# Patient Record
Sex: MALE | Race: WHITE | Employment: OTHER
[De-identification: names, ages, dates, MRNs, and addresses within clinical notes are randomized per-mention and may not be internally consistent; named-entity substitution may affect disease eponyms.]

---

## 2017-01-03 ENCOUNTER — TELEPHONE (OUTPATIENT)
Dept: UROLOGY | Facility: CLINIC | Age: 77
End: 2017-01-03

## 2017-01-11 ENCOUNTER — PROCEDURE VISIT (OUTPATIENT)
Dept: UROLOGY | Facility: CLINIC | Age: 77
End: 2017-01-11

## 2017-01-11 VITALS
SYSTOLIC BLOOD PRESSURE: 142 MMHG | DIASTOLIC BLOOD PRESSURE: 80 MMHG | HEIGHT: 71 IN | WEIGHT: 219 LBS | BODY MASS INDEX: 30.66 KG/M2

## 2017-01-11 DIAGNOSIS — N32.0 BNC (BLADDER NECK CONTRACTURE): ICD-10-CM

## 2017-01-11 DIAGNOSIS — R31.0 GROSS HEMATURIA: Primary | ICD-10-CM

## 2017-01-11 PROCEDURE — 52000 CYSTOURETHROSCOPY: CPT | Performed by: UROLOGY

## 2017-01-11 PROCEDURE — 99214 OFFICE O/P EST MOD 30 MIN: CPT | Performed by: UROLOGY

## 2017-01-11 ASSESSMENT — ENCOUNTER SYMPTOMS
NAUSEA: 0
ABDOMINAL PAIN: 0
SHORTNESS OF BREATH: 0
WHEEZING: 0
COUGH: 0
EYE PAIN: 0
COLOR CHANGE: 0
VOMITING: 0
BACK PAIN: 0
EYE REDNESS: 0

## 2017-01-16 ENCOUNTER — TELEPHONE (OUTPATIENT)
Dept: UROLOGY | Facility: CLINIC | Age: 77
End: 2017-01-16

## 2017-01-16 RX ORDER — NITROFURANTOIN 25; 75 MG/1; MG/1
100 CAPSULE ORAL 2 TIMES DAILY
Qty: 20 CAPSULE | Refills: 0 | Status: SHIPPED | OUTPATIENT
Start: 2017-01-24 | End: 2017-02-03

## 2017-02-07 ENCOUNTER — TELEPHONE (OUTPATIENT)
Dept: UROLOGY | Facility: CLINIC | Age: 77
End: 2017-02-07

## 2017-02-07 ENCOUNTER — NURSE ONLY (OUTPATIENT)
Dept: UROLOGY | Facility: CLINIC | Age: 77
End: 2017-02-07

## 2017-02-07 VITALS
WEIGHT: 216 LBS | DIASTOLIC BLOOD PRESSURE: 80 MMHG | TEMPERATURE: 98 F | HEIGHT: 71 IN | SYSTOLIC BLOOD PRESSURE: 120 MMHG | BODY MASS INDEX: 30.24 KG/M2

## 2017-02-07 DIAGNOSIS — Z98.890 POST-OPERATIVE STATE: Primary | ICD-10-CM

## 2017-02-07 DIAGNOSIS — R33.9 URINARY RETENTION WITH INCOMPLETE BLADDER EMPTYING: ICD-10-CM

## 2017-02-07 PROCEDURE — 51798 US URINE CAPACITY MEASURE: CPT | Performed by: PHYSICIAN ASSISTANT

## 2017-02-22 ENCOUNTER — OFFICE VISIT (OUTPATIENT)
Dept: UROLOGY | Facility: CLINIC | Age: 77
End: 2017-02-22

## 2017-02-22 VITALS
HEIGHT: 70 IN | SYSTOLIC BLOOD PRESSURE: 128 MMHG | WEIGHT: 217 LBS | DIASTOLIC BLOOD PRESSURE: 74 MMHG | BODY MASS INDEX: 31.07 KG/M2

## 2017-02-22 DIAGNOSIS — N40.1 BENIGN NON-NODULAR PROSTATIC HYPERPLASIA WITH LOWER URINARY TRACT SYMPTOMS: ICD-10-CM

## 2017-02-22 DIAGNOSIS — Z98.890 POST-OPERATIVE STATE: ICD-10-CM

## 2017-02-22 DIAGNOSIS — R33.9 URINARY RETENTION WITH INCOMPLETE BLADDER EMPTYING: ICD-10-CM

## 2017-02-22 DIAGNOSIS — N32.0 BNC (BLADDER NECK CONTRACTURE): Primary | ICD-10-CM

## 2017-02-22 PROCEDURE — 51798 US URINE CAPACITY MEASURE: CPT | Performed by: PHYSICIAN ASSISTANT

## 2017-02-22 PROCEDURE — 99024 POSTOP FOLLOW-UP VISIT: CPT | Performed by: PHYSICIAN ASSISTANT

## 2017-02-27 ENCOUNTER — TELEPHONE (OUTPATIENT)
Dept: UROLOGY | Facility: CLINIC | Age: 77
End: 2017-02-27

## 2017-02-27 RX ORDER — NITROFURANTOIN 25; 75 MG/1; MG/1
100 CAPSULE ORAL 2 TIMES DAILY
Qty: 20 CAPSULE | Refills: 0 | Status: SHIPPED | OUTPATIENT
Start: 2017-02-27 | End: 2017-03-01 | Stop reason: ALTCHOICE

## 2017-03-01 PROBLEM — N18.30 ACUTE RENAL FAILURE SUPERIMPOSED ON STAGE 3 CHRONIC KIDNEY DISEASE (HCC): Status: ACTIVE | Noted: 2017-03-01

## 2017-03-01 PROBLEM — N17.9 ACUTE RENAL FAILURE SUPERIMPOSED ON STAGE 3 CHRONIC KIDNEY DISEASE (HCC): Status: ACTIVE | Noted: 2017-03-01

## 2017-03-02 PROBLEM — A41.9 SEPSIS (HCC): Status: ACTIVE | Noted: 2017-03-02

## 2017-03-06 ENCOUNTER — TELEPHONE (OUTPATIENT)
Dept: UROLOGY | Facility: CLINIC | Age: 77
End: 2017-03-06

## 2017-03-13 ENCOUNTER — HOSPITAL ENCOUNTER (OUTPATIENT)
Dept: INFUSION THERAPY | Age: 77
Discharge: HOME | End: 2017-03-13

## 2017-03-17 ENCOUNTER — OFFICE VISIT (OUTPATIENT)
Dept: UROLOGY | Age: 77
End: 2017-03-17
Payer: MEDICARE

## 2017-03-17 ENCOUNTER — HOSPITAL ENCOUNTER (OUTPATIENT)
Age: 77
Discharge: HOME | End: 2017-03-17
Payer: MEDICARE

## 2017-03-17 VITALS
DIASTOLIC BLOOD PRESSURE: 72 MMHG | WEIGHT: 218 LBS | TEMPERATURE: 97.4 F | BODY MASS INDEX: 31.21 KG/M2 | HEIGHT: 70 IN | SYSTOLIC BLOOD PRESSURE: 124 MMHG

## 2017-03-17 DIAGNOSIS — N40.1 BPH WITH OBSTRUCTION/LOWER URINARY TRACT SYMPTOMS: ICD-10-CM

## 2017-03-17 DIAGNOSIS — N32.0 BNC (BLADDER NECK CONTRACTURE): Primary | ICD-10-CM

## 2017-03-17 DIAGNOSIS — N13.8 BPH WITH OBSTRUCTION/LOWER URINARY TRACT SYMPTOMS: ICD-10-CM

## 2017-03-17 LAB
ABSOLUTE EOS #: 0.2 K/UL (ref 0–0.4)
ABSOLUTE LYMPH #: 3 K/UL (ref 1–4.8)
ABSOLUTE MONO #: 1 K/UL (ref 0–1)
ANION GAP SERPL CALCULATED.3IONS-SCNC: 13 MMOL/L (ref 9–17)
BASOPHILS # BLD: 1 % (ref 0–2)
BASOPHILS ABSOLUTE: 0 K/UL (ref 0–0.2)
BUN BLDV-MCNC: 24 MG/DL (ref 8–23)
BUN/CREAT BLD: 19 (ref 9–20)
CALCIUM SERPL-MCNC: 9.4 MG/DL (ref 8.6–10.4)
CHLORIDE BLD-SCNC: 97 MMOL/L (ref 98–107)
CO2: 24 MMOL/L (ref 20–31)
CREAT SERPL-MCNC: 1.27 MG/DL (ref 0.7–1.2)
DIFFERENTIAL TYPE: NORMAL
EOSINOPHILS RELATIVE PERCENT: 2 % (ref 0–8)
GFR AFRICAN AMERICAN: >60 ML/MIN
GFR NON-AFRICAN AMERICAN: 55 ML/MIN
GFR SERPL CREATININE-BSD FRML MDRD: ABNORMAL ML/MIN/{1.73_M2}
GFR SERPL CREATININE-BSD FRML MDRD: ABNORMAL ML/MIN/{1.73_M2}
GLUCOSE BLD-MCNC: 92 MG/DL (ref 70–99)
HCT VFR BLD CALC: 42.6 % (ref 41–53)
HEMOGLOBIN: 14.2 G/DL (ref 13.5–17)
LYMPHOCYTES # BLD: 35 % (ref 24–44)
MCH RBC QN AUTO: 30.8 PG (ref 26–34)
MCHC RBC AUTO-ENTMCNC: 33.3 G/DL (ref 31–37)
MCV RBC AUTO: 92.6 FL (ref 80–100)
MONOCYTES # BLD: 12 % (ref 0–12)
PDW BLD-RTO: 14.4 % (ref 12.1–15.2)
PLATELET # BLD: 358 K/UL (ref 140–450)
PLATELET ESTIMATE: NORMAL
PMV BLD AUTO: 8.3 FL (ref 6–12)
POTASSIUM SERPL-SCNC: 5.2 MMOL/L (ref 3.7–5.3)
RBC # BLD: 4.6 M/UL (ref 4.5–5.9)
RBC # BLD: NORMAL 10*6/UL
SEG NEUTROPHILS: 50 % (ref 36–66)
SEGMENTED NEUTROPHILS ABSOLUTE COUNT: 4.4 K/UL (ref 1.8–7.7)
SODIUM BLD-SCNC: 134 MMOL/L (ref 135–144)
WBC # BLD: 8.7 K/UL (ref 3.5–11)
WBC # BLD: NORMAL 10*3/UL

## 2017-03-17 PROCEDURE — 1123F ACP DISCUSS/DSCN MKR DOCD: CPT | Performed by: UROLOGY

## 2017-03-17 PROCEDURE — 51798 US URINE CAPACITY MEASURE: CPT | Performed by: UROLOGY

## 2017-03-17 PROCEDURE — G8417 CALC BMI ABV UP PARAM F/U: HCPCS | Performed by: UROLOGY

## 2017-03-17 PROCEDURE — 1111F DSCHRG MED/CURRENT MED MERGE: CPT | Performed by: UROLOGY

## 2017-03-17 PROCEDURE — 1036F TOBACCO NON-USER: CPT | Performed by: UROLOGY

## 2017-03-17 PROCEDURE — 4040F PNEUMOC VAC/ADMIN/RCVD: CPT | Performed by: UROLOGY

## 2017-03-17 PROCEDURE — 80048 BASIC METABOLIC PNL TOTAL CA: CPT

## 2017-03-17 PROCEDURE — 85025 COMPLETE CBC W/AUTO DIFF WBC: CPT

## 2017-03-17 PROCEDURE — G8484 FLU IMMUNIZE NO ADMIN: HCPCS | Performed by: UROLOGY

## 2017-03-17 PROCEDURE — 36415 COLL VENOUS BLD VENIPUNCTURE: CPT

## 2017-03-17 PROCEDURE — G8427 DOCREV CUR MEDS BY ELIG CLIN: HCPCS | Performed by: UROLOGY

## 2017-03-17 PROCEDURE — 3017F COLORECTAL CA SCREEN DOC REV: CPT | Performed by: UROLOGY

## 2017-03-17 PROCEDURE — 99024 POSTOP FOLLOW-UP VISIT: CPT | Performed by: UROLOGY

## 2017-03-17 ASSESSMENT — ENCOUNTER SYMPTOMS
EYE PAIN: 0
COUGH: 0
ABDOMINAL PAIN: 0
BACK PAIN: 0
WHEEZING: 0
VOMITING: 0
SHORTNESS OF BREATH: 0
EYE REDNESS: 0
NAUSEA: 0
COLOR CHANGE: 0

## 2017-05-15 ENCOUNTER — HOSPITAL ENCOUNTER (OUTPATIENT)
Age: 77
Discharge: HOME | End: 2017-05-15
Payer: MEDICARE

## 2017-05-15 DIAGNOSIS — R33.9 URINARY RETENTION WITH INCOMPLETE BLADDER EMPTYING: ICD-10-CM

## 2017-05-15 DIAGNOSIS — N40.1 BENIGN NON-NODULAR PROSTATIC HYPERPLASIA WITH LOWER URINARY TRACT SYMPTOMS: ICD-10-CM

## 2017-05-15 LAB
ANION GAP SERPL CALCULATED.3IONS-SCNC: 14 MMOL/L (ref 9–17)
BUN BLDV-MCNC: 19 MG/DL (ref 8–23)
BUN/CREAT BLD: 17 (ref 9–20)
CALCIUM SERPL-MCNC: 9.8 MG/DL (ref 8.6–10.4)
CHLORIDE BLD-SCNC: 99 MMOL/L (ref 98–107)
CO2: 25 MMOL/L (ref 20–31)
CREAT SERPL-MCNC: 1.13 MG/DL (ref 0.7–1.2)
GFR AFRICAN AMERICAN: >60 ML/MIN
GFR NON-AFRICAN AMERICAN: >60 ML/MIN
GFR SERPL CREATININE-BSD FRML MDRD: NORMAL ML/MIN/{1.73_M2}
GFR SERPL CREATININE-BSD FRML MDRD: NORMAL ML/MIN/{1.73_M2}
GLUCOSE BLD-MCNC: 87 MG/DL (ref 70–99)
POTASSIUM SERPL-SCNC: 5.2 MMOL/L (ref 3.7–5.3)
SODIUM BLD-SCNC: 138 MMOL/L (ref 135–144)

## 2017-05-15 PROCEDURE — 80048 BASIC METABOLIC PNL TOTAL CA: CPT

## 2017-05-15 PROCEDURE — 36415 COLL VENOUS BLD VENIPUNCTURE: CPT

## 2017-05-17 ENCOUNTER — HOSPITAL ENCOUNTER (OUTPATIENT)
Age: 77
Setting detail: SPECIMEN
Discharge: HOME | End: 2017-05-17
Payer: MEDICARE

## 2017-05-17 ENCOUNTER — OFFICE VISIT (OUTPATIENT)
Dept: UROLOGY | Age: 77
End: 2017-05-17
Payer: MEDICARE

## 2017-05-17 VITALS
BODY MASS INDEX: 31.07 KG/M2 | WEIGHT: 217 LBS | DIASTOLIC BLOOD PRESSURE: 70 MMHG | HEIGHT: 70 IN | SYSTOLIC BLOOD PRESSURE: 106 MMHG

## 2017-05-17 DIAGNOSIS — N13.8 BPH WITH OBSTRUCTION/LOWER URINARY TRACT SYMPTOMS: ICD-10-CM

## 2017-05-17 DIAGNOSIS — N40.1 BPH WITH OBSTRUCTION/LOWER URINARY TRACT SYMPTOMS: ICD-10-CM

## 2017-05-17 DIAGNOSIS — N32.0 BNC (BLADDER NECK CONTRACTURE): Primary | ICD-10-CM

## 2017-05-17 DIAGNOSIS — R33.9 URINARY RETENTION WITH INCOMPLETE BLADDER EMPTYING: ICD-10-CM

## 2017-05-17 DIAGNOSIS — N28.9 RENAL INSUFFICIENCY: ICD-10-CM

## 2017-05-17 DIAGNOSIS — N32.0 BNC (BLADDER NECK CONTRACTURE): ICD-10-CM

## 2017-05-17 LAB
-: ABNORMAL
AMORPHOUS: ABNORMAL
BACTERIA: ABNORMAL
BILIRUBIN URINE: NEGATIVE
CASTS UA: ABNORMAL /LPF
COLOR: YELLOW
COMMENT UA: ABNORMAL
CRYSTALS, UA: ABNORMAL /HPF
EPITHELIAL CELLS UA: ABNORMAL /HPF (ref 0–5)
GLUCOSE URINE: NEGATIVE
KETONES, URINE: NEGATIVE
LEUKOCYTE ESTERASE, URINE: ABNORMAL
MUCUS: ABNORMAL
NITRITE, URINE: POSITIVE
OTHER OBSERVATIONS UA: ABNORMAL
PH UA: 6 (ref 5–9)
PROTEIN UA: NEGATIVE
RBC UA: ABNORMAL /HPF (ref 0–2)
RENAL EPITHELIAL, UA: ABNORMAL /HPF
SPECIFIC GRAVITY UA: 1.02 (ref 1.01–1.02)
TRICHOMONAS: ABNORMAL
TURBIDITY: CLEAR
URINE HGB: ABNORMAL
UROBILINOGEN, URINE: NORMAL
WBC UA: ABNORMAL /HPF (ref 0–5)
YEAST: ABNORMAL

## 2017-05-17 PROCEDURE — G8417 CALC BMI ABV UP PARAM F/U: HCPCS | Performed by: PHYSICIAN ASSISTANT

## 2017-05-17 PROCEDURE — 99214 OFFICE O/P EST MOD 30 MIN: CPT | Performed by: PHYSICIAN ASSISTANT

## 2017-05-17 PROCEDURE — 4040F PNEUMOC VAC/ADMIN/RCVD: CPT | Performed by: PHYSICIAN ASSISTANT

## 2017-05-17 PROCEDURE — 3017F COLORECTAL CA SCREEN DOC REV: CPT | Performed by: PHYSICIAN ASSISTANT

## 2017-05-17 PROCEDURE — 87186 SC STD MICRODIL/AGAR DIL: CPT

## 2017-05-17 PROCEDURE — 51741 ELECTRO-UROFLOWMETRY FIRST: CPT | Performed by: PHYSICIAN ASSISTANT

## 2017-05-17 PROCEDURE — 87077 CULTURE AEROBIC IDENTIFY: CPT

## 2017-05-17 PROCEDURE — G8427 DOCREV CUR MEDS BY ELIG CLIN: HCPCS | Performed by: PHYSICIAN ASSISTANT

## 2017-05-17 PROCEDURE — 1036F TOBACCO NON-USER: CPT | Performed by: PHYSICIAN ASSISTANT

## 2017-05-17 PROCEDURE — 87086 URINE CULTURE/COLONY COUNT: CPT

## 2017-05-17 PROCEDURE — 81001 URINALYSIS AUTO W/SCOPE: CPT

## 2017-05-17 PROCEDURE — 1123F ACP DISCUSS/DSCN MKR DOCD: CPT | Performed by: PHYSICIAN ASSISTANT

## 2017-05-17 ASSESSMENT — ENCOUNTER SYMPTOMS
DIARRHEA: 0
SHORTNESS OF BREATH: 0
NAUSEA: 0
CONSTIPATION: 0
ABDOMINAL PAIN: 0
VOMITING: 0
ABDOMINAL DISTENTION: 0
COLOR CHANGE: 0

## 2017-05-20 LAB
CULTURE: ABNORMAL
CULTURE: ABNORMAL
Lab: ABNORMAL
Lab: ABNORMAL
ORGANISM: ABNORMAL
SPECIMEN DESCRIPTION: ABNORMAL
SPECIMEN DESCRIPTION: ABNORMAL
STATUS: ABNORMAL

## 2017-10-30 ENCOUNTER — TELEPHONE (OUTPATIENT)
Dept: UROLOGY | Age: 77
End: 2017-10-30

## 2017-10-30 NOTE — TELEPHONE ENCOUNTER
Patient had wife drop off disk of stone and results. He was having abdominal pain and vomiting. He was put on Levofloxacin 500mg daily X 7 days, which he finished Thursday or Friday. He has no pain now. He didn't see where he passed it, but he self caths once a day. He has an appointment 11/20/17. Do you want to see him sooner?

## 2017-10-31 ENCOUNTER — HOSPITAL ENCOUNTER (OUTPATIENT)
Age: 77
Setting detail: SPECIMEN
Discharge: HOME | DRG: 698 | End: 2017-10-31
Payer: MEDICARE

## 2017-10-31 ENCOUNTER — OFFICE VISIT (OUTPATIENT)
Dept: UROLOGY | Age: 77
End: 2017-10-31
Payer: MEDICARE

## 2017-10-31 ENCOUNTER — ANESTHESIA EVENT (OUTPATIENT)
Dept: OPERATING ROOM | Age: 77
DRG: 698 | End: 2017-10-31
Payer: MEDICARE

## 2017-10-31 VITALS
SYSTOLIC BLOOD PRESSURE: 120 MMHG | WEIGHT: 214 LBS | TEMPERATURE: 97.9 F | HEIGHT: 70 IN | DIASTOLIC BLOOD PRESSURE: 80 MMHG | BODY MASS INDEX: 30.64 KG/M2

## 2017-10-31 DIAGNOSIS — N13.2 URETERAL STONE WITH HYDRONEPHROSIS: Primary | ICD-10-CM

## 2017-10-31 DIAGNOSIS — N20.0 RENAL STONE: ICD-10-CM

## 2017-10-31 DIAGNOSIS — N13.2 URETERAL STONE WITH HYDRONEPHROSIS: ICD-10-CM

## 2017-10-31 LAB
-: ABNORMAL
AMORPHOUS: ABNORMAL
BACTERIA: ABNORMAL
BILIRUBIN URINE: NEGATIVE
CASTS UA: ABNORMAL /LPF
COLOR: YELLOW
COMMENT UA: ABNORMAL
CRYSTALS, UA: ABNORMAL /HPF
EPITHELIAL CELLS UA: ABNORMAL /HPF (ref 0–5)
GLUCOSE URINE: NEGATIVE
KETONES, URINE: NEGATIVE
LEUKOCYTE ESTERASE, URINE: ABNORMAL
MUCUS: ABNORMAL
NITRITE, URINE: POSITIVE
OTHER OBSERVATIONS UA: ABNORMAL
PH UA: 6 (ref 5–9)
PROTEIN UA: NEGATIVE
RBC UA: ABNORMAL /HPF (ref 0–2)
RENAL EPITHELIAL, UA: ABNORMAL /HPF
SPECIFIC GRAVITY UA: 1.01 (ref 1.01–1.02)
TRICHOMONAS: ABNORMAL
TURBIDITY: CLEAR
URINE HGB: ABNORMAL
UROBILINOGEN, URINE: NORMAL
WBC UA: ABNORMAL /HPF (ref 0–5)
YEAST: ABNORMAL

## 2017-10-31 PROCEDURE — 87186 SC STD MICRODIL/AGAR DIL: CPT

## 2017-10-31 PROCEDURE — 3017F COLORECTAL CA SCREEN DOC REV: CPT | Performed by: NURSE PRACTITIONER

## 2017-10-31 PROCEDURE — 81001 URINALYSIS AUTO W/SCOPE: CPT

## 2017-10-31 PROCEDURE — 1036F TOBACCO NON-USER: CPT | Performed by: NURSE PRACTITIONER

## 2017-10-31 PROCEDURE — 87077 CULTURE AEROBIC IDENTIFY: CPT

## 2017-10-31 PROCEDURE — G8417 CALC BMI ABV UP PARAM F/U: HCPCS | Performed by: NURSE PRACTITIONER

## 2017-10-31 PROCEDURE — 4040F PNEUMOC VAC/ADMIN/RCVD: CPT | Performed by: NURSE PRACTITIONER

## 2017-10-31 PROCEDURE — 99214 OFFICE O/P EST MOD 30 MIN: CPT | Performed by: NURSE PRACTITIONER

## 2017-10-31 PROCEDURE — 87086 URINE CULTURE/COLONY COUNT: CPT

## 2017-10-31 PROCEDURE — 1123F ACP DISCUSS/DSCN MKR DOCD: CPT | Performed by: NURSE PRACTITIONER

## 2017-10-31 PROCEDURE — G8484 FLU IMMUNIZE NO ADMIN: HCPCS | Performed by: NURSE PRACTITIONER

## 2017-10-31 PROCEDURE — G8427 DOCREV CUR MEDS BY ELIG CLIN: HCPCS | Performed by: NURSE PRACTITIONER

## 2017-10-31 RX ORDER — TAMSULOSIN HYDROCHLORIDE 0.4 MG/1
0.4 CAPSULE ORAL EVERY EVENING
Qty: 30 CAPSULE | Refills: 0 | Status: SHIPPED | OUTPATIENT
Start: 2017-10-31 | End: 2018-01-02 | Stop reason: ALTCHOICE

## 2017-10-31 ASSESSMENT — ENCOUNTER SYMPTOMS
COLOR CHANGE: 0
CONSTIPATION: 0
WHEEZING: 0
SHORTNESS OF BREATH: 0
EYE PAIN: 0
ABDOMINAL PAIN: 0
NAUSEA: 0
VOMITING: 0
COUGH: 0
BACK PAIN: 0
EYE REDNESS: 0

## 2017-10-31 NOTE — PROGRESS NOTES
Subjective:      Patient ID: Codey Reilly is a 78 y.o. male. HPI  Patient is a 78 y.o. male in no acute distress and alert and oriented to person, place and time. History  s/p Greenlight PVP Jan 2015. PVR remained elevated post-op but was improving: Feb 839 mL, March 600 mL, April 505 mL, July 452 mL, Oct 252 mL. Flomax dc'd in July 2015. At annual f/u Oct 2016 PVR back up, >850 mL. Pt does not feel full or uncomfortable. He did self cath previously. He does report intermittent urine stream about half the time, mild straining. Offered pt option of resuming Flomax or resuming self cath qhs. Pt prefered to start self cath. Pt reported gross hematuria and some difficulty with cathing. Cysto showed small caliber BNC. S/p Greenlight PVP and TUIBNC with Greenlight Laser on 1/31/17. Pt has been cathing nightly since (volumes range from 3-28 oz). Today  Here today to follow-up due to recent abdominal pain and vomiting. His primary care provider did obtain a CT scan. This was independently reviewed and does show a 6 mm proximal right ureteral stone with hydronephrosis. There is a 10 mm nonobstructing stone in the left kidney without hydroureter nephrosis. This stone is visible on a KUB. He was started on Levaquin ×7 days. BUN 26, creatinine 1.85, GFR 36. Renal function has declined since 5/2017 when renal function was: BUN 19, creatinine 1.13, GFR >60. WBC 8.7. In office urinalysis showed small leukocytes and negative nitrites. He denies any abdominal pain, vomiting, or fevers since completing the course of Levaquin. He denies any dysuria or gross hematuria.    Past Medical History:   Diagnosis Date    Acute renal failure superimposed on stage 3 chronic kidney disease ([address] 3/1/2017    Arthritis     Diabetes mellitus ([address]     Hyperlipidemia     Hypertension      Past Surgical History:   Procedure Laterality Date    CATARACT REMOVAL WITH IMPLANT Right 8/26/2013    HERNIA REPAIR umbilical 3712    JOINT REPLACEMENT      right knee march 22, 2012    JOINT REPLACEMENT Right junu 2014 partial knee    JOINT REPLACEMENT Right 2014    complete removal    KNEE SURGERY Right     x 2    TURP  01/2017    Greenlight PVP and TUIBNC    TURP  01/2015    Greenlight PVP     History reviewed. No pertinent family history. Current Outpatient Prescriptions on File Prior to Visit   Medication Sig Dispense Refill    docusate sodium (COLACE) 100 MG capsule Take 1 capsule by mouth daily as needed for Constipation 20 capsule 0    metFORMIN (GLUCOPHAGE) 850 MG tablet Take 850 mg by mouth 2 times daily (with meals)       Fish Oil-Cholecalciferol (FISH OIL + D3 PO) Take  by mouth daily.  Pediatric Multivitamins-Fl (MULTI VIT/FL PO) Take  by mouth. Daily      Polyethylene Glycol 3350 (MIRALAX PO) Take  by mouth. Prn      lisinopril (PRINIVIL;ZESTRIL) 10 MG tablet Take 10 mg by mouth daily.  aspirin 81 MG tablet Take 81 mg by mouth daily.  loratadine (CLARITIN) 10 MG tablet   Take 10 mg by mouth daily prn       No current facility-administered medications on file prior to visit. Outpatient Encounter Prescriptions as of 10/31/2017   Medication Sig Dispense Refill    docusate sodium (COLACE) 100 MG capsule Take 1 capsule by mouth daily as needed for Constipation 20 capsule 0    metFORMIN (GLUCOPHAGE) 850 MG tablet Take 850 mg by mouth 2 times daily (with meals)       Fish Oil-Cholecalciferol (FISH OIL + D3 PO) Take  by mouth daily.  Pediatric Multivitamins-Fl (MULTI VIT/FL PO) Take  by mouth. Daily      Polyethylene Glycol 3350 (MIRALAX PO) Take  by mouth. Prn      lisinopril (PRINIVIL;ZESTRIL) 10 MG tablet Take 10 mg by mouth daily.  aspirin 81 MG tablet Take 81 mg by mouth daily.  loratadine (CLARITIN) 10 MG tablet   Take 10 mg by mouth daily prn       No facility-administered encounter medications on file as of 10/31/2017.       Review of patient's allergies indicates no known allergies. History   Smoking Status    Former Smoker   Smokeless Tobacco    Never Used     Comment: quit 50 years ago       History   Alcohol Use No       Vitals:    10/31/17 0926   BP: 120/80   Temp: 97.9 °F (36.6 °C)       Constitutional: Patient in no acute distress; Neuro: alert and oriented to person place and time. Psych: Mood and affect normal.  Skin: Normal  Lungs: Respiratory effort normal  Cardiovascular:  Normal peripheral pulses  Abdomen: Soft, non-tender, non-distended with no CVA, flank pain, hepatosplenomegaly or hernia. Kidneys normal.  Bladder non-tender and not distended. Lymphatics: no palpable lymphadenopathy  Penis normal  Urethral meatus normal  Scrotal exam normal  Testicles normal bilaterally      No results found for: PSA  Lab Results   Component Value Date    BUN 19 05/15/2017     Lab Results   Component Value Date    CREATININE 1.13 05/15/2017       No results found for this visit on 10/31/17. Review of Systems   Constitutional: Negative for appetite change, chills and fever. Eyes: Negative for pain, redness and visual disturbance. Respiratory: Negative for cough, shortness of breath and wheezing. Cardiovascular: Negative for chest pain and leg swelling. Gastrointestinal: Negative for abdominal pain, constipation, nausea and vomiting. Genitourinary: Positive for difficulty urinating (CISC). Negative for decreased urine volume, discharge, dysuria, enuresis, flank pain, frequency, hematuria, penile pain, scrotal swelling, testicular pain and urgency. Musculoskeletal: Negative for back pain, joint swelling and myalgias. Skin: Negative for color change, rash and wound. Neurological: Negative for dizziness, tremors and numbness. Hematological: Negative for adenopathy. Does not bruise/bleed easily. Objective:   Physical Exam    Assessment:      1.  Ureteral stone with hydronephrosis  Urinalysis with Microscopic    Urine culture clean catch tamsulosin (FLOMAX) 0.4 MG capsule   2. Renal stone  Urinalysis with Microscopic    Urine culture clean catch    tamsulosin (FLOMAX) 0.4 MG capsule           Plan:      Discussed risks and benefits of HLL and stent placement (including bleeding, infection, injury to  tract, and need for multiple procedures), details of procedure, and what to expect post-op. Pt amenable to schedule. We will plan in left ESWL in the near future as well.

## 2017-10-31 NOTE — PATIENT INSTRUCTIONS
Thank you for enrolling in [address] Please follow the instructions below to securely access your online medical record. [employer] allows you to send messages to your doctor, view your test results, renew your prescriptions, schedule appointments, and more. How Do I Sign Up? 1. In your Internet browser, go to [email or web address]  2. Click on the Sign Up Now link in the Sign In box. You will see the New Member Sign Up page. 3. Enter your [employer] Access Code exactly as it appears below. You will not need to use this code after youve completed the sign-up process. If you do not sign up before the expiration date, you must request a new code. [employer] Access Code: Activation code not generated  Current [employer] Status: Patient Declined    4. Enter your Social Security Number ([id number]) and Date of Birth (mm/dd/yyyy) as indicated and click Submit. You will be taken to the next sign-up page. 5. Create a [employer] ID. This will be your [employer] login ID and cannot be changed, so think of one that is secure and easy to remember. 6. Create a [employer] password. You can change your password at any time. 7. Enter your Password Reset Question and Answer. This can be used at a later time if you forget your password. 8. Enter your e-mail address. You will receive e-mail notification when new information is available in [address] 9. Click Sign Up. You can now view your medical record. Additional Information  If you have questions, please contact your physician practice where you receive care. Remember, [employer] is NOT to be used for urgent needs. For medical emergencies, dial 911.

## 2017-11-01 ENCOUNTER — ANESTHESIA (OUTPATIENT)
Dept: OPERATING ROOM | Age: 77
DRG: 698 | End: 2017-11-01
Payer: MEDICARE

## 2017-11-01 ENCOUNTER — HOSPITAL ENCOUNTER (OUTPATIENT)
Age: 77
Setting detail: OUTPATIENT SURGERY
Discharge: HOME | DRG: 698 | End: 2017-11-01
Attending: UROLOGY | Admitting: UROLOGY
Payer: MEDICARE

## 2017-11-01 ENCOUNTER — APPOINTMENT (OUTPATIENT)
Dept: GENERAL RADIOLOGY | Age: 77
DRG: 698 | End: 2017-11-01
Attending: UROLOGY
Payer: MEDICARE

## 2017-11-01 VITALS
OXYGEN SATURATION: 98 % | SYSTOLIC BLOOD PRESSURE: 109 MMHG | HEIGHT: 70 IN | DIASTOLIC BLOOD PRESSURE: 66 MMHG | BODY MASS INDEX: 30.64 KG/M2 | TEMPERATURE: 96.8 F | RESPIRATION RATE: 18 BRPM | HEART RATE: 75 BPM | WEIGHT: 214 LBS

## 2017-11-01 VITALS — TEMPERATURE: 97.9 F | OXYGEN SATURATION: 97 % | SYSTOLIC BLOOD PRESSURE: 100 MMHG | DIASTOLIC BLOOD PRESSURE: 59 MMHG

## 2017-11-01 DIAGNOSIS — N20.0 RENAL CALCULUS: ICD-10-CM

## 2017-11-01 PROBLEM — N20.1 URETERAL CALCULUS: Status: ACTIVE | Noted: 2017-11-01

## 2017-11-01 PROCEDURE — 7100000001 HC PACU RECOVERY - ADDTL 15 MIN: Performed by: UROLOGY

## 2017-11-01 PROCEDURE — 6360000002 HC RX W HCPCS: Performed by: NURSE ANESTHETIST, CERTIFIED REGISTERED

## 2017-11-01 PROCEDURE — 74000 XR ABDOMEN LIMITED (KUB): CPT

## 2017-11-01 PROCEDURE — 2500000003 HC RX 250 WO HCPCS: Performed by: NURSE ANESTHETIST, CERTIFIED REGISTERED

## 2017-11-01 PROCEDURE — C1769 GUIDE WIRE: HCPCS | Performed by: UROLOGY

## 2017-11-01 PROCEDURE — 7100000010 HC PHASE II RECOVERY - FIRST 15 MIN: Performed by: UROLOGY

## 2017-11-01 PROCEDURE — 3700000001 HC ADD 15 MINUTES (ANESTHESIA): Performed by: UROLOGY

## 2017-11-01 PROCEDURE — 7100000000 HC PACU RECOVERY - FIRST 15 MIN: Performed by: UROLOGY

## 2017-11-01 PROCEDURE — 7100000011 HC PHASE II RECOVERY - ADDTL 15 MIN: Performed by: UROLOGY

## 2017-11-01 PROCEDURE — 6370000000 HC RX 637 (ALT 250 FOR IP): Performed by: UROLOGY

## 2017-11-01 PROCEDURE — C2617 STENT, NON-COR, TEM W/O DEL: HCPCS | Performed by: UROLOGY

## 2017-11-01 PROCEDURE — 3700000000 HC ANESTHESIA ATTENDED CARE: Performed by: UROLOGY

## 2017-11-01 PROCEDURE — 6360000002 HC RX W HCPCS: Performed by: UROLOGY

## 2017-11-01 PROCEDURE — 3600000014 HC SURGERY LEVEL 4 ADDTL 15MIN: Performed by: UROLOGY

## 2017-11-01 PROCEDURE — 3600000004 HC SURGERY LEVEL 4 BASE: Performed by: UROLOGY

## 2017-11-01 PROCEDURE — 2580000003 HC RX 258: Performed by: UROLOGY

## 2017-11-01 DEVICE — URETERAL STENT
Type: IMPLANTABLE DEVICE | Site: URETER | Status: FUNCTIONAL
Brand: PERCUFLEX™ PLUS

## 2017-11-01 RX ORDER — METOCLOPRAMIDE HYDROCHLORIDE 5 MG/ML
10 INJECTION INTRAMUSCULAR; INTRAVENOUS
Status: DISCONTINUED | OUTPATIENT
Start: 2017-11-01 | End: 2017-11-01 | Stop reason: HOSPADM

## 2017-11-01 RX ORDER — PROPOFOL 10 MG/ML
INJECTION, EMULSION INTRAVENOUS PRN
Status: DISCONTINUED | OUTPATIENT
Start: 2017-11-01 | End: 2017-11-01 | Stop reason: SDUPTHER

## 2017-11-01 RX ORDER — KETOROLAC TROMETHAMINE 30 MG/ML
INJECTION, SOLUTION INTRAMUSCULAR; INTRAVENOUS PRN
Status: DISCONTINUED | OUTPATIENT
Start: 2017-11-01 | End: 2017-11-01 | Stop reason: SDUPTHER

## 2017-11-01 RX ORDER — FENTANYL CITRATE 50 UG/ML
INJECTION, SOLUTION INTRAMUSCULAR; INTRAVENOUS PRN
Status: DISCONTINUED | OUTPATIENT
Start: 2017-11-01 | End: 2017-11-01 | Stop reason: SDUPTHER

## 2017-11-01 RX ORDER — CIPROFLOXACIN 2 MG/ML
400 INJECTION, SOLUTION INTRAVENOUS
Status: COMPLETED | OUTPATIENT
Start: 2017-11-01 | End: 2017-11-01

## 2017-11-01 RX ORDER — SODIUM CHLORIDE, SODIUM LACTATE, POTASSIUM CHLORIDE, CALCIUM CHLORIDE 600; 310; 30; 20 MG/100ML; MG/100ML; MG/100ML; MG/100ML
INJECTION, SOLUTION INTRAVENOUS CONTINUOUS
Status: DISCONTINUED | OUTPATIENT
Start: 2017-11-01 | End: 2017-11-01 | Stop reason: HOSPADM

## 2017-11-01 RX ORDER — DEXAMETHASONE SODIUM PHOSPHATE 4 MG/ML
INJECTION, SOLUTION INTRA-ARTICULAR; INTRALESIONAL; INTRAMUSCULAR; INTRAVENOUS; SOFT TISSUE PRN
Status: DISCONTINUED | OUTPATIENT
Start: 2017-11-01 | End: 2017-11-01 | Stop reason: SDUPTHER

## 2017-11-01 RX ORDER — OXYCODONE HYDROCHLORIDE AND ACETAMINOPHEN 5; 325 MG/1; MG/1
2 TABLET ORAL
Status: DISCONTINUED | OUTPATIENT
Start: 2017-11-01 | End: 2017-11-01 | Stop reason: HOSPADM

## 2017-11-01 RX ORDER — ONDANSETRON 2 MG/ML
INJECTION INTRAMUSCULAR; INTRAVENOUS PRN
Status: DISCONTINUED | OUTPATIENT
Start: 2017-11-01 | End: 2017-11-01 | Stop reason: SDUPTHER

## 2017-11-01 RX ORDER — LIDOCAINE HYDROCHLORIDE 20 MG/ML
INJECTION, SOLUTION INFILTRATION; PERINEURAL PRN
Status: DISCONTINUED | OUTPATIENT
Start: 2017-11-01 | End: 2017-11-01 | Stop reason: SDUPTHER

## 2017-11-01 RX ORDER — PROMETHAZINE HYDROCHLORIDE 25 MG/ML
6.25 INJECTION, SOLUTION INTRAMUSCULAR; INTRAVENOUS
Status: DISCONTINUED | OUTPATIENT
Start: 2017-11-01 | End: 2017-11-01 | Stop reason: HOSPADM

## 2017-11-01 RX ADMIN — FENTANYL CITRATE 50 MCG: 50 INJECTION INTRAMUSCULAR; INTRAVENOUS at 12:30

## 2017-11-01 RX ADMIN — ONDANSETRON 4 MG: 2 INJECTION INTRAMUSCULAR; INTRAVENOUS at 12:30

## 2017-11-01 RX ADMIN — KETOROLAC TROMETHAMINE 30 MG: 30 INJECTION, SOLUTION INTRAMUSCULAR; INTRAVENOUS at 12:30

## 2017-11-01 RX ADMIN — PHENYLEPHRINE HYDROCHLORIDE 200 MCG: 10 INJECTION INTRAVENOUS at 12:55

## 2017-11-01 RX ADMIN — SODIUM CHLORIDE, POTASSIUM CHLORIDE, SODIUM LACTATE AND CALCIUM CHLORIDE: 600; 310; 30; 20 INJECTION, SOLUTION INTRAVENOUS at 11:29

## 2017-11-01 RX ADMIN — PHENYLEPHRINE HYDROCHLORIDE 200 MCG: 10 INJECTION INTRAVENOUS at 12:35

## 2017-11-01 RX ADMIN — CIPROFLOXACIN 400 MG: 2 INJECTION, SOLUTION INTRAVENOUS at 12:11

## 2017-11-01 RX ADMIN — LIDOCAINE HYDROCHLORIDE 100 MG: 20 INJECTION, SOLUTION INFILTRATION; PERINEURAL at 12:20

## 2017-11-01 RX ADMIN — PROPOFOL 160 MG: 10 INJECTION, EMULSION INTRAVENOUS at 12:20

## 2017-11-01 RX ADMIN — PHENYLEPHRINE HYDROCHLORIDE 200 MCG: 10 INJECTION INTRAVENOUS at 12:50

## 2017-11-01 RX ADMIN — DEXAMETHASONE SODIUM PHOSPHATE 8 MG: 4 INJECTION, SOLUTION INTRAMUSCULAR; INTRAVENOUS at 12:30

## 2017-11-01 RX ADMIN — PHENYLEPHRINE HYDROCHLORIDE 200 MCG: 10 INJECTION INTRAVENOUS at 12:30

## 2017-11-01 ASSESSMENT — PAIN SCALES - GENERAL
PAINLEVEL_OUTOF10: 0

## 2017-11-01 ASSESSMENT — PAIN - FUNCTIONAL ASSESSMENT: PAIN_FUNCTIONAL_ASSESSMENT: 0-10

## 2017-11-01 NOTE — H&P
evening 10/31/17   CHILANGO Delgado   docusate sodium (COLACE) 100 MG capsule Take 1 capsule by mouth daily as needed for Constipation 1/31/17   Meagan Interiano MD   metFORMIN (GLUCOPHAGE) 850 MG tablet Take 850 mg by mouth 2 times daily (with meals)  9/12/16   Historical Provider, MD   Fish Oil-Cholecalciferol (FISH OIL + D3 PO) Take  by mouth daily. Historical Provider, MD   Pediatric Multivitamins-Fl (MULTI VIT/FL PO) Take  by mouth. Daily    Historical Provider, MD   Polyethylene Glycol 3350 (MIRALAX PO) Take  by mouth. Prn    Historical Provider, MD   lisinopril (PRINIVIL;ZESTRIL) 10 MG tablet Take 10 mg by mouth daily. Historical Provider, MD   aspirin 81 MG tablet Take 81 mg by mouth daily. Historical Provider, MD   loratadine (CLARITIN) 10 MG tablet   Take 10 mg by mouth daily prn    Historical Provider, MD       Allergies:  Review of patient's allergies indicates no known allergies. Social History:    Social History     Social History    Marital status:      Spouse name: N/A    Number of children: N/A    Years of education: N/A     Occupational History    Not on file. Social History Main Topics    Smoking status: Former Smoker    Smokeless tobacco: Never Used      Comment: quit 50 years ago    Alcohol use No    Drug use: No    Sexual activity: Not on file     Other Topics Concern    Not on file     Social History Narrative    No narrative on file       Family History:  History reviewed. No pertinent family history. REVIEW OF SYSTEMS:  All systems reviewed and negative except for that already noted in the HPI. Physical Exam:      Patient Vitals for the past 24 hrs:   Height Weight   10/31/17 1420 5' 10\" (1.778 m) 214 lb (97.1 kg)     Constitutional: Patient in no acute distress; Neuro: alert and oriented to person place and time.     Psych: Mood and affect normal.  Skin: Normal  Lungs: Respiratory effort normal  Cardiovascular:  Normal peripheral

## 2017-11-01 NOTE — ANESTHESIA PRE PROCEDURE
Department of Anesthesiology  Preprocedure Note       Name:  Denise Rivers   Age:  78 y.o.  :  1940                                          MRN:  [id number]         Date:  2017      Surgeon: Jennie Peralta):  Gaetano Sprague MD    Procedure: Procedure(s):  CYSTOSCOPY URETEROSCOPY LASER-WITH HLL  CYSTOSCOPY STENT INSERTION    Medications prior to admission:   Prior to Admission medications    Medication Sig Start Date End Date Taking? Authorizing Provider   tamsulosin (FLOMAX) 0.4 MG capsule Take 1 capsule by mouth every evening 10/31/17  Yes CHILANGO Ortega   metFORMIN (GLUCOPHAGE) 850 MG tablet Take 850 mg by mouth 2 times daily (with meals)  16  Yes Historical Provider, MD   Fish Oil-Cholecalciferol (FISH OIL + D3 PO) Take  by mouth daily. Yes Historical Provider, MD   Pediatric Multivitamins-Fl (MULTI VIT/FL PO) Take  by mouth. Daily   Yes Historical Provider, MD   lisinopril (PRINIVIL;ZESTRIL) 10 MG tablet Take 10 mg by mouth daily. Yes Historical Provider, MD   aspirin 81 MG tablet Take 81 mg by mouth daily. Yes Historical Provider, MD   loratadine (CLARITIN) 10 MG tablet   Take 10 mg by mouth daily prn   Yes Historical Provider, MD   Polyethylene Glycol 3350 (MIRALAX PO) Take  by mouth.  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 and other lower urinary tract symptoms (LUTS)(600.21) N40.1    UTI (urinary tract infection) N39.0    Gross hematuria R31.0    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    Acute renal failure superimposed on stage 3 chronic kidney disease (HCC) N17.9, N18.3    Sepsis ([facility] A41.9    Diabetes mellitus ([facility] E11.9    Hypertension I10    Ureteral calculus N20.1       Past Medical History:        Diagnosis Date    Acute renal failure superimposed on stage 3 chronic kidney disease ([facility] 3/1/2017    Arthritis     Diabetes mellitus ([facility]     Hyperlipidemia     Hypertension        Past Surgical History:        Procedure Laterality Date    CATARACT REMOVAL WITH IMPLANT Right 8/26/2013    HERNIA REPAIR      umbilical 3175    JOINT REPLACEMENT      right knee march 22, 2012    JOINT REPLACEMENT Right junu 2014 partial knee    JOINT REPLACEMENT Right 2014    complete removal    KNEE SURGERY Right     x 2    TURP  01/2017    Greenlight PVP and TUIBNC    TURP  01/2015    Greenlight PVP       Social History:    Social History   Substance Use Topics    Smoking status: Former Smoker     Packs/day: 0.50     Years: 3.00    Smokeless tobacco: Never Used      Comment: quit 50 years ago    Alcohol use No                                Counseling given: Not Answered      Vital Signs (Current):   Vitals:    10/31/17 1420 11/01/17 1118 11/01/17 1124   BP:   110/86   Pulse:   92   Resp:   16   Temp:   36.9 °C (98.4 °F)   TempSrc:   Temporal   SpO2:   97%   Weight: 214 lb (97.1 kg) 214 lb (97.1 kg)    Height: 5' 10\" (1.778 m) 5' 10\" (1.778 m)                                               BP Readings from Last 3 Encounters:   11/01/17 110/86   10/31/17 120/80   05/17/17 106/70       NPO Status: Time of last liquid consumption: 2100                        Time of last solid consumption: 2100                        Date of last liquid consumption: 10/31/17                        Date of last solid food consumption: 10/31/17    BMI:   Wt Readings from Last 3 Encounters:   11/01/17 214 lb (97.1 kg)   10/31/17 214 lb (97.1 kg) 05/17/17 217 lb (98.4 kg)     Body mass index is 30.71 kg/m². CBC:   Lab Results   Component Value Date    WBC 8.7 03/17/2017    RBC 4.60 03/17/2017    HGB 14.2 03/17/2017    HCT 42.6 03/17/2017    MCV 92.6 03/17/2017    RDW 14.4 03/17/2017     03/17/2017       CMP:   Lab Results   Component Value Date     05/15/2017    K 5.2 05/15/2017    CL 99 05/15/2017    CO2 25 05/15/2017    BUN 19 05/15/2017    CREATININE 1.13 05/15/2017    GFRAA >60 05/15/2017    LABGLOM >60 05/15/2017    GLUCOSE 87 05/15/2017    PROT 6.5 03/02/2017    CALCIUM 9.8 05/15/2017    BILITOT 0.65 03/02/2017    ALKPHOS 47 03/02/2017    AST 16 03/02/2017    ALT 25 03/02/2017       POC Tests: No results for input(s): POCGLU, POCNA, POCK, POCCL, POCBUN, POCHEMO, POCHCT in the last 72 hours. Coags:   Lab Results   Component Value Date    PROTIME 10.6 03/03/2017    INR 1.0 03/03/2017    APTT 31.5 03/03/2017       HCG (If Applicable): No results found for: PREGTESTUR, PREGSERUM, HCG, HCGQUANT     ABGs: No results found for: PHART, PO2ART, BTD9BTK, HQE7LJL, BEART, F8CYHMXN     Type & Screen (If Applicable):  No results found for: LABABO, [address]    Anesthesia Evaluation  Patient summary reviewed and Nursing notes reviewed no history of anesthetic complications:   Airway: Mallampati: III  TM distance: >3 FB   Neck ROM: full  Mouth opening: > = 3 FB Dental: normal exam         Pulmonary:negative ROS breath sounds clear to auscultation                             Cardiovascular:negative ROS    (+) hypertension: no interval change,         Rhythm: regular  Rate: normal                    Neuro/Psych:               GI/Hepatic/Renal:   (+) renal disease: kidney stones, CRI and no interval change,           Endo/Other:                     Abdominal:           Vascular:                                      Anesthesia Plan      general     ASA 2       Induction: intravenous.     MIPS: Postoperative opioids intended and Prophylactic antiemetics administered. Anesthetic plan and risks discussed with patient and spouse.                       Chidi Potts CRNA   11/1/2017

## 2017-11-01 NOTE — OP NOTE
we then used flexible ureteroscope to  pass this through the ureter up into the kidney. I did not identify  any ureteral calculi. We could have easily pushed the stone up into  the kidney. We did identify two stones in the renal pelvis,  approximately 6 mm each. We were able to then use the 200 micron  laser fiber and ablated these stones adequately. We did tiffanie them  around several calices. Once we were down to sub 2 mm fragments, we  then removed the scope, leaving the guidewire in place and replaced  the scope over the guidewire and placed 6-Paraguayan 28-cm double-J  ureteral stent over the Glidewire up into the kidney. Glidewire was  removed. Proximal curl was confirmed by fluoroscopy. Distal curl was  confirmed by visualization. At this point in time, the bladder was  drained. We used 2% Lidocaine Uro-jet for local anesthetic. We then  taped the stent string to the penis with Steris and benzoin. PLAN:  The patient will be discharged home per PACU criteria and we  can pull his stent by string in two days. We can then schedule him  for a left ESWL.         Jaskaran Barnes    D: 11/01/2017 13:04:21       T: 11/01/2017 14:59:39     [email or web address]  Job#: [id number]     Doc#: [id number]

## 2017-11-01 NOTE — ANESTHESIA POSTPROCEDURE EVALUATION
Department of Anesthesiology  Postprocedure Note    Patient: Silver Balderas  MRN: [id number]  YOB: 1940  Date of evaluation: 11/1/2017  Time:  4:03 PM     Procedure Summary     Date:  11/01/17 Room / Location:  [facility] OR    Anesthesia Start:  1215 Anesthesia Stop:  9739    Procedures:       CYSTOSCOPY URETEROSCOPY LASER-WITH HLL (Right )      CYSTOSCOPY STENT INSERTION (Right ) Diagnosis:  (RIGHT URETERAL CALCULUS)    Surgeon:  Roseann Cadena MD Responsible Provider:  Julius Cruz CRNA    Anesthesia Type:  general ASA Status:  2          Anesthesia Type: general    Annelise Phase I: Annelise Score: 10    Annelise Phase II: Annelise Score: 10    Last vitals: Reviewed and per EMR flowsheets.        Anesthesia Post Evaluation    Patient location during evaluation: PACU  Patient participation: complete - patient participated  Level of consciousness: awake and alert  Pain score: 0  Airway patency: patent  Nausea & Vomiting: no nausea and no vomiting  Complications: no  Cardiovascular status: blood pressure returned to baseline  Respiratory status: acceptable and room air  Hydration status: stable

## 2017-11-03 ENCOUNTER — HOSPITAL ENCOUNTER (INPATIENT)
Age: 77
LOS: 6 days | Discharge: HOME | DRG: 698 | End: 2017-11-09
Attending: EMERGENCY MEDICINE | Admitting: INTERNAL MEDICINE
Payer: MEDICARE

## 2017-11-03 ENCOUNTER — APPOINTMENT (OUTPATIENT)
Dept: GENERAL RADIOLOGY | Age: 77
DRG: 698 | End: 2017-11-03
Payer: MEDICARE

## 2017-11-03 ENCOUNTER — APPOINTMENT (OUTPATIENT)
Dept: CT IMAGING | Age: 77
DRG: 698 | End: 2017-11-03
Payer: MEDICARE

## 2017-11-03 ENCOUNTER — ANESTHESIA (OUTPATIENT)
Dept: OPERATING ROOM | Age: 77
DRG: 698 | End: 2017-11-03
Payer: MEDICARE

## 2017-11-03 ENCOUNTER — OFFICE VISIT (OUTPATIENT)
Dept: UROLOGY | Age: 77
End: 2017-11-03
Payer: MEDICARE

## 2017-11-03 ENCOUNTER — ANESTHESIA EVENT (OUTPATIENT)
Dept: OPERATING ROOM | Age: 77
DRG: 698 | End: 2017-11-03
Payer: MEDICARE

## 2017-11-03 VITALS
WEIGHT: 212 LBS | HEART RATE: 110 BPM | BODY MASS INDEX: 30.35 KG/M2 | HEIGHT: 70 IN | DIASTOLIC BLOOD PRESSURE: 63 MMHG | TEMPERATURE: 99.2 F | SYSTOLIC BLOOD PRESSURE: 88 MMHG

## 2017-11-03 VITALS — SYSTOLIC BLOOD PRESSURE: 156 MMHG | OXYGEN SATURATION: 95 % | DIASTOLIC BLOOD PRESSURE: 78 MMHG

## 2017-11-03 DIAGNOSIS — A41.9 SEPSIS, DUE TO UNSPECIFIED ORGANISM: Primary | ICD-10-CM

## 2017-11-03 DIAGNOSIS — N13.2 URETERAL STONE WITH HYDRONEPHROSIS: Primary | ICD-10-CM

## 2017-11-03 DIAGNOSIS — I95.89 OTHER SPECIFIED HYPOTENSION: ICD-10-CM

## 2017-11-03 PROBLEM — N13.30 HYDRONEPHROSIS OF RIGHT KIDNEY: Status: ACTIVE | Noted: 2017-11-03

## 2017-11-03 PROBLEM — I95.9 ARTERIAL HYPOTENSION: Status: ACTIVE | Noted: 2017-11-03

## 2017-11-03 PROBLEM — N17.9 ACUTE RENAL FAILURE SUPERIMPOSED ON STAGE 3 CHRONIC KIDNEY DISEASE (HCC): Status: ACTIVE | Noted: 2017-11-03

## 2017-11-03 PROBLEM — N30.00 ACUTE CYSTITIS: Status: ACTIVE | Noted: 2017-11-03

## 2017-11-03 PROBLEM — I95.9 SEPSIS ASSOCIATED HYPOTENSION (HCC): Status: ACTIVE | Noted: 2017-11-03

## 2017-11-03 PROBLEM — R65.20 SEVERE SEPSIS (HCC): Status: ACTIVE | Noted: 2017-11-03

## 2017-11-03 PROBLEM — E87.20 LACTIC ACID ACIDOSIS: Status: ACTIVE | Noted: 2017-11-03

## 2017-11-03 PROBLEM — N18.30 ACUTE RENAL FAILURE SUPERIMPOSED ON STAGE 3 CHRONIC KIDNEY DISEASE (HCC): Status: ACTIVE | Noted: 2017-11-03

## 2017-11-03 PROBLEM — R33.9 URINARY RETENTION: Status: ACTIVE | Noted: 2017-11-03

## 2017-11-03 PROBLEM — E87.1 HYPONATREMIA: Status: ACTIVE | Noted: 2017-11-03

## 2017-11-03 PROBLEM — B37.9 YEAST INFECTION: Status: ACTIVE | Noted: 2017-11-03

## 2017-11-03 LAB
-: ABNORMAL
ABSOLUTE BANDS #: 4.91 K/UL (ref 0–1)
ABSOLUTE EOS #: 0 K/UL (ref 0–0.4)
ABSOLUTE IMMATURE GRANULOCYTE: ABNORMAL K/UL (ref 0–0.3)
ABSOLUTE LYMPH #: 0.55 K/UL (ref 1–4.8)
ABSOLUTE MONO #: 3 K/UL (ref 0–1)
ALBUMIN SERPL-MCNC: 3.3 G/DL (ref 3.5–5.2)
ALBUMIN/GLOBULIN RATIO: 1 (ref 1–2.5)
ALP BLD-CCNC: 45 U/L (ref 40–129)
ALT SERPL-CCNC: 16 U/L (ref 5–41)
AMORPHOUS: ABNORMAL
ANION GAP SERPL CALCULATED.3IONS-SCNC: 18 MMOL/L (ref 9–17)
AST SERPL-CCNC: 18 U/L
BACTERIA: ABNORMAL
BANDS: 18 %
BASOPHILS # BLD: 0 %
BASOPHILS ABSOLUTE: 0 K/UL (ref 0–0.2)
BILIRUB SERPL-MCNC: 0.95 MG/DL (ref 0.3–1.2)
BILIRUBIN DIRECT: 0.33 MG/DL
BILIRUBIN URINE: ABNORMAL
BILIRUBIN, INDIRECT: 0.62 MG/DL (ref 0–1)
BUN BLDV-MCNC: 29 MG/DL (ref 8–23)
BUN/CREAT BLD: 14 (ref 9–20)
CALCIUM SERPL-MCNC: 8.5 MG/DL (ref 8.6–10.4)
CASTS UA: ABNORMAL /LPF
CHLORIDE BLD-SCNC: 93 MMOL/L (ref 98–107)
CO2: 20 MMOL/L (ref 20–31)
COLOR: YELLOW
COMMENT UA: ABNORMAL
CREAT SERPL-MCNC: 2.06 MG/DL (ref 0.7–1.2)
CRYSTALS, UA: ABNORMAL /HPF
CULTURE: ABNORMAL
DIFFERENTIAL TYPE: ABNORMAL
EKG ATRIAL RATE: 115 BPM
EKG P AXIS: 30 DEGREES
EKG P-R INTERVAL: 188 MS
EKG Q-T INTERVAL: 316 MS
EKG QRS DURATION: 126 MS
EKG QTC CALCULATION (BAZETT): 437 MS
EKG R AXIS: -59 DEGREES
EKG T AXIS: 73 DEGREES
EKG VENTRICULAR RATE: 115 BPM
EOSINOPHILS RELATIVE PERCENT: 0 %
EPITHELIAL CELLS UA: ABNORMAL /HPF (ref 0–5)
GFR AFRICAN AMERICAN: 38 ML/MIN
GFR NON-AFRICAN AMERICAN: 32 ML/MIN
GFR SERPL CREATININE-BSD FRML MDRD: ABNORMAL ML/MIN/{1.73_M2}
GFR SERPL CREATININE-BSD FRML MDRD: ABNORMAL ML/MIN/{1.73_M2}
GLOBULIN: ABNORMAL G/DL (ref 1.5–3.8)
GLUCOSE BLD-MCNC: 150 MG/DL (ref 74–100)
GLUCOSE BLD-MCNC: 165 MG/DL (ref 74–100)
GLUCOSE BLD-MCNC: 205 MG/DL (ref 70–99)
GLUCOSE BLD-MCNC: 223 MG/DL (ref 74–100)
GLUCOSE URINE: ABNORMAL
HCT VFR BLD CALC: 38.1 % (ref 41–53)
HEMOGLOBIN: 12.7 G/DL (ref 13.5–17)
IMMATURE GRANULOCYTES: ABNORMAL %
KETONES, URINE: ABNORMAL
LACTIC ACID, WHOLE BLOOD: ABNORMAL MMOL/L (ref 0.7–2.1)
LACTIC ACID, WHOLE BLOOD: ABNORMAL MMOL/L (ref 0.7–2.1)
LACTIC ACID, WHOLE BLOOD: NORMAL MMOL/L (ref 0.7–2.1)
LACTIC ACID, WHOLE BLOOD: NORMAL MMOL/L (ref 0.7–2.1)
LACTIC ACID: 1.4 MMOL/L (ref 0.5–2.2)
LACTIC ACID: 1.8 MMOL/L (ref 0.5–2.2)
LACTIC ACID: 2.9 MMOL/L (ref 0.5–2.2)
LACTIC ACID: 4.7 MMOL/L (ref 0.5–2.2)
LEUKOCYTE ESTERASE, URINE: ABNORMAL
LYMPHOCYTES # BLD: 2 %
Lab: ABNORMAL
Lab: ABNORMAL
MAGNESIUM: 1.5 MG/DL (ref 1.6–2.6)
MCH RBC QN AUTO: 31.3 PG (ref 26–34)
MCHC RBC AUTO-ENTMCNC: 33.3 G/DL (ref 31–37)
MCV RBC AUTO: 94.1 FL (ref 80–100)
MONOCYTES # BLD: 11 %
MORPHOLOGY: NORMAL
MUCUS: ABNORMAL
NITRITE, URINE: ABNORMAL
ORGANISM: ABNORMAL
OTHER OBSERVATIONS UA: ABNORMAL
PDW BLD-RTO: 13.7 % (ref 12.1–15.2)
PH UA: ABNORMAL (ref 5–9)
PHOSPHORUS: 2.9 MG/DL (ref 2.5–4.5)
PLATELET # BLD: 209 K/UL (ref 140–450)
PLATELET ESTIMATE: ABNORMAL
PMV BLD AUTO: 8.1 FL (ref 6–12)
POTASSIUM SERPL-SCNC: 4.6 MMOL/L (ref 3.7–5.3)
PROTEIN UA: ABNORMAL
RBC # BLD: 4.04 M/UL (ref 4.5–5.9)
RBC # BLD: ABNORMAL 10*6/UL
RBC UA: ABNORMAL /HPF (ref 0–2)
RENAL EPITHELIAL, UA: ABNORMAL /HPF
SEG NEUTROPHILS: 69 %
SEGMENTED NEUTROPHILS ABSOLUTE COUNT: 18.84 K/UL (ref 1.8–7.7)
SODIUM BLD-SCNC: 131 MMOL/L (ref 135–144)
SPECIFIC GRAVITY UA: 1.02 (ref 1.01–1.02)
SPECIMEN DESCRIPTION: ABNORMAL
SPECIMEN DESCRIPTION: ABNORMAL
STATUS: ABNORMAL
TOTAL PROTEIN: 6.7 G/DL (ref 6.4–8.3)
TRICHOMONAS: ABNORMAL
TURBIDITY: ABNORMAL
URINE HGB: ABNORMAL
UROBILINOGEN, URINE: ABNORMAL
WBC # BLD: 27.3 K/UL (ref 3.5–11)
WBC # BLD: ABNORMAL 10*3/UL
WBC UA: ABNORMAL /HPF (ref 0–5)
YEAST: ABNORMAL

## 2017-11-03 PROCEDURE — 87077 CULTURE AEROBIC IDENTIFY: CPT

## 2017-11-03 PROCEDURE — 6370000000 HC RX 637 (ALT 250 FOR IP): Performed by: UROLOGY

## 2017-11-03 PROCEDURE — 83605 ASSAY OF LACTIC ACID: CPT

## 2017-11-03 PROCEDURE — 2500000003 HC RX 250 WO HCPCS: Performed by: NURSE ANESTHETIST, CERTIFIED REGISTERED

## 2017-11-03 PROCEDURE — 6370000000 HC RX 637 (ALT 250 FOR IP): Performed by: PHYSICIAN ASSISTANT

## 2017-11-03 PROCEDURE — 87186 SC STD MICRODIL/AGAR DIL: CPT

## 2017-11-03 PROCEDURE — 6360000002 HC RX W HCPCS: Performed by: NURSE ANESTHETIST, CERTIFIED REGISTERED

## 2017-11-03 PROCEDURE — 83735 ASSAY OF MAGNESIUM: CPT

## 2017-11-03 PROCEDURE — 3600000004 HC SURGERY LEVEL 4 BASE: Performed by: UROLOGY

## 2017-11-03 PROCEDURE — 2580000003 HC RX 258: Performed by: EMERGENCY MEDICINE

## 2017-11-03 PROCEDURE — 84100 ASSAY OF PHOSPHORUS: CPT

## 2017-11-03 PROCEDURE — 82947 ASSAY GLUCOSE BLOOD QUANT: CPT

## 2017-11-03 PROCEDURE — 7100000010 HC PHASE II RECOVERY - FIRST 15 MIN: Performed by: UROLOGY

## 2017-11-03 PROCEDURE — 99284 EMERGENCY DEPT VISIT MOD MDM: CPT

## 2017-11-03 PROCEDURE — 96374 THER/PROPH/DIAG INJ IV PUSH: CPT

## 2017-11-03 PROCEDURE — 87040 BLOOD CULTURE FOR BACTERIA: CPT

## 2017-11-03 PROCEDURE — 6370000000 HC RX 637 (ALT 250 FOR IP): Performed by: EMERGENCY MEDICINE

## 2017-11-03 PROCEDURE — 3600000014 HC SURGERY LEVEL 4 ADDTL 15MIN: Performed by: UROLOGY

## 2017-11-03 PROCEDURE — 80048 BASIC METABOLIC PNL TOTAL CA: CPT

## 2017-11-03 PROCEDURE — 36415 COLL VENOUS BLD VENIPUNCTURE: CPT

## 2017-11-03 PROCEDURE — 74176 CT ABD & PELVIS W/O CONTRAST: CPT

## 2017-11-03 PROCEDURE — 6360000002 HC RX W HCPCS: Performed by: EMERGENCY MEDICINE

## 2017-11-03 PROCEDURE — 71010 XR CHEST LIMITED ONE VIEW: CPT

## 2017-11-03 PROCEDURE — 85025 COMPLETE CBC W/AUTO DIFF WBC: CPT

## 2017-11-03 PROCEDURE — 87205 SMEAR GRAM STAIN: CPT

## 2017-11-03 PROCEDURE — C9113 INJ PANTOPRAZOLE SODIUM, VIA: HCPCS | Performed by: PHYSICIAN ASSISTANT

## 2017-11-03 PROCEDURE — 2000000000 HC ICU R&B

## 2017-11-03 PROCEDURE — 6360000002 HC RX W HCPCS: Performed by: PHYSICIAN ASSISTANT

## 2017-11-03 PROCEDURE — 51798 US URINE CAPACITY MEASURE: CPT

## 2017-11-03 PROCEDURE — 81001 URINALYSIS AUTO W/SCOPE: CPT

## 2017-11-03 PROCEDURE — 3700000000 HC ANESTHESIA ATTENDED CARE: Performed by: UROLOGY

## 2017-11-03 PROCEDURE — 80076 HEPATIC FUNCTION PANEL: CPT

## 2017-11-03 PROCEDURE — 2580000003 HC RX 258: Performed by: UROLOGY

## 2017-11-03 PROCEDURE — 87149 DNA/RNA DIRECT PROBE: CPT

## 2017-11-03 PROCEDURE — 93005 ELECTROCARDIOGRAM TRACING: CPT

## 2017-11-03 PROCEDURE — 6360000002 HC RX W HCPCS: Performed by: UROLOGY

## 2017-11-03 PROCEDURE — A6251 ABSORPT DRG <=16 SQ IN W/O B: HCPCS | Performed by: UROLOGY

## 2017-11-03 PROCEDURE — 2580000003 HC RX 258: Performed by: PHYSICIAN ASSISTANT

## 2017-11-03 PROCEDURE — 74000 XR ABDOMEN LIMITED (KUB): CPT

## 2017-11-03 PROCEDURE — 51702 INSERT TEMP BLADDER CATH: CPT

## 2017-11-03 PROCEDURE — C2617 STENT, NON-COR, TEM W/O DEL: HCPCS | Performed by: UROLOGY

## 2017-11-03 PROCEDURE — 99211 OFF/OP EST MAY X REQ PHY/QHP: CPT | Performed by: NURSE PRACTITIONER

## 2017-11-03 PROCEDURE — 3700000001 HC ADD 15 MINUTES (ANESTHESIA): Performed by: UROLOGY

## 2017-11-03 DEVICE — URETERAL STENT
Type: IMPLANTABLE DEVICE | Site: URETER | Status: FUNCTIONAL
Brand: PERCUFLEX™ PLUS

## 2017-11-03 RX ORDER — FLUCONAZOLE 100 MG/1
200 TABLET ORAL DAILY
Status: DISCONTINUED | OUTPATIENT
Start: 2017-11-04 | End: 2017-11-09 | Stop reason: HOSPADM

## 2017-11-03 RX ORDER — 0.9 % SODIUM CHLORIDE 0.9 %
10 VIAL (ML) INJECTION DAILY
Status: DISCONTINUED | OUTPATIENT
Start: 2017-11-03 | End: 2017-11-09 | Stop reason: HOSPADM

## 2017-11-03 RX ORDER — SODIUM CHLORIDE 0.9 % (FLUSH) 0.9 %
10 SYRINGE (ML) INJECTION PRN
Status: DISCONTINUED | OUTPATIENT
Start: 2017-11-03 | End: 2017-11-03

## 2017-11-03 RX ORDER — MIDAZOLAM HYDROCHLORIDE 1 MG/ML
INJECTION INTRAMUSCULAR; INTRAVENOUS PRN
Status: DISCONTINUED | OUTPATIENT
Start: 2017-11-03 | End: 2017-11-03 | Stop reason: SDUPTHER

## 2017-11-03 RX ORDER — KETAMINE HYDROCHLORIDE 100 MG/ML
INJECTION, SOLUTION INTRAMUSCULAR; INTRAVENOUS PRN
Status: DISCONTINUED | OUTPATIENT
Start: 2017-11-03 | End: 2017-11-03 | Stop reason: SDUPTHER

## 2017-11-03 RX ORDER — ACETAMINOPHEN 325 MG/1
650 TABLET ORAL EVERY 4 HOURS PRN
Status: DISCONTINUED | OUTPATIENT
Start: 2017-11-03 | End: 2017-11-09 | Stop reason: HOSPADM

## 2017-11-03 RX ORDER — ACETAMINOPHEN 650 MG/1
650 SUPPOSITORY RECTAL EVERY 4 HOURS PRN
Status: DISCONTINUED | OUTPATIENT
Start: 2017-11-03 | End: 2017-11-03

## 2017-11-03 RX ORDER — MAGNESIUM SULFATE 1 G/100ML
1 INJECTION INTRAVENOUS PRN
Status: DISCONTINUED | OUTPATIENT
Start: 2017-11-03 | End: 2017-11-03

## 2017-11-03 RX ORDER — TAMSULOSIN HYDROCHLORIDE 0.4 MG/1
0.4 CAPSULE ORAL EVERY EVENING
Status: DISCONTINUED | OUTPATIENT
Start: 2017-11-03 | End: 2017-11-03

## 2017-11-03 RX ORDER — 0.9 % SODIUM CHLORIDE 0.9 %
30 INTRAVENOUS SOLUTION INTRAVENOUS ONCE
Status: COMPLETED | OUTPATIENT
Start: 2017-11-03 | End: 2017-11-03

## 2017-11-03 RX ORDER — POTASSIUM CHLORIDE 7.45 MG/ML
10 INJECTION INTRAVENOUS PRN
Status: DISCONTINUED | OUTPATIENT
Start: 2017-11-03 | End: 2017-11-09 | Stop reason: HOSPADM

## 2017-11-03 RX ORDER — SODIUM CHLORIDE 0.9 % (FLUSH) 0.9 %
10 SYRINGE (ML) INJECTION EVERY 12 HOURS SCHEDULED
Status: DISCONTINUED | OUTPATIENT
Start: 2017-11-03 | End: 2017-11-03

## 2017-11-03 RX ORDER — POTASSIUM CHLORIDE 7.45 MG/ML
10 INJECTION INTRAVENOUS PRN
Status: DISCONTINUED | OUTPATIENT
Start: 2017-11-03 | End: 2017-11-03

## 2017-11-03 RX ORDER — FLUCONAZOLE 100 MG/1
200 TABLET ORAL DAILY
Status: DISCONTINUED | OUTPATIENT
Start: 2017-11-03 | End: 2017-11-03

## 2017-11-03 RX ORDER — DEXTROSE MONOHYDRATE 50 MG/ML
100 INJECTION, SOLUTION INTRAVENOUS PRN
Status: DISCONTINUED | OUTPATIENT
Start: 2017-11-03 | End: 2017-11-09 | Stop reason: HOSPADM

## 2017-11-03 RX ORDER — DEXTROSE MONOHYDRATE 25 G/50ML
12.5 INJECTION, SOLUTION INTRAVENOUS PRN
Status: DISCONTINUED | OUTPATIENT
Start: 2017-11-03 | End: 2017-11-09 | Stop reason: HOSPADM

## 2017-11-03 RX ORDER — SODIUM CHLORIDE 0.9 % (FLUSH) 0.9 %
10 SYRINGE (ML) INJECTION PRN
Status: DISCONTINUED | OUTPATIENT
Start: 2017-11-03 | End: 2017-11-09 | Stop reason: HOSPADM

## 2017-11-03 RX ORDER — TAMSULOSIN HYDROCHLORIDE 0.4 MG/1
0.4 CAPSULE ORAL EVERY EVENING
Status: DISCONTINUED | OUTPATIENT
Start: 2017-11-03 | End: 2017-11-09 | Stop reason: HOSPADM

## 2017-11-03 RX ORDER — ACETAMINOPHEN 325 MG/1
650 TABLET ORAL EVERY 4 HOURS PRN
Status: DISCONTINUED | OUTPATIENT
Start: 2017-11-03 | End: 2017-11-03

## 2017-11-03 RX ORDER — SODIUM CHLORIDE 9 MG/ML
INJECTION, SOLUTION INTRAVENOUS CONTINUOUS
Status: DISCONTINUED | OUTPATIENT
Start: 2017-11-03 | End: 2017-11-03

## 2017-11-03 RX ORDER — POLYETHYLENE GLYCOL 3350 17 G/17G
17 POWDER, FOR SOLUTION ORAL DAILY PRN
Status: DISCONTINUED | OUTPATIENT
Start: 2017-11-03 | End: 2017-11-03

## 2017-11-03 RX ORDER — SODIUM CHLORIDE 0.9 % (FLUSH) 0.9 %
10 SYRINGE (ML) INJECTION EVERY 12 HOURS SCHEDULED
Status: CANCELLED | OUTPATIENT
Start: 2017-11-03

## 2017-11-03 RX ORDER — ASPIRIN 81 MG/1
81 TABLET ORAL DAILY
Status: DISCONTINUED | OUTPATIENT
Start: 2017-11-03 | End: 2017-11-03

## 2017-11-03 RX ORDER — SODIUM CHLORIDE 0.9 % (FLUSH) 0.9 %
10 SYRINGE (ML) INJECTION PRN
Status: CANCELLED | OUTPATIENT
Start: 2017-11-03

## 2017-11-03 RX ORDER — SODIUM CHLORIDE 0.9 % (FLUSH) 0.9 %
10 SYRINGE (ML) INJECTION EVERY 12 HOURS SCHEDULED
Status: DISCONTINUED | OUTPATIENT
Start: 2017-11-03 | End: 2017-11-09 | Stop reason: HOSPADM

## 2017-11-03 RX ORDER — POLYETHYLENE GLYCOL 3350 17 G/17G
17 POWDER, FOR SOLUTION ORAL DAILY PRN
Status: DISCONTINUED | OUTPATIENT
Start: 2017-11-03 | End: 2017-11-09 | Stop reason: HOSPADM

## 2017-11-03 RX ORDER — POLYETHYLENE GLYCOL 3350 17 G/17G
17 POWDER, FOR SOLUTION ORAL DAILY
Status: DISCONTINUED | OUTPATIENT
Start: 2017-11-03 | End: 2017-11-03

## 2017-11-03 RX ORDER — ASPIRIN 81 MG/1
81 TABLET ORAL DAILY
Status: DISCONTINUED | OUTPATIENT
Start: 2017-11-04 | End: 2017-11-09 | Stop reason: HOSPADM

## 2017-11-03 RX ORDER — ACETAMINOPHEN 500 MG
1000 TABLET ORAL ONCE
Status: COMPLETED | OUTPATIENT
Start: 2017-11-03 | End: 2017-11-03

## 2017-11-03 RX ORDER — DEXTROSE MONOHYDRATE 50 MG/ML
100 INJECTION, SOLUTION INTRAVENOUS PRN
Status: DISCONTINUED | OUTPATIENT
Start: 2017-11-03 | End: 2017-11-03

## 2017-11-03 RX ORDER — 0.9 % SODIUM CHLORIDE 0.9 %
2000 INTRAVENOUS SOLUTION INTRAVENOUS ONCE
Status: COMPLETED | OUTPATIENT
Start: 2017-11-03 | End: 2017-11-03

## 2017-11-03 RX ORDER — POLYETHYLENE GLYCOL 3350 17 G/17G
17 POWDER, FOR SOLUTION ORAL DAILY
Status: DISCONTINUED | OUTPATIENT
Start: 2017-11-04 | End: 2017-11-09 | Stop reason: HOSPADM

## 2017-11-03 RX ORDER — MAGNESIUM SULFATE 1 G/100ML
1 INJECTION INTRAVENOUS PRN
Status: DISCONTINUED | OUTPATIENT
Start: 2017-11-03 | End: 2017-11-09 | Stop reason: HOSPADM

## 2017-11-03 RX ORDER — PANTOPRAZOLE SODIUM 40 MG/10ML
40 INJECTION, POWDER, LYOPHILIZED, FOR SOLUTION INTRAVENOUS DAILY
Status: DISCONTINUED | OUTPATIENT
Start: 2017-11-03 | End: 2017-11-09 | Stop reason: HOSPADM

## 2017-11-03 RX ORDER — DEXTROSE MONOHYDRATE 25 G/50ML
12.5 INJECTION, SOLUTION INTRAVENOUS PRN
Status: DISCONTINUED | OUTPATIENT
Start: 2017-11-03 | End: 2017-11-03

## 2017-11-03 RX ORDER — SODIUM CHLORIDE 9 MG/ML
INJECTION, SOLUTION INTRAVENOUS CONTINUOUS
Status: DISCONTINUED | OUTPATIENT
Start: 2017-11-03 | End: 2017-11-04

## 2017-11-03 RX ORDER — NICOTINE POLACRILEX 4 MG
15 LOZENGE BUCCAL PRN
Status: DISCONTINUED | OUTPATIENT
Start: 2017-11-03 | End: 2017-11-09 | Stop reason: HOSPADM

## 2017-11-03 RX ORDER — NICOTINE POLACRILEX 4 MG
15 LOZENGE BUCCAL PRN
Status: DISCONTINUED | OUTPATIENT
Start: 2017-11-03 | End: 2017-11-03

## 2017-11-03 RX ADMIN — ACETAMINOPHEN 650 MG: 325 TABLET, FILM COATED ORAL at 19:53

## 2017-11-03 RX ADMIN — TAMSULOSIN HYDROCHLORIDE 0.4 MG: 0.4 CAPSULE ORAL at 18:31

## 2017-11-03 RX ADMIN — INSULIN LISPRO 1 UNITS: 100 INJECTION, SOLUTION INTRAVENOUS; SUBCUTANEOUS at 20:55

## 2017-11-03 RX ADMIN — MAGNESIUM SULFATE HEPTAHYDRATE 1 G: 1 INJECTION, SOLUTION INTRAVENOUS at 18:32

## 2017-11-03 RX ADMIN — CEFTRIAXONE 1 G: 1 INJECTION, POWDER, FOR SOLUTION INTRAMUSCULAR; INTRAVENOUS at 11:03

## 2017-11-03 RX ADMIN — SODIUM CHLORIDE 999 ML: 9 INJECTION, SOLUTION INTRAVENOUS at 11:04

## 2017-11-03 RX ADMIN — SODIUM CHLORIDE 999 ML: 9 INJECTION, SOLUTION INTRAVENOUS at 11:29

## 2017-11-03 RX ADMIN — PANTOPRAZOLE SODIUM 40 MG: 40 INJECTION, POWDER, FOR SOLUTION INTRAVENOUS at 14:44

## 2017-11-03 RX ADMIN — SODIUM CHLORIDE 2898 ML: 9 INJECTION, SOLUTION INTRAVENOUS at 12:36

## 2017-11-03 RX ADMIN — ACETAMINOPHEN 1000 MG: 500 TABLET ORAL at 10:02

## 2017-11-03 RX ADMIN — MIDAZOLAM HYDROCHLORIDE 1 MG: 1 INJECTION, SOLUTION INTRAMUSCULAR; INTRAVENOUS at 16:39

## 2017-11-03 RX ADMIN — ACETAMINOPHEN 650 MG: 650 SUPPOSITORY RECTAL at 15:00

## 2017-11-03 RX ADMIN — MAGNESIUM SULFATE HEPTAHYDRATE 1 G: 1 INJECTION, SOLUTION INTRAVENOUS at 14:59

## 2017-11-03 RX ADMIN — KETAMINE HYDROCHLORIDE 30 MG: 100 INJECTION INTRAMUSCULAR; INTRAVENOUS at 16:41

## 2017-11-03 RX ADMIN — SODIUM CHLORIDE 999 ML: 9 INJECTION, SOLUTION INTRAVENOUS at 10:03

## 2017-11-03 RX ADMIN — SODIUM CHLORIDE: 9 INJECTION, SOLUTION INTRAVENOUS at 23:29

## 2017-11-03 RX ADMIN — CEFEPIME HYDROCHLORIDE 2 G: 2 INJECTION, POWDER, FOR SOLUTION INTRAVENOUS at 14:44

## 2017-11-03 RX ADMIN — MIDAZOLAM HYDROCHLORIDE 1 MG: 1 INJECTION, SOLUTION INTRAMUSCULAR; INTRAVENOUS at 16:34

## 2017-11-03 RX ADMIN — KETAMINE HYDROCHLORIDE 20 MG: 100 INJECTION INTRAMUSCULAR; INTRAVENOUS at 16:45

## 2017-11-03 RX ADMIN — Medication 10 ML: at 15:02

## 2017-11-03 RX ADMIN — SODIUM CHLORIDE 2000 ML: 9 INJECTION, SOLUTION INTRAVENOUS at 16:00

## 2017-11-03 ASSESSMENT — PAIN DESCRIPTION - PAIN TYPE: TYPE: ACUTE PAIN

## 2017-11-03 ASSESSMENT — ENCOUNTER SYMPTOMS
EYE DISCHARGE: 0
NAUSEA: 0
DIARRHEA: 0
ABDOMINAL DISTENTION: 0
SHORTNESS OF BREATH: 0
COUGH: 0
WHEEZING: 0
VOMITING: 0
CONSTIPATION: 0
BACK PAIN: 1
COLOR CHANGE: 0
ABDOMINAL PAIN: 0

## 2017-11-03 ASSESSMENT — PAIN DESCRIPTION - LOCATION: LOCATION: FLANK

## 2017-11-03 ASSESSMENT — PAIN SCALES - GENERAL
PAINLEVEL_OUTOF10: 0
PAINLEVEL_OUTOF10: 2
PAINLEVEL_OUTOF10: 5

## 2017-11-03 ASSESSMENT — PAIN DESCRIPTION - ORIENTATION: ORIENTATION: RIGHT

## 2017-11-03 ASSESSMENT — PAIN DESCRIPTION - DESCRIPTORS: DESCRIPTORS: ACHING

## 2017-11-03 NOTE — ED PROVIDER NOTES
Cardiovascular: Normal rate, regular rhythm, normal heart sounds and intact distal pulses. Pulmonary/Chest: Effort normal and breath sounds normal. He has no wheezes. Abdominal: Soft. Bowel sounds are normal. He exhibits no distension. There is no tenderness. There is no rebound and no guarding. The patient has decreasing right flank pain. Musculoskeletal: Normal range of motion. He exhibits no edema or tenderness. Neurological: He is alert and oriented to person, place, and time. No cranial nerve deficit. He exhibits normal muscle tone. Coordination normal.   Skin: Skin is warm and dry. He is not diaphoretic. Psychiatric: He has a normal mood and affect. His behavior is normal. Judgment and thought content normal.     Nursing Notes were reviewed. Past Medical History:   Diagnosis Date    Acute renal failure superimposed on stage 3 chronic kidney disease ([facility] 3/1/2017    Arthritis     Diabetes mellitus ([facility]     Hyperlipidemia     Hypertension     Kidney stone        History reviewed. No pertinent family history.     Past Surgical History:   Procedure Laterality Date    CATARACT REMOVAL WITH IMPLANT Right 8/26/2013    CYSTOSCOPY INSERTION / REMOVAL STENT / STONE Right 11/1/2017    CYSTOSCOPY STENT INSERTION performed by Génesis Oliva MD at [address]      right knee march 22, 2012    JOINT REPLACEMENT Right junu 2014 partial knee    JOINT REPLACEMENT Right 2014    complete removal    KNEE SURGERY Right     x 2    LITHOTRIPSY Right 11/01/2017    with stent placement - Dr. Ruddy Carlisle Right 11/1/2017    CYSTOSCOPY URETEROSCOPY LASER-WITH HLL performed by Génesis Oliva MD at [facility]  01/2017    Greenlight PVP and [facility]    TUR  01/2015    Greenlight PVP       Social History     Social History    Marital status:      Spouse name: N/A    Number of children: N/A  Years of education: N/A     Social History Main Topics    Smoking status: Former Smoker     Packs/day: 0.50     Years: 3.00    Smokeless tobacco: Never Used      Comment: quit 50 years ago    Alcohol use No    Drug use: No    Sexual activity: Not Asked     Other Topics Concern    None     Social History Narrative    None       Procedures    MDM patient appears to be septic. He will be admitted to the ICU for further workup. He is tachycardic, hypotensive, febrile and has a positive lactic acid of 2.9. And elevated white count. Dr. Rick Barlow will admit patient as hospitalist    ED Course        Labs  Lactic acid is 2.9. WBCs 27.3 and hemoglobin 12.2.  69 neutrophils, 2 lymphs, 11 monos, and 18 bands. Glucose is 205. BUN 29 and creatinine 2.06. Calcium 8.5. Sodium 131, potassium 4.6, chloride 93 and CO2 20. Urinalysis only shows 2-5 white blood cells but greater than 100 red blood cells. Radiology chest x-ray was normal.      EKG Interpretation.        Dougie Fernando MD  11/03/17 1120

## 2017-11-03 NOTE — PROGRESS NOTES
[facility] Department of Pharmacy   Pharmacy Renal Adjustment Note    Denise Rivers is a 78 y.o. male. Pharmacist assessment of renally cleared medications. Recent Labs      11/03/17   0953   CREATININE  2.06*     Estimated Creatinine Clearance: 36 mL/min (based on SCr of 2.06 mg/dL).     Height:   Ht Readings from Last 1 Encounters:   11/03/17 5' 10\" (1.778 m)     Weight:  Wt Readings from Last 1 Encounters:   11/03/17 214 lb 7 oz (97.3 kg)       The following medication has been adjusted based upon renal function:             Cefepime adjusted to once daily for Crcl 30-60        Elodia Alegre,11/3/2017,1:01 PM

## 2017-11-03 NOTE — H&P
Pooja     IA CYSTO/URETERO/PYELOSCOPY W/LITHOTRIPSY Right 11/1/2017    CYSTOSCOPY URETEROSCOPY LASER-WITH HLL performed by Lara Oliver MD at [facility] TUR  01/2017    Green[facility] PVP and TUIBNC    TURP  01/2015    [facility] PVP       Family History:   History reviewed. No pertinent family history. Social History:   TOBACCO:   reports that he has quit smoking. He has a 1.50 pack-year smoking history. He has never used smokeless tobacco.  ETOH:   reports that he does not drink alcohol. OCCUPATION: farmer    Allergies:  Review of patient's allergies indicates no known allergies. Medications Prior to Admission:    Prior to Admission medications    Medication Sig Start Date End Date Taking? Authorizing Provider   tamsulosin (FLOMAX) 0.4 MG capsule Take 1 capsule by mouth every evening 10/31/17  Yes CHILANGO Ortega   metFORMIN (GLUCOPHAGE) 850 MG tablet Take 850 mg by mouth 2 times daily (with meals)  9/12/16  Yes Historical Provider, MD   Fish Oil-Cholecalciferol (FISH OIL + D3 PO) Take 1 capsule by mouth daily    Yes Historical Provider, MD   Pediatric Multivitamins-Fl (MULTI VIT/FL PO) Take 1 tablet by mouth daily Daily    Yes Historical Provider, MD   lisinopril (PRINIVIL;ZESTRIL) 10 MG tablet Take 10 mg by mouth daily. Yes Historical Provider, MD   aspirin 81 MG tablet Take 81 mg by mouth daily. Yes Historical Provider, MD   Polyethylene Glycol 3350 (MIRALAX PO) Take 1 Dose by mouth daily as needed Prn     Historical Provider, MD   loratadine (CLARITIN) 10 MG tablet   Take 10 mg by mouth daily prn    Historical Provider, MD       Review of Systems:  Constitutional:positive  for fevers, and negative for chills.   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       Plan:     · This patient requires inpatient admission because of hypotension, severe sepsis likely due to UTI - bacterial and yeast, hyponatremia, acute on chronic kidney injury  · Factors affecting the medical complexity of this patient include h/o sepsis, BPH, CKD  · Estimated length of stay is 3-4 days  · Cefepime  · Diflucan  · Trend LFTs  · IV fluid boluses per sepsis protocol  · Trend lactic acid  · Tele  · F/u pan culture  · IVF  · Urology consult  · Hold metformin  · Hold lisinopril  · SSI  · Bladder scan  · Will started IV pressors if needed  · High risk medication monitoring: diflucan    CORE MEASURES  DVT prophylaxis: Lovenox  Decubitus ulcer present on admission: No  CODE STATUS: FULL CODE  Nutrition Status: fair   Physical therapy: No   Old Charts reviewed: Yes  EKG Reviewed: Yes  Advance Directive Addressed:  Yes    Konstantin Todd PA-C  11/3/2017, 1:26 PM    40 minutes of CC time spent on/with this patient indepentantly

## 2017-11-03 NOTE — ANESTHESIA PRE PROCEDURE
chloride flush 0.9 % injection 10 mL  10 mL Intravenous PRN Danielle Nolasco PA-C        [MAR Hold] acetaminophen (TYLENOL) tablet 650 mg  650 mg Oral Q4H PRN Danielle Nolasco PA-C        [facility] Hold] pantoprazole (PROTONIX) injection 40 mg  40 mg Intravenous Daily Danielle Nolasco PA-C   40 mg at 11/03/17 1444    And    [MAR Hold] sodium chloride (PF) 0.9 % injection 10 mL  10 mL Intravenous Daily Danielle Nolasco PA-C   10 mL at 11/03/17 1502    [MAR Hold] magnesium sulfate 1 g in dextrose 5% 100 mL IVPB  1 g Intravenous PRN Danielle Nolasco PA-C   Stopped at 11/03/17 1600    [MAR Hold] potassium chloride 10 mEq/100 mL IVPB (Peripheral Line)  10 mEq Intravenous PRN Danielle Nolasco PA-C        [facility] Hold] insulin lispro (HUMALOG) injection pen 0-12 Units  0-12 Units Subcutaneous TID WC Koffi Nolasco PA-C        [facility] Hold] insulin lispro (HUMALOG) injection pen 0-6 Units  0-6 Units Subcutaneous Nightly Koffi Nolasco PA-C        [facility] Hold] glucose (GLUTOSE) 40 % oral gel 15 g  15 g Oral PRN Danielle Nolasco PA-C        [facility] Hold] dextrose 50 % solution 12.5 g  12.5 g Intravenous PRN Danielle Nolasco PA-C        [MAR Hold] glucagon (rDNA) injection 1 mg  1 mg Intramuscular PRN Danielle Nolasco PA-C        [facility] Hold] dextrose 5 % solution  100 mL/hr Intravenous PRN Danielle Nolasco PA-C        [facility] Hold] fluconazole (DIFLUCAN) tablet 200 mg  200 mg Oral Daily Koffi Nolasco PA-C        [facility] Hold] ceFEPIme (MAXIPIME) 2 g in sterile water 20 mL IV syringe  2 g Intravenous Q24H Danielle Nolasco PA-C   2 g at 11/03/17 1444    [MAR Hold] polyethylene glycol (GLYCOLAX) packet 17 g  17 g Oral Daily Koffi Nolasco PA-C        [MAR Hold] acetaminophen (TYLENOL) suppository 650 mg  650 mg Rectal Q4H PRN Danielle Nolasco PA-C   650 mg at 11/03/17 1500    0.9 % sodium chloride bolus  2,000 mL Intravenous Once Danielle Nolasco PA-C 2,000 mL/hr at 11/03/17 1600 2,000 mL at 11/03/17 1600 1.0 03/03/2017    APTT 31.5 03/03/2017       HCG (If Applicable): No results found for: PREGTESTUR, PREGSERUM, HCG, HCGQUANT     ABGs: No results found for: PHART, PO2ART, NFA7UZI, HUO0NAT, BEART, P6BBUUST     Type & Screen (If Applicable):  No results found for: LABABO, LABRH    Anesthesia Evaluation   no history of anesthetic complications:   Airway: Mallampati: III  TM distance: >3 FB   Neck ROM: full  Mouth opening: > = 3 FB Dental: normal exam         Pulmonary:negative ROS   (+) decreased breath sounds,                             Cardiovascular:    (+) hypertension:,       ECG reviewed                        Neuro/Psych:   {neg ROS              GI/Hepatic/Renal:   (+) renal disease: ARF,      (-) GERD       Endo/Other: negative ROS   (+) Type II DM, ,                  Abdominal:   (+) obese,         Vascular:                                      Anesthesia Plan      MAC     ASA 4 - emergent       Induction: intravenous. Anesthetic plan and risks discussed with patient.                     Quinn Piper CRNA   11/3/2017

## 2017-11-03 NOTE — PROGRESS NOTES
Spoke with Dr. Ramirez Penn. Aware of lactic acid rising from 2.9 to 4.7. He is going to go for urgent stent placement for right hydronephrosis. Will give 2L bolus now. Trend lactic acid. Advised anesthesia of need for aggressive fluid resuscitation.      Louie Briscoe PA-C  11/3/2017, 3:57 PM

## 2017-11-03 NOTE — PLAN OF CARE
Problem: Discharge Planning:  Goal: Discharged to appropriate level of care  Discharged to appropriate level of care   Outcome: Ongoing  Coordinating with patient, family, physician and  to facilitate discharge to home when appropriate. Donna Gray RN      Problem: Infection, Septic Shock:  Goal: Will show no infection signs and symptoms  Will show no infection signs and symptoms   Outcome: Not Met This Shift  Fever spiked and tachycardia noted. Will continue to monitor and intervene as necessary. Donna Gray RN      Problem: Serum Glucose Level - Abnormal:  Goal: Ability to maintain appropriate glucose levels will improve  Ability to maintain appropriate glucose levels will improve   Outcome: Ongoing  Blood glucose levels being monitored before meals and at bedtime. Insulin coverage held due to lack of appetite and pending surgery. Donna Gray RN      Problem: Tissue Perfusion, Altered:  Goal: Circulatory function within specified parameters  Circulatory function within specified parameters   Outcome: Ongoing  Vital signs stable. IV fluids being administered as ordered. Will continue to monitor and intervene as necessary. Donna Gray RN      Problem: Venous Thromboembolism:  Goal: Absence of signs or symptoms of impaired coagulation  Absence of signs or symptoms of impaired coagulation   Outcome: Ongoing  Knee high SYLVIA hose and below the knee SCDs applied to bilateral lower extremities. Aspirin and Lovenox held due to potential surgical intervention for urinary issues. IV fluids continued as ordered. Will continue to monitor and intervene as necessary. Donna Gray RN      Problem: Safety:  Goal: Free from accidental physical injury  Free from accidental physical injury   Outcome: Ongoing  Luo fall score calculated on admission and daily at midnight and shows pt at high risk for fall. Bed in lowest position at all times with wheels locked.  Patient is alert and oriented and has demonstrated the use of using the call light for assistance before getting up. Education has been provided to defer the use of the bed alarm and/or restraint free alarm as the patient has shown competency in calling for assistance and verbalizing the risk. Falling star posted on door frame and yellow sticker visible on patient bracelet. Call light and bedside table with in reach. ID, fall and code status band information verified as correct and visible. Will continue to monitor and intervene as necessary. Roby Colon RN        Problem: Daily Care:  Goal: Daily care needs are met  Daily care needs are met   Outcome: Ongoing  Daily care needs continuing to be assessed. Able to perform self care. Able to verbalize need to for assistance, and encourage to express needs/concerns. Will continue to assist with daily care as verbalized and encourage independence with out sacrificing safety. Call light and personal belongings within reach. Roby Colon RN        Problem: Pain:  Goal: Patient's pain/discomfort is manageable  Patient's pain/discomfort is manageable   Outcome: Ongoing  Denies pain so far this shift. Continuing to monitor/assess pain. Informed patient of adverse complications of uncontrolled pain. Verbalized understanding and agrees to report increases in pain level. Educated that pain medication addiction risk is greatly reduced when medication is used for acute pain on a short term basis. Plan patient's day so activities occur at the peak level of medication. Will be medicated before procedures and activities that increase pain so to prevent uncontrollable pain. Will provide distractions such as TV, music, reading, and/or visitors. Will inform physician of ineffective pain control. Will continue to monitor and medicate as ordered.  Roby Colon RN

## 2017-11-03 NOTE — CONSULTS
times per day   [facility] Hold] pantoprazole  40 mg Intravenous Daily    And    [MAR Hold] sodium chloride (PF)  10 mL Intravenous Daily    [MAR Hold] insulin lispro  0-12 Units Subcutaneous TID WC    [MAR Hold] insulin lispro  0-6 Units Subcutaneous Nightly    [MAR Hold] fluconazole  200 mg Oral Daily    [MAR Hold] cefepime  2 g Intravenous Q24H    [MAR Hold] polyethylene glycol  17 g Oral Daily    sodium chloride  2,000 mL Intravenous Once     Continuous Infusions:   [MAR Hold] sodium chloride 150 mL/hr at 11/03/17 1330    [MAR Hold] dextrose       PRN Meds:. [MAR Hold] polyethylene glycol, [MAR Hold] sodium chloride flush, [MAR Hold] acetaminophen, [MAR Hold] magnesium sulfate, [MAR Hold] potassium chloride, [MAR Hold] glucose, [MAR Hold] dextrose, [MAR Hold] glucagon (rDNA), [MAR Hold] dextrose, [MAR Hold] acetaminophen    Allergies:  Review of patient's allergies indicates no known allergies. Social History:    Social History     Social History    Marital status:      Spouse name: N/A    Number of children: N/A    Years of education: N/A     Occupational History    Not on file. Social History Main Topics    Smoking status: Former Smoker     Packs/day: 0.50     Years: 3.00    Smokeless tobacco: Never Used      Comment: quit 50 years ago    Alcohol use No    Drug use: No    Sexual activity: Not on file     Other Topics Concern    Not on file     Social History Narrative    No narrative on file       Family History:  History reviewed. No pertinent family history.   Previous Urologic Family history: none    REVIEW OF SYSTEMS:  Constitutional: negative  Eyes: negative  Respiratory: negative  Cardiovascular: negative  Gastrointestinal: negative  Genitourinary: see HPI  Musculoskeletal: negative  Skin: negative   Neurological: negative  Hematological/Lymphatic: negative  Psychological: negative    Physical Exam:    This a 78 y.o. male   Patient Vitals for the past 24 hrs:   BP Temp Temp src Pulse Resp SpO2 Height Weight   11/03/17 1619 - 99.2 °F (37.3 °C) Temporal - - - - -   11/03/17 1554 - 102.5 °F (39.2 °C) Temporal - - - - -   11/03/17 1501 130/64 - - 119 (!) 31 - - -   11/03/17 1500 - 104.5 °F (40.3 °C) Temporal - - - - -   11/03/17 1445 - 104.1 °F (40.1 °C) Temporal - - - - -   11/03/17 1420 - 101 °F (38.3 °C) Temporal - - - - -   11/03/17 1334 132/77 - - 107 27 98 % - -   11/03/17 1332 - 99.2 °F (37.3 °C) Temporal - - - - -   11/03/17 1307 114/63 - - 93 - - - -   11/03/17 1302 - 98.4 °F (36.9 °C) - 93 18 - - -   11/03/17 1301 114/63 - - 91 22 99 % - -   11/03/17 1231 109/60 - - 93 14 97 % - -   11/03/17 1224 (!) 105/54 98.4 °F (36.9 °C) Temporal 91 18 - 5' 10\" (1.778 m) 214 lb 7 oz (97.3 kg)   11/03/17 1201 103/61 - - - - 97 % - -   11/03/17 1146 93/63 - - - - 93 % - -   11/03/17 1131 84/68 - - - - 96 % - -   11/03/17 1117 96/61 - - - - 94 % - -   11/03/17 1115 91/60 - - - - 96 % - -   11/03/17 0856 (!) 91/57 101.4 °F (38.6 °C) Tympanic 118 18 96 % - 213 lb (96.6 kg)     Constitutional: Patient in no acute distress; Neuro: alert and oriented to person place and time. Psych: Mood and affect normal.  Skin: Normal  Lungs: Respiratory effort normal  Cardiovascular:  Normal peripheral pulses  Abdomen: Soft, non-tender, non-distended with no CVA, flank pain, hepatosplenomegaly or hernia. Kidneys normal.  Bladder non-tender and not distended. Lymphatics: no palpable lymphadenopathy  Penis normal and circumcised  Urethral meatus normal  Scrotal exam normal  Testicles normal bilaterally  Epididymis normal bilaterally  No evidence of inguinal hernia  Anus and perineum normal  Normal rectal tone with no masses  Prostate soft, non-tender to palpation. No palpable nodules. Estimated 40 grams.    LABS:  Recent Labs      11/03/17   0953   WBC  27.3*   HGB  12.7*   HCT  38.1*   MCV  94.1   PLT  209     Recent Labs      11/03/17   0953   NA  131*   K  4.6   CL  93*   CO2  20   PHOS  2.9   BUN  29*

## 2017-11-03 NOTE — ED NOTES
Bedside report given to [facility] from ICU     Augusta Canchola RN  11/03/17 7621

## 2017-11-04 LAB
ABSOLUTE BANDS #: 0.77 K/UL (ref 0–1)
ABSOLUTE EOS #: 0.15 K/UL (ref 0–0.4)
ABSOLUTE IMMATURE GRANULOCYTE: ABNORMAL K/UL (ref 0–0.3)
ABSOLUTE LYMPH #: 0.46 K/UL (ref 1–4.8)
ABSOLUTE MONO #: 1.22 K/UL (ref 0–1)
ALBUMIN SERPL-MCNC: 2.5 G/DL (ref 3.5–5.2)
ALBUMIN/GLOBULIN RATIO: 0.8 (ref 1–2.5)
ALP BLD-CCNC: 63 U/L (ref 40–129)
ALT SERPL-CCNC: 13 U/L (ref 5–41)
ANION GAP SERPL CALCULATED.3IONS-SCNC: 15 MMOL/L (ref 9–17)
AST SERPL-CCNC: 31 U/L
BANDS: 5 %
BASOPHILS # BLD: 0 %
BASOPHILS ABSOLUTE: 0 K/UL (ref 0–0.2)
BILIRUB SERPL-MCNC: 0.62 MG/DL (ref 0.3–1.2)
BUN BLDV-MCNC: 25 MG/DL (ref 8–23)
BUN/CREAT BLD: 16 (ref 9–20)
CALCIUM SERPL-MCNC: 7 MG/DL (ref 8.6–10.4)
CHLORIDE BLD-SCNC: 102 MMOL/L (ref 98–107)
CO2: 17 MMOL/L (ref 20–31)
CREAT SERPL-MCNC: 1.54 MG/DL (ref 0.7–1.2)
DIFFERENTIAL TYPE: ABNORMAL
EOSINOPHILS RELATIVE PERCENT: 1 %
GFR AFRICAN AMERICAN: 54 ML/MIN
GFR NON-AFRICAN AMERICAN: 44 ML/MIN
GFR SERPL CREATININE-BSD FRML MDRD: ABNORMAL ML/MIN/{1.73_M2}
GFR SERPL CREATININE-BSD FRML MDRD: ABNORMAL ML/MIN/{1.73_M2}
GLUCOSE BLD-MCNC: 109 MG/DL (ref 70–99)
GLUCOSE BLD-MCNC: 144 MG/DL (ref 74–100)
GLUCOSE BLD-MCNC: 144 MG/DL (ref 74–100)
GLUCOSE BLD-MCNC: 153 MG/DL (ref 74–100)
GLUCOSE BLD-MCNC: 87 MG/DL (ref 74–100)
HCT VFR BLD CALC: 32.6 % (ref 41–53)
HEMOGLOBIN: 10.8 G/DL (ref 13.5–17)
IMMATURE GRANULOCYTES: ABNORMAL %
LACTIC ACID, WHOLE BLOOD: NORMAL MMOL/L (ref 0.7–2.1)
LACTIC ACID, WHOLE BLOOD: NORMAL MMOL/L (ref 0.7–2.1)
LACTIC ACID: 1.2 MMOL/L (ref 0.5–2.2)
LACTIC ACID: 1.3 MMOL/L (ref 0.5–2.2)
LYMPHOCYTES # BLD: 3 %
MAGNESIUM: 1.8 MG/DL (ref 1.6–2.6)
MCH RBC QN AUTO: 31.4 PG (ref 26–34)
MCHC RBC AUTO-ENTMCNC: 33.2 G/DL (ref 31–37)
MCV RBC AUTO: 94.8 FL (ref 80–100)
MONOCYTES # BLD: 8 %
MORPHOLOGY: NORMAL
PDW BLD-RTO: 13.8 % (ref 12.1–15.2)
PHOSPHORUS: 2.4 MG/DL (ref 2.5–4.5)
PLATELET # BLD: 188 K/UL (ref 140–450)
PLATELET ESTIMATE: ABNORMAL
PMV BLD AUTO: 8.4 FL (ref 6–12)
POTASSIUM SERPL-SCNC: 4.4 MMOL/L (ref 3.7–5.3)
RBC # BLD: 3.43 M/UL (ref 4.5–5.9)
RBC # BLD: ABNORMAL 10*6/UL
SEG NEUTROPHILS: 83 %
SEGMENTED NEUTROPHILS ABSOLUTE COUNT: 12.7 K/UL (ref 1.8–7.7)
SODIUM BLD-SCNC: 134 MMOL/L (ref 135–144)
TOTAL PROTEIN: 5.7 G/DL (ref 6.4–8.3)
WBC # BLD: 15.3 K/UL (ref 3.5–11)
WBC # BLD: ABNORMAL 10*3/UL

## 2017-11-04 PROCEDURE — 6370000000 HC RX 637 (ALT 250 FOR IP): Performed by: UROLOGY

## 2017-11-04 PROCEDURE — 82947 ASSAY GLUCOSE BLOOD QUANT: CPT

## 2017-11-04 PROCEDURE — 85025 COMPLETE CBC W/AUTO DIFF WBC: CPT

## 2017-11-04 PROCEDURE — 83605 ASSAY OF LACTIC ACID: CPT

## 2017-11-04 PROCEDURE — 36415 COLL VENOUS BLD VENIPUNCTURE: CPT

## 2017-11-04 PROCEDURE — 80053 COMPREHEN METABOLIC PANEL: CPT

## 2017-11-04 PROCEDURE — 84100 ASSAY OF PHOSPHORUS: CPT

## 2017-11-04 PROCEDURE — 2580000003 HC RX 258: Performed by: UROLOGY

## 2017-11-04 PROCEDURE — 2000000000 HC ICU R&B

## 2017-11-04 PROCEDURE — 6360000002 HC RX W HCPCS: Performed by: PHYSICIAN ASSISTANT

## 2017-11-04 PROCEDURE — 0WJR8ZZ INSPECTION OF GENITOURINARY TRACT, VIA NATURAL OR ARTIFICIAL OPENING ENDOSCOPIC APPROACH: ICD-10-PCS | Performed by: UROLOGY

## 2017-11-04 PROCEDURE — 2580000003 HC RX 258: Performed by: PHYSICIAN ASSISTANT

## 2017-11-04 PROCEDURE — 6360000002 HC RX W HCPCS: Performed by: INTERNAL MEDICINE

## 2017-11-04 PROCEDURE — 0T768DZ DILATION OF RIGHT URETER WITH INTRALUMINAL DEVICE, VIA NATURAL OR ARTIFICIAL OPENING ENDOSCOPIC: ICD-10-PCS | Performed by: UROLOGY

## 2017-11-04 PROCEDURE — 83735 ASSAY OF MAGNESIUM: CPT

## 2017-11-04 PROCEDURE — C9113 INJ PANTOPRAZOLE SODIUM, VIA: HCPCS | Performed by: PHYSICIAN ASSISTANT

## 2017-11-04 PROCEDURE — 2580000003 HC RX 258: Performed by: INTERNAL MEDICINE

## 2017-11-04 RX ADMIN — INSULIN LISPRO 1 UNITS: 100 INJECTION, SOLUTION INTRAVENOUS; SUBCUTANEOUS at 20:35

## 2017-11-04 RX ADMIN — Medication 10 ML: at 16:46

## 2017-11-04 RX ADMIN — ASPIRIN 81 MG: 81 TABLET, COATED ORAL at 08:43

## 2017-11-04 RX ADMIN — SODIUM CHLORIDE: 9 INJECTION, SOLUTION INTRAVENOUS at 05:48

## 2017-11-04 RX ADMIN — PIPERACILLIN SODIUM AND TAZOBACTAM SODIUM 3.38 G: 3; .375 INJECTION, POWDER, LYOPHILIZED, FOR SOLUTION INTRAVENOUS at 08:43

## 2017-11-04 RX ADMIN — Medication 10 ML: at 08:45

## 2017-11-04 RX ADMIN — FLUCONAZOLE 200 MG: 100 TABLET ORAL at 08:43

## 2017-11-04 RX ADMIN — TAMSULOSIN HYDROCHLORIDE 0.4 MG: 0.4 CAPSULE ORAL at 16:45

## 2017-11-04 RX ADMIN — PANTOPRAZOLE SODIUM 40 MG: 40 INJECTION, POWDER, FOR SOLUTION INTRAVENOUS at 08:43

## 2017-11-04 RX ADMIN — PIPERACILLIN SODIUM AND TAZOBACTAM SODIUM 3.38 G: 3; .375 INJECTION, POWDER, LYOPHILIZED, FOR SOLUTION INTRAVENOUS at 16:45

## 2017-11-04 RX ADMIN — INSULIN LISPRO 2 UNITS: 100 INJECTION, SOLUTION INTRAVENOUS; SUBCUTANEOUS at 17:10

## 2017-11-04 RX ADMIN — INSULIN LISPRO 2 UNITS: 100 INJECTION, SOLUTION INTRAVENOUS; SUBCUTANEOUS at 12:00

## 2017-11-04 RX ADMIN — POLYETHYLENE GLYCOL 3350 17 G: 17 POWDER, FOR SOLUTION ORAL at 08:43

## 2017-11-04 RX ADMIN — Medication 10 ML: at 08:46

## 2017-11-04 ASSESSMENT — PAIN SCALES - GENERAL
PAINLEVEL_OUTOF10: 0

## 2017-11-04 NOTE — OP NOTE
[address] [place], [place] [address]                               OPERATIVE REPORT    PATIENT NAME: Lexie Figueredo                    :             1940  MED REC NO:   [id number]                               ROOM:            I305  ACCOUNT NO:   [de-identified]                            ADMISSION DATE:  2017  PROVIDER:     Trevor Perkins    DATE OF PROCEDURE:  2017    SURGEON:  Trevor Perkins MD    ASSISTANT:  None. PREOPERATIVE DIAGNOSES:  1. Fever. 2.  Right hydronephrosis. POSTOPERATIVE DIAGNOSES:  1. Fever. 2.  Right hydronephrosis. PROCEDURE PERFORMED:  Cystoscopy of right ureteral stent placement. ANESTHESIA:  MAC.    COMPLICATIONS:  None. ESTIMATED BLOOD LOSS:   Minimal.    SPECIMENS:  None. PROSTHESIS:  A 6-Guinean 28-cm double-J ureteral stent. DISPOSITION:  Stable. FINDINGS:  Right ureteral obstruction. INDICATIONS:  The patient is a 51-year-old man who underwent right  ureteroscopy with holmium laser lithotripsy earlier this week, today  for stent removal.  This morning, the patient was experiencing malaise  prior to the stent removal.  He did get admitted to the floor through  the emergency department, had a white count of 27,000 and a fevers up  to 104 degrees Fahrenheit. DESCRIPTION OF PROCEDURE:  The patient was taken back to the operating  room, informed consent including all risks, benefits, and alternatives  were obtained. The patient was transferred from the [facility] onto the  operating room table, where she was induced under general anesthesia. The patient did receive Maxipime and Rocephin up on the floor. The  patient ______ preoperative antibiotic prophylaxis to begin the case. He was prepped and draped in normal sterile fashion, placed in dorsal  lithotomy. He had 22-Guinean sheath with 30-degree lens passed through  the urethra into the bladder.   Once in the bladder, right ureteral  orifice was identified _____. A 0.035-inch wire was passed up the  right ureter into the kidney, Glidewire was confirmed in the kidney by  fluoroscopy. The patient had 6-English 28-cm double-J ureteral stent  placed over Glidewire. Glidewire was removed. Proximal curl was  confirmed by fluoroscopy. Distal curl was confirmed by visualization. At this point in time, the bladder was drained, 2% lidocaine Uro-Jet  used, he did have a Fernandes catheter reinserted. He was awoken from [facility]  anesthesia and transferred to [facility], taken to PACU in satisfactory  condition by nursing and anesthesia team.    PLAN:  The patient will be readmitted to the ICU where he will be  under the care of the primary service. At this point in time, we will  leave the Fernandes catheter and the stent in until we understand what his  cultures are.         Elizabeth Arenas    D: 11/03/2017 17:07:54       T: 11/04/2017 1:34:28     [email or web address]  Job#: [id number]     Doc#: [id number]

## 2017-11-04 NOTE — PROGRESS NOTES
Final   tigecycline  NOT REPORTED Final   tobramycin Sensitive <=1 SUSCEPTIBLE Final   trimethoprim-sulfamethoxazole  NOT REPORTED           microbiology: blood culture: pending and urine culture: see above    CBC with Differential:    Lab Results   Component Value Date    WBC 15.3 11/04/2017    RBC 3.43 11/04/2017    HGB 10.8 11/04/2017    HCT 32.6 11/04/2017     11/04/2017    MCV 94.8 11/04/2017    MCH 31.4 11/04/2017    MCHC 33.2 11/04/2017    RDW 13.8 11/04/2017    LYMPHOPCT 3 11/04/2017    MONOPCT 8 11/04/2017    BASOPCT 0 11/04/2017    MONOSABS 1.22 11/04/2017    LYMPHSABS 0.46 11/04/2017    EOSABS 0.15 11/04/2017    BASOSABS 0.00 11/04/2017    DIFFTYPE NOT REPORTED 11/04/2017     CMP:    Lab Results   Component Value Date     11/04/2017    K 4.4 11/04/2017     11/04/2017    CO2 17 11/04/2017    BUN 25 11/04/2017    CREATININE 1.54 11/04/2017    GFRAA 54 11/04/2017    LABGLOM 44 11/04/2017    GLUCOSE 109 11/04/2017    PROT 5.7 11/04/2017    LABALBU 2.5 11/04/2017    CALCIUM 7.0 11/04/2017    BILITOT 0.62 11/04/2017    ALKPHOS 63 11/04/2017    AST 31 11/04/2017    ALT 13 11/04/2017           Ct Abdomen Pelvis Wo Iv Contrast    Result Date: 11/3/2017  REPORT: CT abdomen and pelvis without contrast TECHNIQUE: Contiguous thin axial slices through the abdomen and pelvis obtained without contrast. Axial, coronal and sagittal reformats were made from the source images. INDICATION: Right lower quadrant pain, fever, leukocytosis FINDINGS: ABDOMEN: Numerous punctate nonobstructing calcifications in both kidneys. Dominant nonobstructing calcification lower pole right kidney measuring 8 x 12 x 8 mm. There is moderate right hydroureteronephrosis with perinephric inflammation. The level of obstruction appears to be in the mid ureter. Punctate calcification at the level of the obstruction with some hyperdense material just superiorly (best seen coronal image 87). The visualized lung bases are clear.  The electronically signed by Rosaline Lopez on 11/3/2017 10:52 AM    Stable chest      ASSESSMENT:  Sepsis ---resolving                               Septic shock---resolved                                UTI ---under control with IV antibiotics and stent placement            Hospital Problems:  Principal Problem:    Severe sepsis ([address]  Active Problems:    Diabetes mellitus ([address]    Arterial hypotension    Acute cystitis    Acute renal failure superimposed on stage 3 chronic kidney disease (HCC)    Lactic acid acidosis    Yeast infection    Hyponatremia    Sepsis associated hypotension (HCC)    Urinary retention    Hydronephrosis of right kidney      All Problems:  Patient Active Problem List   Diagnosis    Senile nuclear sclerosis    Urinary retention    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 and other lower urinary tract symptoms (LUTS)(600.21)    UTI (urinary tract infection)    Gross hematuria    BNC (bladder neck contracture)    Acute renal failure superimposed on stage 3 chronic kidney disease (HCC)    Sepsis ([address]    Diabetes mellitus ([address]    Hypertension    Ureteral calculus    Arterial hypotension    Acute cystitis    Severe sepsis (HCC)    Acute renal failure superimposed on stage 3 chronic kidney disease (HCC)    Lactic acid acidosis    Yeast infection    Hyponatremia    Sepsis associated hypotension (HCC)    Urinary retention    Hydronephrosis of right kidney       PLAN:  · Sepsis---DC fluids, continue IV antibiotics ( change to Zosyn to match sensitivity)    HIGH RISK MEDS: none    Chikis Chowdary M.D.

## 2017-11-04 NOTE — PROGRESS NOTES
MEDICAL NUTRITION THERAPY - CONSULT ACKNOWLEDGEMENT    Acknowledgement of consult/positive nutrition screening regarding weight loss and decreased PO pta (no significant weight loss per historical weights). CC 4 carbs per meal diet order currently on chart, PO is currently good. Note weight history below. Labs reviewed. Will add 4 oz Glucerna TID with meals and f/u with further recommendations as indicated.     Wt Readings from Last 10 Encounters:   11/03/17 214 lb 7 oz (97.3 kg)   11/03/17 212 lb (96.2 kg)   11/01/17 214 lb (97.1 kg)   10/31/17 214 lb (97.1 kg)   05/17/17 217 lb (98.4 kg)   03/17/17 218 lb (98.9 kg)   03/03/17 216 lb 6 oz (98.1 kg)   02/22/17 217 lb (98.4 kg)   02/07/17 216 lb (98 kg)   01/31/17 219 lb (99.3 kg)       [id number]

## 2017-11-05 LAB
ABSOLUTE EOS #: 0.6 K/UL (ref 0–0.4)
ABSOLUTE IMMATURE GRANULOCYTE: ABNORMAL K/UL (ref 0–0.3)
ABSOLUTE LYMPH #: 1 K/UL (ref 1–4.8)
ABSOLUTE MONO #: 1 K/UL (ref 0–1)
ALBUMIN SERPL-MCNC: 2.1 G/DL (ref 3.5–5.2)
ALBUMIN/GLOBULIN RATIO: 0.6 (ref 1–2.5)
ALP BLD-CCNC: 54 U/L (ref 40–129)
ALT SERPL-CCNC: 19 U/L (ref 5–41)
ANION GAP SERPL CALCULATED.3IONS-SCNC: 14 MMOL/L (ref 9–17)
AST SERPL-CCNC: 19 U/L
BASOPHILS # BLD: 0 %
BASOPHILS ABSOLUTE: 0 K/UL (ref 0–0.2)
BILIRUB SERPL-MCNC: 0.61 MG/DL (ref 0.3–1.2)
BUN BLDV-MCNC: 22 MG/DL (ref 8–23)
BUN/CREAT BLD: 15 (ref 9–20)
CALCIUM SERPL-MCNC: 7.5 MG/DL (ref 8.6–10.4)
CHLORIDE BLD-SCNC: 104 MMOL/L (ref 98–107)
CO2: 18 MMOL/L (ref 20–31)
CREAT SERPL-MCNC: 1.46 MG/DL (ref 0.7–1.2)
DIFFERENTIAL TYPE: ABNORMAL
EOSINOPHILS RELATIVE PERCENT: 5 %
GFR AFRICAN AMERICAN: 57 ML/MIN
GFR NON-AFRICAN AMERICAN: 47 ML/MIN
GFR SERPL CREATININE-BSD FRML MDRD: ABNORMAL ML/MIN/{1.73_M2}
GFR SERPL CREATININE-BSD FRML MDRD: ABNORMAL ML/MIN/{1.73_M2}
GLUCOSE BLD-MCNC: 132 MG/DL (ref 74–100)
GLUCOSE BLD-MCNC: 146 MG/DL (ref 70–99)
GLUCOSE BLD-MCNC: 155 MG/DL (ref 74–100)
GLUCOSE BLD-MCNC: 167 MG/DL (ref 74–100)
GLUCOSE BLD-MCNC: 254 MG/DL (ref 74–100)
HCT VFR BLD CALC: 33.9 % (ref 41–53)
HEMOGLOBIN: 11.3 G/DL (ref 13.5–17)
IMMATURE GRANULOCYTES: ABNORMAL %
LYMPHOCYTES # BLD: 9 %
MAGNESIUM: 1.9 MG/DL (ref 1.6–2.6)
MCH RBC QN AUTO: 31.1 PG (ref 26–34)
MCHC RBC AUTO-ENTMCNC: 33.3 G/DL (ref 31–37)
MCV RBC AUTO: 93.4 FL (ref 80–100)
MONOCYTES # BLD: 9 %
PDW BLD-RTO: 13.7 % (ref 12.1–15.2)
PHOSPHORUS: 1.8 MG/DL (ref 2.5–4.5)
PLATELET # BLD: 199 K/UL (ref 140–450)
PLATELET ESTIMATE: ABNORMAL
PMV BLD AUTO: 8.3 FL (ref 6–12)
POTASSIUM SERPL-SCNC: 4.7 MMOL/L (ref 3.7–5.3)
RBC # BLD: 3.63 M/UL (ref 4.5–5.9)
RBC # BLD: ABNORMAL 10*6/UL
SEG NEUTROPHILS: 77 %
SEGMENTED NEUTROPHILS ABSOLUTE COUNT: 9 K/UL (ref 1.8–7.7)
SODIUM BLD-SCNC: 136 MMOL/L (ref 135–144)
TOTAL PROTEIN: 5.9 G/DL (ref 6.4–8.3)
WBC # BLD: 11.6 K/UL (ref 3.5–11)
WBC # BLD: ABNORMAL 10*3/UL

## 2017-11-05 PROCEDURE — C9113 INJ PANTOPRAZOLE SODIUM, VIA: HCPCS | Performed by: PHYSICIAN ASSISTANT

## 2017-11-05 PROCEDURE — 6370000000 HC RX 637 (ALT 250 FOR IP): Performed by: UROLOGY

## 2017-11-05 PROCEDURE — 83735 ASSAY OF MAGNESIUM: CPT

## 2017-11-05 PROCEDURE — 80053 COMPREHEN METABOLIC PANEL: CPT

## 2017-11-05 PROCEDURE — 6360000002 HC RX W HCPCS: Performed by: INTERNAL MEDICINE

## 2017-11-05 PROCEDURE — 2580000003 HC RX 258: Performed by: PHYSICIAN ASSISTANT

## 2017-11-05 PROCEDURE — 1200000000 HC SEMI PRIVATE

## 2017-11-05 PROCEDURE — 2580000003 HC RX 258: Performed by: UROLOGY

## 2017-11-05 PROCEDURE — 82947 ASSAY GLUCOSE BLOOD QUANT: CPT

## 2017-11-05 PROCEDURE — 36415 COLL VENOUS BLD VENIPUNCTURE: CPT

## 2017-11-05 PROCEDURE — 2580000003 HC RX 258: Performed by: INTERNAL MEDICINE

## 2017-11-05 PROCEDURE — 85025 COMPLETE CBC W/AUTO DIFF WBC: CPT

## 2017-11-05 PROCEDURE — 6360000002 HC RX W HCPCS: Performed by: PHYSICIAN ASSISTANT

## 2017-11-05 PROCEDURE — 84100 ASSAY OF PHOSPHORUS: CPT

## 2017-11-05 RX ADMIN — PANTOPRAZOLE SODIUM 40 MG: 40 INJECTION, POWDER, FOR SOLUTION INTRAVENOUS at 08:06

## 2017-11-05 RX ADMIN — INSULIN LISPRO 3 UNITS: 100 INJECTION, SOLUTION INTRAVENOUS; SUBCUTANEOUS at 20:29

## 2017-11-05 RX ADMIN — Medication 10 ML: at 08:54

## 2017-11-05 RX ADMIN — PIPERACILLIN SODIUM AND TAZOBACTAM SODIUM 3.38 G: 3; .375 INJECTION, POWDER, LYOPHILIZED, FOR SOLUTION INTRAVENOUS at 08:53

## 2017-11-05 RX ADMIN — Medication 10 ML: at 08:09

## 2017-11-05 RX ADMIN — TAMSULOSIN HYDROCHLORIDE 0.4 MG: 0.4 CAPSULE ORAL at 17:46

## 2017-11-05 RX ADMIN — FLUCONAZOLE 200 MG: 100 TABLET ORAL at 08:06

## 2017-11-05 RX ADMIN — Medication 10 ML: at 20:31

## 2017-11-05 RX ADMIN — INSULIN LISPRO 2 UNITS: 100 INJECTION, SOLUTION INTRAVENOUS; SUBCUTANEOUS at 16:47

## 2017-11-05 RX ADMIN — INSULIN LISPRO 2 UNITS: 100 INJECTION, SOLUTION INTRAVENOUS; SUBCUTANEOUS at 11:58

## 2017-11-05 RX ADMIN — PIPERACILLIN SODIUM AND TAZOBACTAM SODIUM 3.38 G: 3; .375 INJECTION, POWDER, LYOPHILIZED, FOR SOLUTION INTRAVENOUS at 16:48

## 2017-11-05 RX ADMIN — ASPIRIN 81 MG: 81 TABLET, COATED ORAL at 08:06

## 2017-11-05 RX ADMIN — PIPERACILLIN SODIUM AND TAZOBACTAM SODIUM 3.38 G: 3; .375 INJECTION, POWDER, LYOPHILIZED, FOR SOLUTION INTRAVENOUS at 00:46

## 2017-11-05 RX ADMIN — POLYETHYLENE GLYCOL 3350 17 G: 17 POWDER, FOR SOLUTION ORAL at 08:06

## 2017-11-05 ASSESSMENT — PAIN SCALES - GENERAL
PAINLEVEL_OUTOF10: 0

## 2017-11-05 NOTE — PROGRESS NOTES
Progress Note    SUBJECTIVE:  Patient seen for UTI, sepsis and septic. He says he feels \"great\". Nurses report no change  ROS:   Constitutional: negative  for fevers, and negative for chills. Respiratory: negative for shortness of breath, positive for cough, and negative for wheezing  Cardiovascular: negative for chest pain, and negative for palpitations  Gastrointestinal: negative for abdominal pain, negative for nausea,negative for vomiting, negative for diarrhea, and negative for constipation     All other systems were reviewed with the patient and are negative unless otherwise stated in HPI    OBJECTIVE:    EXAM:  Vitals:    11/05/17 0800   BP: (!) 144/86   Pulse: 94   Resp:    Temp:    SpO2:       Weight: 227 lb (103 kg)    Height: 5' 10\" (177.8 cm)     GEN:   A & O x3, no apparent distress  EYES: No gross abnormalities. NECK: normal, supple, no lymphadenopathy,   PULM: clear to auscultation bilaterally- no wheezes, rales or rhonchi, normal air movement, no respiratory distress  COR: regular rate & rhythm and no murmurs  ABD:  soft, non-tender, non-distended, normal bowel sounds, no masses or organomegaly  EXT:   no cyanosis, clubbing or edema present    NEURO: negative  SKIN:  no rashes or significant lesions    microbiology: blood culture:   Performed at [facility],    Special Requests 11/03/2017 10:00 AM [address] [place] [address]  [phone]    Culture  (Abnormal) 11/03/2017 10:00 AM [facility]    (A)   POSITIVE BLOOD CULTURE, RN Marnie Monique RN ICU [facility] NOV 4 2017 1000 JLS    Culture  (Abnormal) 11/03/2017 10:00 AM [facility]    RB     Culture 11/03/2017 10:00 AM [facility] FROM BOTTLEAmbrosio Mather NEGATIVE RODS    Culture  (Abnormal) 11/03/2017 10:00 AM [facility]   PNAFISH negative. Culture results to follow.      Culture  (Abnormal) 11/03/2017 10:00 AM is identified on these noncontrast enhanced images. Fatty infiltration of liver. Normal CT appearance of the gallbladder. No intra-or extrahepatic biliary ductal dilation. The adrenal glands, spleen and pancreas are within normal unenhanced limits. Non-aneurysmal abdominal aorta. No free intraperitoneal air. PELVIS: No dilated loops of bowel, bowel wall thickening or pneumatosis. The appendix is well-visualized and is normal.  Bilateral inguinal hernias both containing small portions of the urinary bladder. No free pelvic fluid. Degenerative changes of the  spine and pelvis. 1. Moderate right hydroureteronephrosis with a punctate obstructing calcification in the mid ureter and proximal debris. 2. Bilateral nephrolithiasis as above. 3. Bilateral inguinal hernias both containing portions of the urinary bladder 4. Fatty infiltration of the liver Final report electronically signed by Elo Godoy on 11/3/2017 2:58 PM    Xr Abdomen (kub) (single Ap View)    Result Date: 11/3/2017  FINAL REPORT EXAM:  XR ABDOMEN LIMITED (KUB) HISTORY:  RT stent placement TECHNIQUE:  Single fluoroscopic image of the lower abdomen and pelvis with submitted for review. Total fluoroscopy time was 0.04 minutes with total dose of 2.5 mGy. PRIORS:  None. FINDINGS:  There is a partially visualized ureteral stent present, with the distal portion overlying the region of the bladder. Impression:  The distal portion of the right ureteral stent overlies the bladder. Electronically Signed By: Meme Tavares   on  11/03/2017  18:34    Xr Chest 1 Vw    Result Date: 11/3/2017  REPORT: Portable AP radiograph of the chest obtained at 1024 hours INDICATION: Septic workup FINDINGS: Compared to 3/3/2017. Stable chronic changes in both lungs. No focal consolidation, pleural effusion or pneumothorax seen. Normal cardiac and mediastinal silhouettes. No free intraperitoneal air.   Final report electronically signed by Elo Godoy on 11/3/2017 10:52 AM    Stable chest      ASSESSMENT:  Sepsis, septic shock---resolvee                               UTI---under control            Hospital Problems:  Principal Problem:    Severe sepsis ([address]  Active Problems:    Diabetes mellitus ([address]    Arterial hypotension    Acute cystitis    Acute renal failure superimposed on stage 3 chronic kidney disease (HCC)    Lactic acid acidosis    Yeast infection    Hyponatremia    Sepsis associated hypotension (HCC)    Urinary retention    Hydronephrosis of right kidney      All Problems:  Patient Active Problem List   Diagnosis    Senile nuclear sclerosis    Urinary retention    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 and other lower urinary tract symptoms (LUTS)(600.21)    UTI (urinary tract infection)    Gross hematuria    BNC (bladder neck contracture)    Acute renal failure superimposed on stage 3 chronic kidney disease (HCC)    Sepsis ([address]    Diabetes mellitus ([address]    Hypertension    Ureteral calculus    Arterial hypotension    Acute cystitis    Severe sepsis (HCC)    Acute renal failure superimposed on stage 3 chronic kidney disease (HCC)    Lactic acid acidosis    Yeast infection    Hyponatremia    Sepsis associated hypotension (HCC)    Urinary retention    Hydronephrosis of right kidney       [facility]    HIGH RISK MEDS: none    Naomy Mai M.D.

## 2017-11-05 NOTE — PLAN OF CARE
Outcome: Ongoing  Luo fall score calculated on admission and daily at midnight and shows pt at 70 risk for fall. Bed in lowest position at all times with wheels locked. Falling star posted on door frame and yellow sticker visible on patient bracelet. Call light and bedside table with in reach. ID information verified as correct and visible. Will continue to monitor and intervene as necessary. Mis Nair RN      Problem: Daily Care:  Goal: Daily care needs are met  Daily care needs are met   Outcome: Ongoing  Patient able to use call light for assistance as needed. Hourly rounding to assure needs are met.

## 2017-11-06 PROBLEM — T83.511A URINARY TRACT INFECTION ASSOCIATED WITH CATHETERIZATION OF URINARY TRACT (HCC): Status: ACTIVE | Noted: 2017-11-03

## 2017-11-06 PROBLEM — R78.81 BACTEREMIA: Status: ACTIVE | Noted: 2017-11-06

## 2017-11-06 PROBLEM — N39.0 URINARY TRACT INFECTION ASSOCIATED WITH CATHETERIZATION OF URINARY TRACT (HCC): Status: ACTIVE | Noted: 2017-11-03

## 2017-11-06 LAB
ABSOLUTE EOS #: 0.5 K/UL (ref 0–0.4)
ABSOLUTE IMMATURE GRANULOCYTE: ABNORMAL K/UL (ref 0–0.3)
ABSOLUTE LYMPH #: 1.6 K/UL (ref 1–4.8)
ABSOLUTE MONO #: 0.9 K/UL (ref 0–1)
ALBUMIN SERPL-MCNC: 2.5 G/DL (ref 3.5–5.2)
ALBUMIN/GLOBULIN RATIO: 0.7 (ref 1–2.5)
ALP BLD-CCNC: 51 U/L (ref 40–129)
ALT SERPL-CCNC: 23 U/L (ref 5–41)
ANION GAP SERPL CALCULATED.3IONS-SCNC: 14 MMOL/L (ref 9–17)
AST SERPL-CCNC: 19 U/L
BASOPHILS # BLD: 0 %
BASOPHILS ABSOLUTE: 0 K/UL (ref 0–0.2)
BILIRUB SERPL-MCNC: 0.41 MG/DL (ref 0.3–1.2)
BUN BLDV-MCNC: 21 MG/DL (ref 8–23)
BUN/CREAT BLD: 16 (ref 9–20)
CALCIUM SERPL-MCNC: 7.8 MG/DL (ref 8.6–10.4)
CHLORIDE BLD-SCNC: 103 MMOL/L (ref 98–107)
CO2: 18 MMOL/L (ref 20–31)
CREAT SERPL-MCNC: 1.33 MG/DL (ref 0.7–1.2)
CULTURE: ABNORMAL
DIFFERENTIAL TYPE: ABNORMAL
EOSINOPHILS RELATIVE PERCENT: 5 %
GFR AFRICAN AMERICAN: >60 ML/MIN
GFR NON-AFRICAN AMERICAN: 52 ML/MIN
GFR SERPL CREATININE-BSD FRML MDRD: ABNORMAL ML/MIN/{1.73_M2}
GFR SERPL CREATININE-BSD FRML MDRD: ABNORMAL ML/MIN/{1.73_M2}
GLUCOSE BLD-MCNC: 149 MG/DL (ref 74–100)
GLUCOSE BLD-MCNC: 153 MG/DL (ref 70–99)
GLUCOSE BLD-MCNC: 179 MG/DL (ref 74–100)
GLUCOSE BLD-MCNC: 197 MG/DL (ref 74–100)
GLUCOSE BLD-MCNC: 210 MG/DL (ref 74–100)
HCT VFR BLD CALC: 34.4 % (ref 41–53)
HEMOGLOBIN: 11.3 G/DL (ref 13.5–17)
IMMATURE GRANULOCYTES: ABNORMAL %
LYMPHOCYTES # BLD: 16 %
Lab: ABNORMAL
MAGNESIUM: 1.8 MG/DL (ref 1.6–2.6)
MCH RBC QN AUTO: 31.4 PG (ref 26–34)
MCHC RBC AUTO-ENTMCNC: 32.9 G/DL (ref 31–37)
MCV RBC AUTO: 95.6 FL (ref 80–100)
MONOCYTES # BLD: 9 %
ORGANISM: ABNORMAL
PDW BLD-RTO: 13.5 % (ref 12.1–15.2)
PHOSPHORUS: 1.9 MG/DL (ref 2.5–4.5)
PLATELET # BLD: 226 K/UL (ref 140–450)
PLATELET ESTIMATE: ABNORMAL
PMV BLD AUTO: 8.6 FL (ref 6–12)
POTASSIUM SERPL-SCNC: 4.3 MMOL/L (ref 3.7–5.3)
RBC # BLD: 3.6 M/UL (ref 4.5–5.9)
RBC # BLD: ABNORMAL 10*6/UL
SEG NEUTROPHILS: 70 %
SEGMENTED NEUTROPHILS ABSOLUTE COUNT: 6.6 K/UL (ref 1.8–7.7)
SODIUM BLD-SCNC: 135 MMOL/L (ref 135–144)
SPECIMEN DESCRIPTION: ABNORMAL
STATUS: ABNORMAL
STATUS: ABNORMAL
TOTAL PROTEIN: 6 G/DL (ref 6.4–8.3)
WBC # BLD: 9.6 K/UL (ref 3.5–11)
WBC # BLD: ABNORMAL 10*3/UL

## 2017-11-06 PROCEDURE — 2580000003 HC RX 258: Performed by: INTERNAL MEDICINE

## 2017-11-06 PROCEDURE — 97162 PT EVAL MOD COMPLEX 30 MIN: CPT

## 2017-11-06 PROCEDURE — 97166 OT EVAL MOD COMPLEX 45 MIN: CPT

## 2017-11-06 PROCEDURE — 84100 ASSAY OF PHOSPHORUS: CPT

## 2017-11-06 PROCEDURE — 1200000000 HC SEMI PRIVATE

## 2017-11-06 PROCEDURE — 97530 THERAPEUTIC ACTIVITIES: CPT

## 2017-11-06 PROCEDURE — C9113 INJ PANTOPRAZOLE SODIUM, VIA: HCPCS | Performed by: PHYSICIAN ASSISTANT

## 2017-11-06 PROCEDURE — 6360000002 HC RX W HCPCS: Performed by: PHYSICIAN ASSISTANT

## 2017-11-06 PROCEDURE — 97116 GAIT TRAINING THERAPY: CPT

## 2017-11-06 PROCEDURE — 97110 THERAPEUTIC EXERCISES: CPT

## 2017-11-06 PROCEDURE — 83735 ASSAY OF MAGNESIUM: CPT

## 2017-11-06 PROCEDURE — G8978 MOBILITY CURRENT STATUS: HCPCS

## 2017-11-06 PROCEDURE — 6360000002 HC RX W HCPCS: Performed by: INTERNAL MEDICINE

## 2017-11-06 PROCEDURE — 6370000000 HC RX 637 (ALT 250 FOR IP): Performed by: UROLOGY

## 2017-11-06 PROCEDURE — G8988 SELF CARE GOAL STATUS: HCPCS

## 2017-11-06 PROCEDURE — 36415 COLL VENOUS BLD VENIPUNCTURE: CPT

## 2017-11-06 PROCEDURE — G8987 SELF CARE CURRENT STATUS: HCPCS

## 2017-11-06 PROCEDURE — 82947 ASSAY GLUCOSE BLOOD QUANT: CPT

## 2017-11-06 PROCEDURE — 2580000003 HC RX 258: Performed by: UROLOGY

## 2017-11-06 PROCEDURE — 80053 COMPREHEN METABOLIC PANEL: CPT

## 2017-11-06 PROCEDURE — G8979 MOBILITY GOAL STATUS: HCPCS

## 2017-11-06 PROCEDURE — 85025 COMPLETE CBC W/AUTO DIFF WBC: CPT

## 2017-11-06 PROCEDURE — 2580000003 HC RX 258: Performed by: PHYSICIAN ASSISTANT

## 2017-11-06 RX ADMIN — PIPERACILLIN SODIUM AND TAZOBACTAM SODIUM 3.38 G: 3; .375 INJECTION, POWDER, LYOPHILIZED, FOR SOLUTION INTRAVENOUS at 01:33

## 2017-11-06 RX ADMIN — INSULIN LISPRO 2 UNITS: 100 INJECTION, SOLUTION INTRAVENOUS; SUBCUTANEOUS at 08:03

## 2017-11-06 RX ADMIN — Medication 10 ML: at 20:26

## 2017-11-06 RX ADMIN — Medication 10 ML: at 10:05

## 2017-11-06 RX ADMIN — Medication 10 ML: at 10:06

## 2017-11-06 RX ADMIN — PANTOPRAZOLE SODIUM 40 MG: 40 INJECTION, POWDER, FOR SOLUTION INTRAVENOUS at 09:51

## 2017-11-06 RX ADMIN — TAMSULOSIN HYDROCHLORIDE 0.4 MG: 0.4 CAPSULE ORAL at 20:26

## 2017-11-06 RX ADMIN — ASPIRIN 81 MG: 81 TABLET, COATED ORAL at 09:48

## 2017-11-06 RX ADMIN — Medication 10 ML: at 16:30

## 2017-11-06 RX ADMIN — INSULIN LISPRO 1 UNITS: 100 INJECTION, SOLUTION INTRAVENOUS; SUBCUTANEOUS at 20:31

## 2017-11-06 RX ADMIN — Medication 10 ML: at 09:50

## 2017-11-06 RX ADMIN — FLUCONAZOLE 200 MG: 100 TABLET ORAL at 09:48

## 2017-11-06 RX ADMIN — PIPERACILLIN SODIUM AND TAZOBACTAM SODIUM 3.38 G: 3; .375 INJECTION, POWDER, LYOPHILIZED, FOR SOLUTION INTRAVENOUS at 16:36

## 2017-11-06 RX ADMIN — INSULIN LISPRO 2 UNITS: 100 INJECTION, SOLUTION INTRAVENOUS; SUBCUTANEOUS at 13:01

## 2017-11-06 RX ADMIN — PIPERACILLIN SODIUM AND TAZOBACTAM SODIUM 3.38 G: 3; .375 INJECTION, POWDER, LYOPHILIZED, FOR SOLUTION INTRAVENOUS at 09:50

## 2017-11-06 RX ADMIN — INSULIN LISPRO 4 UNITS: 100 INJECTION, SOLUTION INTRAVENOUS; SUBCUTANEOUS at 16:33

## 2017-11-06 ASSESSMENT — PAIN SCALES - GENERAL
PAINLEVEL_OUTOF10: 0

## 2017-11-06 NOTE — PLAN OF CARE
Problem: Nutrition  Goal: Optimal nutrition therapy  Outcome: Ongoing  Nutrition Problem: Increased nutrient needs  Intervention: Food and/or Nutrient Delivery: Continue current diet, Continue current ONS  Nutritional Goals: PO > 75% of meals and supplements

## 2017-11-06 NOTE — PROGRESS NOTES
Measures:  · Ht: 5' 10\" (177.8 cm)   · Current Body Wt: 227 lb 4.8 oz (103.1 kg)  · Admission Body Wt: 214 lb 7 oz (97.3 kg)  · Usual Body Wt: 218 lb (98.9 kg)  · % Weight Change: 15.5#/7.3% weight gain,  acute gain  · Ideal Body Wt: 166 lb (75.3 kg), % Ideal Body 137%  · Adjusted Body Wt: 181 lb (82.1 kg), body weight adjusted for Obesity  · BMI Classification: BMI 30.0 - 34.9 Obese Class I  · Comparative Standards (Estimated Nutrition Needs):  · Estimated Daily Total Kcal: 2938-2029  · Estimated Daily Protein (g): 98-115g  Wt Readings from Last 10 Encounters:   11/06/17 227 lb 4.8 oz (103.1 kg)   11/03/17 212 lb (96.2 kg)   11/01/17 214 lb (97.1 kg)   10/31/17 214 lb (97.1 kg)   05/17/17 217 lb (98.4 kg)   03/17/17 218 lb (98.9 kg)   03/03/17 216 lb 6 oz (98.1 kg)   02/22/17 217 lb (98.4 kg)   02/07/17 216 lb (98 kg)   01/31/17 219 lb (99.3 kg)     Recent Labs      11/04/17   0550  11/05/17   0545  11/06/17   0533   NA  134*  136  135   K  4.4  4.7  4.3   CL  102  104  103   CO2  17*  18*  18*   BUN  25*  22  21   CREATININE  1.54*  1.46*  1.33*   GLUCOSE  109*  146*  153*   ALT  13  19  23   ALKPHOS  63  54  51   GFR                                          Lab Results   Component Value Date    LABALBU 2.5 11/06/2017    No results found for: LABA1C  Recent Labs      11/04/17   1149  11/04/17   1552  11/04/17   2034  11/05/17   0752  11/05/17   1150  11/05/17   1619  11/05/17 2028  11/06/17   0801   POCGLU  144*  144*  153*  132*  167*  155*  254*  149*   No results found for: TRIG, HDL, LDLCALC, LDLDIRECT, LABVLDL    Estimated Intake vs Estimated Needs: Intake Meets Needs  PO is good of meals and supplements. Encourage protein foods, noted moderately depleted visceral protein stores. Glucose levels are stable overall. F/u as needed. Nutrition Risk Level:  Moderate    Nutrition Interventions:   Continue current diet, Continue current ONS  Continued Inpatient Monitoring, Education declined, Coordination of

## 2017-11-06 NOTE — PROGRESS NOTES
Physical Therapy  Facility/Department: [facility] MED SURG  Daily Treatment Note  NAME: Anita Aponte  : 1940  MRN: [id number]    Date of Service: 2017    Patient Diagnosis(es):   Patient Active Problem List    Diagnosis Date Noted    Bacteremia 2017    Arterial hypotension 2017    Urinary tract infection associated with catheterization of urinary tract - self cath daily 2017    Severe sepsis ([address] 2017    Acute renal failure superimposed on stage 3 chronic kidney disease ([address] 2017    Lactic acid acidosis 2017    Yeast infection 2017    Hyponatremia 2017    Sepsis associated hypotension ([address] 2017    Urinary retention 2017    Hydronephrosis of right kidney 2017    Ureteral calculus 2017    Sepsis ([address] 2017    Diabetes mellitus ([address]     Hypertension     Acute renal failure superimposed on stage 3 chronic kidney disease ([address] 2017    Gross hematuria 2017    [facility] (bladder neck contracture) 2017    UTI (urinary tract infection) 2016    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 and other lower urinary tract symptoms (LUTS)(600.21) 2015    Urinary retention 2014    Senile nuclear sclerosis 2013       Past Medical History:   Diagnosis Date    Acute renal failure superimposed on stage 3 chronic kidney disease ([address] 3/1/2017    Arthritis     Diabetes mellitus ([address]     Hyperlipidemia     Hypertension     Kidney stone      Past Surgical History:   Procedure Laterality Date    CATARACT REMOVAL WITH IMPLANT Right 2013    CYSTOSCOPY INSERTION / REMOVAL STENT / STONE Right 2017    CYSTOSCOPY STENT INSERTION performed by Demetri Garcia MD at [address] / [address] / Nik Petersen N/A 11/3/2017    CY[facility] STENT [facility]    with stent

## 2017-11-06 NOTE — PLAN OF CARE
Problem: Falls - Risk of  Goal: Absence of falls  Outcome: Ongoing  Pt is at high risk for falls. Non skid socks on. Bed in low position and wheels locked. Fall sign posted and bracelet on. Siderails up x2. Pt A & Ox3. Call light within reach. Will continue to assess. Problem: Infection, Septic Shock:  Goal: Will show no infection signs and symptoms  Will show no infection signs and symptoms   Outcome: Ongoing  No temperature noted. VS stable and WNL. Will continue to assess. Problem: Serum Glucose Level - Abnormal:  Goal: Ability to maintain appropriate glucose levels will improve  Ability to maintain appropriate glucose levels will improve   Outcome: Ongoing  GAIL=297. Meds given as ordered. Will continue to assess. Problem: Venous Thromboembolism:  Goal: Absence of signs or symptoms of impaired coagulation  Absence of signs or symptoms of impaired coagulation   Outcome: Ongoing  No s/s of DVT. Teds in place. Will continue to assess. Problem: Daily Care:  Goal: Daily care needs are met  Daily care needs are met   Outcome: Ongoing  Daily care needs regularly assessed. Pt able to complete daily cares with minimal assistance. Assistance given if needed. Call light within reach. Will continue to assess.

## 2017-11-06 NOTE — PROGRESS NOTES
Standard  Bathroom Equipment: Shower chair  Home Equipment: Joan Reyez Electric scooter (has Hover Round from past knee surgery-uses mainly outdoors)  ADL Assistance: [address]: Independent (shares with spouse)  Ambulation Assistance: Independent  Transfer Assistance: Independent  Active : Yes  Additional Comments: pt reports he walks short distances with a SPC but also uses a hover round      Objective   Vision: Within Functional Limits  Hearing: Within functional limits    Orientation  Overall Orientation Status: Within Functional Limits     Standing Balance  Sit to stand: Contact guard assistance  Stand to sit: Contact guard assistance  Functional Mobility  Functional - Mobility Device: Cane  Activity: To/from bathroom  Assist Level: Contact guard assistance  Functional Mobility Comments: slight SOB noted following  ADL  Feeding: Independent  Grooming: Stand by assistance  UE Bathing: Supervision  LE Bathing: Contact guard assistance  UE Dressing: Supervision  LE Dressing: Contact guard assistance  Toileting: Contact guard assistance     Transfers  Stand Pivot Transfers: Contact guard assistance  Sit to stand: Contact guard assistance  Stand to sit: Contact guard assistance     Cognition  Overall Cognitive Status: WFL     LUE AROM (degrees)  LUE AROM : WFL  RUE AROM (degrees)  RUE AROM : WFL  LUE Strength  Gross LUE Strength: WFL  RUE Strength  Gross RUE Strength: WFL     Assessment   Performance deficits / Impairments: Decreased functional mobility ; Decreased ADL status; Decreased strength;Decreased endurance;Decreased balance;Decreased high-level IADLs  Prognosis: Good  Decision Making: Medium Complexity  Discharge Recommendations: Continue to assess pending progress;Home with assist PRN  REQUIRES OT FOLLOW UP: Yes  Activity Tolerance  Activity Tolerance: Patient Tolerated treatment well  Safety Devices  Safety Devices in place: Yes  Type of devices: Left in chair;Call light within reach        Discharge Recommendations:  Continue to assess pending progress, Home with assist PRN     Plan   Plan  Times per week: 7x/wk  Times per day: Daily  Current Treatment Recommendations: Strengthening, Balance Training, Functional Mobility Training, Safety Education & Training, Self-Care / ADL    G-Code  OT G-codes  Functional Limitation: Self care  Self Care Current Status (): At least 20 percent but less than 40 percent impaired, limited or restricted  Self Care Goal Status (): At least 1 percent but less than 20 percent impaired, limited or restricted    Goals  Short term goals  Time Frame for Short term goals: 3-7 days  Short term goal 1: Pt. will tolerate 20 mins of BUE ther ex/act w/o SOB to improve overall strength/activity tolerance for functional tasks. Short term goal 2: Pt. will increase standing tolerance > 7 mins during dynamic tasks w/o LOB to increase participation with ADL's. Short term goal 3: Pt. will complete self care routine with SUP/MI to increase (I). Short term goal 4: Pt. will demo G safety awareness during functional ADL transfers/mobility for safe return to PLOF.        Therapy Time   Individual Concurrent Group Co-treatment   Time In [address] [place] [address]          Time Out 0842         Minutes [address] [place], [place]

## 2017-11-06 NOTE — PROGRESS NOTES
no masses or organomegaly  EXT:   no cyanosis, clubbing or edema present    : schulz  NEURO: follows commands, TAYLOR, no deficits  SKIN:  no rashes or significant lesions      -----------------------------------------------------------------  Diagnostic Data:  Lab Results   Component Value Date    WBC 9.6 11/06/2017    HGB 11.3 (L) 11/06/2017    HCT 34.4 (L) 11/06/2017     11/06/2017    ALT 19 11/05/2017    AST 19 11/05/2017     11/05/2017    K 4.7 11/05/2017     11/05/2017    CREATININE 1.46 (H) 11/05/2017    BUN 22 11/05/2017    CO2 18 (L) 11/05/2017    INR 1.0 03/03/2017       ASSESSMENT:    Principal Problem:    Severe sepsis (HCC)  Active Problems:    Diabetes mellitus ([address]    Arterial hypotension    Urinary tract infection associated with catheterization of urinary tract - self cath daily    Acute renal failure superimposed on stage 3 chronic kidney disease (HCC)    Lactic acid acidosis    Yeast infection    Hyponatremia    Sepsis associated hypotension ([address]    Urinary retention    Hydronephrosis of right kidney    Bacteremia      Patient Active Problem List    Diagnosis Date Noted    Bacteremia 11/06/2017    Arterial hypotension 11/03/2017    Urinary tract infection associated with catheterization of urinary tract - self cath daily 11/03/2017    Severe sepsis ([address] 11/03/2017    Acute renal failure superimposed on stage 3 chronic kidney disease ([address] 11/03/2017    Lactic acid acidosis 11/03/2017    Yeast infection 11/03/2017    Hyponatremia 11/03/2017    Sepsis associated hypotension ([address] 11/03/2017    Urinary retention 11/03/2017    Hydronephrosis of right kidney 11/03/2017    Ureteral calculus 11/01/2017    Sepsis ([address] 03/02/2017    Diabetes mellitus ([address]     Hypertension     Acute renal failure superimposed on stage 3 chronic kidney disease ([address] 03/01/2017    Gross hematuria 01/11/2017    BNC (bladder neck contracture) 01/11/2017    UTI (urinary tract infection)

## 2017-11-06 NOTE — PROGRESS NOTES
entrance  ADL Assistance: [address]: Independent  Ambulation Assistance: Independent  Transfer Assistance: Independent  Active : Yes  Additional Comments: pt reports he walks short distances with a SPC but also uses a hover round   Objective          PROM RLE (degrees)  RLE PROM: WFL  AROM RLE (degrees)  RLE AROM: WFL  PROM LLE (degrees)  LLE PROM: WFL  AROM LLE (degrees)  LLE AROM : WFL  Strength RLE  Comment: grossly 4-/5  Strength LLE  Comment: grossly 4-/5           Transfers  Sit to Stand: Contact guard assistance  Stand to sit: Contact guard assistance  Ambulation  Ambulation?: Yes  WB Status: unrestricted   Ambulation 1  Surface: level tile  Device: Small Quique Dar  Assistance: Contact guard assistance  Distance: 50 feet      Balance  Posture: Fair  Sitting - Static: Good  Sitting - Dynamic: Good  Standing - Static: Fair;+  Standing - Dynamic: Fair        Assessment   Body structures, Functions, Activity limitations: Decreased functional mobility ; Decreased endurance;Decreased strength;Decreased high-level IADLs;Decreased balance  Assessment: Pt is a 78year old male that was admitted to the hospital with severe sepsis. Pt presents with general weakness, decreased ambulation/activity tolerance, and reduce dynamic standing balance. Pt will benefit from skilled PT in order to address these deficits.    Treatment Diagnosis: general weakness   Prognosis: Good  Decision Making: Medium Complexity  Patient Education: pt educated on his POC   REQUIRES PT FOLLOW UP: Yes  Activity Tolerance  Activity Tolerance: Patient Tolerated treatment well     Discharge Recommendations:  Continue to assess pending progress      Plan   Plan  Times per week: 7x/wk   Times per day: Twice a day  Plan weeks: 2x/daily except weekends 1x/daily   Current Treatment Recommendations: Strengthening, Neuromuscular Re-education, Home Exercise Program, Safety Education & Training, Balance Training, Endurance Training, Functional Mobility Training, Transfer Training, Gait Training  Safety Devices  Type of devices: Call light within reach, Left in chair    G-Code  PT G-Codes  Functional Limitation: Mobility: Walking and moving around  Mobility: Walking and Moving Around Current Status (): At least 20 percent but less than 40 percent impaired, limited or restricted  Mobility: Walking and Moving Around Goal Status ():  At least 1 percent but less than 20 percent impaired, limited or restricted  OutComes Score                                           Goals  Short term goals  Time Frame for Short term goals: 10 days  Short term goal 1: Pt will be educated on his POC  Short term goal 2: Pt will perform all transfers with Mod I in order to return home  Short term goal 3: Pt will ambulate 100 feet with SPC in order to return to PLOF  Short term goal 4: Pt will increase dynamic standing balance to Fair + in order to reduce fall risk   Patient Goals   Patient goals : \"to go back home\"       Therapy Time   Individual Concurrent Group Co-treatment   Time In 0705         Time Out 0723         Minutes [facility],

## 2017-11-07 LAB
ABSOLUTE EOS #: 0.6 K/UL (ref 0–0.4)
ABSOLUTE IMMATURE GRANULOCYTE: ABNORMAL K/UL (ref 0–0.3)
ABSOLUTE LYMPH #: 1.8 K/UL (ref 1–4.8)
ABSOLUTE MONO #: 1.1 K/UL (ref 0–1)
ALBUMIN SERPL-MCNC: 2.8 G/DL (ref 3.5–5.2)
ALBUMIN/GLOBULIN RATIO: 0.8 (ref 1–2.5)
ALP BLD-CCNC: 52 U/L (ref 40–129)
ALT SERPL-CCNC: 25 U/L (ref 5–41)
ANION GAP SERPL CALCULATED.3IONS-SCNC: 15 MMOL/L (ref 9–17)
AST SERPL-CCNC: 17 U/L
BASOPHILS # BLD: 0 %
BASOPHILS ABSOLUTE: 0 K/UL (ref 0–0.2)
BILIRUB SERPL-MCNC: 0.47 MG/DL (ref 0.3–1.2)
BUN BLDV-MCNC: 20 MG/DL (ref 8–23)
BUN/CREAT BLD: 17 (ref 9–20)
CALCIUM SERPL-MCNC: 8.3 MG/DL (ref 8.6–10.4)
CHLORIDE BLD-SCNC: 102 MMOL/L (ref 98–107)
CO2: 19 MMOL/L (ref 20–31)
CREAT SERPL-MCNC: 1.16 MG/DL (ref 0.7–1.2)
DIFFERENTIAL TYPE: ABNORMAL
EOSINOPHILS RELATIVE PERCENT: 5 %
GFR AFRICAN AMERICAN: >60 ML/MIN
GFR NON-AFRICAN AMERICAN: >60 ML/MIN
GFR SERPL CREATININE-BSD FRML MDRD: ABNORMAL ML/MIN/{1.73_M2}
GFR SERPL CREATININE-BSD FRML MDRD: ABNORMAL ML/MIN/{1.73_M2}
GLUCOSE BLD-MCNC: 146 MG/DL (ref 74–100)
GLUCOSE BLD-MCNC: 147 MG/DL (ref 74–100)
GLUCOSE BLD-MCNC: 152 MG/DL (ref 70–99)
GLUCOSE BLD-MCNC: 169 MG/DL (ref 74–100)
GLUCOSE BLD-MCNC: 216 MG/DL (ref 74–100)
HCT VFR BLD CALC: 36 % (ref 41–53)
HEMOGLOBIN: 11.9 G/DL (ref 13.5–17)
IMMATURE GRANULOCYTES: ABNORMAL %
LYMPHOCYTES # BLD: 17 %
MAGNESIUM: 1.8 MG/DL (ref 1.6–2.6)
MCH RBC QN AUTO: 31.1 PG (ref 26–34)
MCHC RBC AUTO-ENTMCNC: 33 G/DL (ref 31–37)
MCV RBC AUTO: 94.2 FL (ref 80–100)
MONOCYTES # BLD: 11 %
PDW BLD-RTO: 14 % (ref 12.1–15.2)
PHOSPHORUS: 2.3 MG/DL (ref 2.5–4.5)
PLATELET # BLD: 228 K/UL (ref 140–450)
PLATELET ESTIMATE: ABNORMAL
PMV BLD AUTO: 8.8 FL (ref 6–12)
POTASSIUM SERPL-SCNC: 4 MMOL/L (ref 3.7–5.3)
RBC # BLD: 3.82 M/UL (ref 4.5–5.9)
RBC # BLD: ABNORMAL 10*6/UL
SEG NEUTROPHILS: 67 %
SEGMENTED NEUTROPHILS ABSOLUTE COUNT: 6.9 K/UL (ref 1.8–7.7)
SODIUM BLD-SCNC: 136 MMOL/L (ref 135–144)
TOTAL PROTEIN: 6.5 G/DL (ref 6.4–8.3)
WBC # BLD: 10.4 K/UL (ref 3.5–11)
WBC # BLD: ABNORMAL 10*3/UL

## 2017-11-07 PROCEDURE — 97110 THERAPEUTIC EXERCISES: CPT

## 2017-11-07 PROCEDURE — 2580000003 HC RX 258: Performed by: UROLOGY

## 2017-11-07 PROCEDURE — 1200000000 HC SEMI PRIVATE

## 2017-11-07 PROCEDURE — 97116 GAIT TRAINING THERAPY: CPT

## 2017-11-07 PROCEDURE — 6360000002 HC RX W HCPCS: Performed by: INTERNAL MEDICINE

## 2017-11-07 PROCEDURE — 85025 COMPLETE CBC W/AUTO DIFF WBC: CPT

## 2017-11-07 PROCEDURE — 6360000002 HC RX W HCPCS: Performed by: PHYSICIAN ASSISTANT

## 2017-11-07 PROCEDURE — 84100 ASSAY OF PHOSPHORUS: CPT

## 2017-11-07 PROCEDURE — C9113 INJ PANTOPRAZOLE SODIUM, VIA: HCPCS | Performed by: PHYSICIAN ASSISTANT

## 2017-11-07 PROCEDURE — 2580000003 HC RX 258: Performed by: INTERNAL MEDICINE

## 2017-11-07 PROCEDURE — 2580000003 HC RX 258: Performed by: PHYSICIAN ASSISTANT

## 2017-11-07 PROCEDURE — 82947 ASSAY GLUCOSE BLOOD QUANT: CPT

## 2017-11-07 PROCEDURE — 83735 ASSAY OF MAGNESIUM: CPT

## 2017-11-07 PROCEDURE — 80053 COMPREHEN METABOLIC PANEL: CPT

## 2017-11-07 PROCEDURE — 6370000000 HC RX 637 (ALT 250 FOR IP): Performed by: UROLOGY

## 2017-11-07 PROCEDURE — 36415 COLL VENOUS BLD VENIPUNCTURE: CPT

## 2017-11-07 RX ADMIN — INSULIN LISPRO 2 UNITS: 100 INJECTION, SOLUTION INTRAVENOUS; SUBCUTANEOUS at 20:07

## 2017-11-07 RX ADMIN — ASPIRIN 81 MG: 81 TABLET, COATED ORAL at 08:45

## 2017-11-07 RX ADMIN — INSULIN LISPRO 2 UNITS: 100 INJECTION, SOLUTION INTRAVENOUS; SUBCUTANEOUS at 16:38

## 2017-11-07 RX ADMIN — PIPERACILLIN SODIUM AND TAZOBACTAM SODIUM 3.38 G: 3; .375 INJECTION, POWDER, LYOPHILIZED, FOR SOLUTION INTRAVENOUS at 01:03

## 2017-11-07 RX ADMIN — INSULIN LISPRO 2 UNITS: 100 INJECTION, SOLUTION INTRAVENOUS; SUBCUTANEOUS at 11:57

## 2017-11-07 RX ADMIN — Medication 10 ML: at 08:46

## 2017-11-07 RX ADMIN — PANTOPRAZOLE SODIUM 40 MG: 40 INJECTION, POWDER, FOR SOLUTION INTRAVENOUS at 08:45

## 2017-11-07 RX ADMIN — TAMSULOSIN HYDROCHLORIDE 0.4 MG: 0.4 CAPSULE ORAL at 20:04

## 2017-11-07 RX ADMIN — Medication 10 ML: at 08:45

## 2017-11-07 RX ADMIN — INSULIN LISPRO 2 UNITS: 100 INJECTION, SOLUTION INTRAVENOUS; SUBCUTANEOUS at 08:48

## 2017-11-07 RX ADMIN — PIPERACILLIN SODIUM AND TAZOBACTAM SODIUM 3.38 G: 3; .375 INJECTION, POWDER, LYOPHILIZED, FOR SOLUTION INTRAVENOUS at 16:42

## 2017-11-07 RX ADMIN — FLUCONAZOLE 200 MG: 100 TABLET ORAL at 08:45

## 2017-11-07 RX ADMIN — Medication 10 ML: at 22:40

## 2017-11-07 RX ADMIN — PIPERACILLIN SODIUM AND TAZOBACTAM SODIUM 3.38 G: 3; .375 INJECTION, POWDER, LYOPHILIZED, FOR SOLUTION INTRAVENOUS at 08:45

## 2017-11-07 ASSESSMENT — PAIN SCALES - GENERAL: PAINLEVEL_OUTOF10: 0

## 2017-11-07 NOTE — PLAN OF CARE
Problem: Falls - Risk of  Goal: Absence of falls  Outcome: Ongoing  Patient is alert and oriented and has demonstrated the use of using the call light for assistance before getting up. Education has been provided to defer the use of the bed alarm and/or restraint free alarm as the patient has shown competency in calling for assistance and verbalizing the risk.       Problem: Discharge Planning:  Goal: Discharged to appropriate level of care  Discharged to appropriate level of care   Outcome: Ongoing  Home at discharge with possible IV therapy as an OP    Problem: Infection, Septic Shock:  Goal: Will show no infection signs and symptoms  Will show no infection signs and symptoms   Outcome: Ongoing  Continue IV antibiotics for sepsis

## 2017-11-07 NOTE — CONSULTS
vomiting. Endocrine: Negative for polydipsia and polyuria. Genitourinary: Schulz in place. Negative for hematuria, dysuria, flank pain. Musculoskeletal: Negative for myalgias and joint swelling. Skin: Negative for rash and wound. Neurological: Negative for dizziness and headaches. Hematological: Negative for adenopathy. Does not bruise/bleed easily. PHYSICAL EXAM:  Patient Vitals for the past 24 hrs:   BP Temp Temp src Pulse Resp SpO2 Weight   11/06/17 1927 111/71 98.7 °F (37.1 °C) Temporal 88 18 99 % -   11/06/17 1439 (!) 142/86 97.9 °F (36.6 °C) Temporal 85 18 99 % -   11/06/17 0845 127/83 99 °F (37.2 °C) Temporal 92 16 97 % -   11/06/17 0100 (!) 143/79 98.9 °F (37.2 °C) Temporal 85 16 98 % 227 lb 4.8 oz (103.1 kg)   11/06/17 0008 - - - 71 - - -     Constitutional: Patient resting comfortably, in no acute distress. Neuro: Alert and oriented to person place and time. Cranial nerves grossly intact. Psych: Mood and affect normal.  Skin: Warm, dry, non-diaphoretic  HEENT: normocephalic, atraumatic  Lymphatics: No palpable lymphadenopathy  Lungs: Respiratory effort normal, unlabored  Cardiovascular:  Normal peripheral pulses  Abdomen: Soft, non-tender, non-distended with no organomegaly or palpable masses. : No CVA tenderness bilat. Bladder non-tender and not distended. Genital/Rectal: schulz draining clear urine  Extremities: Calves are non-tender, equal in circumference bilat without swelling, warmth, or erythema.      LABS:  Recent Labs      11/04/17   0550  11/05/17   0545  11/06/17   0533   WBC  15.3*  11.6*  9.6   HGB  10.8*  11.3*  11.3*   HCT  32.6*  33.9*  34.4*   MCV  94.8  93.4  95.6   PLT  188  199  226     Recent Labs      11/04/17   0550  11/05/17   0545  11/06/17   0533   NA  134*  136  135   K  4.4  4.7  4.3   CL  102  104  103   CO2  17*  18*  18*   PHOS  2.4*  1.8*  1.9*   BUN  25*  22  21   CREATININE  1.54*  1.46*  1.33*     No results found for: PSA    Urinalysis: No results for

## 2017-11-07 NOTE — PLAN OF CARE
Problem: Falls - Risk of  Goal: Absence of falls  Outcome: Ongoing  Patient is alert and oriented and has demonstrated the use of using the call light for assistance before getting up. Education has been provided to defer the use of the bed alarm and/or restraint free alarm as the patient has shown competency in calling for assistance and verbalizing the risk. Problem: Infection, Septic Shock:  Goal: Will show no infection signs and symptoms  Will show no infection signs and symptoms   Outcome: Ongoing  Afebrile will continue to monitor    Problem: Serum Glucose Level - Abnormal:  Goal: Ability to maintain appropriate glucose levels will improve  Ability to maintain appropriate glucose levels will improve   Outcome: Ongoing  Glucose monitored and insulin given as ordered per sliding scale    Problem: Venous Thromboembolism:  Goal: Absence of signs or symptoms of impaired coagulation  Absence of signs or symptoms of impaired coagulation   Outcome: Ongoing  No signs of DVT noted at this time    Problem: Safety:  Goal: Free from accidental physical injury  Free from accidental physical injury   Outcome: Ongoing  No accidents or injuries noted at this time. Problem: Daily Care:  Goal: Daily care needs are met  Daily care needs are met   Outcome: Ongoing  Daily cares assessed and needs met according to patient condition. Patient reminded to ask for help when needed.

## 2017-11-07 NOTE — PROGRESS NOTES
(single Ap View)    Result Date: 11/3/2017  FINAL REPORT EXAM:  XR ABDOMEN LIMITED (KUB) HISTORY:  RT stent placement TECHNIQUE:  Single fluoroscopic image of the lower abdomen and pelvis with submitted for review. Total fluoroscopy time was 0.04 minutes with total dose of 2.5 mGy. PRIORS:  None. FINDINGS:  There is a partially visualized ureteral stent present, with the distal portion overlying the region of the bladder. Impression:  The distal portion of the right ureteral stent overlies the bladder. Electronically Signed By: Kevon Kay   on  11/03/2017  18:34    Xr Chest 1 Vw    Result Date: 11/3/2017  REPORT: Portable AP radiograph of the chest obtained at 1024 hours INDICATION: Septic workup FINDINGS: Compared to 3/3/2017. Stable chronic changes in both lungs. No focal consolidation, pleural effusion or pneumothorax seen. Normal cardiac and mediastinal silhouettes. No free intraperitoneal air.   Final report electronically signed by Danial Melvin on 11/3/2017 10:52 AM    Stable chest      ASSESSMENT:  UTI ---improved                               Sepsis and shock---resolved            Hospital Problems:  Principal Problem:    Severe sepsis ([address]  Active Problems:    Diabetes mellitus ([address]    Arterial hypotension    Urinary tract infection associated with catheterization of urinary tract - self cath daily    Acute renal failure superimposed on stage 3 chronic kidney disease (HCC)    Lactic acid acidosis    Yeast infection    Hyponatremia    Sepsis associated hypotension ([address]    Urinary retention    Hydronephrosis of right kidney    Bacteremia      All Problems:  Patient Active Problem List   Diagnosis    Senile nuclear sclerosis    Urinary retention    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 and other lower urinary tract symptoms (LUTS)(600.21)    UTI (urinary tract infection)    Gross hematuria    BNC (bladder neck contracture)    Acute renal failure superimposed on stage 3 chronic kidney disease [facility]    Sepsis ([facility]    Diabetes mellitus ([facility]    Hypertension    Ureteral calculus    Arterial hypotension    Urinary tract infection associated with catheterization of urinary tract - self cath daily    Severe sepsis (HCC)    Acute renal failure superimposed on stage 3 chronic kidney disease (HCC)    Lactic acid acidosis    Yeast infection    Hyponatremia    Sepsis associated hypotension (HCC)    Urinary retention    Hydronephrosis of right kidney    Bacteremia       PLAN:  · UTI and sepsis---continue IV antibiotics    HIGH RISK MEDS: none    Ismael Vasquez M.D.

## 2017-11-07 NOTE — PROGRESS NOTES
Physical Therapy    Facility/Department: [facility] AT THE [facility] MED SURG  Initial Assessment    NAME: Lizeth Dave  : 1940  MRN: [id number]    Date of Service: 2017    Patient Diagnosis(es): The primary encounter diagnosis was Sepsis, due to unspecified organism [facility]). A diagnosis of Other specified hypotension was also pertinent to this visit. has a past medical history of Acute renal failure superimposed on stage 3 chronic kidney disease ([facility] Arthritis; Diabetes mellitus ([facility] Hyperlipidemia; Hypertension; and Kidney stone. has a past surgical history that includes hernia repair; Cataract removal with implant (Right, 2013); knee surgery (Right); joint replacement; joint replacement (Right, 2014 partial knee); joint replacement (Right, ); TURP (2017); TURP (2015); Lithotripsy (Right, 2017); cysto/uretero/pyeloscopy w/lithotripsy (Right, 2017); CYSTOSCOPY INSERTION / REMOVAL STENT / STONE (Right, 2017); cystourethroscopy (2017); and CYSTOSCOPY INSERTION / REMOVAL STENT / STONE (N/A, 11/3/2017). Restrictions  Restrictions/Precautions  Restrictions/Precautions: General Precautions, Fall Risk  Vision/Hearing          Social/Functional History  Social/Functional History  Lives With: Spouse  Type of Home: House  Home Layout: Two level, Able to Live on Main level with bedroom/bathroom  Home Access: Ramped entrance  Bathroom Shower/Tub: Tub/Shower unit  Bathroom Toilet: Standard  Bathroom Equipment: Shower chair  Home Equipment: Cane, Electric scooter (has Hover Round from past knee surgery-uses mainly outdoors)  ADL Assistance: [address] [place] [address] Avenue: Independent (shares with spouse)  Ambulation Assistance: Independent  Transfer Assistance: Independent  Active :  Yes  Additional Comments: pt reports he walks short distances with a SPC but also uses a hover round     Discharge Recommendations:  Continue to assess pending progress      Plan   Plan  Times per week: 7x/wk   Times per day: Twice a day  Plan weeks: 2x/daily except weekends 1x/daily   Current Treatment Recommendations: Strengthening, Neuromuscular Re-education, Home Exercise Program, Safety Education & Training, Balance Training, Endurance Training, Functional Mobility Training, Transfer Training, Gait Training  Safety Devices  Type of devices:  All fall risk precautions in place, Call light within reach, Gait belt, Left in chair    Goals  Short term goals  Time Frame for Short term goals: 10 days  Short term goal 1: Pt will be educated on his POC  Short term goal 2: Pt will perform all transfers with Mod I in order to return home  Short term goal 3: Pt will ambulate 100 feet with SPC in order to return to PLOF  Short term goal 4: Pt will increase dynamic standing balance to Fair + in order to reduce fall risk   Patient Goals   Patient goals : \"to go back home\"       Therapy Time   Individual Concurrent Group Co-treatment   Time In           Time Out           Minutes                   Charlie Russell, PT, DPT

## 2017-11-07 NOTE — PROGRESS NOTES
Tolerance: Patient Tolerated treatment well  Safety Devices  Type of devices: Left in chair;Call light within reach       Discharge Recommendations:  Continue to assess pending progress, Home with assist PRN     Plan   Plan  Times per week: 7x/wk  Times per day: Daily  Current Treatment Recommendations: Strengthening, Balance Training, Functional Mobility Training, Safety Education & Training, Self-Care / ADL  G-Code     OutComes Score                                             Goals  Short term goals  Time Frame for Short term goals: 3-7 days  Short term goal 1: Pt. will tolerate 20 mins of BUE ther ex/act w/o SOB to improve overall strength/activity tolerance for functional tasks. Short term goal 2: Pt. will increase standing tolerance > 7 mins during dynamic tasks w/o LOB to increase participation with ADL's. Short term goal 3: Pt. will complete self care routine with SUP/MI to increase (I). Short term goal 4: Pt. will demo G safety awareness during functional ADL transfers/mobility for safe return to PLOF.        Therapy Time   Individual Concurrent Group Co-treatment   Time In 0925         Time Out 0955         Minutes 30                 GINO Marley

## 2017-11-07 NOTE — CONSULTS
11/07/17 1233  Last data filed at 11/07/17 1210   Gross per 24 hour   Intake             4070 ml   Output             5500 ml   Net            -1430 ml       Recent Labs      11/05/17   0545  11/06/17   0533  11/07/17   0526   WBC  11.6*  9.6  10.4   HGB  11.3*  11.3*  11.9*   HCT  33.9*  34.4*  36.0*   MCV  93.4  95.6  94.2   PLT  199  226  228     Recent Labs      11/05/17   0545  11/06/17   0533  11/07/17   0526   NA  136  135  136   K  4.7  4.3  4.0   CL  104  103  102   CO2  18*  18*  19*   PHOS  1.8*  1.9*  2.3*   BUN  22  21  20   CREATININE  1.46*  1.33*  1.16       No results for input(s): COLORU, PHUR, LABCAST, WBCUA, RBCUA, MUCUS, TRICHOMONAS, YEAST, BACTERIA, CLARITYU, SPECGRAV, LEUKOCYTESUR, UROBILINOGEN, BILIRUBINUR, BLOODU in the last 72 hours. Invalid input(s): NITRATE, GLUCOSEUKETONESUAMORPHOUS    Additional Lab/culture results: none    Interval Imaging Findings: none    Assessment:    Patient Active Problem List   Diagnosis    Senile nuclear sclerosis    Urinary retention    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 and other lower urinary tract symptoms (LUTS)(600.21)    UTI (urinary tract infection)    Gross hematuria    BNC (bladder neck contracture)    Acute renal failure superimposed on stage 3 chronic kidney disease (HCC)    [facility]    Diabetes mellitus ([facility]    Hypertension    Ureteral calculus    Arterial hypotension    Urinary tract infection associated with catheterization of urinary tract - self cath daily    Severe sepsis (HCC)    Acute renal failure superimposed on stage 3 chronic kidney disease (HCC)    Lactic acid acidosis    Yeast infection    Hyponatremia    Sepsis associated hypotension (HCC)    Urinary retention    Hydronephrosis of right kidney    Bacteremia       Plan: Continue IV antibiotics according to hospitalist plan of care. Maintain Fernandes catheter through hospital discharge.   We will plan to remove catheter on an

## 2017-11-07 NOTE — PROGRESS NOTES
Physical Therapy  Facility/Department: [facility] MED SURG  Daily Treatment Note  NAME: Mariely Yeager  : 1940  MRN: [id number]    Date of Service: 2017    Patient Diagnosis(es):   Patient Active Problem List    Diagnosis Date Noted    Bacteremia 2017    Arterial hypotension 2017    Urinary tract infection associated with catheterization of urinary tract - self cath daily 2017    Severe sepsis ([address] 2017    Acute renal failure superimposed on stage 3 chronic kidney disease ([address] 2017    Lactic acid acidosis 2017    Yeast infection 2017    Hyponatremia 2017    Sepsis associated hypotension ([address] 2017    Urinary retention 2017    Hydronephrosis of right kidney 2017    Ureteral calculus 2017    Sepsis ([address] 2017    Diabetes mellitus ([address]     Hypertension     Acute renal failure superimposed on stage 3 chronic kidney disease ([address] 2017    Gross hematuria 2017    [facility] (bladder neck contracture) 2017    UTI (urinary tract infection) 2016    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 and other lower urinary tract symptoms (LUTS)(600.21) 2015    Urinary retention 2014    Senile nuclear sclerosis 2013       Past Medical History:   Diagnosis Date    Acute renal failure superimposed on stage 3 chronic kidney disease ([address] 3/1/2017    Arthritis     Diabetes mellitus ([address]     Hyperlipidemia     Hypertension     Kidney stone      Past Surgical History:   Procedure Laterality Date    CATARACT REMOVAL WITH IMPLANT Right 2013    CYSTOSCOPY INSERTION / REMOVAL STENT / STONE Right 2017    CYSTOSCOPY STENT INSERTION performed by Bard Yuniel MD at [address] / [address] / Shawanda Fernando N/A 11/3/2017    CYSTOSCOPY STENT INSERTION performed by Bard Yuniel MD at [address]  2017    with stent placement on right side. Changed schulz catheter    HERNIA REPAIR      umbilical 9624    JOINT REPLACEMENT      right knee march 22, 2012    JOINT REPLACEMENT Right junu 2014 partial knee    JOINT REPLACEMENT Right 2014    complete removal    KNEE SURGERY Right     x 2    LITHOTRIPSY Right 11/01/2017    with stent placement - Dr. Ana Mosquera Right 11/1/2017    CYSTOSCOPY URETEROSCOPY LASER-WITH HLL performed by Demetri Garcia MD at [facility] TURP  01/2017    [employer] [facility] and TUIBNC    TURP  01/2015    [employer] [facility]       Restrictions  Restrictions/Precautions  Restrictions/Precautions: General Precautions, Fall Risk  Subjective   General  Chart Reviewed: Yes  Response To Previous Treatment: Patient with no complaints from previous session. Family / Caregiver Present: No  Referring Practitioner: Dr. Chris Carrera: pt denied any pain and reported he is feeling much better day by day. Pain Screening  Patient Currently in Pain: Denies  Vital Signs  Patient Currently in Pain: Denies       Orientation  Orientation  Overall Orientation Status: Within Functional Limits  Objective      Transfers  Sit to Stand: Contact guard assistance  Stand to sit: Contact guard assistance  Ambulation  Ambulation?: Yes  Ambulation 1  Surface: level tile  Device: Small Elastagenon  Assistance: Contact guard assistance  Quality of Gait: Reciprocal gait pattern, decreased knee flex  Distance: 250ft  Comments: Minimal cues for safety with AD. Stairs/Curb  Stairs?: No     Balance  Standing - Static: Fair;+  Standing - Dynamic: Fair;+  Exercises  Hip Flexion: 25x  Hip Abduction: 25x  Knee Long Arc Quad: 25x  Knee Active Range of Motion: 25x  Ankle Pumps: 25x  Comments: Above exercises completed in sitting. Assessment   Body structures, Functions, Activity limitations: Decreased functional mobility ; Decreased endurance;Decreased strength;Decreased high-level

## 2017-11-07 NOTE — PROGRESS NOTES
well with minimal RB's required.    Treatment Diagnosis: general weakness   Prognosis: Good  Patient Education: Educated pt on safety during transfers  REQUIRES PT FOLLOW UP: Yes       Discharge Recommendations:  Continue to assess pending progress      Goals  Short term goals  Time Frame for Short term goals: 10 days  Short term goal 1: Pt will be educated on his POC  Short term goal 2: Pt will perform all transfers with Mod I in order to return home  Short term goal 3: Pt will ambulate 100 feet with SPC in order to return to PLOF  Short term goal 4: Pt will increase dynamic standing balance to Fair + in order to reduce fall risk   Patient Goals   Patient goals : \"to go back home\"    Plan    Plan  Times per week: 7x/wk   Times per day: Twice a day  Plan weeks: 2x/daily except weekends 1x/daily   Current Treatment Recommendations: Strengthening, Neuromuscular Re-education, Home Exercise Program, Safety Education & Training, Balance Training, Endurance Training, Functional Mobility Training, Transfer Training, Gait Training  Safety Devices  Type of devices: Call light within reach, Gait belt, Left in chair, Nurse notified (no alarm present upon entering pt room)     Therapy Time   Individual Concurrent Group Co-treatment   Time In 0852         Time Out 0924         Minutes [place] [phone]

## 2017-11-08 LAB
ABSOLUTE EOS #: 0.6 K/UL (ref 0–0.4)
ABSOLUTE IMMATURE GRANULOCYTE: ABNORMAL K/UL (ref 0–0.3)
ABSOLUTE LYMPH #: 2.4 K/UL (ref 1–4.8)
ABSOLUTE MONO #: 1.1 K/UL (ref 0–1)
ALBUMIN SERPL-MCNC: 2.9 G/DL (ref 3.5–5.2)
ALBUMIN/GLOBULIN RATIO: 0.8 (ref 1–2.5)
ALP BLD-CCNC: 51 U/L (ref 40–129)
ALT SERPL-CCNC: 26 U/L (ref 5–41)
AST SERPL-CCNC: 18 U/L
BASOPHILS # BLD: 0 %
BASOPHILS ABSOLUTE: 0 K/UL (ref 0–0.2)
BILIRUB SERPL-MCNC: 0.41 MG/DL (ref 0.3–1.2)
BILIRUBIN DIRECT: <0.08 MG/DL
BILIRUBIN, INDIRECT: ABNORMAL MG/DL (ref 0–1)
DIFFERENTIAL TYPE: ABNORMAL
EOSINOPHILS RELATIVE PERCENT: 5 %
GLOBULIN: ABNORMAL G/DL (ref 1.5–3.8)
GLUCOSE BLD-MCNC: 149 MG/DL (ref 74–100)
GLUCOSE BLD-MCNC: 174 MG/DL (ref 74–100)
GLUCOSE BLD-MCNC: 224 MG/DL (ref 74–100)
GLUCOSE BLD-MCNC: 226 MG/DL (ref 74–100)
HCT VFR BLD CALC: 37.8 % (ref 41–53)
HEMOGLOBIN: 12.4 G/DL (ref 13.5–17)
IMMATURE GRANULOCYTES: ABNORMAL %
LYMPHOCYTES # BLD: 21 %
MAGNESIUM: 1.9 MG/DL (ref 1.6–2.6)
MCH RBC QN AUTO: 30.8 PG (ref 26–34)
MCHC RBC AUTO-ENTMCNC: 32.9 G/DL (ref 31–37)
MCV RBC AUTO: 93.4 FL (ref 80–100)
MONOCYTES # BLD: 10 %
PDW BLD-RTO: 13.4 % (ref 12.1–15.2)
PHOSPHORUS: 3 MG/DL (ref 2.5–4.5)
PLATELET # BLD: 282 K/UL (ref 140–450)
PLATELET ESTIMATE: ABNORMAL
PMV BLD AUTO: 8.4 FL (ref 6–12)
RBC # BLD: 4.04 M/UL (ref 4.5–5.9)
RBC # BLD: ABNORMAL 10*6/UL
SEG NEUTROPHILS: 64 %
SEGMENTED NEUTROPHILS ABSOLUTE COUNT: 7.1 K/UL (ref 1.8–7.7)
TOTAL PROTEIN: 6.7 G/DL (ref 6.4–8.3)
WBC # BLD: 11.2 K/UL (ref 3.5–11)
WBC # BLD: ABNORMAL 10*3/UL

## 2017-11-08 PROCEDURE — 97110 THERAPEUTIC EXERCISES: CPT

## 2017-11-08 PROCEDURE — 2580000003 HC RX 258: Performed by: UROLOGY

## 2017-11-08 PROCEDURE — 6370000000 HC RX 637 (ALT 250 FOR IP): Performed by: UROLOGY

## 2017-11-08 PROCEDURE — 2580000003 HC RX 258: Performed by: PHYSICIAN ASSISTANT

## 2017-11-08 PROCEDURE — C9113 INJ PANTOPRAZOLE SODIUM, VIA: HCPCS | Performed by: PHYSICIAN ASSISTANT

## 2017-11-08 PROCEDURE — 6360000002 HC RX W HCPCS: Performed by: PHYSICIAN ASSISTANT

## 2017-11-08 PROCEDURE — 6360000002 HC RX W HCPCS: Performed by: INTERNAL MEDICINE

## 2017-11-08 PROCEDURE — 1200000000 HC SEMI PRIVATE

## 2017-11-08 PROCEDURE — 84100 ASSAY OF PHOSPHORUS: CPT

## 2017-11-08 PROCEDURE — 83735 ASSAY OF MAGNESIUM: CPT

## 2017-11-08 PROCEDURE — 80076 HEPATIC FUNCTION PANEL: CPT

## 2017-11-08 PROCEDURE — 2580000003 HC RX 258: Performed by: INTERNAL MEDICINE

## 2017-11-08 PROCEDURE — 97530 THERAPEUTIC ACTIVITIES: CPT

## 2017-11-08 PROCEDURE — 36415 COLL VENOUS BLD VENIPUNCTURE: CPT

## 2017-11-08 PROCEDURE — 97116 GAIT TRAINING THERAPY: CPT

## 2017-11-08 PROCEDURE — 82947 ASSAY GLUCOSE BLOOD QUANT: CPT

## 2017-11-08 PROCEDURE — 85025 COMPLETE CBC W/AUTO DIFF WBC: CPT

## 2017-11-08 RX ADMIN — FLUCONAZOLE 200 MG: 100 TABLET ORAL at 09:06

## 2017-11-08 RX ADMIN — ASPIRIN 81 MG: 81 TABLET, COATED ORAL at 09:06

## 2017-11-08 RX ADMIN — TAMSULOSIN HYDROCHLORIDE 0.4 MG: 0.4 CAPSULE ORAL at 17:35

## 2017-11-08 RX ADMIN — PIPERACILLIN SODIUM AND TAZOBACTAM SODIUM 3.38 G: 3; .375 INJECTION, POWDER, LYOPHILIZED, FOR SOLUTION INTRAVENOUS at 01:14

## 2017-11-08 RX ADMIN — PIPERACILLIN SODIUM AND TAZOBACTAM SODIUM 3.38 G: 3; .375 INJECTION, POWDER, LYOPHILIZED, FOR SOLUTION INTRAVENOUS at 09:06

## 2017-11-08 RX ADMIN — PANTOPRAZOLE SODIUM 40 MG: 40 INJECTION, POWDER, FOR SOLUTION INTRAVENOUS at 09:06

## 2017-11-08 RX ADMIN — PIPERACILLIN SODIUM AND TAZOBACTAM SODIUM 3.38 G: 3; .375 INJECTION, POWDER, LYOPHILIZED, FOR SOLUTION INTRAVENOUS at 17:14

## 2017-11-08 RX ADMIN — Medication 10 ML: at 20:37

## 2017-11-08 RX ADMIN — INSULIN LISPRO 2 UNITS: 100 INJECTION, SOLUTION INTRAVENOUS; SUBCUTANEOUS at 12:13

## 2017-11-08 RX ADMIN — INSULIN LISPRO 4 UNITS: 100 INJECTION, SOLUTION INTRAVENOUS; SUBCUTANEOUS at 17:28

## 2017-11-08 RX ADMIN — INSULIN LISPRO 2 UNITS: 100 INJECTION, SOLUTION INTRAVENOUS; SUBCUTANEOUS at 09:13

## 2017-11-08 RX ADMIN — INSULIN LISPRO 2 UNITS: 100 INJECTION, SOLUTION INTRAVENOUS; SUBCUTANEOUS at 20:32

## 2017-11-08 RX ADMIN — Medication 10 ML: at 09:07

## 2017-11-08 ASSESSMENT — PAIN SCALES - GENERAL
PAINLEVEL_OUTOF10: 0

## 2017-11-08 NOTE — PROGRESS NOTES
Type of ROM/Therapeutic Exercise  Type of ROM/Therapeutic Exercise: KERWIN  Comment: BUE ther ex x2# x 20 reps in all planes to increase BUE strength for ADL tasks. Pt demo good pacing and required no RBs. Assessment      Activity Tolerance  Activity Tolerance: Patient Tolerated treatment well       Discharge Recommendations:  Continue to assess pending progress, Home with assist PRN     Plan   Plan  Times per week: 7x/wk  Times per day: Daily  Current Treatment Recommendations: Strengthening, Balance Training, Functional Mobility Training, Safety Education & Training, Self-Care / ADL  G-Code     OutComes Score                                             Goals  Short term goals  Time Frame for Short term goals: 3-7 days  Short term goal 1: Pt. will tolerate 20 mins of BUE ther ex/act w/o SOB to improve overall strength/activity tolerance for functional tasks. Short term goal 2: Pt. will increase standing tolerance > 7 mins during dynamic tasks w/o LOB to increase participation with ADL's. Short term goal 3: Pt. will complete self care routine with SUP/MI to increase (I). Short term goal 4: Pt. will demo G safety awareness during functional ADL transfers/mobility for safe return to PLOF.        Therapy Time   Individual Concurrent Group Co-treatment   Time In  8:30         Time Out  900         Minutes  [address] [place]

## 2017-11-08 NOTE — PROGRESS NOTES
Physical Therapy  Facility/Department: [facility] AT THE [facility] MED SURG  Daily Treatment Note  NAME: Nickolas Vale  : 1940  MRN: [id number]    Date of Service: 2017    Patient Diagnosis(es):   Patient Active Problem List    Diagnosis Date Noted    Bacteremia 2017    Arterial hypotension 2017    Urinary tract infection associated with catheterization of urinary tract - self cath daily 2017    Severe sepsis ([address] 2017    Acute renal failure superimposed on stage 3 chronic kidney disease ([address] 2017    Lactic acid acidosis 2017    Yeast infection 2017    Hyponatremia 2017    Sepsis associated hypotension ([address] 2017    Urinary retention 2017    Hydronephrosis of right kidney 2017    Ureteral calculus 2017    Sepsis ([address] 2017    Diabetes mellitus ([address]     Hypertension     Acute renal failure superimposed on stage 3 chronic kidney disease ([address] 2017    Gross hematuria 2017    [facility] (bladder neck contracture) 2017    UTI (urinary tract infection) 2016    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 and other lower urinary tract symptoms (LUTS)(600.21) 2015    Urinary retention 2014    Senile nuclear sclerosis 2013       Past Medical History:   Diagnosis Date    Acute renal failure superimposed on stage 3 chronic kidney disease ([address] 3/1/2017    Arthritis     Diabetes mellitus ([address]     Hyperlipidemia     Hypertension     Kidney stone      Past Surgical History:   Procedure Laterality Date    CATARACT REMOVAL WITH IMPLANT Right 2013    CYSTOSCOPY INSERTION / REMOVAL STENT / STONE Right 2017    CYSTOSCOPY STENT INSERTION performed by Meagan Interiano MD at [address] / [address] / Jennifer Rosenthal N/A 11/3/2017    CYST[facility]COPY ST[facility]    with stent

## 2017-11-08 NOTE — PROGRESS NOTES
Progress Note    SUBJECTIVE:  Patient seen for UTI, sepsis, septic shock---all under control. He has no new complaints  ROS:   Constitutional: negative  for fevers, and negative for chills. Respiratory: negative for shortness of breath, negative for cough, and negative for wheezing  Cardiovascular: negative for chest pain, and negative for palpitations  Gastrointestinal: negative for abdominal pain, negative for nausea,negative for vomiting, negative for diarrhea, and negative for constipation     All other systems were reviewed with the patient and are negative unless otherwise stated in HPI    OBJECTIVE:    EXAM:  Vitals:    11/08/17 0100   BP: 137/76   Pulse: 80   Resp: 18   Temp: 98.4 °F (36.9 °C)   SpO2: 99%      Weight: 218 lb 3.2 oz (99 kg)    Height: 5' 10\" (177.8 cm)     GEN:   A & O x3, no apparent distress  EYES: No gross abnormalities. NECK: normal, supple, no lymphadenopathy,   PULM: clear to auscultation bilaterally- no wheezes, rales or rhonchi, normal air movement, no respiratory distress  COR: regular rate & rhythm, no murmurs and no gallops  ABD:  soft, non-tender, non-distended, normal bowel sounds, no masses or organomegaly  EXT:   no cyanosis, clubbing or edema present    NEURO: negative  SKIN:  no rashes or significant lesions                Ct Abdomen Pelvis Wo Iv Contrast    Result Date: 11/3/2017  REPORT: CT abdomen and pelvis without contrast TECHNIQUE: Contiguous thin axial slices through the abdomen and pelvis obtained without contrast. Axial, coronal and sagittal reformats were made from the source images. INDICATION: Right lower quadrant pain, fever, leukocytosis FINDINGS: ABDOMEN: Numerous punctate nonobstructing calcifications in both kidneys. Dominant nonobstructing calcification lower pole right kidney measuring 8 x 12 x 8 mm. There is moderate right hydroureteronephrosis with perinephric inflammation. The level of obstruction appears to be in the mid ureter.  Punctate both lungs. No focal consolidation, pleural effusion or pneumothorax seen. Normal cardiac and mediastinal silhouettes. No free intraperitoneal air.   Final report electronically signed by Dalton Mcdonald on 11/3/2017 10:52 AM    Stable chest      ASSESSMENT:  Doing well            Hospital Problems:  Principal Problem:    Severe sepsis ([address]  Active Problems:    Diabetes mellitus ([address]    Arterial hypotension    Urinary tract infection associated with catheterization of urinary tract - self cath daily    Acute renal failure superimposed on stage 3 chronic kidney disease (HCC)    Lactic acid acidosis    Yeast infection    Hyponatremia    Sepsis associated hypotension (HCC)    Urinary retention    Hydronephrosis of right kidney    Bacteremia      All Problems:  Patient Active Problem List   Diagnosis    Senile nuclear sclerosis    Urinary retention    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 and other lower urinary tract symptoms (LUTS)(600.21)    UTI (urinary tract infection)    Gross hematuria    BNC (bladder neck contracture)    Acute renal failure superimposed on stage 3 chronic kidney disease (HCC)    Sepsis ([address]    Diabetes mellitus ([address]    Hypertension    Ureteral calculus    Arterial hypotension    Urinary tract infection associated with catheterization of urinary tract - self cath daily    Severe sepsis (HCC)    Acute renal failure superimposed on stage 3 chronic kidney disease (HCC)    Lactic acid acidosis    Yeast infection    Hyponatremia    Sepsis associated hypotension (HCC)    Urinary retention    Hydronephrosis of right kidney    Bacteremia       PLAN:  · Continue IV antibiotics    HIGH RISK MEDS: none    Merline Havers , M.D.

## 2017-11-08 NOTE — PROGRESS NOTES
placement on right side. Changed schulz catheter    HERNIA REPAIR      umbilical 4999    JOINT REPLACEMENT      right knee march 22, 2012    JOINT REPLACEMENT Right junu 2014 partial knee    JOINT REPLACEMENT Right 2014    complete removal    KNEE SURGERY Right     x 2    LITHOTRIPSY Right 11/01/2017    with stent placement - Dr. Pari Sellers Right 11/1/2017    CYSTOSCOPY URETEROSCOPY LASER-WITH HLL performed by Daniel Jackson MD at [facility] TURP  01/2017    Greenlight PVP and TUIBNC    TURP  01/2015    Greenlight PVP       Restrictions  Restrictions/Precautions  Restrictions/Precautions: General Precautions, Fall Risk  Subjective   General  Chart Reviewed: Yes  Response To Previous Treatment: Patient with no complaints from previous session. Family / Caregiver Present: No  Referring Practitioner: Dr. Zepeda Adas: Pt denies pain  Pain Screening  Patient Currently in Pain: Denies  Pain Assessment  Pain Assessment: 0-10  Vital Signs  Patient Currently in Pain: Denies       Orientation  Orientation  Overall Orientation Status: Within Functional Limits  Objective      Transfers  Sit to Stand: Contact guard assistance;Stand by assistance  Stand to sit: Contact guard assistance;Stand by assistance  Ambulation  Ambulation?: Yes  WB Status: unrestricted   More Ambulation?: No  Ambulation 1  Surface: level tile  Device: Small G2B Pharmanison  Assistance: Contact guard assistance;Stand by assistance  Quality of Gait: reciprocal gait pattern, wide FARIDEH  Distance: 250ft  Stairs/Curb  Stairs?: No     Balance  Standing - Static: Good;+  Standing - Dynamic: Fair;+                           Assessment   Body structures, Functions, Activity limitations: Decreased functional mobility ; Decreased endurance;Decreased strength;Decreased high-level IADLs;Decreased balance  Assessment: Pt required 1 standing rest break with amb and seated rest break after, slight SOB

## 2017-11-09 VITALS
HEART RATE: 82 BPM | TEMPERATURE: 97.6 F | BODY MASS INDEX: 30.52 KG/M2 | SYSTOLIC BLOOD PRESSURE: 138 MMHG | HEIGHT: 70 IN | OXYGEN SATURATION: 99 % | RESPIRATION RATE: 18 BRPM | WEIGHT: 213.2 LBS | DIASTOLIC BLOOD PRESSURE: 84 MMHG

## 2017-11-09 LAB
ABSOLUTE EOS #: 0.6 K/UL (ref 0–0.4)
ABSOLUTE IMMATURE GRANULOCYTE: ABNORMAL K/UL (ref 0–0.3)
ABSOLUTE LYMPH #: 2.7 K/UL (ref 1–4.8)
ABSOLUTE MONO #: 1.1 K/UL (ref 0–1)
ALBUMIN SERPL-MCNC: 2.8 G/DL (ref 3.5–5.2)
ALBUMIN/GLOBULIN RATIO: 0.7 (ref 1–2.5)
ALP BLD-CCNC: 47 U/L (ref 40–129)
ALT SERPL-CCNC: 26 U/L (ref 5–41)
AST SERPL-CCNC: 16 U/L
BASOPHILS # BLD: 0 %
BASOPHILS ABSOLUTE: 0 K/UL (ref 0–0.2)
BILIRUB SERPL-MCNC: 0.3 MG/DL (ref 0.3–1.2)
BILIRUBIN DIRECT: <0.08 MG/DL
BILIRUBIN, INDIRECT: ABNORMAL MG/DL (ref 0–1)
DIFFERENTIAL TYPE: ABNORMAL
EOSINOPHILS RELATIVE PERCENT: 5 %
GLOBULIN: ABNORMAL G/DL (ref 1.5–3.8)
GLUCOSE BLD-MCNC: 145 MG/DL (ref 74–100)
GLUCOSE BLD-MCNC: 150 MG/DL (ref 74–100)
HCT VFR BLD CALC: 37 % (ref 41–53)
HEMOGLOBIN: 11.9 G/DL (ref 13.5–17)
IMMATURE GRANULOCYTES: ABNORMAL %
LYMPHOCYTES # BLD: 25 %
MAGNESIUM: 2.1 MG/DL (ref 1.6–2.6)
MCH RBC QN AUTO: 30.7 PG (ref 26–34)
MCHC RBC AUTO-ENTMCNC: 32.2 G/DL (ref 31–37)
MCV RBC AUTO: 95.4 FL (ref 80–100)
MONOCYTES # BLD: 11 %
PDW BLD-RTO: 13.9 % (ref 12.1–15.2)
PHOSPHORUS: 3.2 MG/DL (ref 2.5–4.5)
PLATELET # BLD: 305 K/UL (ref 140–450)
PLATELET ESTIMATE: ABNORMAL
PMV BLD AUTO: 8.5 FL (ref 6–12)
RBC # BLD: 3.88 M/UL (ref 4.5–5.9)
RBC # BLD: ABNORMAL 10*6/UL
SEG NEUTROPHILS: 59 %
SEGMENTED NEUTROPHILS ABSOLUTE COUNT: 6.4 K/UL (ref 1.8–7.7)
TOTAL PROTEIN: 6.7 G/DL (ref 6.4–8.3)
WBC # BLD: 10.8 K/UL (ref 3.5–11)
WBC # BLD: ABNORMAL 10*3/UL

## 2017-11-09 PROCEDURE — C9113 INJ PANTOPRAZOLE SODIUM, VIA: HCPCS | Performed by: PHYSICIAN ASSISTANT

## 2017-11-09 PROCEDURE — 2580000003 HC RX 258: Performed by: UROLOGY

## 2017-11-09 PROCEDURE — 84100 ASSAY OF PHOSPHORUS: CPT

## 2017-11-09 PROCEDURE — 82947 ASSAY GLUCOSE BLOOD QUANT: CPT

## 2017-11-09 PROCEDURE — 2580000003 HC RX 258: Performed by: INTERNAL MEDICINE

## 2017-11-09 PROCEDURE — 85025 COMPLETE CBC W/AUTO DIFF WBC: CPT

## 2017-11-09 PROCEDURE — 6370000000 HC RX 637 (ALT 250 FOR IP): Performed by: UROLOGY

## 2017-11-09 PROCEDURE — 36415 COLL VENOUS BLD VENIPUNCTURE: CPT

## 2017-11-09 PROCEDURE — 80076 HEPATIC FUNCTION PANEL: CPT

## 2017-11-09 PROCEDURE — 2580000003 HC RX 258: Performed by: PHYSICIAN ASSISTANT

## 2017-11-09 PROCEDURE — 6360000002 HC RX W HCPCS: Performed by: INTERNAL MEDICINE

## 2017-11-09 PROCEDURE — 6360000002 HC RX W HCPCS: Performed by: PHYSICIAN ASSISTANT

## 2017-11-09 PROCEDURE — 97110 THERAPEUTIC EXERCISES: CPT

## 2017-11-09 PROCEDURE — 83735 ASSAY OF MAGNESIUM: CPT

## 2017-11-09 PROCEDURE — 97116 GAIT TRAINING THERAPY: CPT

## 2017-11-09 RX ORDER — AMOXICILLIN AND CLAVULANATE POTASSIUM 875; 125 MG/1; MG/1
1 TABLET, FILM COATED ORAL 2 TIMES DAILY
Qty: 20 TABLET | Refills: 0 | Status: SHIPPED | OUTPATIENT
Start: 2017-11-09 | End: 2017-11-19

## 2017-11-09 RX ADMIN — PIPERACILLIN SODIUM AND TAZOBACTAM SODIUM 3.38 G: 3; .375 INJECTION, POWDER, LYOPHILIZED, FOR SOLUTION INTRAVENOUS at 01:34

## 2017-11-09 RX ADMIN — INSULIN LISPRO 2 UNITS: 100 INJECTION, SOLUTION INTRAVENOUS; SUBCUTANEOUS at 08:07

## 2017-11-09 RX ADMIN — PANTOPRAZOLE SODIUM 40 MG: 40 INJECTION, POWDER, FOR SOLUTION INTRAVENOUS at 09:04

## 2017-11-09 RX ADMIN — ASPIRIN 81 MG: 81 TABLET, COATED ORAL at 09:00

## 2017-11-09 RX ADMIN — Medication 10 ML: at 10:46

## 2017-11-09 RX ADMIN — PIPERACILLIN SODIUM AND TAZOBACTAM SODIUM 3.38 G: 3; .375 INJECTION, POWDER, LYOPHILIZED, FOR SOLUTION INTRAVENOUS at 09:02

## 2017-11-09 RX ADMIN — INSULIN LISPRO 2 UNITS: 100 INJECTION, SOLUTION INTRAVENOUS; SUBCUTANEOUS at 11:43

## 2017-11-09 RX ADMIN — Medication 10 ML: at 09:01

## 2017-11-09 RX ADMIN — FLUCONAZOLE 200 MG: 100 TABLET ORAL at 09:00

## 2017-11-09 ASSESSMENT — PAIN SCALES - GENERAL
PAINLEVEL_OUTOF10: 0

## 2017-11-09 NOTE — PROGRESS NOTES
Discharge instructions reviewed with pt at bedside. Pt verbalized understanding of all instructions using teach back method without difficulty. Pt will be discharged this afternoon once family arrives for transportation.

## 2017-11-09 NOTE — DISCHARGE SUMMARY
Discharge Summary    Nickolas Vale  :  1940  MRN:  [id number]    Admit date:  11/3/2017      Discharge date: 2017     Admitting Physician:  Anderson Do MD    Consultants:  Dr. Prashanth Vora, urology    Procedures: none    Complications: none    Discharge Condition: stable    Hospital Course:   Nickolas Vale is a 78 y.o. male admitted with fatigue, fever. He states that in the ER he had fever, hypotension and tachycardia. Fever noted in [facility] office 11/3/17 and was taken to the ER. Uro stent removal 11/3/17 and lithotripsy/stent placement 17. Cr=2, lactic acid 2.9, WBC 27, Na 131. SBP in 90s. Fever 101.4. . On prior CT:  \"6 mm stone in the proximal right ureter. This is associated with hydroureteronephrosis\" He is growing non-lactose fermenting gram negative rods from urine culture 10/31/17. 17: Cystoscopy, right ureteroscopy, right ureteral stent placement, right holmium laser lithotripsy by Dr. Prashanth Vora. Had stent removed in office 11/3/17. Soon after developed tachycardia and fever. Of note: S/p Greenlight PVP and TUIBNC with Greenlight Laser on 17. Patient with history of urosepsis 3/17. Patient admitted 11/3/17 to ICU for severe sepsis, UTI, acute on chronic kidney injury, hyponatremia. Started on IV abx. He was given volume for sepsis, repeat LA worsened, given additional boluses. He had urinary retention. Urology consulted. CT abd/pelvis showed right hydronephrosis. Schulz placed. He was taken to the OR for Cystoscopy of right ureteral stent placement. LA returned to normal. Patient had prolonged hospital course due to severe sepsis from pseudomonas bacteremia/UTI. He was stabilized and transferred to the floor. He continued to improve. He will be discharged with schulz. Will have an additional 10 days of augmentin and will follow closely with urology. He will have lithotripsy in the future per urology. Discharge to home.       Exam:  GEN:  alert and oriented to person, place and time, well-developed and well-nourished, in no acute distress  EYES:  PERRL  NECK: normal, supple,  no carotid bruits  PULM: clear to auscultation bilaterally- no wheezes, rales or rhonchi, normal air movement, no respiratory distress  COR:   regular rate & rhythm and no murmurs  ABD:    Obese, mod firm, non-tender, mod distended, normal bowel sounds, no masses or organomegaly  EXT:    no cyanosis, clubbing or edema present    NEURO: follows commands, TAYLOR, no deficits  SKIN:   no rashes or significant lesions    Significant Diagnostic Studies:   Lab Results   Component Value Date    WBC 10.8 11/09/2017    HGB 11.9 (L) 11/09/2017     11/09/2017       Lab Results   Component Value Date    BUN 20 11/07/2017    CREATININE 1.16 11/07/2017     11/07/2017    K 4.0 11/07/2017    CALCIUM 8.3 (L) 11/07/2017     11/07/2017    CO2 19 (L) 11/07/2017    LABGLOM >60 11/07/2017       Lab Results   Component Value Date    WBCUA 2 TO 5 11/03/2017    RBCUA GREATER THAN 100 11/03/2017    EPITHUA 0 TO 2 11/03/2017    LEUKOCYTESUR (A) 11/03/2017     INTERPRET WITH CAUTION DUE TO INTENSE COLOR OF URINE. SPECGRAV 1.020 11/03/2017    GLUCOSEU (A) 11/03/2017     INTERPRET WITH CAUTION DUE TO INTENSE COLOR OF URINE. KETUA (A) 11/03/2017     INTERPRET WITH CAUTION DUE TO INTENSE COLOR OF URINE. PROTEINU (A) 11/03/2017     INTERPRET WITH CAUTION DUE TO INTENSE COLOR OF URINE. HGBUR (A) 11/03/2017     INTERPRET WITH CAUTION DUE TO INTENSE COLOR OF URINE. CASTUA NOT REPORTED 11/03/2017    CRYSTUA NOT REPORTED 11/03/2017    BACTERIA 2+ (A) 11/03/2017    YEAST PRESENCE NOTED (A) 11/03/2017       Ct Abdomen Pelvis Wo Iv Contrast    Result Date: 11/3/2017  REPORT: CT abdomen and pelvis without contrast TECHNIQUE: Contiguous thin axial slices through the abdomen and pelvis obtained without contrast. Axial, coronal and sagittal reformats were made from the source images.  INDICATION: Right lower quadrant pain, fever, distal portion of the right ureteral stent overlies the bladder. Electronically Signed By: Bruce Smith   on  11/03/2017  18:34    Xr Chest 1 Vw    Result Date: 11/3/2017  REPORT: Portable AP radiograph of the chest obtained at 1024 hours INDICATION: Septic workup FINDINGS: Compared to 3/3/2017. Stable chronic changes in both lungs. No focal consolidation, pleural effusion or pneumothorax seen. Normal cardiac and mediastinal silhouettes. No free intraperitoneal air. Final report electronically signed by Mayelin George on 11/3/2017 10:52 AM    Stable chest      Discharge Diagnoses:    Principal Problem:    Severe sepsis [facility])  Active Problems:    Diabetes mellitus ([address]    Arterial hypotension    Urinary tract infection associated with catheterization of urinary tract - self cath daily    Acute renal failure superimposed on stage 3 chronic kidney disease (HCC)    Lactic acid acidosis    Yeast infection    Hyponatremia    Sepsis associated hypotension ([address]    Urinary retention    Hydronephrosis of right kidney    Bacteremia      Active Hospital Problems    Diagnosis Date Noted    Bacteremia [R78.81] 11/06/2017    Arterial hypotension [I95.9] 11/03/2017    Urinary tract infection associated with catheterization of urinary tract - self cath daily [T83.511A, N39.0] 11/03/2017    Severe sepsis ([address] [A41.9, R65.20] 11/03/2017    Acute renal failure superimposed on stage 3 chronic kidney disease ([address] [N17.9, N18.3] 11/03/2017    Lactic acid acidosis [E87.2] 11/03/2017    Yeast infection [B37.9] 11/03/2017    Hyponatremia [E87.1] 11/03/2017    Sepsis associated hypotension ([address] [A41.9] 11/03/2017    Urinary retention [R33.9] 11/03/2017    Hydronephrosis of right kidney [N13.30] 11/03/2017    Diabetes mellitus ([address] [E11.9]        Discharge Medications:        Claudia Wilkerson, [facility] [address] Medication Instructions [id number]    Printed on:11/09/17 1152   Medication Information amoxicillin-clavulanate (AUGMENTIN) 875-125 MG per tablet  Take 1 tablet by mouth 2 times daily for 10 days             aspirin 81 MG tablet  Take 81 mg by mouth daily. Fish Oil-Cholecalciferol (FISH OIL + D3 PO)  Take 1 capsule by mouth daily              lisinopril (PRINIVIL;ZESTRIL) 10 MG tablet  Take 10 mg by mouth daily. loratadine (CLARITIN) 10 MG tablet    Take 10 mg by mouth daily prn             metFORMIN (GLUCOPHAGE) 850 MG tablet  Take 850 mg by mouth 2 times daily (with meals)              Pediatric Multivitamins-Fl (MULTI VIT/FL PO)  Take 1 tablet by mouth daily Daily              Polyethylene Glycol 3350 (MIRALAX PO)  Take 1 Dose by mouth daily as needed Prn              tamsulosin (FLOMAX) 0.4 MG capsule  Take 1 capsule by mouth every evening                 Patient Instructions:    Activity: activity as tolerated  Diet: diabetic diet  Wound Care: schulz care  Other: abx    Disposition:   Discharge to Home    Follow up:  Patient will be followed by Gee Molina MD in 1 week  Dr. Mervin Holcomb (uro) 1 week    CORE MEASURES on Discharge (if applicable)  ACE/ARB in CHF: NA  Statin in MI: NA  ASA in [place]: NA  Statin in CVA: NA  Antiplatelet in CVA: NA    Total time spent on discharge services: 40 minutes    Including the following activities:  Evaluation and Management of patient  Discussion with patient and/or surrogate about current care plan  Coordination with Case Management and/or   Coordination of care with Consultants (if applicable)   Coordination of care with Receiving Facility Physician (if applicable)  Completion of DME forms (if applicable)  Preparation of Discharge Summary  Preparation of Medication Reconciliation  Preparation of Discharge Prescriptions    Signed:  Bibiana Ladd PA-C  11/9/2017, 11:52 AM

## 2017-11-09 NOTE — PROGRESS NOTES
[facility] SPECIALTY [facility]  OCCUPATIONAL THERAPY  No Visit Note    [] ICU    [x] Acute   Patient: Hua Fuentes  Room: 0301/0301-01      Hua Fuentes not seen on 11/9/2017 at 1:24 PM due to refusing treatment due to being discharged soon. Pt stating \"no thanks, I'm leaving soon and I had therapy (PT) a couple of times this morning. \"          Signature: Luís NEWSOME

## 2017-11-09 NOTE — PLAN OF CARE
Problem: Falls - Risk of  Goal: Absence of falls  Outcome: Ongoing  Patient is alert and oriented and has demonstrated the use of using the call light for assistance before getting up. Education has been provided to defer the use of the bed alarm and/or restraint free alarm as the patient has shown competency in calling for assistance and verbalizing the risk. Problem: Infection, Septic Shock:  Goal: Will show no infection signs and symptoms  Will show no infection signs and symptoms   Outcome: Ongoing  Patient remain afebrile. WBC trending down to within normal limits. Zosyn ordered every 8 hours. Problem: Serum Glucose Level - Abnormal:  Goal: Ability to maintain appropriate glucose levels will improve  Ability to maintain appropriate glucose levels will improve   Outcome: Ongoing  FSBS ac&hs with sliding scale coverage. 224 this evening. Problem: Venous Thromboembolism:  Goal: Absence of signs or symptoms of impaired coagulation  Absence of signs or symptoms of impaired coagulation   Outcome: Ongoing  No sings of thrombus at this time. Eduard hose on. Problem: Safety:  Goal: Free from accidental physical injury  Free from accidental physical injury   Outcome: Ongoing  Patient remain free from injury. Fall risk assessment complete. Bed in low position with bed wheels locked and bed alarm on. Call light within reach. ID band and fall band on.

## 2017-11-09 NOTE — DISCHARGE SUMMARY
Maddison Hancock. Ramirez Penn  Physician Assistant Attestation Note For Discharge 11/9/17    I personally evaluated and examined the patient face-to-face in conjunction with the PA and agree with the management and dispostition of the patient. Please see PA's discharge summary note for full details. My key findings are:     SUBJECTIVE:    Patient admitted for Severe sepsis ([address] septic shock , ureteral obstruction and UTI with pseudomonas bacteremia. OBJECTIVE:    Vitals:   Temp: 97.6 °F (36.4 °C)  BP: 138/84  Resp: 18  Pulse: 82  SpO2: 99 %    24HR INTAKE/OUTPUT:    Intake/Output Summary (Last 24 hours) at 11/09/17 1538  Last data filed at 11/09/17 1222   Gross per 24 hour   Intake             1620 ml   Output             3950 ml   Net            -2330 ml     -----------------------------------------------------------------  Exam:  GEN:    Awake, alert and oriented x 3. no acute distress  EYES:  EOMI, pupils equal   NECK: Supple. No lymphadenopathy. No carotid bruit  CVS:    RRR, no murmur, rub or gallop  PULM: CTA, no wheezes, rales or rhonchi  ABD:    Bowels sounds normal.  Abdomen is soft. No distention. No tenderness. EXT:   no edema bilaterally . No calf tenderness. NEURO: Motor and sensory are intact  SKIN:  No rashes. No skin lesions.       Diagnostic Data:    · All available data reviewed  Lab Results   Component Value Date    WBC 10.8 11/09/2017    HGB 11.9 (L) 11/09/2017    MCV 95.4 11/09/2017     11/09/2017    Lab Results   Component Value Date    GLUCOSE 152 (H) 11/07/2017    BUN 20 11/07/2017    CREATININE 1.16 11/07/2017     11/07/2017    K 4.0 11/07/2017    CALCIUM 8.3 (L) 11/07/2017     11/07/2017    CO2 19 (L) 11/07/2017       ASSESSMENT:    · stable    PLAN:  · I agree with the plan as outlined in the PA's note  · Dr Simone Enriquez to follow and do lithotripsy as out patient    Kasey Melgar M.D.  11/9/2017  3:38 PM

## 2017-11-09 NOTE — PROGRESS NOTES
placement on right side. Changed schulz catheter    HERNIA REPAIR      umbilical 4639    JOINT REPLACEMENT      right knee march 22, 2012    JOINT REPLACEMENT Right junu 2014 partial knee    JOINT REPLACEMENT Right 2014    complete removal    KNEE SURGERY Right     x 2    LITHOTRIPSY Right 11/01/2017    with stent placement - Dr. Luis Fernando Jerez Right 11/1/2017    CYSTOSCOPY URETEROSCOPY LASER-WITH HLL performed by Noris Miranda MD at [facility] TURP  01/2017    Greenlight PVP and TUIBNC    TURP  01/2015    Greenlight PVP       Restrictions  Restrictions/Precautions  Restrictions/Precautions: General Precautions, Fall Risk  Subjective   General  Chart Reviewed: Yes  Response To Previous Treatment: Patient with no complaints from previous session. Family / Caregiver Present: No  Referring Practitioner: Dr. Ramirez Penn  Subjective  Subjective: pt denied any pain or concerns. Pain Screening  Patient Currently in Pain: Denies  Vital Signs  Patient Currently in Pain: Denies       Orientation  Orientation  Overall Orientation Status: Within Functional Limits  Objective      Transfers  Sit to Stand: Contact guard assistance;Stand by assistance  Stand to sit: Contact guard assistance;Stand by assistance  Ambulation  Ambulation?: Yes  Ambulation 1  Surface: level tile  Device: Small Quique Dar  Assistance: Contact guard assistance;Stand by assistance  Quality of Gait: reciprocal gait pattern, wide FARIDEH  Distance: 275ft  Comments: Pt continues to progres well with endurance. Stairs/Curb  Stairs?: No     Balance  Standing - Static: Good;+  Standing - Dynamic: Fair;+  Exercises  Hip Flexion: 15x  Hip Abduction: 15x  Knee Active Range of Motion: 15x  Ankle Pumps: 15x  Comments: B LE therex completed standing--no seated RB required prior to completion. Exercises included HR, TR, marches, hip abd, HS curls and mini squats.       Assessment   Body structures, Functions, Activity limitations: Decreased functional mobility ; Decreased endurance;Decreased strength;Decreased high-level IADLs;Decreased balance  Assessment: Continued with standing therex and gait training--pt continues to tolerate well with minimal RB's. Treatment Diagnosis: general weakness   Prognosis: Good  Patient Education: No questions at this time in regards to d/c folder. REQUIRES PT FOLLOW UP: Yes       Discharge Recommendations:  Continue to assess pending progress      Goals  Short term goals  Time Frame for Short term goals: 10 days  Short term goal 1: Pt will be educated on his POC  Short term goal 2: Pt will perform all transfers with Mod I in order to return home  Short term goal 3: Pt will ambulate 100 feet with SPC in order to return to PLOF  Short term goal 4: Pt will increase dynamic standing balance to Fair + in order to reduce fall risk   Patient Goals   Patient goals : \"to go back home\"    Plan    Plan  Times per week: 7x/wk   Times per day: Twice a day  Plan weeks: 2x/daily except weekends 1x/daily   Current Treatment Recommendations: Strengthening, Neuromuscular Re-education, Home Exercise Program, Safety Education & Training, Balance Training, Endurance Training, Functional Mobility Training, Transfer Training, Gait Training  Safety Devices  Type of devices:  All fall risk precautions in place, Call light within reach, Gait belt, Left in chair     Therapy Time   Individual Concurrent Group Co-treatment   Time In 1052         Time Out 1115         Minutes 270-05 Alia Monk

## 2017-11-20 ENCOUNTER — OFFICE VISIT (OUTPATIENT)
Dept: UROLOGY | Age: 77
End: 2017-11-20
Payer: MEDICARE

## 2017-11-20 VITALS
DIASTOLIC BLOOD PRESSURE: 70 MMHG | TEMPERATURE: 97.8 F | SYSTOLIC BLOOD PRESSURE: 110 MMHG | BODY MASS INDEX: 29.78 KG/M2 | WEIGHT: 208 LBS | HEIGHT: 70 IN

## 2017-11-20 DIAGNOSIS — N32.0 BNC (BLADDER NECK CONTRACTURE): ICD-10-CM

## 2017-11-20 DIAGNOSIS — N20.0 RENAL STONE: ICD-10-CM

## 2017-11-20 DIAGNOSIS — N40.1 BPH WITH OBSTRUCTION/LOWER URINARY TRACT SYMPTOMS: ICD-10-CM

## 2017-11-20 DIAGNOSIS — N13.8 BPH WITH OBSTRUCTION/LOWER URINARY TRACT SYMPTOMS: ICD-10-CM

## 2017-11-20 DIAGNOSIS — R33.9 URINARY RETENTION: Primary | ICD-10-CM

## 2017-11-20 PROCEDURE — G8427 DOCREV CUR MEDS BY ELIG CLIN: HCPCS | Performed by: NURSE PRACTITIONER

## 2017-11-20 PROCEDURE — G8417 CALC BMI ABV UP PARAM F/U: HCPCS | Performed by: NURSE PRACTITIONER

## 2017-11-20 PROCEDURE — 3017F COLORECTAL CA SCREEN DOC REV: CPT | Performed by: NURSE PRACTITIONER

## 2017-11-20 PROCEDURE — 4040F PNEUMOC VAC/ADMIN/RCVD: CPT | Performed by: NURSE PRACTITIONER

## 2017-11-20 PROCEDURE — 1123F ACP DISCUSS/DSCN MKR DOCD: CPT | Performed by: NURSE PRACTITIONER

## 2017-11-20 PROCEDURE — 1036F TOBACCO NON-USER: CPT | Performed by: NURSE PRACTITIONER

## 2017-11-20 PROCEDURE — 99214 OFFICE O/P EST MOD 30 MIN: CPT | Performed by: NURSE PRACTITIONER

## 2017-11-20 PROCEDURE — 1111F DSCHRG MED/CURRENT MED MERGE: CPT | Performed by: NURSE PRACTITIONER

## 2017-11-20 PROCEDURE — G8484 FLU IMMUNIZE NO ADMIN: HCPCS | Performed by: NURSE PRACTITIONER

## 2017-11-20 ASSESSMENT — ENCOUNTER SYMPTOMS
VOMITING: 0
CONSTIPATION: 0
BACK PAIN: 0
NAUSEA: 0
EYE REDNESS: 0
COUGH: 0
EYE PAIN: 0
WHEEZING: 0
SHORTNESS OF BREATH: 0
COLOR CHANGE: 0
ABDOMINAL PAIN: 0

## 2017-11-20 NOTE — PROGRESS NOTES
Subjective:      Patient ID: Haylee Ryder is a 78 y.o. male. HPI  Patient is a 78 y.o. male in no acute distress and alert and oriented to person, place and time. History  Greenlight PVP Jan 2015. PVR remained elevated post-op but was improving: Feb 839 mL, March 600 mL, April 505 mL, July 452 mL, Oct 252 mL. Flomax dc'd in July 2015. At annual f/u Oct 2016 PVR back up, >850 mL. Pt does not feel full or uncomfortable. He did self cath previously. He does report intermittent urine stream about half the time, mild straining. Offered pt option of resuming Flomax or resuming self cath qhs. Pt prefered to start self cath. Pt reported gross hematuria and some difficulty with cathing. Cysto showed small caliber BNC. S/p Greenlight PVP and TUIBNC with Greenlight Laser on 1/31/17. Pt has been cathing nightly since (volumes range from 3-28 oz). Right HLL 11/1/17    Today  Here today for hospital follow-up. He is status post right HLL on 11/1/17. He did have the ureteral stent removed on 11/3/17. After stent removal patient did complain of severe flank pain and malaise so he was transported to the ER for further evaluation. While in the ER he did have a white count of 27,000 and fevers of 104. He was taken back to the OR and had a right ureteral stent placed and Schulz catheter placed. Urinalysis did show pseudomonas fluorescence. He was treated with culture specific Zosyn. He does need his schulz catheter removed today. He denies pain, fevers, or hematuria.   Past Medical History:   Diagnosis Date    Acute renal failure superimposed on stage 3 chronic kidney disease ([address] 3/1/2017    Arthritis     Diabetes mellitus ([address]     Hyperlipidemia     Hypertension     Kidney stone      Past Surgical History:   Procedure Laterality Date    CATARACT REMOVAL WITH IMPLANT Right 8/26/2013    CYSTOSCOPY INSERTION / REMOVAL STENT / STONE Right 11/1/2017    CYSTOSCOPY STENT INSERTION performed by Elijah Hernandez Negative for decreased urine volume, discharge, dysuria, enuresis, flank pain, frequency, hematuria, penile pain, scrotal swelling, testicular pain and urgency. Musculoskeletal: Negative for back pain, joint swelling and myalgias. Skin: Negative for color change, rash and wound. Neurological: Negative for dizziness, tremors and numbness. Hematological: Negative for adenopathy. Does not bruise/bleed easily. Objective:   Physical Exam    Assessment:      1. Urinary retention     2. 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 Renal stone             Plan: We will remove the Fernandes catheter today. He will resume intermittent self cathing. He will return in 1-2 weeks for cystoscopy stent removal.  We will plan for left-sided ESWL after the stent is removed.

## 2017-11-20 NOTE — PROGRESS NOTES
Removed the patient's catheter today during the office visit. Patient tolerated the procedure well, with no adverse reactions.

## 2017-11-24 ENCOUNTER — HOSPITAL ENCOUNTER (EMERGENCY)
Age: 77
Discharge: HOME | End: 2017-11-24
Attending: FAMILY MEDICINE
Payer: MEDICARE

## 2017-11-24 VITALS
HEART RATE: 70 BPM | TEMPERATURE: 98.5 F | RESPIRATION RATE: 20 BRPM | OXYGEN SATURATION: 97 % | SYSTOLIC BLOOD PRESSURE: 130 MMHG | DIASTOLIC BLOOD PRESSURE: 76 MMHG

## 2017-11-24 DIAGNOSIS — N30.01 ACUTE CYSTITIS WITH HEMATURIA: Primary | ICD-10-CM

## 2017-11-24 LAB
-: ABNORMAL
ABSOLUTE EOS #: 0.3 K/UL (ref 0–0.4)
ABSOLUTE IMMATURE GRANULOCYTE: ABNORMAL K/UL (ref 0–0.3)
ABSOLUTE LYMPH #: 2.4 K/UL (ref 1–4.8)
ABSOLUTE MONO #: 1.3 K/UL (ref 0.2–0.8)
ALBUMIN SERPL-MCNC: 4.1 G/DL (ref 3.5–5.2)
ALBUMIN/GLOBULIN RATIO: 1 (ref 1–2.5)
ALP BLD-CCNC: 76 U/L (ref 40–129)
ALT SERPL-CCNC: 15 U/L (ref 5–41)
AMORPHOUS: ABNORMAL
ANION GAP SERPL CALCULATED.3IONS-SCNC: 16 MMOL/L (ref 9–17)
AST SERPL-CCNC: 14 U/L
BACTERIA: ABNORMAL
BASOPHILS # BLD: 0 % (ref 0–2)
BASOPHILS ABSOLUTE: 0 K/UL (ref 0–0.2)
BILIRUB SERPL-MCNC: 0.36 MG/DL (ref 0.3–1.2)
BILIRUBIN URINE: NEGATIVE
BUN BLDV-MCNC: 24 MG/DL (ref 8–23)
BUN/CREAT BLD: 17 (ref 9–20)
CALCIUM SERPL-MCNC: 9.3 MG/DL (ref 8.6–10.4)
CASTS UA: ABNORMAL /LPF
CHLORIDE BLD-SCNC: 96 MMOL/L (ref 98–107)
CO2: 23 MMOL/L (ref 20–31)
COLOR: YELLOW
COMMENT UA: ABNORMAL
CREAT SERPL-MCNC: 1.43 MG/DL (ref 0.7–1.2)
CRYSTALS, UA: ABNORMAL /HPF
DIFFERENTIAL TYPE: ABNORMAL
EOSINOPHILS RELATIVE PERCENT: 3 % (ref 1–4)
EPITHELIAL CELLS UA: ABNORMAL /HPF (ref 0–5)
GFR AFRICAN AMERICAN: 58 ML/MIN
GFR NON-AFRICAN AMERICAN: 48 ML/MIN
GFR SERPL CREATININE-BSD FRML MDRD: ABNORMAL ML/MIN/{1.73_M2}
GFR SERPL CREATININE-BSD FRML MDRD: ABNORMAL ML/MIN/{1.73_M2}
GLUCOSE BLD-MCNC: 138 MG/DL (ref 70–99)
GLUCOSE URINE: NEGATIVE
HCT VFR BLD CALC: 40 % (ref 41–53)
HEMOGLOBIN: 12.9 G/DL (ref 13.5–17)
IMMATURE GRANULOCYTES: ABNORMAL %
KETONES, URINE: NEGATIVE
LEUKOCYTE ESTERASE, URINE: ABNORMAL
LYMPHOCYTES # BLD: 21 % (ref 24–44)
MCH RBC QN AUTO: 29.6 PG (ref 26–34)
MCHC RBC AUTO-ENTMCNC: 32.3 G/DL (ref 31–37)
MCV RBC AUTO: 91.7 FL (ref 80–100)
MONOCYTES # BLD: 12 % (ref 1–7)
MUCUS: ABNORMAL
NITRITE, URINE: POSITIVE
OTHER OBSERVATIONS UA: ABNORMAL
PDW BLD-RTO: 12.8 % (ref 12.1–15.2)
PH UA: 6 (ref 5–9)
PLATELET # BLD: 288 K/UL (ref 140–450)
PLATELET ESTIMATE: ABNORMAL
PMV BLD AUTO: 8.5 FL (ref 6–12)
POTASSIUM SERPL-SCNC: 4.9 MMOL/L (ref 3.7–5.3)
PROTEIN UA: ABNORMAL
RBC # BLD: 4.37 M/UL (ref 4.5–5.9)
RBC # BLD: ABNORMAL 10*6/UL
RBC UA: ABNORMAL /HPF (ref 0–2)
RENAL EPITHELIAL, UA: ABNORMAL /HPF
SEG NEUTROPHILS: 64 % (ref 36–66)
SEGMENTED NEUTROPHILS ABSOLUTE COUNT: 7.3 K/UL (ref 1.8–7.7)
SODIUM BLD-SCNC: 135 MMOL/L (ref 135–144)
SPECIFIC GRAVITY UA: 1.01 (ref 1.01–1.02)
TOTAL PROTEIN: 8.3 G/DL (ref 6.4–8.3)
TRICHOMONAS: ABNORMAL
TURBIDITY: CLEAR
URINE HGB: ABNORMAL
UROBILINOGEN, URINE: NORMAL
WBC # BLD: 11.3 K/UL (ref 3.5–11)
WBC # BLD: ABNORMAL 10*3/UL
WBC UA: ABNORMAL /HPF (ref 0–5)
YEAST: ABNORMAL

## 2017-11-24 PROCEDURE — 99284 EMERGENCY DEPT VISIT MOD MDM: CPT

## 2017-11-24 PROCEDURE — 87077 CULTURE AEROBIC IDENTIFY: CPT

## 2017-11-24 PROCEDURE — 87186 SC STD MICRODIL/AGAR DIL: CPT

## 2017-11-24 PROCEDURE — 80053 COMPREHEN METABOLIC PANEL: CPT

## 2017-11-24 PROCEDURE — 6360000002 HC RX W HCPCS: Performed by: FAMILY MEDICINE

## 2017-11-24 PROCEDURE — 85025 COMPLETE CBC W/AUTO DIFF WBC: CPT

## 2017-11-24 PROCEDURE — 96374 THER/PROPH/DIAG INJ IV PUSH: CPT

## 2017-11-24 PROCEDURE — 87086 URINE CULTURE/COLONY COUNT: CPT

## 2017-11-24 PROCEDURE — 81001 URINALYSIS AUTO W/SCOPE: CPT

## 2017-11-24 RX ORDER — SULFAMETHOXAZOLE AND TRIMETHOPRIM 800; 160 MG/1; MG/1
1 TABLET ORAL 2 TIMES DAILY
Qty: 20 TABLET | Refills: 0 | Status: SHIPPED | OUTPATIENT
Start: 2017-11-24 | End: 2017-12-04

## 2017-11-24 RX ORDER — KETOROLAC TROMETHAMINE 30 MG/ML
30 INJECTION, SOLUTION INTRAMUSCULAR; INTRAVENOUS ONCE
Status: DISCONTINUED | OUTPATIENT
Start: 2017-11-24 | End: 2017-11-24

## 2017-11-24 RX ORDER — AMOXICILLIN AND CLAVULANATE POTASSIUM 500; 125 MG/1; MG/1
1 TABLET, FILM COATED ORAL 3 TIMES DAILY
COMMUNITY
End: 2017-12-04

## 2017-11-24 RX ORDER — KETOROLAC TROMETHAMINE 15 MG/ML
15 INJECTION, SOLUTION INTRAMUSCULAR; INTRAVENOUS ONCE
Status: COMPLETED | OUTPATIENT
Start: 2017-11-24 | End: 2017-11-24

## 2017-11-24 RX ADMIN — KETOROLAC TROMETHAMINE 15 MG: 15 INJECTION, SOLUTION INTRAMUSCULAR; INTRAVENOUS at 10:46

## 2017-11-24 ASSESSMENT — PAIN DESCRIPTION - LOCATION: LOCATION: FLANK

## 2017-11-24 ASSESSMENT — PAIN SCALES - GENERAL
PAINLEVEL_OUTOF10: 4
PAINLEVEL_OUTOF10: 3

## 2017-11-24 ASSESSMENT — PAIN DESCRIPTION - FREQUENCY: FREQUENCY: CONTINUOUS

## 2017-11-24 ASSESSMENT — PAIN DESCRIPTION - ORIENTATION: ORIENTATION: LEFT

## 2017-11-24 ASSESSMENT — PAIN DESCRIPTION - DESCRIPTORS: DESCRIPTORS: DISCOMFORT;DULL;ACHING

## 2017-11-24 ASSESSMENT — PAIN DESCRIPTION - PAIN TYPE: TYPE: ACUTE PAIN

## 2017-11-25 NOTE — ED PROVIDER NOTES
[facility]  eMERGENCY dEPARTMENT eNCOUnter          [facility]       Chief Complaint   Patient presents with    Flank Pain     onset around midnight; left sided; is being seen by Dr Angela Sorto for a kidney stone       Nurses Notes reviewed and I agree except as noted in the HPI. HISTORY OF PRESENT ILLNESS    Haylee Ryder is a 78 y.o. male who presents To the emergency room with complaint of pain in her side, and to her left flank area, onset approximately midnight, paced states that he better this morning though she states she could not get comfortable, states worse with any deep breath. Patient described overall pain as a dull pain 56/10, though less now at this time. Last bowel movement 1 day prior, and has daily bowel movements. Denies fever chills denies nausea vomiting. Patient has had kidney stones in the past, urinary tract infections in the past, is currently under the care of urology. Patient has been taking his Flomax, denies gross dysuria or hematuria    REVIEW OF SYSTEMS     Review of Systems   All other systems reviewed and are negative. PAST MEDICAL HISTORY    has a past medical history of Acute renal failure superimposed on stage 3 chronic kidney disease ([address] Arthritis; Diabetes mellitus ([address] Hyperlipidemia; Hypertension; and Kidney stone. SURGICAL HISTORY      has a past surgical history that includes hernia repair; Cataract removal with implant (Right, 8/26/2013); knee surgery (Right); joint replacement; joint replacement (Right, junu 2014 partial knee); joint replacement (Right, 2014); TURP (01/2017); TURP (01/2015); Lithotripsy (Right, 11/01/2017); cysto/uretero/pyeloscopy w/lithotripsy (Right, 11/1/2017); CYSTOSCOPY INSERTION / REMOVAL STENT / STONE (Right, 11/1/2017); cystourethroscopy (11/03/2017); and CYSTOSCOPY INSERTION / REMOVAL STENT / STONE (N/A, 11/3/2017).     CURRENT MEDICATIONS       Discharge Medication List as of 11/24/2017 12:26 tenderness on negative gross abdominal tenderness   Musculoskeletal:   Negative acute trauma or deformity,  apparent full range of motion and normal strength all extremities appropriate to age. Neurological: Patient is alert and oriented to person, place, and time. patient displays no tremor. Patient displays no seizure activity. Skin: Skin is warm and dry. Patient is not diaphoretic. Psychiatric: Patient has a normal mood and pleasant affect.  Patient speech is normal and behavior is normal. Cognition and memory are normal.      DIFFERENTIAL DIAGNOSIS:   UTI pyelonephritis kidney stone MSK    DIAGNOSTIC RESULTS           RADIOLOGY: non-plain film images(s) such as CT, Ultrasound and MRI are read by the radiologist.  No orders to display       LABS:   Labs Reviewed   UA W/REFLEX CULTURE - Abnormal; Notable for the following:        Result Value    Urine Hgb 2+ (*)     Protein, UA 1+ (*)     Nitrite, Urine POSITIVE (*)     Leukocyte Esterase, Urine MODERATE (*)     All other components within normal limits   CBC WITH AUTO DIFFERENTIAL - Abnormal; Notable for the following:     WBC 11.3 (*)     RBC 4.37 (*)     Hemoglobin 12.9 (*)     Hematocrit 40.0 (*)     Lymphocytes 21 (*)     Monocytes 12 (*)     Absolute Mono # 1.30 (*)     All other components within normal limits   COMPREHENSIVE METABOLIC PANEL - Abnormal; Notable for the following:     Glucose 138 (*)     BUN 24 (*)     CREATININE 1.43 (*)     Chloride 96 (*)     GFR Non- 48 (*)     GFR  58 (*)     All other components within normal limits   MICROSCOPIC URINALYSIS - Abnormal; Notable for the following:     Bacteria, UA 1+ (*)     All other components within normal limits   URINE CULTURE       EMERGENCY DEPARTMENT COURSE:   Vitals:    Vitals:    11/24/17 1011   BP: 130/76   Pulse: 70   Resp: 20   Temp: 98.5 °F (36.9 °C)   TempSrc: Tympanic   SpO2: 97%     Patient history physical exam taken at bedside, discussed patient's symptoms and exam findings as well as plan workup to include blood and urine studies, will give IV saline lock the IV ketorolac 15 mg. Patient resting in bed semi-Fowlers with wife at bedside    Lab work are reviewed noting BUN 24 serum creatinine 1.43, white blood cell count 11.3 with 64% PMNs no reported bandemia, noted urine nitrite positive    Allergies confirmed    Discussed the patient diagnosis of urinary tract infection, confirmed acknowledges will initiate patient on Bactrim DS at this time, discussed ports hydration, close follow up with PCP and/or urologist, return to ER if any symptoms change worsen or other concerns, acknowledges    FINAL IMPRESSION      1.  Acute cystitis with hematuria          DISPOSITION/PLAN   Discharge    PATIENT REFERRED TO:  MD Pierre Sr [address] [place] [id number] [phone]    Call       [facility] ED  [address] [facility]  [address] [place] [address] Road  [phone]    If symptoms worsen, As needed      DISCHARGE MEDICATIONS:  Discharge Medication List as of 11/24/2017 12:26 PM      START taking these medications    Details   sulfamethoxazole-trimethoprim (BACTRIM DS) 800-160 MG per tablet Take 1 tablet by mouth 2 times daily for 10 days, Disp-20 tablet, R-0Print                 Summation      Patient Course:  Discharge    ED Medications administered this visit:    Medications   ketorolac (TORADOL) injection 15 mg (15 mg Intravenous Given 11/24/17 1046)       New Prescriptions from this visit:    Discharge Medication List as of 11/24/2017 12:26 PM      START taking these medications    Details   sulfamethoxazole-trimethoprim (BACTRIM DS) 800-160 MG per tablet Take 1 tablet by mouth 2 times daily for 10 days, Disp-20 tablet, R-0Print             Follow-up:  MD Pierre Sr [address] [place] [id number] [phone]    Call       [facility] ED  [address] [facility]  [address]  [phone]    If symptoms worsen, As needed        Final Impression:   1.  Acute cystitis with hematuria               (Please note that portions of this note were completed with a voice recognition program.  Efforts were made to edit the dictations but occasionally words are mis-transcribed.)    MD Laurent Willingham MD  11/24/17 2002

## 2017-11-26 LAB
CULTURE: ABNORMAL
CULTURE: ABNORMAL
Lab: ABNORMAL
ORGANISM: ABNORMAL
SPECIMEN DESCRIPTION: ABNORMAL
SPECIMEN DESCRIPTION: ABNORMAL
STATUS: ABNORMAL

## 2017-12-04 ENCOUNTER — PROCEDURE VISIT (OUTPATIENT)
Dept: UROLOGY | Age: 77
End: 2017-12-04
Payer: MEDICARE

## 2017-12-04 ENCOUNTER — HOSPITAL ENCOUNTER (OUTPATIENT)
Age: 77
Setting detail: SPECIMEN
Discharge: HOME | End: 2017-12-04
Payer: MEDICARE

## 2017-12-04 VITALS
DIASTOLIC BLOOD PRESSURE: 80 MMHG | WEIGHT: 209 LBS | BODY MASS INDEX: 29.92 KG/M2 | SYSTOLIC BLOOD PRESSURE: 126 MMHG | HEIGHT: 70 IN

## 2017-12-04 DIAGNOSIS — N20.1 URETERAL CALCULUS: Primary | ICD-10-CM

## 2017-12-04 DIAGNOSIS — N20.0 RENAL CALCULUS: ICD-10-CM

## 2017-12-04 PROCEDURE — G8484 FLU IMMUNIZE NO ADMIN: HCPCS | Performed by: UROLOGY

## 2017-12-04 PROCEDURE — 1123F ACP DISCUSS/DSCN MKR DOCD: CPT | Performed by: UROLOGY

## 2017-12-04 PROCEDURE — 3017F COLORECTAL CA SCREEN DOC REV: CPT | Performed by: UROLOGY

## 2017-12-04 PROCEDURE — 4040F PNEUMOC VAC/ADMIN/RCVD: CPT | Performed by: UROLOGY

## 2017-12-04 PROCEDURE — 1111F DSCHRG MED/CURRENT MED MERGE: CPT | Performed by: UROLOGY

## 2017-12-04 PROCEDURE — G8427 DOCREV CUR MEDS BY ELIG CLIN: HCPCS | Performed by: UROLOGY

## 2017-12-04 PROCEDURE — 1036F TOBACCO NON-USER: CPT | Performed by: UROLOGY

## 2017-12-04 PROCEDURE — 52310 CYSTOSCOPY AND TREATMENT: CPT | Performed by: UROLOGY

## 2017-12-04 PROCEDURE — 99214 OFFICE O/P EST MOD 30 MIN: CPT | Performed by: UROLOGY

## 2017-12-04 PROCEDURE — G8417 CALC BMI ABV UP PARAM F/U: HCPCS | Performed by: UROLOGY

## 2017-12-04 RX ORDER — AMOXICILLIN AND CLAVULANATE POTASSIUM 875; 125 MG/1; MG/1
TABLET, FILM COATED ORAL
COMMUNITY
Start: 2017-11-22 | End: 2018-01-02 | Stop reason: ALTCHOICE

## 2017-12-04 ASSESSMENT — ENCOUNTER SYMPTOMS
VOMITING: 0
COLOR CHANGE: 0
BACK PAIN: 0
EYE PAIN: 0
ABDOMINAL PAIN: 0
WHEEZING: 0
SHORTNESS OF BREATH: 0
NAUSEA: 0
COUGH: 0
EYE REDNESS: 0

## 2017-12-04 NOTE — PROGRESS NOTES
orifice(s) was/were seen bilaterally and in normal anatomic position  Trabeculations No  Diverticulum No           Specimens: Stent discarded               Complications:  None; patient tolerated the procedure well. Disposition: home           Condition: stable    Past Medical History:   Diagnosis Date    Acute renal failure superimposed on stage 3 chronic kidney disease ([address] 3/1/2017    Arthritis     Diabetes mellitus ([address]     Hyperlipidemia     Hypertension     Kidney stone      Past Surgical History:   Procedure Laterality Date    CATARACT REMOVAL WITH IMPLANT Right 8/26/2013    CYSTOSCOPY INSERTION / REMOVAL STENT / STONE Right 11/1/2017    CYSTOSCOPY STENT INSERTION performed by Gary Goldstein MD at [address] / [address] / Susan Delong N/A 11/3/2017    [facility]ST[facility]COPY STENT [facility]  11/03/2017    with stent placement on right side. Changed schulz catheter    HERNIA REPAIR      umbilical 7930    JOINT REPLACEMENT      right knee march 22, 2012    JOINT REPLACEMENT Right junu 2014 partial knee    JOINT REPLACEMENT Right 2014    complete removal    KNEE SURGERY Right     x 2    LITHOTRIPSY Right 11/01/2017    with stent placement - Dr. Concha Gonzales Right 11/1/2017    CYSTOSCOPY URETEROSCOPY LASER-WITH HLL performed by Gary Goldstein MD at [facility] TUR  01/2017    Greenlight PVP and TUIBNC    TURP  01/2015    Greenlight PVP     No family history on file.   Current Outpatient Prescriptions on File Prior to Visit   Medication Sig Dispense Refill    amoxicillin-clavulanate (AUGMENTIN) 500-125 MG per tablet Take 1 tablet by mouth 3 times daily      sulfamethoxazole-trimethoprim (BACTRIM DS) 800-160 MG per tablet Take 1 tablet by mouth 2 times daily for 10 days 20 tablet 0    tamsulosin (FLOMAX) 0.4 MG capsule Take 1 capsule by mouth every evening 30 capsule 0    metFORMIN (GLUCOPHAGE) 850 MG tablet Take 850 mg by mouth 2 times daily (with meals)       Fish Oil-Cholecalciferol (FISH OIL + D3 PO) Take 1 capsule by mouth daily       Pediatric Multivitamins-Fl (MULTI VIT/FL PO) Take 1 tablet by mouth daily Daily       Polyethylene Glycol 3350 (MIRALAX PO) Take 1 Dose by mouth daily as needed Prn       lisinopril (PRINIVIL;ZESTRIL) 10 MG tablet Take 10 mg by mouth daily.  aspirin 81 MG tablet Take 81 mg by mouth daily.  loratadine (CLARITIN) 10 MG tablet   Take 10 mg by mouth daily prn       No current facility-administered medications on file prior to visit. Outpatient Encounter Prescriptions as of 12/4/2017   Medication Sig Dispense Refill    amoxicillin-clavulanate (AUGMENTIN) 500-125 MG per tablet Take 1 tablet by mouth 3 times daily      sulfamethoxazole-trimethoprim (BACTRIM DS) 800-160 MG per tablet Take 1 tablet by mouth 2 times daily for 10 days 20 tablet 0    tamsulosin (FLOMAX) 0.4 MG capsule Take 1 capsule by mouth every evening 30 capsule 0    metFORMIN (GLUCOPHAGE) 850 MG tablet Take 850 mg by mouth 2 times daily (with meals)       Fish Oil-Cholecalciferol (FISH OIL + D3 PO) Take 1 capsule by mouth daily       Pediatric Multivitamins-Fl (MULTI VIT/FL PO) Take 1 tablet by mouth daily Daily       Polyethylene Glycol 3350 (MIRALAX PO) Take 1 Dose by mouth daily as needed Prn       lisinopril (PRINIVIL;ZESTRIL) 10 MG tablet Take 10 mg by mouth daily.  aspirin 81 MG tablet Take 81 mg by mouth daily.  loratadine (CLARITIN) 10 MG tablet   Take 10 mg by mouth daily prn       No facility-administered encounter medications on file as of 12/4/2017. Review of patient's allergies indicates no known allergies.   History   Smoking Status    Former Smoker    Packs/day: 0.50    Years: 3.00   Smokeless Tobacco    Never Used     Comment: quit 50 years ago       History   Alcohol Use No       Vitals:    12/04/17 1031   BP: 128/78       Constitutional: Patient in no acute distress; Neuro: alert and oriented to person place and time. Psych: Mood and affect normal.  Skin: Normal  Lungs: Respiratory effort normal  Cardiovascular:  Normal peripheral pulses  Abdomen: Soft, non-tender, non-distended with no CVA, flank pain, hepatosplenomegaly or hernia. Kidneys normal.  Bladder non-tender and not distended. Lymphatics: no palpable lymphadenopathy  Penis normal  Urethral meatus normal  Scrotal exam normal  Testicles normal bilaterally  Epididymis normal bilaterally  No evidence of inguinal hernia  Anus and perineum normal  Normal rectal tone with no masses  Prostate soft, non-tender to palpation. No palpable nodules. Estimated 40 grams. Seminal vesicles not palpable. No results found for: PSA  Lab Results   Component Value Date    BUN 24 (H) 11/24/2017     Lab Results   Component Value Date    CREATININE 1.43 (H) 11/24/2017       No results found for this visit on 12/04/17. Review of Systems   Constitutional: Negative for appetite change, chills and fever. Eyes: Negative for pain, redness and visual disturbance. Respiratory: Negative for cough, shortness of breath and wheezing. Cardiovascular: Negative for chest pain and leg swelling. Gastrointestinal: Negative for abdominal pain, nausea and vomiting. Genitourinary: Negative for difficulty urinating, discharge, dysuria, flank pain, frequency, hematuria, scrotal swelling and testicular pain. Musculoskeletal: Negative for back pain, joint swelling and myalgias. Skin: Negative for color change, rash and wound. Neurological: Negative for dizziness, tremors and numbness. Hematological: Negative for adenopathy. Does not bruise/bleed easily. Objective:   Physical Exam    Assessment: This is a 78 y.o. male with the following diagnoses:  1. Ureteral calculus  HI CYSTOURETHROGRAPHY REMV CALCULUS, STENT, FOREIGN BODY, SIMPLE   2.  Renal calculus  HI

## 2017-12-07 ENCOUNTER — HOSPITAL ENCOUNTER (OUTPATIENT)
Age: 77
Setting detail: SPECIMEN
Discharge: HOME | End: 2017-12-07
Payer: MEDICARE

## 2017-12-07 DIAGNOSIS — N20.1 URETERAL CALCULUS: ICD-10-CM

## 2017-12-07 DIAGNOSIS — N20.0 RENAL CALCULUS: ICD-10-CM

## 2017-12-07 LAB
-: ABNORMAL
AMORPHOUS: ABNORMAL
BACTERIA: ABNORMAL
BILIRUBIN URINE: NEGATIVE
CASTS UA: ABNORMAL /LPF
COLOR: YELLOW
COMMENT UA: ABNORMAL
CRYSTALS, UA: ABNORMAL /HPF
EPITHELIAL CELLS UA: ABNORMAL /HPF (ref 0–5)
GLUCOSE URINE: NEGATIVE
KETONES, URINE: NEGATIVE
LEUKOCYTE ESTERASE, URINE: NEGATIVE
MUCUS: ABNORMAL
NITRITE, URINE: NEGATIVE
OTHER OBSERVATIONS UA: ABNORMAL
PH UA: 5.5 (ref 5–9)
PROTEIN UA: NEGATIVE
RBC UA: ABNORMAL /HPF (ref 0–2)
RENAL EPITHELIAL, UA: ABNORMAL /HPF
SPECIFIC GRAVITY UA: 1.02 (ref 1.01–1.02)
TRICHOMONAS: ABNORMAL
TURBIDITY: CLEAR
URINE HGB: ABNORMAL
UROBILINOGEN, URINE: NORMAL
WBC UA: ABNORMAL /HPF (ref 0–5)
YEAST: ABNORMAL

## 2017-12-07 PROCEDURE — 87086 URINE CULTURE/COLONY COUNT: CPT

## 2017-12-07 PROCEDURE — 87077 CULTURE AEROBIC IDENTIFY: CPT

## 2017-12-07 PROCEDURE — 87186 SC STD MICRODIL/AGAR DIL: CPT

## 2017-12-07 PROCEDURE — 81001 URINALYSIS AUTO W/SCOPE: CPT

## 2017-12-07 PROCEDURE — 87184 SC STD DISK METHOD PER PLATE: CPT

## 2017-12-11 ENCOUNTER — TELEPHONE (OUTPATIENT)
Dept: UROLOGY | Age: 77
End: 2017-12-11

## 2017-12-11 LAB
CULTURE: ABNORMAL
CULTURE: ABNORMAL
Lab: ABNORMAL
Lab: ABNORMAL
ORGANISM: ABNORMAL
ORGANISM: ABNORMAL
SPECIMEN DESCRIPTION: ABNORMAL
SPECIMEN DESCRIPTION: ABNORMAL
STATUS: ABNORMAL

## 2017-12-11 NOTE — TELEPHONE ENCOUNTER
Called patient and informed him that his urine culture results require him receiving IV antibiotics. Informed patient that once arranged patient will be notified.

## 2017-12-11 NOTE — TELEPHONE ENCOUNTER
Patient will need outpatient infusion for IV antibiotics. He will need zosyn. Can they do a saline lock for saline or do they prefer a PICC line?

## 2017-12-12 ENCOUNTER — OFFICE VISIT (OUTPATIENT)
Dept: UROLOGY | Age: 77
End: 2017-12-12
Payer: MEDICARE

## 2017-12-12 VITALS
HEIGHT: 70 IN | SYSTOLIC BLOOD PRESSURE: 102 MMHG | TEMPERATURE: 96.4 F | WEIGHT: 209 LBS | DIASTOLIC BLOOD PRESSURE: 64 MMHG | BODY MASS INDEX: 29.92 KG/M2

## 2017-12-12 DIAGNOSIS — N30.00 ACUTE CYSTITIS WITHOUT HEMATURIA: Primary | ICD-10-CM

## 2017-12-12 DIAGNOSIS — N20.0 RENAL CALCULUS: ICD-10-CM

## 2017-12-12 PROCEDURE — G8427 DOCREV CUR MEDS BY ELIG CLIN: HCPCS | Performed by: NURSE PRACTITIONER

## 2017-12-12 PROCEDURE — G8417 CALC BMI ABV UP PARAM F/U: HCPCS | Performed by: NURSE PRACTITIONER

## 2017-12-12 PROCEDURE — 4040F PNEUMOC VAC/ADMIN/RCVD: CPT | Performed by: NURSE PRACTITIONER

## 2017-12-12 PROCEDURE — G8484 FLU IMMUNIZE NO ADMIN: HCPCS | Performed by: NURSE PRACTITIONER

## 2017-12-12 PROCEDURE — 3017F COLORECTAL CA SCREEN DOC REV: CPT | Performed by: NURSE PRACTITIONER

## 2017-12-12 PROCEDURE — 99214 OFFICE O/P EST MOD 30 MIN: CPT | Performed by: NURSE PRACTITIONER

## 2017-12-12 PROCEDURE — 1123F ACP DISCUSS/DSCN MKR DOCD: CPT | Performed by: NURSE PRACTITIONER

## 2017-12-12 PROCEDURE — 1036F TOBACCO NON-USER: CPT | Performed by: NURSE PRACTITIONER

## 2017-12-19 ASSESSMENT — ENCOUNTER SYMPTOMS
CONSTIPATION: 0
WHEEZING: 0
EYE PAIN: 0
BACK PAIN: 0
EYE REDNESS: 0
SHORTNESS OF BREATH: 0
COLOR CHANGE: 0
VOMITING: 0
COUGH: 0
ABDOMINAL PAIN: 0
NAUSEA: 0

## 2017-12-19 NOTE — PROGRESS NOTES
Subjective:      Patient ID: Mariely Yeager is a 78 y.o. male. HPI  Patient is a 78 y.o. male in no acute distress and alert and oriented to person, place and time. History  Greenlight PVP Jan 2015.      PVR remained elevated post-op but was improving: Feb 839 mL, March 600 mL, April 505 mL, July 452 mL, Oct 252 mL. Flomax dc'd in July 2015. At annual f/u Oct 2016 PVR back up, >850 mL. Pt does not feel full or uncomfortable. He did self cath previously. He does report intermittent urine stream about half the time, mild straining. Offered pt option of resuming Flomax or resuming self cath qhs. Pt prefered to start self cath. Pt reported gross hematuria and some difficulty with cathing. Cysto showed small caliber BNC.      S/p Greenlight PVP and TUIBNC with Greenlight Laser on 1/31/17.      Pt has been cathing nightly since (volumes range from 3-28 oz).      Right HLL 11/1/17    Currently  Here today to discuss recent urine results and planned left ESWL. Patient recently had a ureteral stent removed on 12/4/17. At this time we did check a urine because we are planning on proceeding with left ESWL. He had absolutely no urinary symptoms at this time. Patient does self caths once per night. The urine culture was positive for pseudomonas aeruginosa [id number]. The only culture specific antibiotic options to use were Zosyn, tobramycin, and gentamicin. Patient does not want to proceed with ESWL until after the holidays. Today patient denies any dysuria, gross hematuria, frequency, urgency, flank or abdominal pain, fevers, nausea or vomiting.   Past Medical History:   Diagnosis Date    Acute renal failure superimposed on stage 3 chronic kidney disease ([address] 3/1/2017    Arthritis     Diabetes mellitus ([address]     Hyperlipidemia     Hypertension     Kidney stone      Past Surgical History:   Procedure Laterality Date    CATARACT REMOVAL WITH IMPLANT Right 8/26/2013    CYSTOSCOPY INSERTION / REMOVAL STENT / STONE Right 11/1/2017    CYSTOSCOPY STENT INSERTION performed by Bernard Whalen MD at [address] [place] [address] / [address] [facility] [address] / Ema Romeo N/A 11/3/2017    CYSTOSCOPY STENT INSERTION performed by Bernard Whalen MD at [facility]  11/03/2017    with stent placement on right side. Changed schulz catheter    HERNIA REPAIR      umbilical 3745    JOINT REPLACEMENT      right knee march 22, 2012    JOINT REPLACEMENT Right junu 2014 partial knee    JOINT REPLACEMENT Right 2014    complete removal    KNEE SURGERY Right     x 2    LITHOTRIPSY Right 11/01/2017    with stent placement - Dr. Omid Wilkerson Right 11/1/2017    CYSTOSCOPY URETEROSCOPY LASER-WITH HLL performed by Bernard Whalen MD at [facility] [facility] TUR  01/2017    Greenlight PVP and [facility]    TURP  01/2015    Greenlight PVP     No family history on file. Current Outpatient Prescriptions on File Prior to Visit   Medication Sig Dispense Refill    VENTOLIN  (90 Base) MCG/ACT inhaler       metFORMIN (GLUCOPHAGE) 850 MG tablet Take 850 mg by mouth 2 times daily (with meals)       Fish Oil-Cholecalciferol (FISH OIL + D3 PO) Take 1 capsule by mouth daily       Pediatric Multivitamins-Fl (MULTI VIT/FL PO) Take 1 tablet by mouth daily Daily       Polyethylene Glycol 3350 (MIRALAX PO) Take 1 Dose by mouth daily as needed Prn       lisinopril (PRINIVIL;ZESTRIL) 10 MG tablet Take 10 mg by mouth daily.  aspirin 81 MG tablet Take 81 mg by mouth daily.  amoxicillin-clavulanate (AUGMENTIN) 875-125 MG per tablet       tamsulosin (FLOMAX) 0.4 MG capsule Take 1 capsule by mouth every evening 30 capsule 0    loratadine (CLARITIN) 10 MG tablet   Take 10 mg by mouth daily prn       No current facility-administered medications on file prior to visit.         Outpatient Encounter Prescriptions as of 12/12/2017   Medication Sig Dispense Refill    VENTOLIN  (90 Base) MCG/ACT inhaler  metFORMIN (GLUCOPHAGE) 850 MG tablet Take 850 mg by mouth 2 times daily (with meals)       Fish Oil-Cholecalciferol (FISH OIL + D3 PO) Take 1 capsule by mouth daily       Pediatric Multivitamins-Fl (MULTI VIT/FL PO) Take 1 tablet by mouth daily Daily       Polyethylene Glycol 3350 (MIRALAX PO) Take 1 Dose by mouth daily as needed Prn       lisinopril (PRINIVIL;ZESTRIL) 10 MG tablet Take 10 mg by mouth daily.  aspirin 81 MG tablet Take 81 mg by mouth daily.  amoxicillin-clavulanate (AUGMENTIN) 875-125 MG per tablet       tamsulosin (FLOMAX) 0.4 MG capsule Take 1 capsule by mouth every evening 30 capsule 0    loratadine (CLARITIN) 10 MG tablet   Take 10 mg by mouth daily prn       No facility-administered encounter medications on file as of 12/12/2017. Review of patient's allergies indicates no known allergies. History   Smoking Status    Former Smoker    Packs/day: 0.50    Years: 3.00   Smokeless Tobacco    Never Used     Comment: quit 50 years ago       History   Alcohol Use No       Vitals:    12/12/17 1349   BP: 102/64   Temp: 96.4 °F (35.8 °C)       Constitutional: Patient in no acute distress; Neuro: alert and oriented to person place and time. Psych: Mood and affect normal.  Skin: Normal  Lungs: Respiratory effort normal  Cardiovascular:  Normal peripheral pulses  Abdomen: Soft, non-tender, non-distended with no CVA, flank pain, hepatosplenomegaly or hernia. Kidneys normal.  Bladder non-tender and not distended. Lymphatics: no palpable lymphadenopathy  Penis normal  Urethral meatus normal  Scrotal exam normal  Testicles normal bilaterally      No results found for: PSA  Lab Results   Component Value Date    BUN 24 (H) 11/24/2017     Lab Results   Component Value Date    CREATININE 1.43 (H) 11/24/2017       No results found for this visit on 12/12/17. Review of Systems   Constitutional: Negative for appetite change, chills and fever.    Eyes: Negative for pain, redness and

## 2017-12-22 ENCOUNTER — HOSPITAL ENCOUNTER (OUTPATIENT)
Age: 77
Discharge: HOME | End: 2017-12-22
Payer: MEDICARE

## 2017-12-22 ENCOUNTER — TELEPHONE (OUTPATIENT)
Dept: UROLOGY | Age: 77
End: 2017-12-22

## 2017-12-22 DIAGNOSIS — N30.00 ACUTE CYSTITIS WITHOUT HEMATURIA: Primary | ICD-10-CM

## 2017-12-22 DIAGNOSIS — N30.00 ACUTE CYSTITIS WITHOUT HEMATURIA: ICD-10-CM

## 2017-12-22 LAB
-: ABNORMAL
AMORPHOUS: ABNORMAL
BACTERIA: ABNORMAL
BILIRUBIN URINE: NEGATIVE
CASTS UA: ABNORMAL /LPF
COLOR: YELLOW
COMMENT UA: ABNORMAL
CRYSTALS, UA: ABNORMAL /HPF
EPITHELIAL CELLS UA: ABNORMAL /HPF (ref 0–5)
GLUCOSE URINE: NEGATIVE
KETONES, URINE: NEGATIVE
LEUKOCYTE ESTERASE, URINE: ABNORMAL
MUCUS: ABNORMAL
NITRITE, URINE: NEGATIVE
OTHER OBSERVATIONS UA: ABNORMAL
PH UA: 6 (ref 5–9)
PROTEIN UA: ABNORMAL
RBC UA: ABNORMAL /HPF (ref 0–2)
RENAL EPITHELIAL, UA: ABNORMAL /HPF
SPECIFIC GRAVITY UA: 1.01 (ref 1.01–1.02)
TRICHOMONAS: ABNORMAL
TURBIDITY: ABNORMAL
URINE HGB: ABNORMAL
UROBILINOGEN, URINE: NORMAL
WBC UA: ABNORMAL /HPF (ref 0–5)
YEAST: ABNORMAL

## 2017-12-22 PROCEDURE — 87077 CULTURE AEROBIC IDENTIFY: CPT

## 2017-12-22 PROCEDURE — 81001 URINALYSIS AUTO W/SCOPE: CPT

## 2017-12-22 PROCEDURE — 87184 SC STD DISK METHOD PER PLATE: CPT

## 2017-12-22 PROCEDURE — 87086 URINE CULTURE/COLONY COUNT: CPT

## 2017-12-22 PROCEDURE — 87186 SC STD MICRODIL/AGAR DIL: CPT

## 2017-12-22 PROCEDURE — 87088 URINE BACTERIA CULTURE: CPT

## 2017-12-22 RX ORDER — GRANULES FOR ORAL 3 G/1
3 POWDER ORAL
Qty: 1 EACH | Refills: 2 | Status: SHIPPED | OUTPATIENT
Start: 2017-12-22 | End: 2018-01-02 | Stop reason: ALTCHOICE

## 2017-12-22 NOTE — TELEPHONE ENCOUNTER
Drop off a urine for UA C&S now. I sent in past for myosin. This should work for the infection that was in your urine prior. This does take a prior authorization for your insurance. This antibiotic is an oral drink. You take this once per day and repeat dose every 3 days. If you develop any fevers, nausea, vomiting or worsening urinary symptoms go to the ER. He needs to start the antibiotics today. For prior authorization:   the only antibiotics that worked according to a urine culture are IV Zosyn (which is administered every 6 hours), tobramycin, and gentamicin. Patient does have stage III chronic kidney disease so IV tobramycin and oriented gentamicin are very poor choices for patient.

## 2017-12-22 NOTE — TELEPHONE ENCOUNTER
Patient made aware of prior authorization approval. [facility]

## 2017-12-26 ENCOUNTER — TELEPHONE (OUTPATIENT)
Dept: UROLOGY | Age: 77
End: 2017-12-26

## 2017-12-26 LAB
CULTURE: ABNORMAL
Lab: ABNORMAL
ORGANISM: ABNORMAL
SPECIMEN DESCRIPTION: ABNORMAL
SPECIMEN DESCRIPTION: ABNORMAL
STATUS: ABNORMAL

## 2017-12-26 RX ORDER — CIPROFLOXACIN 500 MG/1
500 TABLET, FILM COATED ORAL 2 TIMES DAILY
Qty: 20 TABLET | Refills: 0 | Status: SHIPPED | OUTPATIENT
Start: 2017-12-26 | End: 2018-01-05

## 2017-12-27 NOTE — TELEPHONE ENCOUNTER
Called patient and informed him of urine culture results. Advised to finish Fosfomycin and to take Cipro that was prescribed. Informed to take with food and to eat yogurt daily while on antibiotic.

## 2018-01-05 ENCOUNTER — TELEPHONE (OUTPATIENT)
Dept: UROLOGY | Age: 78
End: 2018-01-05

## 2018-01-05 DIAGNOSIS — N30.01 ACUTE CYSTITIS WITH HEMATURIA: Primary | ICD-10-CM

## 2018-01-05 NOTE — TELEPHONE ENCOUNTER
Patient asking if he should have a repeat UAC/S prior to his surgery (ESWL 1/16/17)? He was recently treated with Fosfomycin 12/27/17.

## 2018-01-09 ENCOUNTER — HOSPITAL ENCOUNTER (OUTPATIENT)
Age: 78
Discharge: HOME | End: 2018-01-09
Payer: MEDICARE

## 2018-01-09 DIAGNOSIS — N30.01 ACUTE CYSTITIS WITH HEMATURIA: ICD-10-CM

## 2018-01-09 LAB
-: ABNORMAL
AMORPHOUS: ABNORMAL
BACTERIA: ABNORMAL
BILIRUBIN URINE: NEGATIVE
CASTS UA: ABNORMAL /LPF
COLOR: YELLOW
COMMENT UA: ABNORMAL
CRYSTALS, UA: ABNORMAL /HPF
EPITHELIAL CELLS UA: ABNORMAL /HPF (ref 0–5)
GLUCOSE URINE: NEGATIVE
KETONES, URINE: NEGATIVE
LEUKOCYTE ESTERASE, URINE: NEGATIVE
MUCUS: ABNORMAL
NITRITE, URINE: NEGATIVE
OTHER OBSERVATIONS UA: ABNORMAL
PH UA: 6 (ref 5–9)
PROTEIN UA: NEGATIVE
RBC UA: ABNORMAL /HPF (ref 0–2)
RENAL EPITHELIAL, UA: ABNORMAL /HPF
SPECIFIC GRAVITY UA: 1.01 (ref 1.01–1.02)
TRICHOMONAS: ABNORMAL
TURBIDITY: CLEAR
URINE HGB: ABNORMAL
UROBILINOGEN, URINE: NORMAL
WBC UA: ABNORMAL /HPF (ref 0–5)
YEAST: ABNORMAL

## 2018-01-09 PROCEDURE — 87077 CULTURE AEROBIC IDENTIFY: CPT

## 2018-01-09 PROCEDURE — 87186 SC STD MICRODIL/AGAR DIL: CPT

## 2018-01-09 PROCEDURE — 87184 SC STD DISK METHOD PER PLATE: CPT

## 2018-01-09 PROCEDURE — 87086 URINE CULTURE/COLONY COUNT: CPT

## 2018-01-09 PROCEDURE — 81001 URINALYSIS AUTO W/SCOPE: CPT

## 2018-01-13 LAB
CULTURE: ABNORMAL
Lab: ABNORMAL
Lab: ABNORMAL
ORGANISM: ABNORMAL
SPECIMEN DESCRIPTION: ABNORMAL
SPECIMEN DESCRIPTION: ABNORMAL
STATUS: ABNORMAL

## 2018-01-15 ENCOUNTER — TELEPHONE (OUTPATIENT)
Dept: UROLOGY | Age: 78
End: 2018-01-15

## 2018-01-15 ENCOUNTER — ANESTHESIA EVENT (OUTPATIENT)
Dept: OPERATING ROOM | Age: 78
End: 2018-01-15
Payer: MEDICARE

## 2018-01-15 DIAGNOSIS — N30.00 ACUTE CYSTITIS WITHOUT HEMATURIA: Primary | ICD-10-CM

## 2018-01-15 RX ORDER — GRANULES FOR ORAL 3 G/1
3 POWDER ORAL
Qty: 2 EACH | Refills: 0 | Status: SHIPPED | OUTPATIENT
Start: 2018-01-15 | End: 2019-04-24 | Stop reason: ALTCHOICE

## 2018-01-15 NOTE — TELEPHONE ENCOUNTER
UACS is positive for infection, but it is the same bacteria you have had so we believe it is due to the stones we are treating tomorrow. Dr. Durga Christensen and I reviewed your case and reviewed the culture results. We would like for you to take one dose of fosfomycin today. He will give you IV antibiotics tomorrow, then you will repeat the fosfomycin dose in three days. We believe this will prevent hospitalization like last time.

## 2018-01-15 NOTE — H&P
with stent placement - Dr. Melinda Costa Right 11/1/2017    CYSTOSCOPY URETEROSCOPY LASER-WITH HLL performed by Deion Tam MD at [address] [facility] TURP  01/2017    Greenlight PVP and TUIBNC    TURP  01/2015    Greenlight PVP       Medications Prior to Admission:    Prior to Admission medications    Medication Sig Start Date End Date Taking? Authorizing Provider   fosfomycin tromethamine (MONUROL) 3 g PACK Take 1 packet by mouth every 3 days Take one dose now, then repeat in three days 1/15/18   CHILANGO Mckeon   VENTOLIN  (96 Base) MCG/ACT inhaler  11/22/17   Historical Provider, MD   metFORMIN (GLUCOPHAGE) 850 MG tablet Take 850 mg by mouth 2 times daily (with meals)  9/12/16   Historical Provider, MD   Fish Oil-Cholecalciferol (FISH OIL + D3 PO) Take 1 capsule by mouth daily     Historical Provider, MD   Pediatric Multivitamins-Fl (MULTI VIT/FL PO) Take 1 tablet by mouth daily Daily     Historical Provider, MD   Polyethylene Glycol 3350 (MIRALAX PO) Take 1 Dose by mouth daily as needed Prn     Historical Provider, MD   lisinopril (PRINIVIL;ZESTRIL) 10 MG tablet Take 10 mg by mouth daily. Historical Provider, MD   aspirin 81 MG tablet Take 81 mg by mouth daily. Historical Provider, MD   loratadine (CLARITIN) 10 MG tablet   Take 10 mg by mouth daily prn    Historical Provider, MD       Allergies:  Review of patient's allergies indicates no known allergies. Social History:    Social History     Social History    Marital status:      Spouse name: N/A    Number of children: N/A    Years of education: N/A     Occupational History    Not on file.      Social History Main Topics    Smoking status: Former Smoker     Packs/day: 0.50     Years: 3.00    Smokeless tobacco: Never Used      Comment: quit 50 years ago    Alcohol use No    Drug use: No    Sexual activity: Not on file     Other Topics Concern    Not on file     Social History Narrative    No narrative on file       Family History:  History reviewed. No pertinent family history. REVIEW OF SYSTEMS:  All systems reviewed and negative except for that already noted in the HPI. Physical Exam:      No data found. Constitutional: Patient in no acute distress; Neuro: alert and oriented to person place and time. Psych: Mood and affect normal.  Skin: Normal  Lungs: Respiratory effort normal  Cardiovascular:  Normal peripheral pulses  Abdomen: Soft, non-tender, non-distended with no CVA, flank pain, hepatosplenomegaly or hernia. Kidneys normal.  Bladder non-tender and not distended. Lymphatics: no palpable lymphadenopathy  Penis normal and circumcised  Urethral meatus normal  Scrotal exam normal  Testicles normal bilaterally  Epididymis normal bilaterally  No evidence of inguinal hernia  Anus and perineum normal  Normal rectal tone with no masses  Prostate soft, non-tender to palpation. No palpable nodules. Estimated 40 grams. Seminal vesicles not palpable. LABS:   No results for input(s): WBC, HGB, HCT, MCV, PLT in the last 72 hours. No results for input(s): NA, K, CL, CO2, PHOS, BUN, CREATININE in the last 72 hours. Invalid input(s): CA  No results found for: PSA    Additional Lab/culture results:    Urinalysis: No results for input(s): COLORU, PHUR, LABCAST, WBCUA, RBCUA, MUCUS, TRICHOMONAS, YEAST, BACTERIA, CLARITYU, SPECGRAV, LEUKOCYTESUR, UROBILINOGEN, Tessa Laura in the last 72 hours.     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

## 2018-01-16 ENCOUNTER — HOSPITAL ENCOUNTER (OUTPATIENT)
Age: 78
Setting detail: OUTPATIENT SURGERY
Discharge: HOME | End: 2018-01-16
Attending: UROLOGY | Admitting: UROLOGY
Payer: MEDICARE

## 2018-01-16 ENCOUNTER — ANESTHESIA (OUTPATIENT)
Dept: OPERATING ROOM | Age: 78
End: 2018-01-16
Payer: MEDICARE

## 2018-01-16 VITALS
RESPIRATION RATE: 17 BRPM | DIASTOLIC BLOOD PRESSURE: 70 MMHG | TEMPERATURE: 96.8 F | OXYGEN SATURATION: 100 % | SYSTOLIC BLOOD PRESSURE: 112 MMHG

## 2018-01-16 VITALS
HEART RATE: 93 BPM | BODY MASS INDEX: 30.06 KG/M2 | TEMPERATURE: 97 F | HEIGHT: 70 IN | WEIGHT: 210 LBS | OXYGEN SATURATION: 98 % | SYSTOLIC BLOOD PRESSURE: 130 MMHG | DIASTOLIC BLOOD PRESSURE: 66 MMHG | RESPIRATION RATE: 16 BRPM

## 2018-01-16 PROCEDURE — 7100000010 HC PHASE II RECOVERY - FIRST 15 MIN: Performed by: UROLOGY

## 2018-01-16 PROCEDURE — 3700000000 HC ANESTHESIA ATTENDED CARE: Performed by: UROLOGY

## 2018-01-16 PROCEDURE — 6360000002 HC RX W HCPCS: Performed by: NURSE ANESTHETIST, CERTIFIED REGISTERED

## 2018-01-16 PROCEDURE — 2580000003 HC RX 258: Performed by: UROLOGY

## 2018-01-16 PROCEDURE — 7100000000 HC PACU RECOVERY - FIRST 15 MIN: Performed by: UROLOGY

## 2018-01-16 PROCEDURE — 3700000001 HC ADD 15 MINUTES (ANESTHESIA): Performed by: UROLOGY

## 2018-01-16 PROCEDURE — 7100000001 HC PACU RECOVERY - ADDTL 15 MIN: Performed by: UROLOGY

## 2018-01-16 PROCEDURE — 2500000003 HC RX 250 WO HCPCS: Performed by: NURSE ANESTHETIST, CERTIFIED REGISTERED

## 2018-01-16 PROCEDURE — 3600000014 HC SURGERY LEVEL 4 ADDTL 15MIN: Performed by: UROLOGY

## 2018-01-16 PROCEDURE — 6360000002 HC RX W HCPCS: Performed by: UROLOGY

## 2018-01-16 PROCEDURE — 7100000011 HC PHASE II RECOVERY - ADDTL 15 MIN: Performed by: UROLOGY

## 2018-01-16 PROCEDURE — 3600000004 HC SURGERY LEVEL 4 BASE: Performed by: UROLOGY

## 2018-01-16 RX ORDER — EPHEDRINE SULFATE 50 MG/ML
INJECTION, SOLUTION INTRAVENOUS PRN
Status: DISCONTINUED | OUTPATIENT
Start: 2018-01-16 | End: 2018-01-16 | Stop reason: SDUPTHER

## 2018-01-16 RX ORDER — DEXAMETHASONE SODIUM PHOSPHATE 4 MG/ML
INJECTION, SOLUTION INTRA-ARTICULAR; INTRALESIONAL; INTRAMUSCULAR; INTRAVENOUS; SOFT TISSUE PRN
Status: DISCONTINUED | OUTPATIENT
Start: 2018-01-16 | End: 2018-01-16 | Stop reason: SDUPTHER

## 2018-01-16 RX ORDER — LABETALOL HYDROCHLORIDE 5 MG/ML
5 INJECTION, SOLUTION INTRAVENOUS EVERY 10 MIN PRN
Status: DISCONTINUED | OUTPATIENT
Start: 2018-01-16 | End: 2018-01-16 | Stop reason: HOSPADM

## 2018-01-16 RX ORDER — PROPOFOL 10 MG/ML
INJECTION, EMULSION INTRAVENOUS PRN
Status: DISCONTINUED | OUTPATIENT
Start: 2018-01-16 | End: 2018-01-16 | Stop reason: SDUPTHER

## 2018-01-16 RX ORDER — HYDRALAZINE HYDROCHLORIDE 20 MG/ML
5 INJECTION INTRAMUSCULAR; INTRAVENOUS EVERY 10 MIN PRN
Status: DISCONTINUED | OUTPATIENT
Start: 2018-01-16 | End: 2018-01-16 | Stop reason: HOSPADM

## 2018-01-16 RX ORDER — ENALAPRILAT 2.5 MG/2ML
1.25 INJECTION INTRAVENOUS
Status: DISCONTINUED | OUTPATIENT
Start: 2018-01-16 | End: 2018-01-16 | Stop reason: HOSPADM

## 2018-01-16 RX ORDER — SUCCINYLCHOLINE CHLORIDE 20 MG/ML
INJECTION INTRAMUSCULAR; INTRAVENOUS PRN
Status: DISCONTINUED | OUTPATIENT
Start: 2018-01-16 | End: 2018-01-16 | Stop reason: SDUPTHER

## 2018-01-16 RX ORDER — MEPERIDINE HYDROCHLORIDE 50 MG/ML
12.5 INJECTION INTRAMUSCULAR; INTRAVENOUS; SUBCUTANEOUS EVERY 5 MIN PRN
Status: DISCONTINUED | OUTPATIENT
Start: 2018-01-16 | End: 2018-01-16 | Stop reason: HOSPADM

## 2018-01-16 RX ORDER — FENTANYL CITRATE 50 UG/ML
25 INJECTION, SOLUTION INTRAMUSCULAR; INTRAVENOUS EVERY 5 MIN PRN
Status: DISCONTINUED | OUTPATIENT
Start: 2018-01-16 | End: 2018-01-16 | Stop reason: HOSPADM

## 2018-01-16 RX ORDER — SODIUM CHLORIDE, SODIUM LACTATE, POTASSIUM CHLORIDE, CALCIUM CHLORIDE 600; 310; 30; 20 MG/100ML; MG/100ML; MG/100ML; MG/100ML
INJECTION, SOLUTION INTRAVENOUS CONTINUOUS
Status: DISCONTINUED | OUTPATIENT
Start: 2018-01-16 | End: 2018-01-16 | Stop reason: HOSPADM

## 2018-01-16 RX ORDER — PROMETHAZINE HYDROCHLORIDE 25 MG/ML
6.25 INJECTION, SOLUTION INTRAMUSCULAR; INTRAVENOUS
Status: DISCONTINUED | OUTPATIENT
Start: 2018-01-16 | End: 2018-01-16 | Stop reason: HOSPADM

## 2018-01-16 RX ORDER — FENTANYL CITRATE 50 UG/ML
INJECTION, SOLUTION INTRAMUSCULAR; INTRAVENOUS PRN
Status: DISCONTINUED | OUTPATIENT
Start: 2018-01-16 | End: 2018-01-16 | Stop reason: SDUPTHER

## 2018-01-16 RX ORDER — OXYCODONE HYDROCHLORIDE 5 MG/1
5 TABLET ORAL
Status: DISCONTINUED | OUTPATIENT
Start: 2018-01-16 | End: 2018-01-16 | Stop reason: HOSPADM

## 2018-01-16 RX ORDER — KETOROLAC TROMETHAMINE 30 MG/ML
INJECTION, SOLUTION INTRAMUSCULAR; INTRAVENOUS PRN
Status: DISCONTINUED | OUTPATIENT
Start: 2018-01-16 | End: 2018-01-16 | Stop reason: SDUPTHER

## 2018-01-16 RX ORDER — ONDANSETRON 2 MG/ML
INJECTION INTRAMUSCULAR; INTRAVENOUS PRN
Status: DISCONTINUED | OUTPATIENT
Start: 2018-01-16 | End: 2018-01-16 | Stop reason: SDUPTHER

## 2018-01-16 RX ORDER — FENTANYL CITRATE 50 UG/ML
50 INJECTION, SOLUTION INTRAMUSCULAR; INTRAVENOUS EVERY 5 MIN PRN
Status: DISCONTINUED | OUTPATIENT
Start: 2018-01-16 | End: 2018-01-16 | Stop reason: HOSPADM

## 2018-01-16 RX ADMIN — SUCCINYLCHOLINE CHLORIDE 100 MG: 20 INJECTION, SOLUTION INTRAMUSCULAR; INTRAVENOUS at 08:33

## 2018-01-16 RX ADMIN — DEXTROSE MONOHYDRATE 3.38 G: 50 INJECTION, SOLUTION INTRAVENOUS at 08:19

## 2018-01-16 RX ADMIN — PROPOFOL 50 MG: 10 INJECTION, EMULSION INTRAVENOUS at 08:33

## 2018-01-16 RX ADMIN — EPHEDRINE SULFATE 15 MG: 50 INJECTION INTRAMUSCULAR; INTRAVENOUS; SUBCUTANEOUS at 09:10

## 2018-01-16 RX ADMIN — EPHEDRINE SULFATE 15 MG: 50 INJECTION INTRAMUSCULAR; INTRAVENOUS; SUBCUTANEOUS at 08:55

## 2018-01-16 RX ADMIN — DEXAMETHASONE SODIUM PHOSPHATE 8 MG: 4 INJECTION, SOLUTION INTRAMUSCULAR; INTRAVENOUS at 08:43

## 2018-01-16 RX ADMIN — PROPOFOL 50 MG: 10 INJECTION, EMULSION INTRAVENOUS at 09:15

## 2018-01-16 RX ADMIN — PROPOFOL 30 MG: 10 INJECTION, EMULSION INTRAVENOUS at 09:14

## 2018-01-16 RX ADMIN — KETOROLAC TROMETHAMINE 15 MG: 30 INJECTION, SOLUTION INTRAMUSCULAR; INTRAVENOUS at 08:43

## 2018-01-16 RX ADMIN — SODIUM CHLORIDE, POTASSIUM CHLORIDE, SODIUM LACTATE AND CALCIUM CHLORIDE: 600; 310; 30; 20 INJECTION, SOLUTION INTRAVENOUS at 07:10

## 2018-01-16 RX ADMIN — FENTANYL CITRATE 100 MCG: 50 INJECTION INTRAMUSCULAR; INTRAVENOUS at 08:40

## 2018-01-16 RX ADMIN — PROPOFOL 150 MG: 10 INJECTION, EMULSION INTRAVENOUS at 08:29

## 2018-01-16 RX ADMIN — ONDANSETRON 4 MG: 2 INJECTION INTRAMUSCULAR; INTRAVENOUS at 08:43

## 2018-01-16 ASSESSMENT — PAIN SCALES - GENERAL
PAINLEVEL_OUTOF10: 0

## 2018-01-16 ASSESSMENT — PULMONARY FUNCTION TESTS
PIF_VALUE: 14
PIF_VALUE: 13
PIF_VALUE: 1
PIF_VALUE: 13
PIF_VALUE: 1
PIF_VALUE: 14
PIF_VALUE: 12
PIF_VALUE: 1
PIF_VALUE: 1
PIF_VALUE: 13
PIF_VALUE: 14
PIF_VALUE: 11
PIF_VALUE: 20
PIF_VALUE: 15
PIF_VALUE: 14
PIF_VALUE: 15
PIF_VALUE: 13
PIF_VALUE: 20
PIF_VALUE: 22
PIF_VALUE: 13
PIF_VALUE: 19
PIF_VALUE: 13
PIF_VALUE: 4
PIF_VALUE: 14
PIF_VALUE: 0
PIF_VALUE: 20
PIF_VALUE: 24
PIF_VALUE: 18
PIF_VALUE: 14
PIF_VALUE: 20
PIF_VALUE: 3
PIF_VALUE: 23
PIF_VALUE: 14
PIF_VALUE: 21
PIF_VALUE: 1

## 2018-01-16 ASSESSMENT — LIFESTYLE VARIABLES: SMOKING_STATUS: 0

## 2018-01-16 NOTE — PROGRESS NOTES
Discharge Criteria    Inpatients must meet Criteria 1 through 7. All other patients are either YES or N/A. If a NO is chosen then Anesthesia or Surgeon must be notified. 1.  Minimum 30 minutes after last dose of sedative medication, minimum 120 minutes after last dose of reversal agent. Yes      2. Systolic BP stable within 20 mmHg for 30 minutes & systolic BP between 90 & 322 or within 10 mmHg of baseline. Yes      3. Pulse between 60 and 100 or within 10 bpm of baseline. Yes      4. Spontaneous respiratory rate >/= 10 per minute. Yes      5. SaO2 >/= 95 or  >/= baseline. Yes      6. Able to cough and swallow or return to baseline function. Yes      7. Alert and oriented or return to baseline mental status. Yes      8. Demonstrates controlled, coordinated movements, ambulates with steady gait, or return to baseline activity function. Yes      9. Minimal or no pain or nausea, or at a level tolerable and acceptable to patient. Yes      10. Takes and retains oral fluids as allowed. Yes      11. Procedural / perioperative site stable. Minimal or no bleeding. Yes          12. If GI endoscopy procedure, minimal or no abdominal distention or passing flatus. N/A      13. Written discharge instructions and emergency telephone number provided. Yes      14. Accompanied by a responsible adult. Yes      Adult patient discharged from facility without responsible person meets above criteria plus the following:   a) remains awake without stimulus for 30 minutes     b) oriented appropriate for age      c) all vital signs stable   d) no significant risk of losing protective reflexes      e) able to maintain pre-procedure mobility without assistance   f) no nausea or dizziness      g) transportation arrangements that do not require patient to operate motor Vehicle.      N/A

## 2018-01-16 NOTE — ANESTHESIA PRE PROCEDURE
11/24/2017       POC Tests: No results for input(s): POCGLU, POCNA, POCK, POCCL, POCBUN, POCHEMO, POCHCT in the last 72 hours. Coags:   Lab Results   Component Value Date    PROTIME 10.6 03/03/2017    INR 1.0 03/03/2017    APTT 31.5 03/03/2017       HCG (If Applicable): No results found for: PREGTESTUR, PREGSERUM, HCG, HCGQUANT     ABGs: No results found for: PHART, PO2ART, ARI2XVE, CGU6VPF, BEART, G4IPWYTL     Type & Screen (If Applicable):  No results found for: LABABO, [address]    Anesthesia Evaluation  Patient summary reviewed and Nursing notes reviewed no history of anesthetic complications:   Airway: Mallampati: II  TM distance: >3 FB     Mouth opening: > = 3 FB Dental: normal exam   (+) edentulous      Pulmonary:normal exam  breath sounds clear to auscultation      (-) not a current smoker          Patient did not smoke on day of surgery. Cardiovascular:  Exercise tolerance: good (>4 METS),   (+) hypertension:, hyperlipidemia      ECG reviewed                        Neuro/Psych:   Negative Neuro/Psych ROS              GI/Hepatic/Renal:   (+) renal disease (stage 3 chronic kidney disease): kidney stones,      (-) GERD       Endo/Other:    (+) Type II DM, well controlled, , : arthritis:., .                 Abdominal:           Vascular: negative vascular ROS. Anesthesia Plan      general     ASA 2       Induction: intravenous. MIPS: Postoperative opioids intended and Prophylactic antiemetics administered. Anesthetic plan and risks discussed with patient, spouse and child/children. Plan discussed with CRNA.                   Rasheeda Jay CRNA   1/16/2018

## 2018-01-16 NOTE — OP NOTE
Inderjit Chavez    D: 01/16/2018 9:42:34       T: 01/16/2018 17:07:29     [id number]  Job#: [id number]     Doc#: [id number]    CC:

## 2018-01-18 ENCOUNTER — OFFICE VISIT (OUTPATIENT)
Dept: UROLOGY | Age: 78
End: 2018-01-18

## 2018-01-18 VITALS
WEIGHT: 223 LBS | SYSTOLIC BLOOD PRESSURE: 132 MMHG | DIASTOLIC BLOOD PRESSURE: 84 MMHG | HEIGHT: 70 IN | BODY MASS INDEX: 31.92 KG/M2

## 2018-01-18 DIAGNOSIS — N20.0 RENAL CALCULUS: Primary | ICD-10-CM

## 2018-01-18 DIAGNOSIS — K59.03 DRUG-INDUCED CONSTIPATION: ICD-10-CM

## 2018-01-18 DIAGNOSIS — Z98.890 POST-OPERATIVE STATE: ICD-10-CM

## 2018-01-18 PROCEDURE — 99024 POSTOP FOLLOW-UP VISIT: CPT | Performed by: NURSE PRACTITIONER

## 2018-01-18 RX ORDER — POLYETHYLENE GLYCOL 3350 17 G/17G
17 POWDER, FOR SOLUTION ORAL 2 TIMES DAILY
Qty: 1020 G | Refills: 5 | Status: SHIPPED | OUTPATIENT
Start: 2018-01-18 | End: 2018-02-17

## 2018-01-18 RX ORDER — DOCUSATE SODIUM 100 MG/1
100 CAPSULE, LIQUID FILLED ORAL 3 TIMES DAILY PRN
Qty: 60 CAPSULE | Refills: 1 | Status: SHIPPED | OUTPATIENT
Start: 2018-01-18 | End: 2022-05-23

## 2018-01-25 ENCOUNTER — OFFICE VISIT (OUTPATIENT)
Dept: UROLOGY | Age: 78
End: 2018-01-25

## 2018-01-25 VITALS
DIASTOLIC BLOOD PRESSURE: 62 MMHG | BODY MASS INDEX: 31.35 KG/M2 | SYSTOLIC BLOOD PRESSURE: 102 MMHG | HEIGHT: 70 IN | WEIGHT: 219 LBS

## 2018-01-25 DIAGNOSIS — Z98.890 POST-OPERATIVE STATE: ICD-10-CM

## 2018-01-25 DIAGNOSIS — N20.0 RENAL CALCULUS: Primary | ICD-10-CM

## 2018-01-25 PROCEDURE — 99024 POSTOP FOLLOW-UP VISIT: CPT | Performed by: NURSE PRACTITIONER

## 2018-04-23 ENCOUNTER — HOSPITAL ENCOUNTER (OUTPATIENT)
Age: 78
Discharge: HOME | End: 2018-04-25
Payer: MEDICARE

## 2018-04-23 ENCOUNTER — HOSPITAL ENCOUNTER (OUTPATIENT)
Dept: GENERAL RADIOLOGY | Age: 78
Discharge: HOME | End: 2018-04-25
Payer: MEDICARE

## 2018-04-23 DIAGNOSIS — N20.0 RENAL CALCULUS: ICD-10-CM

## 2018-04-23 PROCEDURE — 74018 RADEX ABDOMEN 1 VIEW: CPT

## 2018-04-25 ENCOUNTER — OFFICE VISIT (OUTPATIENT)
Dept: UROLOGY | Age: 78
End: 2018-04-25
Payer: MEDICARE

## 2018-04-25 VITALS
BODY MASS INDEX: 31.5 KG/M2 | SYSTOLIC BLOOD PRESSURE: 138 MMHG | WEIGHT: 220 LBS | DIASTOLIC BLOOD PRESSURE: 78 MMHG | HEIGHT: 70 IN

## 2018-04-25 DIAGNOSIS — R33.9 URINARY RETENTION: ICD-10-CM

## 2018-04-25 DIAGNOSIS — N20.0 RENAL CALCULUS: ICD-10-CM

## 2018-04-25 DIAGNOSIS — N32.0 BNC (BLADDER NECK CONTRACTURE): Primary | ICD-10-CM

## 2018-04-25 PROCEDURE — 1036F TOBACCO NON-USER: CPT | Performed by: UROLOGY

## 2018-04-25 PROCEDURE — 1123F ACP DISCUSS/DSCN MKR DOCD: CPT | Performed by: UROLOGY

## 2018-04-25 PROCEDURE — G8417 CALC BMI ABV UP PARAM F/U: HCPCS | Performed by: UROLOGY

## 2018-04-25 PROCEDURE — G8427 DOCREV CUR MEDS BY ELIG CLIN: HCPCS | Performed by: UROLOGY

## 2018-04-25 PROCEDURE — 99213 OFFICE O/P EST LOW 20 MIN: CPT | Performed by: UROLOGY

## 2018-04-25 PROCEDURE — 4040F PNEUMOC VAC/ADMIN/RCVD: CPT | Performed by: UROLOGY

## 2018-04-25 ASSESSMENT — ENCOUNTER SYMPTOMS
SHORTNESS OF BREATH: 0
VOMITING: 0
COLOR CHANGE: 0
EYE REDNESS: 0
NAUSEA: 0
WHEEZING: 0
COUGH: 0
ABDOMINAL PAIN: 0
BACK PAIN: 0
EYE PAIN: 0

## 2019-04-22 ENCOUNTER — HOSPITAL ENCOUNTER (OUTPATIENT)
Dept: GENERAL RADIOLOGY | Age: 79
Discharge: HOME | End: 2019-04-24
Payer: MEDICARE

## 2019-04-22 ENCOUNTER — HOSPITAL ENCOUNTER (OUTPATIENT)
Age: 79
Discharge: HOME | End: 2019-04-24
Payer: MEDICARE

## 2019-04-22 DIAGNOSIS — N20.0 RENAL CALCULUS: ICD-10-CM

## 2019-04-22 PROCEDURE — 74018 RADEX ABDOMEN 1 VIEW: CPT

## 2019-04-24 ENCOUNTER — OFFICE VISIT (OUTPATIENT)
Dept: UROLOGY | Age: 79
End: 2019-04-24
Payer: MEDICARE

## 2019-04-24 ENCOUNTER — TELEPHONE (OUTPATIENT)
Dept: UROLOGY | Age: 79
End: 2019-04-24

## 2019-04-24 VITALS — SYSTOLIC BLOOD PRESSURE: 144 MMHG | BODY MASS INDEX: 31.14 KG/M2 | DIASTOLIC BLOOD PRESSURE: 77 MMHG | WEIGHT: 217 LBS

## 2019-04-24 DIAGNOSIS — R33.9 URINARY RETENTION: Primary | ICD-10-CM

## 2019-04-24 DIAGNOSIS — N20.0 RENAL CALCULUS: Primary | ICD-10-CM

## 2019-04-24 DIAGNOSIS — R33.9 URINARY RETENTION: ICD-10-CM

## 2019-04-24 PROCEDURE — 1036F TOBACCO NON-USER: CPT | Performed by: NURSE PRACTITIONER

## 2019-04-24 PROCEDURE — 99213 OFFICE O/P EST LOW 20 MIN: CPT | Performed by: NURSE PRACTITIONER

## 2019-04-24 PROCEDURE — G8427 DOCREV CUR MEDS BY ELIG CLIN: HCPCS | Performed by: NURSE PRACTITIONER

## 2019-04-24 PROCEDURE — 1123F ACP DISCUSS/DSCN MKR DOCD: CPT | Performed by: NURSE PRACTITIONER

## 2019-04-24 PROCEDURE — 4040F PNEUMOC VAC/ADMIN/RCVD: CPT | Performed by: NURSE PRACTITIONER

## 2019-04-24 PROCEDURE — G8417 CALC BMI ABV UP PARAM F/U: HCPCS | Performed by: NURSE PRACTITIONER

## 2019-04-24 ASSESSMENT — ENCOUNTER SYMPTOMS
NAUSEA: 0
ABDOMINAL PAIN: 0
WHEEZING: 0
VOMITING: 0
COUGH: 0
CONSTIPATION: 0
COLOR CHANGE: 0
EYE PAIN: 0
EYE REDNESS: 0
BACK PAIN: 0
SHORTNESS OF BREATH: 0

## 2019-04-24 NOTE — PROGRESS NOTES
HPI:    Patient is a 70 y.o. male in no acute distress. He is alert and oriented to person, place, and time. History  1/2015 Greenlight PVP      PVR remained elevated post-op, but was improving: Feb 839 mL, March 600 mL, April 505 mL, July 452 mL, Oct 252 mL.     7/2015 Flomax dc'd     10/2016 PVR back up, >850 mL. Does not feel full or uncomfortable. He did self cath previously. He does report intermittent urine stream about half the time, mild straining. Offered option of resuming Flomax or resuming self cath qhs. Pt prefered to start self cath.     1/2017 Gross hematuria and some difficulty with cathing. Cysto showed small caliber BNC.      1/2017 Greenlight PVP and TUIBNC      11/2017 Right HLL      1/2018 Left ESWL    Today   Here today for a one year follow-up for history of renal stones and urinary retention. Patient denies any episodes of gross hematuria, flank or abdominal pain. He reports he was treated for urinary tract infection by his primary care provider within the last year. I do not have these results available for review. He did have a KUB completed prior to stasis visit. This was independently reviewed and shows no evidence of  calcifications. Patient does continue to perform intermittent catheterization nightly. He denies any problems with self cathing. I do not have any recent renal labs available for review.     Past Medical History:   Diagnosis Date    Acute renal failure superimposed on stage 3 chronic kidney disease ([facility] [address] 3/1/2017    Arthritis     Diabetes mellitus ([facility] [address]     Hyperlipidemia     Hypertension     Kidney stone      Past Surgical History:   Procedure Laterality Date    CATARACT REMOVAL WITH IMPLANT Right 8/26/2013    CYSTOSCOPY INSERTION / REMOVAL STENT / STONE Right 11/1/2017    CYSTOSCOPY STENT INSERTION performed by Chris Campos MD at [facility] / [address] [place] [address] Rd / STONE N/A 11/3/2017    CYSTOSCOPY STENT INSERTION performed by Alicia Penn MD at [address]  11/03/2017    with stent placement on right side. Changed schulz catheter    HERNIA REPAIR      umbilical 6147    JOINT REPLACEMENT      right knee march 22, 2012    JOINT REPLACEMENT Right junu 2014 partial knee    JOINT REPLACEMENT Right 2014    complete removal    KNEE SURGERY Right     x 2    LITHOTRIPSY Right 11/01/2017    with stent placement - Dr. Domingo Leong LITHOTRIPSY Left 01/16/2018    PA CYSTO/URETERO/PYELOSCOPY W/LITHOTRIPSY Right 11/1/2017    CYSTOSCOPY URETEROSCOPY LASER-WITH HLL performed by Alicia Penn MD at [facility] [address] ESWL Left 1/16/2018    ESWL EXTRACORPEAL SHOCK WAVE LITHOTRIPSY performed by Alicia Penn MD at [address] [facility] TURP  01/2017    Greenlight PVP and TUIBNC    TURP  01/2015    Greenlight PVP     Outpatient Encounter Medications as of 4/24/2019   Medication Sig Dispense Refill    docusate sodium (COLACE) 100 MG capsule Take 1 capsule by mouth 3 times daily as needed for Constipation 60 capsule 1    metFORMIN (GLUCOPHAGE) 850 MG tablet Take 850 mg by mouth 2 times daily (with meals)       Pediatric Multivitamins-Fl (MULTI VIT/FL PO) Take 1 tablet by mouth daily Daily       lisinopril (PRINIVIL;ZESTRIL) 10 MG tablet Take 10 mg by mouth daily.  aspirin 81 MG tablet Take 81 mg by mouth daily.  [DISCONTINUED] fosfomycin tromethamine (MONUROL) 3 g PACK Take 1 packet by mouth every 3 days Take one dose now, then repeat in three days 2 each 0    VENTOLIN  (90 Base) MCG/ACT inhaler       [DISCONTINUED] Fish Oil-Cholecalciferol (FISH OIL + D3 PO) Take 1 capsule by mouth daily       loratadine (CLARITIN) 10 MG tablet   Take 10 mg by mouth daily prn       No facility-administered encounter medications on file as of 4/24/2019.        Current Outpatient Medications on File Prior to Visit   Medication Sig Dispense Refill    docusate sodium (COLACE) 100 MG capsule Take 1 capsule by mouth 3 times daily as needed for Constipation 60 capsule 1    metFORMIN (GLUCOPHAGE) 850 MG tablet Take 850 mg by mouth 2 times daily (with meals)       Pediatric Multivitamins-Fl (MULTI VIT/FL PO) Take 1 tablet by mouth daily Daily       lisinopril (PRINIVIL;ZESTRIL) 10 MG tablet Take 10 mg by mouth daily.  aspirin 81 MG tablet Take 81 mg by mouth daily.  VENTOLIN  (90 Base) MCG/ACT inhaler       loratadine (CLARITIN) 10 MG tablet   Take 10 mg by mouth daily prn       No current facility-administered medications on file prior to visit. Patient has no known allergies. History reviewed. No pertinent family history. Social History     Tobacco Use   Smoking Status Former Smoker    Packs/day: 0.50    Years: 3.00    Pack years: 1.50   Smokeless Tobacco Never Used   Tobacco Comment    quit 50 years ago       Social History     Substance and Sexual Activity   Alcohol Use No       Review of Systems   Constitutional: Negative for appetite change, chills and fever. Eyes: Negative for pain, redness and visual disturbance. Respiratory: Negative for cough, shortness of breath and wheezing. Cardiovascular: Negative for chest pain and leg swelling. Gastrointestinal: Negative for abdominal pain, constipation, nausea and vomiting. Genitourinary: Positive for difficulty urinating. Negative for decreased urine volume, discharge, dysuria, enuresis, flank pain, frequency, hematuria, penile pain, scrotal swelling, testicular pain and urgency. Musculoskeletal: Negative for back pain, joint swelling and myalgias. Skin: Negative for color change, rash and wound. Neurological: Negative for dizziness, tremors and numbness. Hematological: Negative for adenopathy. Does not bruise/bleed easily.        BP (!) 144/77 (Site: Right Upper Arm, Position: Sitting, Cuff Size: Large Adult)   Wt 217 lb (98.4 kg)   BMI 31.14 kg/m²       PHYSICAL EXAM:  Constitutional: Patient in no acute distress; Neuro: alert and oriented to person place and time. Psych: Mood and affect normal.  Skin: Normal  Lungs: Respiratory effort normal  Cardiovascular:  Normal peripheral pulses  Abdomen: Soft, non-tender, non-distended with no CVA, flank pain, hepatosplenomegaly or hernia. Kidneys normal.  Bladder non-tender and not distended. Lymphatics: no palpable lymphadenopathy  Penis normal  Urethral meatus normal  Scrotal exam normal  Testicles normal bilaterally  Epididymis normal bilaterally  No evidence of inguinal hernia        Lab Results   Component Value Date    BUN 24 (H) 11/24/2017     Lab Results   Component Value Date    CREATININE 1.43 (H) 11/24/2017     No results found for: PSA    ASSESSMENT:   Diagnosis Orders   1. Renal calculus  XR ABDOMEN (KUB) (SINGLE AP VIEW)   2. Urinary retention             PLAN:  We will obtain labs from Dr. Lencho Stiles. He will continue to perform intermittent catheterization once per night. We will plan to see him back in one year with a KUB prior or sooner if needed for new or worsening symptoms.

## 2019-04-29 ENCOUNTER — HOSPITAL ENCOUNTER (OUTPATIENT)
Age: 79
Discharge: HOME | End: 2019-04-29
Payer: MEDICARE

## 2019-04-29 ENCOUNTER — TELEPHONE (OUTPATIENT)
Dept: UROLOGY | Age: 79
End: 2019-04-29

## 2019-04-29 DIAGNOSIS — R33.9 URINARY RETENTION: ICD-10-CM

## 2019-04-29 LAB
ANION GAP SERPL CALCULATED.3IONS-SCNC: 15 MMOL/L (ref 9–17)
BUN BLDV-MCNC: 25 MG/DL (ref 8–23)
BUN/CREAT BLD: 17 (ref 9–20)
CALCIUM SERPL-MCNC: 9.5 MG/DL (ref 8.6–10.4)
CHLORIDE BLD-SCNC: 99 MMOL/L (ref 98–107)
CO2: 22 MMOL/L (ref 20–31)
CREAT SERPL-MCNC: 1.45 MG/DL (ref 0.7–1.2)
GFR AFRICAN AMERICAN: 57 ML/MIN
GFR NON-AFRICAN AMERICAN: 47 ML/MIN
GFR SERPL CREATININE-BSD FRML MDRD: ABNORMAL ML/MIN/{1.73_M2}
GFR SERPL CREATININE-BSD FRML MDRD: ABNORMAL ML/MIN/{1.73_M2}
GLUCOSE BLD-MCNC: 84 MG/DL (ref 70–99)
POTASSIUM SERPL-SCNC: 5 MMOL/L (ref 3.7–5.3)
SODIUM BLD-SCNC: 136 MMOL/L (ref 135–144)

## 2019-04-29 PROCEDURE — 36415 COLL VENOUS BLD VENIPUNCTURE: CPT

## 2019-04-29 PROCEDURE — 80048 BASIC METABOLIC PNL TOTAL CA: CPT

## 2019-06-18 ENCOUNTER — HOSPITAL ENCOUNTER (OUTPATIENT)
Age: 79
Setting detail: SPECIMEN
Discharge: HOME | End: 2019-06-18
Payer: MEDICARE

## 2019-06-18 ENCOUNTER — OFFICE VISIT (OUTPATIENT)
Dept: UROLOGY | Age: 79
End: 2019-06-18
Payer: MEDICARE

## 2019-06-18 VITALS
SYSTOLIC BLOOD PRESSURE: 130 MMHG | TEMPERATURE: 98 F | DIASTOLIC BLOOD PRESSURE: 82 MMHG | WEIGHT: 220 LBS | HEIGHT: 70 IN | BODY MASS INDEX: 31.5 KG/M2

## 2019-06-18 DIAGNOSIS — R33.9 URINARY RETENTION: ICD-10-CM

## 2019-06-18 DIAGNOSIS — R33.9 URINARY RETENTION: Primary | ICD-10-CM

## 2019-06-18 LAB
-: ABNORMAL
AMORPHOUS: ABNORMAL
BACTERIA: ABNORMAL
BILIRUBIN URINE: NEGATIVE
CASTS UA: ABNORMAL /LPF
COLOR: YELLOW
COMMENT UA: ABNORMAL
CRYSTALS, UA: ABNORMAL /HPF
EPITHELIAL CELLS UA: ABNORMAL /HPF (ref 0–5)
GLUCOSE URINE: NEGATIVE
KETONES, URINE: NEGATIVE
LEUKOCYTE ESTERASE, URINE: ABNORMAL
MUCUS: ABNORMAL
NITRITE, URINE: POSITIVE
OTHER OBSERVATIONS UA: ABNORMAL
PH UA: 5.5 (ref 5–9)
PROTEIN UA: NEGATIVE
RBC UA: ABNORMAL /HPF (ref 0–2)
RENAL EPITHELIAL, UA: ABNORMAL /HPF
SPECIFIC GRAVITY UA: 1.01 (ref 1.01–1.02)
TRICHOMONAS: ABNORMAL
TURBIDITY: ABNORMAL
URINE HGB: ABNORMAL
UROBILINOGEN, URINE: NORMAL
WBC UA: ABNORMAL /HPF (ref 0–5)
YEAST: ABNORMAL

## 2019-06-18 PROCEDURE — 51798 US URINE CAPACITY MEASURE: CPT | Performed by: NURSE PRACTITIONER

## 2019-06-18 PROCEDURE — 1123F ACP DISCUSS/DSCN MKR DOCD: CPT | Performed by: NURSE PRACTITIONER

## 2019-06-18 PROCEDURE — 81001 URINALYSIS AUTO W/SCOPE: CPT

## 2019-06-18 PROCEDURE — 87086 URINE CULTURE/COLONY COUNT: CPT

## 2019-06-18 PROCEDURE — 87186 SC STD MICRODIL/AGAR DIL: CPT

## 2019-06-18 PROCEDURE — 4040F PNEUMOC VAC/ADMIN/RCVD: CPT | Performed by: NURSE PRACTITIONER

## 2019-06-18 PROCEDURE — 99213 OFFICE O/P EST LOW 20 MIN: CPT | Performed by: NURSE PRACTITIONER

## 2019-06-18 PROCEDURE — 1036F TOBACCO NON-USER: CPT | Performed by: NURSE PRACTITIONER

## 2019-06-18 PROCEDURE — 87088 URINE BACTERIA CULTURE: CPT

## 2019-06-18 PROCEDURE — G8417 CALC BMI ABV UP PARAM F/U: HCPCS | Performed by: NURSE PRACTITIONER

## 2019-06-18 PROCEDURE — G8427 DOCREV CUR MEDS BY ELIG CLIN: HCPCS | Performed by: NURSE PRACTITIONER

## 2019-06-18 ASSESSMENT — ENCOUNTER SYMPTOMS
VOMITING: 0
EYE PAIN: 0
ABDOMINAL PAIN: 0
EYE REDNESS: 0
COLOR CHANGE: 0
NAUSEA: 0
CONSTIPATION: 0
COUGH: 0
WHEEZING: 0
BACK PAIN: 0
SHORTNESS OF BREATH: 0

## 2019-06-18 NOTE — PROGRESS NOTES
HPI:    Patient is a 70 y.o. male in no acute distress. He is alert and oriented to person, place, and time. History  1/2015 Greenlight PVP      PVR remained elevated post-op, but was improving: Feb 839 mL, March 600 mL, April 505 mL, July 452 mL, Oct 252 mL.     7/2015 Flomax dc'd     10/2016 PVR back up, >850 mL. Does not feel full or uncomfortable. He did self cath previously. He does report intermittent urine stream about half the time, mild straining. Offered option of resuming Flomax or resuming self cath qhs. Pt prefered to start self cath.     1/2017 Gross hematuria and some difficulty with cathing. Cysto showed small caliber BNC.      1/2017 Greenlight PVP and TUIBNC      11/2017 Right HLL      1/2018 Left ESWL    Today   Here today for an abnormal POC urine obtained at Dr. Galindo Perez office. This urinalysis was positive for leukocyte esterase and nitrites. Patient denies any episodes of dysuria, gross hematuria, frequency or urgency. He does continue to perform intermittent catheterization at bedtime. He does urinate during the day 2-3 times and he reports these are large volume voids. He does have an elevated postvoid residual today of 504 mL. He denies any recent nausea, vomiting or fevers. His KUB in the spring was negative for  calcifications. Renal function from 4/2019 was stable. We did complete a hematuria work-up in 2017.     Past Medical History:   Diagnosis Date    Acute renal failure superimposed on stage 3 chronic kidney disease ([facility] [address] 3/1/2017    Arthritis     Diabetes mellitus ([facility] [address]     Hyperlipidemia     Hypertension     Kidney stone      Past Surgical History:   Procedure Laterality Date    CATARACT REMOVAL WITH IMPLANT Right 8/26/2013    CYSTOSCOPY INSERTION / REMOVAL STENT / STONE Right 11/1/2017    CYSTOSCOPY STENT INSERTION performed by Rachell Castellon MD at [address] / STONE N/A 11/3/2017    CYSTOSCOPY STENT INSERTION performed by Areli Reid MD at [facility]  11/03/2017    with stent placement on right side. Changed schulz catheter    HERNIA REPAIR      umbilical 6253    JOINT REPLACEMENT      right knee march 22, 2012    JOINT REPLACEMENT Right junu 2014 partial knee    JOINT REPLACEMENT Right 2014    complete removal    KNEE SURGERY Right     x 2    LITHOTRIPSY Right 11/01/2017    with stent placement - Dr. Puma Zayas LITHOTRIPSY Left 01/16/2018    FL CYSTO/URETERO/PYELOSCOPY W/LITHOTRIPSY Right 11/1/2017    CYSTOSCOPY URETEROSCOPY LASER-WITH HLL performed by Areli Reid MD at [facility] [address] ESWL Left 1/16/2018    ESWL EXTRACORPEAL SHOCK WAVE LITHOTRIPSY performed by Areli Reid MD at [address] [facility] TURP  01/2017    Greenlight PVP and TUIBNC    TURP  01/2015    Greenlight PVP     Outpatient Encounter Medications as of 6/18/2019   Medication Sig Dispense Refill    docusate sodium (COLACE) 100 MG capsule Take 1 capsule by mouth 3 times daily as needed for Constipation 60 capsule 1    VENTOLIN  (90 Base) MCG/ACT inhaler as needed       metFORMIN (GLUCOPHAGE) 850 MG tablet Take 850 mg by mouth 2 times daily (with meals)       Pediatric Multivitamins-Fl (MULTI VIT/FL PO) Take 1 tablet by mouth daily Daily       lisinopril (PRINIVIL;ZESTRIL) 10 MG tablet Take 10 mg by mouth daily.  aspirin 81 MG tablet Take 81 mg by mouth daily.  loratadine (CLARITIN) 10 MG tablet   Take 10 mg by mouth daily prn       No facility-administered encounter medications on file as of 6/18/2019.        Current Outpatient Medications on File Prior to Visit   Medication Sig Dispense Refill    docusate sodium (COLACE) 100 MG capsule Take 1 capsule by mouth 3 times daily as needed for Constipation 60 capsule 1    VENTOLIN  (90 Base) MCG/ACT inhaler as needed       metFORMIN (GLUCOPHAGE) 850 MG tablet Take 850 mg by mouth 2 times daily (with meals)       Pediatric Multivitamins-Fl (MULTI VIT/FL PO) Take 1 tablet by mouth daily Daily       lisinopril (PRINIVIL;ZESTRIL) 10 MG tablet Take 10 mg by mouth daily.  aspirin 81 MG tablet Take 81 mg by mouth daily.  loratadine (CLARITIN) 10 MG tablet   Take 10 mg by mouth daily prn       No current facility-administered medications on file prior to visit. Patient has no known allergies. History reviewed. No pertinent family history. Social History     Tobacco Use   Smoking Status Former Smoker    Packs/day: 0.50    Years: 3.00    Pack years: 1.50   Smokeless Tobacco Never Used   Tobacco Comment    quit 50 years ago       Social History     Substance and Sexual Activity   Alcohol Use No       Review of Systems   Constitutional: Negative for appetite change, chills and fever. Eyes: Negative for pain, redness and visual disturbance. Respiratory: Negative for cough, shortness of breath and wheezing. Cardiovascular: Negative for chest pain and leg swelling. Gastrointestinal: Negative for abdominal pain, constipation, nausea and vomiting. Genitourinary: Negative for decreased urine volume, difficulty urinating, discharge, dysuria, enuresis, flank pain, frequency, hematuria, penile pain, scrotal swelling, testicular pain and urgency. Musculoskeletal: Negative for back pain, joint swelling and myalgias. Skin: Negative for color change, rash and wound. Neurological: Negative for dizziness, tremors and numbness. Hematological: Negative for adenopathy. Does not bruise/bleed easily. /82 (Site: Right Upper Arm, Position: Sitting, Cuff Size: Medium Adult)   Temp 98 °F (36.7 °C)   Ht 5' 10\" (1.778 m)   Wt 220 lb (99.8 kg)   BMI 31.57 kg/m²       PHYSICAL EXAM:  Constitutional: Patient in no acute distress; Neuro: alert and oriented to person place and time.     Psych: Mood and affect normal.  Skin: Normal  Lungs: Respiratory effort normal  Cardiovascular:  Normal peripheral pulses  Abdomen: Soft, non-tender, non-distended with no CVA, flank pain, hepatosplenomegaly or hernia. Kidneys normal.  Bladder non-tender and not distended. Lymphatics: no palpable lymphadenopathy  Penis normal  Urethral meatus normal  Scrotal exam normal  Testicles normal bilaterally  Epididymis normal bilaterally  No evidence of inguinal hernia        Lab Results   Component Value Date    BUN 25 (H) 04/29/2019     Lab Results   Component Value Date    CREATININE 1.45 (H) 04/29/2019     No results found for: PSA    ASSESSMENT:   Diagnosis Orders   1. Urinary retention  Urine culture clean catch    Urinalysis with Microscopic           PLAN:  We will check a formal UA C&S today. I did explain to patient that he will always have an abnormal urinalysis due to intermittent catheterization. We will call with the urine results and treat appropriately if needed.

## 2019-06-18 NOTE — PATIENT INSTRUCTIONS
SURVEY:    You may be receiving a survey from [employer] regarding your visit today. Please complete the survey to enable us to provide the highest quality of care to you and your family. If you cannot score us a very good on any question, please call the office to discuss how we could have made your experience a very good one. Thank you.

## 2019-06-19 LAB
CULTURE: ABNORMAL
Lab: ABNORMAL
SPECIMEN DESCRIPTION: ABNORMAL

## 2019-06-20 ENCOUNTER — TELEPHONE (OUTPATIENT)
Dept: UROLOGY | Age: 79
End: 2019-06-20

## 2019-06-20 RX ORDER — CIPROFLOXACIN 500 MG/1
500 TABLET, FILM COATED ORAL 2 TIMES DAILY
Qty: 14 TABLET | Refills: 0 | Status: SHIPPED | OUTPATIENT
Start: 2019-06-20 | End: 2019-06-27

## 2020-03-25 PROBLEM — N17.9 ACUTE RENAL FAILURE SUPERIMPOSED ON STAGE 3 CHRONIC KIDNEY DISEASE (HCC): Status: RESOLVED | Noted: 2017-03-01 | Resolved: 2020-03-24

## 2020-03-25 PROBLEM — N18.30 ACUTE RENAL FAILURE SUPERIMPOSED ON STAGE 3 CHRONIC KIDNEY DISEASE (HCC): Status: RESOLVED | Noted: 2017-03-01 | Resolved: 2020-03-24

## 2020-04-15 ENCOUNTER — TELEPHONE (OUTPATIENT)
Dept: UROLOGY | Age: 80
End: 2020-04-15

## 2020-06-05 ENCOUNTER — HOSPITAL ENCOUNTER (OUTPATIENT)
Age: 80
Discharge: HOME | End: 2020-06-07
Payer: MEDICARE

## 2020-06-05 ENCOUNTER — HOSPITAL ENCOUNTER (OUTPATIENT)
Dept: GENERAL RADIOLOGY | Age: 80
Discharge: HOME | End: 2020-06-07
Payer: MEDICARE

## 2020-06-05 PROCEDURE — 74018 RADEX ABDOMEN 1 VIEW: CPT

## 2020-06-09 ENCOUNTER — OFFICE VISIT (OUTPATIENT)
Dept: UROLOGY | Age: 80
End: 2020-06-09
Payer: MEDICARE

## 2020-06-09 VITALS — BODY MASS INDEX: 31.28 KG/M2 | DIASTOLIC BLOOD PRESSURE: 78 MMHG | SYSTOLIC BLOOD PRESSURE: 130 MMHG | WEIGHT: 218 LBS

## 2020-06-09 PROCEDURE — 51798 US URINE CAPACITY MEASURE: CPT | Performed by: NURSE PRACTITIONER

## 2020-06-09 PROCEDURE — 1123F ACP DISCUSS/DSCN MKR DOCD: CPT | Performed by: NURSE PRACTITIONER

## 2020-06-09 PROCEDURE — G8417 CALC BMI ABV UP PARAM F/U: HCPCS | Performed by: NURSE PRACTITIONER

## 2020-06-09 PROCEDURE — 1036F TOBACCO NON-USER: CPT | Performed by: NURSE PRACTITIONER

## 2020-06-09 PROCEDURE — G8427 DOCREV CUR MEDS BY ELIG CLIN: HCPCS | Performed by: NURSE PRACTITIONER

## 2020-06-09 PROCEDURE — 4040F PNEUMOC VAC/ADMIN/RCVD: CPT | Performed by: NURSE PRACTITIONER

## 2020-06-09 PROCEDURE — 99213 OFFICE O/P EST LOW 20 MIN: CPT | Performed by: NURSE PRACTITIONER

## 2020-06-09 ASSESSMENT — ENCOUNTER SYMPTOMS
NAUSEA: 0
WHEEZING: 0
CONSTIPATION: 0
SHORTNESS OF BREATH: 0
EYE REDNESS: 0
COLOR CHANGE: 0
EYE PAIN: 0
COUGH: 0
BACK PAIN: 0
ABDOMINAL PAIN: 0
VOMITING: 0

## 2020-06-09 NOTE — PATIENT INSTRUCTIONS
Please have Dr. Martha Astorga send us your yearly routine labs (BMP - kidney function). STONE PREVENTION    To prevent future stone formation:    1) DO drink ~65-80 oz (2-2.5L) of water per day to stay adequately hydrated     2) AVOID/LIMIT intake of \"bad fluids\": soda/pop, coffee, tea, alcohol, energy drinks, any beverage with caffeine can act to dehydrate you       \"BAD FLUIDS\" DO NOT COUNT TOWARD THE 65-80oz of water    3) REDUCE consumption of sodium/salt - DO NOT salt your food, read labels (lunch meats, canned soups, prepared meals are high in salt), choose low salt options    4) DO NOT EAT animal protein/meat more than 2 meals a day - this includes red meat, pork, poultry and fish    Survey: You may be receiving a survey from [employer] regarding your visit today. Please complete the survey to enable us to provide the highest quality of care to you and your family.     If you cannot score us as very good on any question, please call the office to discuss how we could have major experience exceptional.    Thank you    Your Urology Care Team.

## 2020-06-09 NOTE — PROGRESS NOTES
catheter    HERNIA REPAIR      umbilical 8498    JOINT REPLACEMENT      right knee march 22, 2012    JOINT REPLACEMENT Right junu 2014 partial knee    JOINT REPLACEMENT Right 2014    complete removal    KNEE SURGERY Right     x 2    LITHOTRIPSY Right 11/01/2017    with stent placement - Dr. Jose Alfredo Salas LITHOTRIPSY Left 01/16/2018    CA CYSTO/URETERO/PYELOSCOPY W/LITHOTRIPSY Right 11/1/2017    CYSTOSCOPY URETEROSCOPY LASER-WITH HLL performed by Mayo Euceda MD at [address] ESWL Left 1/16/2018    ESWL EXTRACORPEAL SHOCK WAVE LITHOTRIPSY performed by Mayo Euceda MD at [facility] TURP  01/2017    [facility] and [facility]    TURP  01/2015    [facility]     Outpatient Encounter Medications as of 6/9/2020   Medication Sig Dispense Refill    docusate sodium (COLACE) 100 MG capsule Take 1 capsule by mouth 3 times daily as needed for Constipation 60 capsule 1    VENTOLIN  (90 Base) MCG/ACT inhaler as needed       metFORMIN (GLUCOPHAGE) 850 MG tablet Take 850 mg by mouth 2 times daily (with meals)       Pediatric Multivitamins-Fl (MULTI VIT/FL PO) Take 1 tablet by mouth daily Daily       lisinopril (PRINIVIL;ZESTRIL) 10 MG tablet Take 10 mg by mouth daily.  aspirin 81 MG tablet Take 81 mg by mouth daily.  loratadine (CLARITIN) 10 MG tablet   Take 10 mg by mouth daily prn       No facility-administered encounter medications on file as of 6/9/2020.        Current Outpatient Medications on File Prior to Visit   Medication Sig Dispense Refill    docusate sodium (COLACE) 100 MG capsule Take 1 capsule by mouth 3 times daily as needed for Constipation 60 capsule 1    VENTOLIN  (90 Base) MCG/ACT inhaler as needed       metFORMIN (GLUCOPHAGE) 850 MG tablet Take 850 mg by mouth 2 times daily (with meals)       Pediatric Multivitamins-Fl (MULTI VIT/FL PO) Take 1 tablet by mouth daily Daily       lisinopril (PRINIVIL;ZESTRIL) 10 MG tablet Take 10 mg by mouth daily.  aspirin 81 MG tablet Take 81 mg by mouth daily.  loratadine (CLARITIN) 10 MG tablet   Take 10 mg by mouth daily prn       No current facility-administered medications on file prior to visit. Patient has no known allergies. History reviewed. No pertinent family history. Social History     Tobacco Use   Smoking Status Former Smoker    Packs/day: 0.50    Years: 3.00    Pack years: 1.50   Smokeless Tobacco Never Used   Tobacco Comment    quit 50 years ago       Social History     Substance and Sexual Activity   Alcohol Use No       Review of Systems   Constitutional: Negative for appetite change, chills and fever. Eyes: Negative for pain, redness and visual disturbance. Respiratory: Negative for cough, shortness of breath and wheezing. Cardiovascular: Negative for chest pain and leg swelling. Gastrointestinal: Negative for abdominal pain, constipation, nausea and vomiting. Genitourinary: Negative for decreased urine volume, difficulty urinating, discharge, dysuria, enuresis, flank pain, frequency, hematuria, penile pain, scrotal swelling, testicular pain and urgency. Musculoskeletal: Negative for back pain, joint swelling and myalgias. Skin: Negative for color change, rash and wound. Neurological: Negative for dizziness, tremors and numbness. Hematological: Negative for adenopathy. Does not bruise/bleed easily. /78 (Site: Right Upper Arm, Position: Sitting, Cuff Size: Large Adult)   Wt 218 lb (98.9 kg)   BMI 31.28 kg/m²       PHYSICAL EXAM:  Constitutional: Patient in no acute distress; Neuro: alert and oriented to person place and time. Psych: Mood and affect normal.  Skin: Normal  Lungs: Respiratory effort normal  Cardiovascular:  Normal peripheral pulses  Abdomen: Soft, non-tender, non-distended with no CVA, flank pain, hepatosplenomegaly or hernia. Kidneys normal.  Bladder non-tender and not distended.   Lymphatics: no palpable

## 2020-09-27 PROBLEM — H25.812 COMBINED FORMS OF AGE-RELATED CATARACT OF LEFT EYE: Chronic | Status: ACTIVE | Noted: 2020-09-27

## 2020-09-28 ENCOUNTER — HOSPITAL ENCOUNTER (OUTPATIENT)
Age: 80
Setting detail: OUTPATIENT SURGERY
Discharge: HOME | End: 2020-09-28
Attending: OPHTHALMOLOGY | Admitting: OPHTHALMOLOGY
Payer: MEDICARE

## 2020-09-28 VITALS
TEMPERATURE: 97.2 F | HEART RATE: 74 BPM | SYSTOLIC BLOOD PRESSURE: 149 MMHG | OXYGEN SATURATION: 96 % | HEIGHT: 70 IN | RESPIRATION RATE: 18 BRPM | BODY MASS INDEX: 30.06 KG/M2 | WEIGHT: 210 LBS | DIASTOLIC BLOOD PRESSURE: 90 MMHG

## 2020-09-28 PROBLEM — H25.812 COMBINED FORMS OF AGE-RELATED CATARACT OF LEFT EYE: Chronic | Status: RESOLVED | Noted: 2020-09-27 | Resolved: 2020-09-28

## 2020-09-28 PROCEDURE — 2500000003 HC RX 250 WO HCPCS: Performed by: OPHTHALMOLOGY

## 2020-09-28 PROCEDURE — 6360000002 HC RX W HCPCS: Performed by: OPHTHALMOLOGY

## 2020-09-28 PROCEDURE — 7100000011 HC PHASE II RECOVERY - ADDTL 15 MIN: Performed by: OPHTHALMOLOGY

## 2020-09-28 PROCEDURE — V2632 POST CHMBR INTRAOCULAR LENS: HCPCS | Performed by: OPHTHALMOLOGY

## 2020-09-28 PROCEDURE — 99153 MOD SED SAME PHYS/QHP EA: CPT | Performed by: OPHTHALMOLOGY

## 2020-09-28 PROCEDURE — 3600000003 HC SURGERY LEVEL 3 BASE: Performed by: OPHTHALMOLOGY

## 2020-09-28 PROCEDURE — 6370000000 HC RX 637 (ALT 250 FOR IP): Performed by: OPHTHALMOLOGY

## 2020-09-28 PROCEDURE — 2709999900 HC NON-CHARGEABLE SUPPLY: Performed by: OPHTHALMOLOGY

## 2020-09-28 PROCEDURE — 99152 MOD SED SAME PHYS/QHP 5/>YRS: CPT | Performed by: OPHTHALMOLOGY

## 2020-09-28 PROCEDURE — 3600000013 HC SURGERY LEVEL 3 ADDTL 15MIN: Performed by: OPHTHALMOLOGY

## 2020-09-28 PROCEDURE — 7100000010 HC PHASE II RECOVERY - FIRST 15 MIN: Performed by: OPHTHALMOLOGY

## 2020-09-28 DEVICE — LENS INTOCU L12.5 MM OD6 MM +21.5 D HYDROPHOBIC ENVISTA: Type: IMPLANTABLE DEVICE | Status: FUNCTIONAL

## 2020-09-28 RX ORDER — MIDAZOLAM HYDROCHLORIDE 1 MG/ML
INJECTION INTRAMUSCULAR; INTRAVENOUS PRN
Status: DISCONTINUED | OUTPATIENT
Start: 2020-09-28 | End: 2020-09-28 | Stop reason: ALTCHOICE

## 2020-09-28 RX ORDER — SODIUM CHLORIDE 0.9 % (FLUSH) 0.9 %
10 SYRINGE (ML) INJECTION EVERY 12 HOURS SCHEDULED
Status: DISCONTINUED | OUTPATIENT
Start: 2020-09-28 | End: 2020-09-28 | Stop reason: HOSPADM

## 2020-09-28 RX ORDER — SODIUM CHLORIDE 0.9 % (FLUSH) 0.9 %
10 SYRINGE (ML) INJECTION PRN
Status: DISCONTINUED | OUTPATIENT
Start: 2020-09-28 | End: 2020-09-28 | Stop reason: HOSPADM

## 2020-09-28 RX ORDER — SODIUM CHLORIDE 9 MG/ML
INJECTION, SOLUTION INTRAVENOUS CONTINUOUS
Status: DISCONTINUED | OUTPATIENT
Start: 2020-09-28 | End: 2020-09-28 | Stop reason: HOSPADM

## 2020-09-28 RX ORDER — TETRACAINE HYDROCHLORIDE 5 MG/ML
1 SOLUTION OPHTHALMIC SEE ADMIN INSTRUCTIONS
Status: DISCONTINUED | OUTPATIENT
Start: 2020-09-28 | End: 2020-09-28 | Stop reason: HOSPADM

## 2020-09-28 RX ORDER — TETRACAINE HYDROCHLORIDE 5 MG/ML
SOLUTION OPHTHALMIC PRN
Status: DISCONTINUED | OUTPATIENT
Start: 2020-09-28 | End: 2020-09-28 | Stop reason: ALTCHOICE

## 2020-09-28 RX ORDER — FENTANYL CITRATE 50 UG/ML
INJECTION, SOLUTION INTRAMUSCULAR; INTRAVENOUS PRN
Status: DISCONTINUED | OUTPATIENT
Start: 2020-09-28 | End: 2020-09-28 | Stop reason: ALTCHOICE

## 2020-09-28 RX ORDER — PROPARACAINE HYDROCHLORIDE 5 MG/ML
1 SOLUTION/ DROPS OPHTHALMIC SEE ADMIN INSTRUCTIONS
Status: DISCONTINUED | OUTPATIENT
Start: 2020-09-28 | End: 2020-09-28 | Stop reason: HOSPADM

## 2020-09-28 RX ORDER — TROPICAMIDE 10 MG/ML
1 SOLUTION/ DROPS OPHTHALMIC SEE ADMIN INSTRUCTIONS
Status: DISCONTINUED | OUTPATIENT
Start: 2020-09-28 | End: 2020-09-28 | Stop reason: HOSPADM

## 2020-09-28 RX ORDER — LIDOCAINE HYDROCHLORIDE 10 MG/ML
INJECTION, SOLUTION EPIDURAL; INFILTRATION; INTRACAUDAL; PERINEURAL PRN
Status: DISCONTINUED | OUTPATIENT
Start: 2020-09-28 | End: 2020-09-28 | Stop reason: ALTCHOICE

## 2020-09-28 RX ORDER — PHENYLEPHRINE HCL 2.5 %
1 DROPS OPHTHALMIC (EYE) SEE ADMIN INSTRUCTIONS
Status: DISCONTINUED | OUTPATIENT
Start: 2020-09-28 | End: 2020-09-28 | Stop reason: HOSPADM

## 2020-09-28 RX ADMIN — PROPARACAINE HYDROCHLORIDE 1 DROP: 5 SOLUTION/ DROPS OPHTHALMIC at 11:59

## 2020-09-28 RX ADMIN — TROPICAMIDE 1 DROP: 10 SOLUTION/ DROPS OPHTHALMIC at 11:59

## 2020-09-28 RX ADMIN — PROPARACAINE HYDROCHLORIDE 1 DROP: 5 SOLUTION/ DROPS OPHTHALMIC at 12:05

## 2020-09-28 RX ADMIN — TROPICAMIDE 1 DROP: 10 SOLUTION/ DROPS OPHTHALMIC at 12:05

## 2020-09-28 RX ADMIN — PHENYLEPHRINE HYDROCHLORIDE 1 DROP: 25 SOLUTION/ DROPS OPHTHALMIC at 12:11

## 2020-09-28 RX ADMIN — PHENYLEPHRINE HYDROCHLORIDE 1 DROP: 25 SOLUTION/ DROPS OPHTHALMIC at 11:59

## 2020-09-28 RX ADMIN — PHENYLEPHRINE HYDROCHLORIDE 1 DROP: 25 SOLUTION/ DROPS OPHTHALMIC at 12:05

## 2020-09-28 RX ADMIN — TETRACAINE HYDROCHLORIDE 1 DROP: 5 SOLUTION OPHTHALMIC at 12:28

## 2020-09-28 RX ADMIN — TROPICAMIDE 1 DROP: 10 SOLUTION/ DROPS OPHTHALMIC at 12:11

## 2020-09-28 ASSESSMENT — PAIN - FUNCTIONAL ASSESSMENT: PAIN_FUNCTIONAL_ASSESSMENT: 0-10

## 2020-09-28 ASSESSMENT — PAIN SCALES - GENERAL
PAINLEVEL_OUTOF10: 0

## 2020-09-28 NOTE — OP NOTE
OPERATIVE NOTE      Patient:  Moses Sos:  1940    Account #:  [de-identified]    Date:  9/28/2020    Surgeon:  Sandip Gomez. 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 year old with decreased vision, glare and halos around lights, and trouble with activities of daily living. Examination revealed a visually significant cataract in the left eye. Other eye diseases have been ruled out as the primary cause of decreased visual function. Significant improvement is expected in the patient's visual acuity and/or visual function from the removal of the cataract. Risks, benefits, and alternatives to surgery were discussed with the patient and the patient elected to proceed with phacoemulsification with lens implantation. Details of the procedure: Following informed consent, the patient was identified by name and identification tag in the holding area,taken to the operating room and placed in the supine position. A time out was performed by all present in the room to identify the patient, the eye to be operated on, the correct operative procedure, and the correct intraocular lens. Monitoring leads were placed. The patient was positioned. An eyelid prep of half-strength Betadine was performed. The patient was administered intravenous sedation if desired and topical anesthetic was placed in the operative eye. The eye prepped a second time and draped in the usual sterile fashion using aseptic technique for cataract surgery. A lid speculum was then inserted. [place] was placed onto the corneal surface.   A stab incision was made using a disposable blade into the anterior chamber. Intracameral preservative free xylocaine was placed into the anterior chamber. Amvisc was then used to replace the aqueous of the anterior chamber. A 2.2 mm keratome blade was used to make a 3-plane clear corneal incision into cornea depending on the patient's astigmatism. The cystitome was used to make a tear in the anterior capsule. Fine forceps were used to make a complete curvilinear capsulorrhexis. The lens was hydrodissected and freely rotated using a balanced salt filled syringe. The ultrasound handpiece was then used to emulsify the nucleus and epi nucleus. The residual cortex was then aspirated using the IA handpiece. The posterior capsule was polished. The eye was filled with viscoelastic and a foldable posterior chamber intraocular lens was injected into the capsular bag unless otherwise stated in the addendum. Irrigation/aspiration was used to remove all excess viscoelastic. The eye was pressurized and the wounds were check for leaks and none were found. A collagen shield, which had been soaked in antibiotic drops, was then placed onto the cornea. The lid speculum was removed. The eye was covered with a clear plastic shield. The patient was taken to the recovery area in excellent condition, having tolerated the procedure well, and will follow up with me tomorrow for postop care. ADDENDUM:  The phacoemulsification time 1:12.65 seconds @ 21% average ultrasound power for an effective phacoemulsification time of 15.76 seconds. The lens inserted was a model MX60 made by [facility] [employer] that was +21.5 diopters in power. The lens was inserted into the capsular bag utilizing the BLIS-X1 injector made by [employer].  The serial number on this lens was [id number]. There was no stitch placed to close this temporal posterior corneal incision. EBL was less than 1.0 ml. Migdalia Shah.  Shari Mane DO

## 2020-09-28 NOTE — PROGRESS NOTES

## 2020-09-28 NOTE — H&P
Lalito Charles is a 68y.o. year old who presents for elective outpatient ophthalmic surgery with Elham Meek. The patient complains of decreased vision, glare and halos around lights, and trouble with vision for activities of daily living. Past Medical History:   Diagnosis Date    Acute renal failure superimposed on stage 3 chronic kidney disease ([facility] 3/1/2017    Arthritis     Diabetes mellitus ([facility]     Hyperlipidemia     Hypertension     Kidney stone        Past Surgical History:   Procedure Laterality Date    CATARACT REMOVAL WITH IMPLANT Right 8/26/2013    CYSTOSCOPY INSERTION / REMOVAL STENT / STONE Right 11/1/2017    CYSTOSCOPY STENT INSERTION performed by Kecia Coello MD at [address] / [address] / Prashanth Wheeler N/A 11/3/2017    CYSTOSCOPY STENT INSERTION performed by Kecia Coello MD at [place] [address]  11/03/2017    with stent placement on right side. Changed schulz catheter    HERNIA REPAIR      umbilical 9788    JOINT REPLACEMENT      right knee march 22, 2012    JOINT REPLACEMENT Right junu 2014 partial knee    JOINT REPLACEMENT Right 2014    complete removal    KNEE SURGERY Right     x 2    LITHOTRIPSY Right 11/01/2017    with stent placement - Dr. Lowanda Crigler LITHOTRIPSY Left 01/16/2018    SC CYSTO/URETERO/PYELOSCOPY W/LITHOTRIPSY Right 11/1/2017    CYSTOSCOPY URETEROSCOPY LASER-WITH HLL performed by Kecia Coello MD at [facility] [place] [facility]SWL Left 1/16/2018    ESWL [address] LITHOTRIPSY performed by Kecia Coello MD at [address] [place] [address] TUR  01/2017    [facility] and [facility]    TURP  01/2015    [facility]       Home meds:   Prior to Admission medications    Medication Sig Start Date End Date Taking?  Authorizing Provider   docusate sodium (COLACE) 100 MG capsule Take 1 capsule by mouth 3 times daily as needed for Constipation 1/18/18   CHILANGO Dee - CNP

## 2021-06-07 ENCOUNTER — HOSPITAL ENCOUNTER (OUTPATIENT)
Age: 81
Discharge: HOME | End: 2021-06-09
Payer: MEDICARE

## 2021-06-07 ENCOUNTER — HOSPITAL ENCOUNTER (OUTPATIENT)
Dept: GENERAL RADIOLOGY | Age: 81
Discharge: HOME | End: 2021-06-09
Payer: MEDICARE

## 2021-06-07 DIAGNOSIS — N20.0 KIDNEY STONES: ICD-10-CM

## 2021-06-07 PROCEDURE — 74018 RADEX ABDOMEN 1 VIEW: CPT

## 2021-06-08 ENCOUNTER — HOSPITAL ENCOUNTER (OUTPATIENT)
Age: 81
Setting detail: SPECIMEN
Discharge: HOME | End: 2021-06-08
Payer: MEDICARE

## 2021-06-08 ENCOUNTER — OFFICE VISIT (OUTPATIENT)
Dept: UROLOGY | Age: 81
End: 2021-06-08
Payer: MEDICARE

## 2021-06-08 VITALS
SYSTOLIC BLOOD PRESSURE: 135 MMHG | HEART RATE: 66 BPM | WEIGHT: 222 LBS | TEMPERATURE: 97.5 F | BODY MASS INDEX: 31.85 KG/M2 | DIASTOLIC BLOOD PRESSURE: 79 MMHG

## 2021-06-08 DIAGNOSIS — R31.0 GROSS HEMATURIA: Primary | ICD-10-CM

## 2021-06-08 DIAGNOSIS — N20.0 KIDNEY STONES: ICD-10-CM

## 2021-06-08 DIAGNOSIS — N32.0 BNC (BLADDER NECK CONTRACTURE): ICD-10-CM

## 2021-06-08 DIAGNOSIS — R31.0 GROSS HEMATURIA: ICD-10-CM

## 2021-06-08 LAB
-: ABNORMAL
AMORPHOUS: ABNORMAL
BACTERIA: ABNORMAL
BILIRUBIN URINE: NEGATIVE
CASTS UA: ABNORMAL /LPF
COLOR: YELLOW
COMMENT UA: ABNORMAL
CRYSTALS, UA: ABNORMAL /HPF
EPITHELIAL CELLS UA: ABNORMAL /HPF (ref 0–5)
GLUCOSE URINE: NEGATIVE
KETONES, URINE: NEGATIVE
LEUKOCYTE ESTERASE, URINE: ABNORMAL
MUCUS: ABNORMAL
NITRITE, URINE: POSITIVE
OTHER OBSERVATIONS UA: ABNORMAL
PH UA: 5.5 (ref 5–9)
PROTEIN UA: ABNORMAL
RBC UA: ABNORMAL /HPF (ref 0–2)
RENAL EPITHELIAL, UA: ABNORMAL /HPF
SPECIFIC GRAVITY UA: 1.02 (ref 1.01–1.02)
TRICHOMONAS: ABNORMAL
TURBIDITY: CLEAR
URINE HGB: NEGATIVE
UROBILINOGEN, URINE: NORMAL
WBC UA: ABNORMAL /HPF (ref 0–5)
YEAST: ABNORMAL

## 2021-06-08 PROCEDURE — 87088 URINE BACTERIA CULTURE: CPT

## 2021-06-08 PROCEDURE — G8417 CALC BMI ABV UP PARAM F/U: HCPCS | Performed by: PHYSICIAN ASSISTANT

## 2021-06-08 PROCEDURE — 81001 URINALYSIS AUTO W/SCOPE: CPT

## 2021-06-08 PROCEDURE — 99213 OFFICE O/P EST LOW 20 MIN: CPT | Performed by: PHYSICIAN ASSISTANT

## 2021-06-08 PROCEDURE — 1036F TOBACCO NON-USER: CPT | Performed by: PHYSICIAN ASSISTANT

## 2021-06-08 PROCEDURE — 4040F PNEUMOC VAC/ADMIN/RCVD: CPT | Performed by: PHYSICIAN ASSISTANT

## 2021-06-08 PROCEDURE — 1123F ACP DISCUSS/DSCN MKR DOCD: CPT | Performed by: PHYSICIAN ASSISTANT

## 2021-06-08 PROCEDURE — 99211 OFF/OP EST MAY X REQ PHY/QHP: CPT

## 2021-06-08 PROCEDURE — G8427 DOCREV CUR MEDS BY ELIG CLIN: HCPCS | Performed by: PHYSICIAN ASSISTANT

## 2021-06-08 PROCEDURE — 87186 SC STD MICRODIL/AGAR DIL: CPT

## 2021-06-08 PROCEDURE — 87086 URINE CULTURE/COLONY COUNT: CPT

## 2021-06-08 RX ORDER — SIMVASTATIN 20 MG
TABLET ORAL
COMMUNITY
Start: 2021-04-06

## 2021-06-08 ASSESSMENT — ENCOUNTER SYMPTOMS
COLOR CHANGE: 0
EYE REDNESS: 0
VOMITING: 0
NAUSEA: 0
COUGH: 0
ABDOMINAL PAIN: 0
CONSTIPATION: 0
WHEEZING: 0
BACK PAIN: 0
SHORTNESS OF BREATH: 0

## 2021-06-08 NOTE — PROGRESS NOTES
HPI:      Patient is a 80 y.o. male in no acute distress. He is alert and oriented to person, place, and time. History  1/2015 [facility] P[facility]      PVR remained elevated post-op, but was improving: Feb 839 mL, March 600 mL, April 505 mL, July 452 mL, Oct 252 mL.      7/2015 Flomax dc'd      10/2016 PVR back up, >850 mL. Does not feel full or uncomfortable. He did self cath previously. He does report intermittent urine stream about half the time, mild straining. Offered option of resuming Flomax or resuming self cath qhs. Pt prefered to start self cath.      1/2017 Gross hematuria and some difficulty with cathing. Cysto showed small caliber BNC.      1/2017 [facility] and [facility]      11/2017 Right HLL      1/2018 Left ESWL    Today:  Patient is here for follow-up of kidney stones, BPH, urinary retention. He does continue to perform intermittent catheterization at bedtime. He does urinate on his own during the day 2-4 times and these are large volume voids. He does not have any issues with the catheterization. He denies any new or worsening lower urinary tract symptoms. He denies any spontaneous stone passage over the last year. He denies any gross hematuria. Patient did have blood work from 4/2021 in care everywhere showing a BUN 25, creatinine of 1.34 and a GFR of 51. This compared to prior numbers is stable for this patient. Patient did have a recent KUB which was independently reviewed showing: stable appearing left-sided pelvic calcification, likely phlebolith, doubt renal calculi. Denies any fever, chills, gross hematuria, flank pain, dysuria. Completed a hematuria work-up 2017.      Past Medical History:   Diagnosis Date    Acute renal failure superimposed on stage 3 chronic kidney disease ([address] 3/1/2017    Arthritis     Diabetes mellitus ([address]     Hyperlipidemia     Hypertension     Kidney stone      Past Surgical History:   Procedure Laterality Date    CATARACT REMOVAL WITH IMPLANT Right of 6/8/2021. Current Outpatient Medications on File Prior to Visit   Medication Sig Dispense Refill    simvastatin (ZOCOR) 20 MG tablet TAKE 1 TABLET BY MOUTH ONCE DAILY      docusate sodium (COLACE) 100 MG capsule Take 1 capsule by mouth 3 times daily as needed for Constipation 60 capsule 1    metFORMIN (GLUCOPHAGE) 850 MG tablet Take 850 mg by mouth 2 times daily (with meals)       Pediatric Multivitamins-Fl (MULTI VIT/FL PO) Take 1 tablet by mouth daily Daily       lisinopril (PRINIVIL;ZESTRIL) 10 MG tablet Take 10 mg by mouth daily.  aspirin 81 MG tablet Take 81 mg by mouth daily.  loratadine (CLARITIN) 10 MG tablet   Take 10 mg by mouth daily prn       No current facility-administered medications on file prior to visit. Patient has no known allergies. History reviewed. No pertinent family history. Social History     Tobacco Use   Smoking Status Former Smoker    Packs/day: 0.50    Years: 3.00    Pack years: 1.50   Smokeless Tobacco Never Used   Tobacco Comment    quit 50 years ago       Social History     Substance and Sexual Activity   Alcohol Use No       Review of Systems   Constitutional: Negative for appetite change, chills and fever. Eyes: Negative for redness and visual disturbance. Respiratory: Negative for cough, shortness of breath and wheezing. Cardiovascular: Negative for chest pain and leg swelling. Gastrointestinal: Negative for abdominal pain, constipation, nausea and vomiting. Genitourinary: Negative for decreased urine volume, difficulty urinating, discharge, dysuria, enuresis, flank pain, frequency, hematuria, penile pain, scrotal swelling, testicular pain and urgency. Musculoskeletal: Negative for back pain, joint swelling and myalgias. Skin: Negative for color change, rash and wound. Neurological: Negative for dizziness, tremors and numbness. Hematological: Negative for adenopathy. Does not bruise/bleed easily.        /79 (Site: Right Upper Arm, Position: Sitting, Cuff Size: Large Adult)   Pulse 66   Temp 97.5 °F (36.4 °C)   Wt 222 lb (100.7 kg)   BMI 31.85 kg/m²       PHYSICAL EXAM:  Constitutional: Patient in no acute distress; Neuro: alert and oriented to person place and time. Psych: Mood and affect normal.  Lungs: Respiratory effort normal  Abdomen: Soft, non-tender, non-distended  Rectal: Deferred      Lab Results   Component Value Date    BUN 25 (H) 04/29/2019     Lab Results   Component Value Date    CREATININE 1.45 (H) 04/29/2019     No results found for: PSA    ASSESSMENT:   Diagnosis Orders   1. Gross hematuria  Culture, Urine    Urinalysis with Microscopic   2. BNC (bladder neck contracture)     3. Kidney stones  XR ABDOMEN (KUB) (SINGLE AP VIEW)         PLAN:  We will check a urinalysis and culture. We will call him with the results. This is for his yearly hematuria follow-up. Next year to be 5 years out from his initial work-up. Continue nightly intermittent cathingthis will be indefinite    Patient instructed to drink at least 80 ounces of \"good fluids\" daily (water, juice, Gatoraide, milk) and to avoid/minimize intake of \"bad fluids\" (soda pop, coffee, tea, alcohol, energy drinks). Also advised to avoid excessive consumption of sodium and animal protein to decrease risk of recurrent kidney stones. KUB in 1 year    Up in 1 year with KUB, we will need to also check a urinalysis and culture at that time. Patient develops any new or worsening symptoms in the next year he will contact us sooner.

## 2021-06-10 ENCOUNTER — TELEPHONE (OUTPATIENT)
Dept: UROLOGY | Age: 81
End: 2021-06-10

## 2021-06-10 LAB
CULTURE: ABNORMAL
Lab: ABNORMAL
SPECIMEN DESCRIPTION: ABNORMAL

## 2021-06-10 RX ORDER — CIPROFLOXACIN 500 MG/1
500 TABLET, FILM COATED ORAL 2 TIMES DAILY
Qty: 14 TABLET | Refills: 0 | Status: SHIPPED | OUTPATIENT
Start: 2021-06-10 | End: 2021-06-17

## 2022-04-29 ENCOUNTER — HOSPITAL ENCOUNTER (EMERGENCY)
Age: 82
Discharge: HOME | End: 2022-04-29
Attending: EMERGENCY MEDICINE
Payer: MEDICARE

## 2022-04-29 ENCOUNTER — APPOINTMENT (OUTPATIENT)
Dept: GENERAL RADIOLOGY | Age: 82
End: 2022-04-29
Payer: MEDICARE

## 2022-04-29 VITALS
SYSTOLIC BLOOD PRESSURE: 153 MMHG | BODY MASS INDEX: 31.5 KG/M2 | TEMPERATURE: 99 F | OXYGEN SATURATION: 95 % | HEIGHT: 70 IN | WEIGHT: 220 LBS | RESPIRATION RATE: 20 BRPM | HEART RATE: 104 BPM | DIASTOLIC BLOOD PRESSURE: 76 MMHG

## 2022-04-29 DIAGNOSIS — R31.9 URINARY TRACT INFECTION WITH HEMATURIA, SITE UNSPECIFIED: Primary | ICD-10-CM

## 2022-04-29 DIAGNOSIS — N39.0 URINARY TRACT INFECTION WITH HEMATURIA, SITE UNSPECIFIED: Primary | ICD-10-CM

## 2022-04-29 LAB
-: ABNORMAL
ABSOLUTE EOS #: 0.27 K/UL (ref 0–0.44)
ABSOLUTE IMMATURE GRANULOCYTE: 0.05 K/UL (ref 0–0.3)
ABSOLUTE LYMPH #: 3.06 K/UL (ref 1.1–3.7)
ABSOLUTE MONO #: 1.08 K/UL (ref 0.1–1.2)
ALBUMIN SERPL-MCNC: 4 G/DL (ref 3.5–5.2)
ALBUMIN/GLOBULIN RATIO: 1.3 (ref 1–2.5)
ALP BLD-CCNC: 75 U/L (ref 40–129)
ALT SERPL-CCNC: 20 U/L (ref 5–41)
ANION GAP SERPL CALCULATED.3IONS-SCNC: 12 MMOL/L (ref 9–17)
AST SERPL-CCNC: 20 U/L
BACTERIA: ABNORMAL
BASOPHILS # BLD: 1 % (ref 0–2)
BASOPHILS ABSOLUTE: 0.05 K/UL (ref 0–0.2)
BILIRUB SERPL-MCNC: 0.39 MG/DL (ref 0.3–1.2)
BILIRUBIN URINE: ABNORMAL
BUN BLDV-MCNC: 27 MG/DL (ref 8–23)
BUN/CREAT BLD: 16 (ref 9–20)
CALCIUM SERPL-MCNC: 9.5 MG/DL (ref 8.6–10.4)
CHLORIDE BLD-SCNC: 100 MMOL/L (ref 98–107)
CO2: 23 MMOL/L (ref 20–31)
COLOR: YELLOW
CREAT SERPL-MCNC: 1.67 MG/DL (ref 0.7–1.2)
EOSINOPHILS RELATIVE PERCENT: 3 % (ref 1–4)
EPITHELIAL CELLS UA: ABNORMAL /HPF (ref 0–5)
GFR AFRICAN AMERICAN: 48 ML/MIN
GFR NON-AFRICAN AMERICAN: 40 ML/MIN
GFR SERPL CREATININE-BSD FRML MDRD: ABNORMAL ML/MIN/{1.73_M2}
GFR SERPL CREATININE-BSD FRML MDRD: ABNORMAL ML/MIN/{1.73_M2}
GLUCOSE BLD-MCNC: 178 MG/DL (ref 70–99)
GLUCOSE URINE: NEGATIVE
HCT VFR BLD CALC: 42.9 % (ref 40.7–50.3)
HEMOGLOBIN: 14.2 G/DL (ref 13–17)
IMMATURE GRANULOCYTES: 1 %
KETONES, URINE: NEGATIVE
LEUKOCYTE ESTERASE, URINE: ABNORMAL
LYMPHOCYTES # BLD: 32 % (ref 24–43)
MCH RBC QN AUTO: 31.4 PG (ref 25.2–33.5)
MCHC RBC AUTO-ENTMCNC: 33.1 G/DL (ref 28.4–34.8)
MCV RBC AUTO: 94.9 FL (ref 82.6–102.9)
MONOCYTES # BLD: 11 % (ref 3–12)
NITRITE, URINE: NEGATIVE
NRBC AUTOMATED: 0 PER 100 WBC
PDW BLD-RTO: 12.9 % (ref 11.8–14.4)
PH UA: 6 (ref 5–9)
PLATELET # BLD: 236 K/UL (ref 138–453)
PMV BLD AUTO: 9.9 FL (ref 8.1–13.5)
POTASSIUM SERPL-SCNC: 5 MMOL/L (ref 3.7–5.3)
PRO-BNP: 804 PG/ML
PROTEIN UA: ABNORMAL
RBC # BLD: 4.52 M/UL (ref 4.21–5.77)
RBC UA: ABNORMAL /HPF (ref 0–2)
SEG NEUTROPHILS: 52 % (ref 36–65)
SEGMENTED NEUTROPHILS ABSOLUTE COUNT: 5.18 K/UL (ref 1.5–8.1)
SODIUM BLD-SCNC: 135 MMOL/L (ref 135–144)
SPECIFIC GRAVITY UA: 1.02 (ref 1.01–1.02)
TOTAL PROTEIN: 7.1 G/DL (ref 6.4–8.3)
TURBIDITY: ABNORMAL
URINE HGB: ABNORMAL
UROBILINOGEN, URINE: NORMAL
WBC # BLD: 9.7 K/UL (ref 3.5–11.3)
WBC UA: ABNORMAL /HPF (ref 0–5)

## 2022-04-29 PROCEDURE — 81001 URINALYSIS AUTO W/SCOPE: CPT

## 2022-04-29 PROCEDURE — 83880 ASSAY OF NATRIURETIC PEPTIDE: CPT

## 2022-04-29 PROCEDURE — 6360000002 HC RX W HCPCS: Performed by: EMERGENCY MEDICINE

## 2022-04-29 PROCEDURE — 96365 THER/PROPH/DIAG IV INF INIT: CPT

## 2022-04-29 PROCEDURE — 87186 SC STD MICRODIL/AGAR DIL: CPT

## 2022-04-29 PROCEDURE — 2580000003 HC RX 258: Performed by: EMERGENCY MEDICINE

## 2022-04-29 PROCEDURE — 71045 X-RAY EXAM CHEST 1 VIEW: CPT

## 2022-04-29 PROCEDURE — 80053 COMPREHEN METABOLIC PANEL: CPT

## 2022-04-29 PROCEDURE — 85025 COMPLETE CBC W/AUTO DIFF WBC: CPT

## 2022-04-29 PROCEDURE — 99284 EMERGENCY DEPT VISIT MOD MDM: CPT

## 2022-04-29 PROCEDURE — 87077 CULTURE AEROBIC IDENTIFY: CPT

## 2022-04-29 PROCEDURE — 87086 URINE CULTURE/COLONY COUNT: CPT

## 2022-04-29 PROCEDURE — 87076 CULTURE ANAEROBE IDENT EACH: CPT

## 2022-04-29 RX ORDER — MONTELUKAST SODIUM 10 MG/1
10 TABLET ORAL NIGHTLY
COMMUNITY

## 2022-04-29 RX ORDER — CEPHALEXIN 500 MG/1
500 CAPSULE ORAL 3 TIMES DAILY
Qty: 30 CAPSULE | Refills: 0 | Status: SHIPPED | OUTPATIENT
Start: 2022-04-29 | End: 2022-05-09

## 2022-04-29 RX ORDER — METOPROLOL SUCCINATE 50 MG/1
50 TABLET, EXTENDED RELEASE ORAL DAILY
COMMUNITY
End: 2022-05-23 | Stop reason: ALTCHOICE

## 2022-04-29 RX ORDER — FUROSEMIDE 20 MG/1
20 TABLET ORAL DAILY
COMMUNITY

## 2022-04-29 RX ORDER — 0.9 % SODIUM CHLORIDE 0.9 %
1000 INTRAVENOUS SOLUTION INTRAVENOUS ONCE
Status: COMPLETED | OUTPATIENT
Start: 2022-04-29 | End: 2022-04-29

## 2022-04-29 RX ADMIN — SODIUM CHLORIDE 1000 ML: 9 INJECTION, SOLUTION INTRAVENOUS at 20:42

## 2022-04-29 RX ADMIN — CEFTRIAXONE 1000 MG: 1 INJECTION, POWDER, FOR SOLUTION INTRAMUSCULAR; INTRAVENOUS at 21:06

## 2022-04-29 ASSESSMENT — PAIN - FUNCTIONAL ASSESSMENT: PAIN_FUNCTIONAL_ASSESSMENT: NONE - DENIES PAIN

## 2022-04-30 NOTE — ED PROVIDER NOTES
[facility] ED  [facility]   Chief Complaint   Patient presents with    Hematuria     onset this evening      HPI   Martha Bowman is a [de-identified] y.o. male who presents with complaint of blood in his urine. He had this once before when he got septic. That was after a urologic procedure. He did not have any urologic procedures lately. He has no pain. He is worried that he might have a urinary tract infection given his previous occurrence of 1 though. No other complaints. REVIEW OF SYSTEMS   GI: Patient complains of abdominal pain  Cardiac: No chest pain or syncope  Pulmonary: No difficulty breathing or new cough  General: No fevers  : No hematuria or dysuria  See HPI for further details. All other review of systems otherwise negative. PAST MEDICAL & SURGICAL HISTORY   Past Medical History:   Diagnosis Date    Acute renal failure superimposed on stage 3 chronic kidney disease ([facility] [address] 3/1/2017    Arthritis     Diabetes mellitus ([facility] [address] [facility]     Hyperlipidemia     Hypertension     Kidney stone      Past Surgical History:   Procedure Laterality Date    CATARACT REMOVAL WITH IMPLANT Right 8/26/2013    CYSTOSCOPY INSERTION / REMOVAL STENT / STONE Right 11/1/2017    CYSTOSCOPY STENT INSERTION performed by Mario Watts MD at [address] [place] [address] / [address] / [place] Code N/A 11/3/2017    CYSTOSCOPY STENT INSERTION performed by Mario Watts MD at [address]  11/03/2017    with stent placement on right side.  Changed schulz catheter    HERNIA REPAIR      umbilical 9965    INTRACAPSULAR CATARACT EXTRACTION Left 9/28/2020    EYE CATARACT EMULSIFICATION IOL IMPLANT performed by Divya Zhu DO at [address]      right knee march 22, 2012    JOINT REPLACEMENT Right junu 2014 partial knee    JOINT REPLACEMENT Right 2014    complete removal    KNEE SURGERY Right     x 2    LITHOTRIPSY Right 11/01/2017 with stent placement - Dr. Charlene Chawla LITHOTRIPSY Left 01/16/2018    CO CYSTO/URETERO/PYELOSCOPY W/LITHOTRIPSY Right 11/1/2017    CYSTOSCOPY URETEROSCOPY LASER-WITH HLL performed by Edwin Quick MD at [facility] ESWL Left 1/16/2018    ESWL EXTRACORPEAL SHOCK WAVE LITHOTRIPSY performed by Edwin Quick MD at [address] [place] [address] TUR  01/2017    [facility]    TURP  01/2015    [facility]      CURRENT MEDICATIONS   Current Outpatient Rx   Medication Sig Dispense Refill    furosemide (LASIX) 40 MG tablet Take 40 mg by mouth 2 times daily      montelukast (SINGULAIR) 10 MG tablet Take 10 mg by mouth nightly      metoprolol succinate (TOPROL XL) 50 MG extended release tablet Take 50 mg by mouth daily      cephALEXin (KEFLEX) 500 MG capsule Take 1 capsule by mouth 3 times daily for 10 days 30 capsule 0    simvastatin (ZOCOR) 20 MG tablet TAKE 1 TABLET BY MOUTH ONCE DAILY      docusate sodium (COLACE) 100 MG capsule Take 1 capsule by mouth 3 times daily as needed for Constipation 60 capsule 1    metFORMIN (GLUCOPHAGE) 850 MG tablet Take 850 mg by mouth 2 times daily (with meals)       Pediatric Multivitamins-Fl (MULTI VIT/FL PO) Take 1 tablet by mouth daily Daily       lisinopril (PRINIVIL;ZESTRIL) 10 MG tablet Take 10 mg by mouth daily. (Patient not taking: Reported on 4/29/2022)      aspirin 81 MG tablet Take 81 mg by mouth daily.       loratadine (CLARITIN) 10 MG tablet   Take 10 mg by mouth daily prn (Patient not taking: Reported on 4/29/2022)        ALLERGIES   No Known Allergies   SOCIAL AND FAMILY HISTORY   Social History     Socioeconomic History    Marital status:      Spouse name: None    Number of children: None    Years of education: None    Highest education level: None   Occupational History    None   Tobacco Use    Smoking status: Former Smoker     Packs/day: 0.50     Years: 3.00     Pack years: 1.50    Smokeless tobacco: Never Used  Tobacco comment: quit 50 years ago   Vaping Use    Vaping Use: Never used   Substance and Sexual Activity    Alcohol use: No    Drug use: No    Sexual activity: None   Other Topics Concern    None   Social History Narrative    None     Social Determinants of Health     Financial Resource Strain:     Difficulty of Paying Living Expenses: Not on file   Food Insecurity:     Worried About Running Out of Food in the Last Year: Not on file    Radha of Food in the Last Year: Not on file   Transportation Needs:     Lack of Transportation (Medical): Not on file    Lack of Transportation (Non-Medical): Not on file   Physical Activity:     Days of Exercise per Week: Not on file    Minutes of Exercise per Session: Not on file   Stress:     Feeling of Stress : Not on file   Social Connections:     Frequency of Communication with Friends and Family: Not on file    Frequency of Social Gatherings with Friends and Family: Not on file    Attends Jain Services: Not on file    Active Member of [address] [place] [address] or Organizations: Not on file    Attends Club or Organization Meetings: Not on file    Marital Status: Not on file   Intimate Partner Violence:     Fear of Current or Ex-Partner: Not on file    Emotionally Abused: Not on file    Physically Abused: Not on file    Sexually Abused: Not on file   Housing Stability:     Unable to Pay for Housing in the Last Year: Not on file    Number of [place] in the Last Year: Not on file    Unstable Housing in the Last Year: Not on file     History reviewed. No pertinent family history.      PHYSICAL EXAM   VITAL SIGNS: BP (!) 156/80   Pulse 104   Temp 99 °F (37.2 °C) (Tympanic)   Resp 20   Ht 5' 10\" (1.778 m)   Wt 220 lb (99.8 kg)   SpO2 97%   BMI 31.57 kg/m²   Constitutional: Well developed, well nourished  Eyes: Sclera nonicteric, conjunctiva moist  HENT: Atraumatic, nose normal  Neck: Supple, no JVD  Respiratory: No retractions, normal breath sounds Cardiovascular: Normal rate, normal rhythm  GI: Soft, no abdominal tenderness, no guarding, bowel sounds present, no audible bruits or palpable pulsatile masses. Musculoskeletal: No edema, no deformity   Vascular: radial pulses 2+ equal bilaterally  Integument: No rash, dry skin  Neurologic: Alert & oriented, normal speech  Psychiatric: Cooperative, pleasant affect     RADIOLOGY/PROCEDURES   XR CHEST PORTABLE   Final Result   No acute cardiopulmonary abnormality. ED COURSE & MEDICAL DECISION MAKING   Pertinent Labs & Imaging studies reviewed and interpreted. (See chart for details)   See EMR for medications prescribed  Vitals:    04/29/22 2019   BP: (!) 156/80   Pulse: 104   Resp: 20   Temp: 99 °F (37.2 °C)   SpO2: 97%     MDM: Patient well-appearing. He appears to have a urinary tract infection. Will treat and send culture. He will be given 1 dose of ceftriaxone was here. Differential diagnosis: Bladder cancer, urinary tract infection, hemorrhagic cyst in the kidney or bladder. FINAL IMPRESSION   1.  Urinary tract infection with hematuria, site unspecified        PLAN  Home with follow-up  Electronically signed by: Alcira López MD, 4/29/2022 9:49 PM  (This note was completed with a voice recognition program)            Alcira López MD  04/29/22 [id number]

## 2022-05-02 ENCOUNTER — TELEPHONE (OUTPATIENT)
Dept: UROLOGY | Age: 82
End: 2022-05-02

## 2022-05-02 LAB
CULTURE: ABNORMAL
SPECIMEN DESCRIPTION: ABNORMAL

## 2022-05-02 NOTE — TELEPHONE ENCOUNTER
Patient called and said he was at the ER with a UTI. They put him on meds and it cleared up pretty good. Do you need to see him?

## 2022-05-23 ENCOUNTER — HOSPITAL ENCOUNTER (OUTPATIENT)
Dept: NON INVASIVE DIAGNOSTICS | Age: 82
Discharge: HOME | End: 2022-05-23
Payer: MEDICARE

## 2022-05-23 ENCOUNTER — OFFICE VISIT (OUTPATIENT)
Dept: CARDIOLOGY | Age: 82
End: 2022-05-23
Payer: MEDICARE

## 2022-05-23 ENCOUNTER — HOSPITAL ENCOUNTER (OUTPATIENT)
Age: 82
Discharge: HOME | End: 2022-05-23
Payer: MEDICARE

## 2022-05-23 VITALS
DIASTOLIC BLOOD PRESSURE: 69 MMHG | BODY MASS INDEX: 31.52 KG/M2 | SYSTOLIC BLOOD PRESSURE: 103 MMHG | RESPIRATION RATE: 18 BRPM | WEIGHT: 220.2 LBS | HEART RATE: 109 BPM | OXYGEN SATURATION: 97 % | HEIGHT: 70 IN

## 2022-05-23 DIAGNOSIS — R53.83 OTHER FATIGUE: ICD-10-CM

## 2022-05-23 DIAGNOSIS — R94.31 ABNORMAL EKG: ICD-10-CM

## 2022-05-23 DIAGNOSIS — R00.0 TACHYCARDIA: ICD-10-CM

## 2022-05-23 DIAGNOSIS — I73.9 PVD (PERIPHERAL VASCULAR DISEASE) (HCC): ICD-10-CM

## 2022-05-23 DIAGNOSIS — R06.02 SOB (SHORTNESS OF BREATH): ICD-10-CM

## 2022-05-23 DIAGNOSIS — I10 PRIMARY HYPERTENSION: ICD-10-CM

## 2022-05-23 DIAGNOSIS — I44.4 LEFT ANTERIOR FASCICULAR BLOCK: ICD-10-CM

## 2022-05-23 DIAGNOSIS — I44.0 1ST DEGREE AV BLOCK: ICD-10-CM

## 2022-05-23 DIAGNOSIS — I45.10 RIGHT BUNDLE BRANCH BLOCK: ICD-10-CM

## 2022-05-23 DIAGNOSIS — R06.02 SOB (SHORTNESS OF BREATH): Primary | ICD-10-CM

## 2022-05-23 LAB
ANION GAP SERPL CALCULATED.3IONS-SCNC: 15 MMOL/L (ref 9–17)
BUN BLDV-MCNC: 34 MG/DL (ref 8–23)
BUN/CREAT BLD: 18 (ref 9–20)
CALCIUM SERPL-MCNC: 9.8 MG/DL (ref 8.6–10.4)
CHLORIDE BLD-SCNC: 97 MMOL/L (ref 98–107)
CHOLESTEROL/HDL RATIO: 3.4
CHOLESTEROL: 131 MG/DL
CO2: 24 MMOL/L (ref 20–31)
CREAT SERPL-MCNC: 1.93 MG/DL (ref 0.7–1.2)
GFR AFRICAN AMERICAN: 41 ML/MIN
GFR NON-AFRICAN AMERICAN: 34 ML/MIN
GFR SERPL CREATININE-BSD FRML MDRD: ABNORMAL ML/MIN/{1.73_M2}
GFR SERPL CREATININE-BSD FRML MDRD: ABNORMAL ML/MIN/{1.73_M2}
GLUCOSE BLD-MCNC: 118 MG/DL (ref 70–99)
HDLC SERPL-MCNC: 39 MG/DL
LDL CHOLESTEROL: 57 MG/DL (ref 0–130)
POTASSIUM SERPL-SCNC: 4.7 MMOL/L (ref 3.7–5.3)
SODIUM BLD-SCNC: 136 MMOL/L (ref 135–144)
TRIGL SERPL-MCNC: 175 MG/DL

## 2022-05-23 PROCEDURE — 93005 ELECTROCARDIOGRAM TRACING: CPT | Performed by: FAMILY MEDICINE

## 2022-05-23 PROCEDURE — 99211 OFF/OP EST MAY X REQ PHY/QHP: CPT | Performed by: PHYSICIAN ASSISTANT

## 2022-05-23 PROCEDURE — 93243 EXT ECG>48HR<7D SCAN A/R: CPT

## 2022-05-23 PROCEDURE — 80048 BASIC METABOLIC PNL TOTAL CA: CPT

## 2022-05-23 PROCEDURE — 80061 LIPID PANEL: CPT

## 2022-05-23 PROCEDURE — 99204 OFFICE O/P NEW MOD 45 MIN: CPT | Performed by: PHYSICIAN ASSISTANT

## 2022-05-23 PROCEDURE — 93010 ELECTROCARDIOGRAM REPORT: CPT | Performed by: FAMILY MEDICINE

## 2022-05-23 PROCEDURE — G8417 CALC BMI ABV UP PARAM F/U: HCPCS | Performed by: PHYSICIAN ASSISTANT

## 2022-05-23 PROCEDURE — G8427 DOCREV CUR MEDS BY ELIG CLIN: HCPCS | Performed by: PHYSICIAN ASSISTANT

## 2022-05-23 PROCEDURE — 1123F ACP DISCUSS/DSCN MKR DOCD: CPT | Performed by: PHYSICIAN ASSISTANT

## 2022-05-23 PROCEDURE — 93242 EXT ECG>48HR<7D RECORDING: CPT

## 2022-05-23 PROCEDURE — 1036F TOBACCO NON-USER: CPT | Performed by: PHYSICIAN ASSISTANT

## 2022-05-23 PROCEDURE — 36415 COLL VENOUS BLD VENIPUNCTURE: CPT

## 2022-05-23 RX ORDER — METOPROLOL SUCCINATE 25 MG/1
25 TABLET, EXTENDED RELEASE ORAL DAILY
Qty: 30 TABLET | Refills: 3 | Status: SHIPPED | OUTPATIENT
Start: 2022-05-23 | End: 2022-08-15 | Stop reason: SDUPTHER

## 2022-05-23 RX ORDER — LOSARTAN POTASSIUM 50 MG/1
TABLET ORAL
COMMUNITY
Start: 2022-05-12 | End: 2022-07-01 | Stop reason: ALTCHOICE

## 2022-05-23 RX ORDER — ALBUTEROL SULFATE 1.25 MG/3ML
1 SOLUTION RESPIRATORY (INHALATION) EVERY 6 HOURS PRN
COMMUNITY

## 2022-05-23 NOTE — PROGRESS NOTES
I, Anthony Cantrell am scribing for and in the presence of Ritika Ventura PA-C. Patient: Tesha Hopkins  : 1940  Date of Visit: May 23, 2022    REASON FOR VISIT / CONSULTATION: New Patient (pt is here to [facility]. pt states he has woresning fatigue, and SOB, dizziness every now and again. pt denies CP, and paplitations. )      History of Present Illness:      Dear Tari Foley MD    I had the pleasure of seeing Tesha Hopkins, who is a [de-identified] y.o. male to establish care. He has two brothers with heart disease he is unsure of details or ages when the problems started. He believes they had heart attacks in the past. Also reports mother and father with CAD, he believes everyone was in their 66's prior to having any issues. He is former smoker quit over 60 years ago. He smoked 1/2 pack per day for 3 years. He was diagnosed with hypertension for only a few years and hyperlipidemia and diabetes. He denies having sleep apnea. He states until he was 81years old he never seen a provider and never went to the doctor's office. He has urinary retention and has to self cath once daily. EKG done today 2022: Sinus tachycardia with 1st degree AV block. Left anterior fascicular block, right bundle branch block. Heart rate 101 bpm.    Mr. Rodrick Xie is here today to establish care for shortness of breath and fatigue. He denies chest pain, pressure, or tightness. His shortness of breath has been worsening over the past couple of months. He is able to walk 150 back and fourth although was tired afterwards and had to take a break. He states his tiredness and fatigue has worsened over the last year. He sleeps well 3-4 hours and then starts tosses's and turns due to leg discomfort. He denies snoring at night. He reports not feeling well rested when waking up in morning. Denies palpitations, heart racing or skipping beats.   He does have left leg swelling worse but also reports right leg swelling lately. Weight is stable. PAST MEDICAL HISTORY:        Past Medical History:   Diagnosis Date    Acute renal failure superimposed on stage 3 chronic kidney disease ([facility] [address] 3/1/2017    Arthritis     Diabetes mellitus ([facility] [address]     Hyperlipidemia     Hypertension     Kidney stone        CURRENT ALLERGIES: Patient has no known allergies. REVIEW OF SYSTEMS: 14 systems were reviewed. Pertinent positives and negatives as above, all else negative. Past Surgical History:   Procedure Laterality Date    CATARACT REMOVAL WITH IMPLANT Right 8/26/2013    CYSTOSCOPY INSERTION / REMOVAL STENT / STONE Right 11/1/2017    CYSTOSCOPY STENT INSERTION performed by Eleazar Jeffrey MD at [facility] / [place] / [place] N/A 11/3/2017    CYSTOSCOPY STENT INSERTION performed by Eleazar Jeffrey MD at [place] [address]  11/03/2017    with stent placement on right side.  Changed schulz catheter    HERNIA REPAIR      umbilical 3066    INTRACAPSULAR CATARACT EXTRACTION Left 9/28/2020    EYE CATARACT EMULSIFICATION IOL IMPLANT performed by Teodoro Young DO at [address]      right knee march 22, 2012    JOINT REPLACEMENT Right junu 2014 partial knee    JOINT REPLACEMENT Right 2014    complete removal    KNEE SURGERY Right     x 2    LITHOTRIPSY Right 11/01/2017    with stent placement - Dr. Sorin Nances LITHOTRIPSY Left 01/16/2018    AK CYSTO/URETERO/PYELOSCOPY W/LITHOTRIPSY Right 11/1/2017    CYSTOSCOPY URETEROSCOPY LASER-WITH HLL performed by Eleazar Jeffrey MD at [facility] [address] ESWL Left 1/16/2018    ESWL EXTRACORPEAL SHOCK WAVE LITHOTRIPSY performed by Eleazar Jeffrey MD at [address] [place] [address]  01/2017    [facility]    TURP  01/2015    [facility]    Social History:  Social History     Tobacco Use    Smoking status: Former Smoker     Packs/day: 0.50     Years: 3.00     Pack years: 1.50    Smokeless tobacco: Former User Quit date: 1/1/1960    Tobacco comment: quit 50 years ago   Vaping Use    Vaping Use: Never used   Substance Use Topics    Alcohol use: No    Drug use: No        CURRENT MEDICATIONS:        Outpatient Medications Marked as Taking for the 5/23/22 encounter (Office Visit) with Taina Hartmann PA-C   Medication Sig Dispense Refill    albuterol (ACCUNEB) 1.25 MG/3ML nebulizer solution Inhale 1 ampule into the lungs every 6 hours as needed for Wheezing      losartan (COZAAR) 50 MG tablet TAKE 1 TABLET BY MOUTH ONCE DAILY      metoprolol succinate (TOPROL XL) 25 MG extended release tablet Take 1 tablet by mouth daily 30 tablet 3    furosemide (LASIX) 40 MG tablet Take 40 mg by mouth 2 times daily      montelukast (SINGULAIR) 10 MG tablet Take 10 mg by mouth nightly      simvastatin (ZOCOR) 20 MG tablet TAKE 1 TABLET BY MOUTH ONCE DAILY      metFORMIN (GLUCOPHAGE) 850 MG tablet Take 850 mg by mouth 2 times daily (with meals)       Pediatric Multivitamins-Fl (MULTI VIT/FL PO) Take 1 tablet by mouth daily Daily       aspirin 81 MG tablet Take 81 mg by mouth daily. FAMILY HISTORY: family history includes Heart Failure in his brother. Physical Examination:     /69 (Site: Right Upper Arm, Position: Sitting, Cuff Size: Large Adult)   Pulse 109   Resp 18   Ht 5' 10\" (1.778 m)   Wt 220 lb 3.2 oz (99.9 kg)   SpO2 97%   BMI 31.60 kg/m²  Body mass index is 31.6 kg/m². Constitutional: He is oriented to person, place, and time. He appears well-developed and well-nourished. In no acute distress. HEENT: Normocephalic and atraumatic. No JVD present. Carotid bruit is not present. No mass and no thyromegaly present. No lymphadenopathy present. Cardiovascular: Tachycardic rate, regular rhythm, normal heart sounds. Exam reveals no gallop and no friction rubs. No murmur was heard. Pulmonary/Chest: Effort normal and breath sounds normal. No respiratory distress. He has no wheezes, rhonchi or rales. Abdominal: Soft, non-tender. Bowel sounds and aorta are normal. He exhibits no organomegaly, mass or bruit. Extremities: Trace-1+ 1/2 up to the knees left worse than right. No cyanosis or clubbing. 2+ radial and carotid pulses. Distal extremity pulses: Not palpable left, right 1+. Neurological: He is alert and oriented to person, place, and time. No evidence of gross cranial nerve deficit. Coordination appeared normal.   Skin: Skin is warm and dry. There is no rash or diaphoresis. Psychiatric: He has a normal mood and affect. His speech is normal and behavior is normal.      MOST RECENT LABS ON RECORD:   Lab Results   Component Value Date    WBC 9.7 04/29/2022    HGB 14.2 04/29/2022    HCT 42.9 04/29/2022     04/29/2022    ALT 20 04/29/2022    AST 20 04/29/2022     04/29/2022    K 5.0 04/29/2022     04/29/2022    CREATININE 1.67 (H) 04/29/2022    BUN 27 (H) 04/29/2022    CO2 23 04/29/2022    INR 1.0 03/03/2017       ASSESSMENT:      Diagnosis Orders   1. SOB (shortness of breath)  EKG 12 lead    Stress test, lexiscan    Echo 2D w doppler w color complete    Continuous cardiac monitoring, >2 up to 14 days    Basic Metabolic Panel    Lipid Panel    metoprolol succinate (TOPROL XL) 25 MG extended release tablet   2. Primary hypertension  EKG 12 lead    Stress test, lexiscan    Echo 2D w doppler w color complete    Continuous cardiac monitoring, >2 up to 14 days    Basic Metabolic Panel    Lipid Panel    metoprolol succinate (TOPROL XL) 25 MG extended release tablet   3. Abnormal EKG  EKG 12 lead    Stress test, lexiscan    Echo 2D w doppler w color complete    Continuous cardiac monitoring, >2 up to 14 days    Basic Metabolic Panel    Lipid Panel    metoprolol succinate (TOPROL XL) 25 MG extended release tablet   4.  Tachycardia  EKG 12 lead    Stress test, lexiscan    Echo 2D w doppler w color complete    Continuous cardiac monitoring, >2 up to 14 days    Basic Metabolic Panel    Lipid Panel choosing, unfortunately, if the lab work was not performed at a [facility]) facility I could not guarantee my ability to follow up with them on their results. , I ordered a echocardiogram to better assess for the etiology of this problem and to help guide future management and Because current signs and symptoms can certainly be caused by significant coronary artery disease, I ordered a Regadenoson (Lexiscan) stress test with SPECT imaging to try and rule out this possibility.  I asked them to keep log of BP and call office with number.  Abnormal EKG: Tachycardia with 1st degree AV block, Right bundle branch block, left anterior fascicular block: Probably symptomatic  · Beta Blocker: START Metoprolol succinate (Toprol XL) 25 mg daily. I also discussed the potential side effects of this medication including lightheadedness and dizziness and instructed them to stop the medication of this occurs and call our office if this occurs. · Calcium Channel Blocker: Not indicated at this time. · Additional Testing List: I ordered an CAM MONITOR for one week to try and pinpoint the etiology of their symptoms     · Generalized fatigue:  · I spent about 10 minutes talking with Mr. Betsy Russ  about what I expect is at least mild to moderate deconditioning. I explained that if he is only doing the minimum necessary to accomplish his daily activities of living, it will be normal to expect shortness of breath whenever he does more activity. I went on to say that although he may be very happy with his current quality of life, if he were to develop pneumonia, a unexpected surgery, or an unexpected bone fracture, this would likely lead to significant worsening deconditioning and may very well make him unable to perform these, and activities of daily living and therefore take away his independence.   Therefore although ideally I would suggest that he exercise for 30-40 minutes 5 days a week, I think that if he would just exercise 3 days a week for 30-40 minutes this would not only help maintain his current quality of life but would give him some reserve if and more likely when he develops some unexpected illness. · Because of this history, I ordered a echocardiogram to better assess the severity of this problem. · Additional Testing List: Additional Testing List: Because current signs and symptoms can certainly be caused by significant coronary artery disease, I ordered a Regadenoson (Lexiscan) stress test with SPECT imaging to try and rule out this possibility. · Peripheral vascular disease: Weak pulse on right (1+) no palpable pulse on the left, left leg cold to touch. · I ordered YURY studies to be done     In the meantime, I encouraged Mr. Diallo to continue to take his other medications. I discussed patient's symptoms and treatment plan with Dr Matilda Cabrera, he was in agreement with the plan and follow up. FOLLOW UP:   I told Mr. [facility] to call my office if he had any problems, but otherwise I asked him to Return in about 6 weeks (around 7/4/2022). However, I would be happy to see him sooner should the need arise. Sincerely,  Ezra Morales PA-C  [facility] Cardiology Specialist    [address] [facility], [address] [place] [address]  Phone: [phone], Fax: [phone]     I believe that the risk of significant morbidity and mortality related to the patient's current medical conditions are: intermediate-high. Approximately 50 minutes were spent during prep work, discussion and exam of the patient, and follow up documentation and all of their questions were answered. The documentation recorded by the scribe, accurately and completely reflects the services I personally performed and the decisions made by me.  Ezra Morales PA-C May 23, 2022

## 2022-05-23 NOTE — PATIENT INSTRUCTIONS
SURVEY:    You may be receiving a survey from [employer] regarding your visit today. Please complete the survey to enable us to provide the highest quality of care to you and your family. If you cannot score us a very good on any question, please call the office to discuss how we could have made your experience a very good one. Thank you.

## 2022-05-24 ENCOUNTER — TELEPHONE (OUTPATIENT)
Dept: CARDIOLOGY | Age: 82
End: 2022-05-24

## 2022-05-31 ENCOUNTER — HOSPITAL ENCOUNTER (OUTPATIENT)
Dept: VASCULAR LAB | Age: 82
Discharge: HOME | End: 2022-06-02
Payer: MEDICARE

## 2022-05-31 DIAGNOSIS — I73.9 PVD (PERIPHERAL VASCULAR DISEASE) (HCC): ICD-10-CM

## 2022-05-31 PROCEDURE — 93922 UPR/L XTREMITY ART 2 LEVELS: CPT

## 2022-06-01 ENCOUNTER — TELEPHONE (OUTPATIENT)
Dept: CARDIOLOGY | Age: 82
End: 2022-06-01

## 2022-06-03 ENCOUNTER — HOSPITAL ENCOUNTER (OUTPATIENT)
Age: 82
Discharge: HOME | End: 2022-06-05
Payer: MEDICARE

## 2022-06-03 ENCOUNTER — HOSPITAL ENCOUNTER (OUTPATIENT)
Dept: GENERAL RADIOLOGY | Age: 82
Discharge: HOME | End: 2022-06-05
Payer: MEDICARE

## 2022-06-03 DIAGNOSIS — N20.0 KIDNEY STONES: ICD-10-CM

## 2022-06-03 PROCEDURE — 74018 RADEX ABDOMEN 1 VIEW: CPT

## 2022-06-06 ENCOUNTER — HOSPITAL ENCOUNTER (OUTPATIENT)
Dept: NON INVASIVE DIAGNOSTICS | Age: 82
Discharge: HOME | End: 2022-06-06
Payer: MEDICARE

## 2022-06-06 DIAGNOSIS — I73.9 PVD (PERIPHERAL VASCULAR DISEASE) (HCC): ICD-10-CM

## 2022-06-06 DIAGNOSIS — R94.31 ABNORMAL EKG: ICD-10-CM

## 2022-06-06 DIAGNOSIS — I10 PRIMARY HYPERTENSION: ICD-10-CM

## 2022-06-06 DIAGNOSIS — R06.02 SOB (SHORTNESS OF BREATH): ICD-10-CM

## 2022-06-06 DIAGNOSIS — R00.0 TACHYCARDIA: ICD-10-CM

## 2022-06-06 DIAGNOSIS — R53.83 OTHER FATIGUE: ICD-10-CM

## 2022-06-06 PROCEDURE — 93017 CV STRESS TEST TRACING ONLY: CPT

## 2022-06-06 PROCEDURE — 3430000000 HC RX DIAGNOSTIC RADIOPHARMACEUTICAL: Performed by: PHYSICIAN ASSISTANT

## 2022-06-06 PROCEDURE — A9500 TC99M SESTAMIBI: HCPCS | Performed by: PHYSICIAN ASSISTANT

## 2022-06-06 PROCEDURE — 6360000002 HC RX W HCPCS: Performed by: FAMILY MEDICINE

## 2022-06-06 RX ADMIN — Medication 30 MILLICURIE: at 10:41

## 2022-06-06 RX ADMIN — REGADENOSON 0.4 MG: 0.08 INJECTION, SOLUTION INTRAVENOUS at 10:54

## 2022-06-06 NOTE — PROCEDURES
[facility], [address] [place] [address]                                 EVENT MONITOR    PATIENT NAME: Jess Romero                   :        1940  MED REC NO:   [id number]                              ROOM:  ACCOUNT NO:   [de-identified]                           ADMIT DATE: 2022  PROVIDER:     Dolores Clark MD    CARDIOVASCULAR DIAGNOSTIC DEPARTMENT    DATE OF STUDY:  2022    ORDERING PROVIDER:  Martín Cook PA-C    PRIMARY CARE PROVIDER:  Shukri Zepeda MD    INTERPRETING PHYSICIAN: Torin Hickey. Junito Miles MD    DIAGNOSIS:  Paroxysmal tachycardia, unspecified. PHYSICIAN INTERPRETATION:  Findings Summary  1. 6 days and 23 hours recorded. 2. Baseline rhythm is sinus with average heart rate of 80 bpm, ranging  between 59 and 128 bpm.  3. Sinus tachycardia represented 4% of the study duration. 4. Occasional premature atrial contractions, PACs burden 2% with  occasional pairs. No atrial runs. 5. Rare isolated premature ventricular contractions noted. 6. No patient activated events recorded. 7. Overall fairly unremarkable study.         Waqar Mo MD    D: 2022 12:15:53       T: 2022 12:17:05     ARLEEN/COOPER_ADRIANA  Job#: [id number]     Doc#: Unknown    CC:  KRISTOFER Underwood MD

## 2022-06-07 ENCOUNTER — HOSPITAL ENCOUNTER (OUTPATIENT)
Dept: NON INVASIVE DIAGNOSTICS | Age: 82
Discharge: HOME | End: 2022-06-07
Payer: MEDICARE

## 2022-06-07 ENCOUNTER — HOSPITAL ENCOUNTER (OUTPATIENT)
Age: 82
Setting detail: SPECIMEN
Discharge: HOME | End: 2022-06-07
Payer: MEDICARE

## 2022-06-07 ENCOUNTER — OFFICE VISIT (OUTPATIENT)
Dept: UROLOGY | Age: 82
End: 2022-06-07
Payer: MEDICARE

## 2022-06-07 VITALS
SYSTOLIC BLOOD PRESSURE: 126 MMHG | HEART RATE: 85 BPM | HEIGHT: 70 IN | DIASTOLIC BLOOD PRESSURE: 81 MMHG | BODY MASS INDEX: 32.21 KG/M2 | WEIGHT: 225 LBS

## 2022-06-07 DIAGNOSIS — R33.9 INCOMPLETE BLADDER EMPTYING: ICD-10-CM

## 2022-06-07 DIAGNOSIS — N18.9 ACUTE KIDNEY INJURY SUPERIMPOSED ON CHRONIC KIDNEY DISEASE (HCC): ICD-10-CM

## 2022-06-07 DIAGNOSIS — N40.1 BPH WITH OBSTRUCTION/LOWER URINARY TRACT SYMPTOMS: ICD-10-CM

## 2022-06-07 DIAGNOSIS — N13.8 BPH WITH OBSTRUCTION/LOWER URINARY TRACT SYMPTOMS: ICD-10-CM

## 2022-06-07 DIAGNOSIS — N17.9 ACUTE KIDNEY INJURY SUPERIMPOSED ON CHRONIC KIDNEY DISEASE (HCC): ICD-10-CM

## 2022-06-07 DIAGNOSIS — N18.32 STAGE 3B CHRONIC KIDNEY DISEASE (HCC): ICD-10-CM

## 2022-06-07 DIAGNOSIS — N20.0 KIDNEY STONES: Primary | ICD-10-CM

## 2022-06-07 DIAGNOSIS — N32.0 BNC (BLADDER NECK CONTRACTURE): ICD-10-CM

## 2022-06-07 PROCEDURE — 1123F ACP DISCUSS/DSCN MKR DOCD: CPT | Performed by: NURSE PRACTITIONER

## 2022-06-07 PROCEDURE — 99211 OFF/OP EST MAY X REQ PHY/QHP: CPT

## 2022-06-07 PROCEDURE — 78452 HT MUSCLE IMAGE SPECT MULT: CPT

## 2022-06-07 PROCEDURE — A9500 TC99M SESTAMIBI: HCPCS | Performed by: PHYSICIAN ASSISTANT

## 2022-06-07 PROCEDURE — 87077 CULTURE AEROBIC IDENTIFY: CPT

## 2022-06-07 PROCEDURE — 1036F TOBACCO NON-USER: CPT | Performed by: NURSE PRACTITIONER

## 2022-06-07 PROCEDURE — 51798 US URINE CAPACITY MEASURE: CPT | Performed by: NURSE PRACTITIONER

## 2022-06-07 PROCEDURE — G8417 CALC BMI ABV UP PARAM F/U: HCPCS | Performed by: NURSE PRACTITIONER

## 2022-06-07 PROCEDURE — 3430000000 HC RX DIAGNOSTIC RADIOPHARMACEUTICAL: Performed by: PHYSICIAN ASSISTANT

## 2022-06-07 PROCEDURE — 87186 SC STD MICRODIL/AGAR DIL: CPT

## 2022-06-07 PROCEDURE — G8427 DOCREV CUR MEDS BY ELIG CLIN: HCPCS | Performed by: NURSE PRACTITIONER

## 2022-06-07 PROCEDURE — 87086 URINE CULTURE/COLONY COUNT: CPT

## 2022-06-07 PROCEDURE — PBSHW PBB SHADOW CHARGE: Performed by: NURSE PRACTITIONER

## 2022-06-07 PROCEDURE — 99214 OFFICE O/P EST MOD 30 MIN: CPT | Performed by: NURSE PRACTITIONER

## 2022-06-07 RX ORDER — TAMSULOSIN HYDROCHLORIDE 0.4 MG/1
0.4 CAPSULE ORAL DAILY
Qty: 30 CAPSULE | Refills: 1 | Status: SHIPPED | OUTPATIENT
Start: 2022-06-07 | End: 2022-08-01 | Stop reason: SDUPTHER

## 2022-06-07 RX ADMIN — Medication 30 MILLICURIE: at 13:57

## 2022-06-07 ASSESSMENT — ENCOUNTER SYMPTOMS
SHORTNESS OF BREATH: 0
BACK PAIN: 0
WHEEZING: 0
COLOR CHANGE: 0
NAUSEA: 0
ABDOMINAL PAIN: 0
EYE REDNESS: 0
CONSTIPATION: 0
COUGH: 0
VOMITING: 0

## 2022-06-07 NOTE — PROGRESS NOTES
HPI:          Patient is a [de-identified] y.o. male in no acute distress. He is alert and oriented to person, place, and time. History  1/2015 [facility] PVP      PVR remained elevated post-op, but was improving: Feb 839 mL, March 600 mL, April 505 mL, July 452 mL, Oct 252 mL.      7/2015 Flomax stopped     10/2016 PVR back up, >850 mL. Does not feel full or uncomfortable. He did self cath previously. He does report intermittent urine stream about half the time, mild straining. Offered option of resuming Flomax or resuming self cath qhs. Prefered to start self cath.      1/2017 Gross hematuria and some difficulty with cathing. Cysto showed small caliber BNC.      1/2017 [facility] PVP and TUIBNC      11/2017 Right HLL      1/2018 Left ESWL    Today  Here today to follow-up for stones, hematuria, BPH and BNC. He urinates every 4 hours during the day and urinates approximately 300-500 mL each time. He is cathing once per night without difficulty. He does have a spraying stream when he urinates. He recently saw nephrology. They instructed him to increase his cath frequency to 4 times per day. Recent creatinine was 1.93. PVR today in the office is 400 mL. This is stable for patient. He did have a KUB completed prior to today's visit. This film was independently reviewed and does show a 1 cm left renal stone. This is unchanged from prior imaging. He denies any flank or abdominal pain. He did have a urinary tract infection on 4/29/2022. Urine culture showed Enterococcus.   Past Medical History:   Diagnosis Date    Acute renal failure superimposed on stage 3 chronic kidney disease ([address] 3/1/2017    Arthritis     Diabetes mellitus ([address]     Hyperlipidemia     Hypertension     Kidney stone      Past Surgical History:   Procedure Laterality Date    CATARACT REMOVAL WITH IMPLANT Right 8/26/2013    CYSTOSCOPY INSERTION / REMOVAL STENT / STONE Right 11/1/2017    CYSTOSCOPY STENT INSERTION performed by Gómez Moreno Pillo Peterson MD at [address] / REMOVAL STENT / Philip Licona N/A 11/3/2017    [facility]  11/03/2017    with stent placement on right side. Changed schulz catheter    HERNIA REPAIR      umbilical 9941    INTRACAPSULAR CATARACT EXTRACTION Left 9/28/2020    EYE CATARACT EMULSIFICATION IOL IMPLANT performed by Timmy Maldonado DO at [address]      right knee march 22, 2012    JOINT REPLACEMENT Right junu 2014 partial knee    JOINT REPLACEMENT Right 2014    complete removal    KNEE SURGERY Right     x 2    LITHOTRIPSY Right 11/01/2017    with stent placement - Dr. Bonifacio Penn LITHOTRIPSY Left 01/16/2018    CO CYSTO/URETERO/PYELOSCOPY W/LITHOTRIPSY Right 11/1/2017    CYSTOSCOPY URETEROSCOPY LASER-WITH HLL [facility] [address] [facility] Left 1/16/2018    ESWL [address] LITHOTRIPSY performed by Ernie Friedman MD at [address] [facility] TUR  01/2017    [facility]    TURP  01/2015    [facility]     Outpatient Encounter Medications as of 6/7/2022   Medication Sig Dispense Refill    albuterol (ACCUNEB) 1.25 MG/3ML nebulizer solution Inhale 1 ampule into the lungs every 6 hours as needed for Wheezing      losartan (COZAAR) 50 MG tablet TAKE 1 TABLET BY MOUTH ONCE DAILY      metoprolol succinate (TOPROL XL) 25 MG extended release tablet Take 1 tablet by mouth daily 30 tablet 3    furosemide (LASIX) 40 MG tablet Take 20 mg by mouth 2 times daily       montelukast (SINGULAIR) 10 MG tablet Take 10 mg by mouth nightly      simvastatin (ZOCOR) 20 MG tablet TAKE 1 TABLET BY MOUTH ONCE DAILY      metFORMIN (GLUCOPHAGE) 850 MG tablet Take 850 mg by mouth 2 times daily (with meals)       Pediatric Multivitamins-Fl (MULTI VIT/FL PO) Take 1 tablet by mouth daily Daily       aspirin 81 MG tablet Take 81 mg by mouth daily.        No facility-administered encounter medications on file as of 6/7/2022. Current Outpatient Medications on File Prior to Visit   Medication Sig Dispense Refill    albuterol (ACCUNEB) 1.25 MG/3ML nebulizer solution Inhale 1 ampule into the lungs every 6 hours as needed for Wheezing      losartan (COZAAR) 50 MG tablet TAKE 1 TABLET BY MOUTH ONCE DAILY      metoprolol succinate (TOPROL XL) 25 MG extended release tablet Take 1 tablet by mouth daily 30 tablet 3    furosemide (LASIX) 40 MG tablet Take 20 mg by mouth 2 times daily       montelukast (SINGULAIR) 10 MG tablet Take 10 mg by mouth nightly      simvastatin (ZOCOR) 20 MG tablet TAKE 1 TABLET BY MOUTH ONCE DAILY      metFORMIN (GLUCOPHAGE) 850 MG tablet Take 850 mg by mouth 2 times daily (with meals)       Pediatric Multivitamins-Fl (MULTI VIT/FL PO) Take 1 tablet by mouth daily Daily       aspirin 81 MG tablet Take 81 mg by mouth daily. No current facility-administered medications on file prior to visit. Patient has no known allergies. Family History   Problem Relation Age of Onset    Heart Failure Brother      Social History     Tobacco Use   Smoking Status Former Smoker    Packs/day: 0.50    Years: 3.00    Pack years: 1.50   Smokeless Tobacco Former User    Quit date: 1/1/1960   Tobacco Comment    quit 50 years ago       Social History     Substance and Sexual Activity   Alcohol Use No       Review of Systems   Constitutional: Negative for appetite change, chills and fever. Eyes: Negative for redness and visual disturbance. Respiratory: Negative for cough, shortness of breath and wheezing. Cardiovascular: Negative for chest pain and leg swelling. Gastrointestinal: Negative for abdominal pain, constipation, nausea and vomiting.    Genitourinary: Negative for decreased urine volume, difficulty urinating, dysuria, enuresis, flank pain, frequency, hematuria, penile discharge, penile pain, scrotal swelling, testicular pain and urgency. Musculoskeletal: Negative for back pain, joint swelling and myalgias. Skin: Negative for color change, rash and wound. Neurological: Negative for dizziness, tremors and numbness. Hematological: Negative for adenopathy. Does not bruise/bleed easily. Ht 5' 10\" (1.778 m)   Wt 225 lb (102.1 kg)   BMI 32.28 kg/m²       PHYSICAL EXAM:  Constitutional: Patient in no acute distress; Neuro: alert and oriented to person place and time. Psych: Mood and affect normal.  Skin: Normal  Lungs: Respiratory effort normal  Cardiovascular:  Normal peripheral pulses  Abdomen: Soft, non-tender, non-distended with no CVA, flank pain  Bladder non-tender and not distended. Lab Results   Component Value Date    BUN 34 (H) 05/23/2022     Lab Results   Component Value Date    CREATININE 1.93 (H) 05/23/2022     No results found for: PSA    ASSESSMENT:   Diagnosis Orders   1. Kidney stones  CT ABDOMEN PELVIS WO CONTRAST Additional Contrast? None   2. Stage 3b chronic kidney disease (HCC)  CT ABDOMEN PELVIS WO CONTRAST Additional Contrast? None   3. Acute kidney injury superimposed on chronic kidney disease (HCC)  CT ABDOMEN PELVIS WO CONTRAST Additional Contrast? None   4. BNC (bladder neck contracture)     5. BPH with obstruction/lower urinary tract symptoms     6. Incomplete bladder emptying  Culture, Urine         PLAN:  Cancel ultrasound ordered by nephrology    CT abdomen and pelvis without contrast to evaluate known left renal stone due to worsening renal function    Start Flomax daily    Keep a voiding diary for 3 days. Record voided urine volumes and cath volumes. Maintaining intermittent catheterization at bedtime for now. Patient's PVR is stable and consistent with what his PVRs have been since 2015. I am not convinced that this is causing his acute kidney injury.     He will return for cystoscopy due to reports of a spraying stream and history of bladder neck contracture and recent worsening renal function

## 2022-06-08 PROCEDURE — 78452 HT MUSCLE IMAGE SPECT MULT: CPT | Performed by: FAMILY MEDICINE

## 2022-06-08 NOTE — PROCEDURES
electrocardiogram demonstrated normal  sinus rhythm without significant ST-segment abnormalities that may  impair accurate ECG detection of stress induced cardiac ischemia. During vasodilator infusion and during recovery, the patient developed:    No significant ST segment changes suggestive of myocardial ischemia with  no premature atrial contractions (PACs) and occasional premature  ventricular contractions (PVCs). NUCLEAR IMAGING RESULTS:  The overall quality of the study is good. Mild/moderate attenuation artifact was seen. There is no evidence of  abnormal lung uptake. Additionally, the right ventricle appears normal.  The left ventricular cavity is noted to be normal in size on the stress  images. There is no evidence of transient ischemic dilatation (TID) of  the left ventricle. Gated SPECT imaging demonstrates hypokinesis of the septal,  anteroseptal, apical and inferoapical region(s). The calculated left  ventricular ejection fraction was 27%. The rest images demonstrated a moderate/large perfusion abnormality of  severe intensity in the septal, apical, anteroapical, and inferoapical  region(s). On stress imaging, a moderate/large perfusion abnormality of severe  intensity in the  septal, apical, anteroapical, and inferoapical  region(s). IMPRESSION:  1. Abnormal myocardial perfusion study. 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 2.  Global left ventricular systolic function was abnormal with an EF of  27% with regional wall motion abnormalities. 3.  No significant electrocardiographic evidence of myocardial ischemia  during EKG monitoring without significant associated arrhythmias.     Overall, these results are most consistent with high risk for  significant coronary artery disease. Additional testing including cardiac catheterization may be indicated. The sensitivity for detecting ischemia on this test may have been  reduced due to the patient being on a beta blocker.            Pearl Rivera MD    D: 06/08/2022 10:59:19       T: 06/08/2022 11:06:43     [email or web address]  Job#: [id number]     Doc#: Unknown    CC:  KRISTOFER Cervantes MD

## 2022-06-09 ENCOUNTER — TELEPHONE (OUTPATIENT)
Dept: CARDIOLOGY | Age: 82
End: 2022-06-09

## 2022-06-09 ENCOUNTER — TELEPHONE (OUTPATIENT)
Dept: UROLOGY | Age: 82
End: 2022-06-09

## 2022-06-09 LAB
CULTURE: ABNORMAL
SPECIMEN DESCRIPTION: ABNORMAL

## 2022-06-09 RX ORDER — CEFDINIR 300 MG/1
300 CAPSULE ORAL 2 TIMES DAILY
Qty: 20 CAPSULE | Refills: 0 | Status: SHIPPED | OUTPATIENT
Start: 2022-06-09 | End: 2022-06-19

## 2022-06-09 NOTE — TELEPHONE ENCOUNTER
----- Message from Dread Arzola PA-C sent at 6/9/2022  8:19 AM EDT -----  Please let them know that their stress test was abnromal. Please make follow up in next 1 week. Thanks.

## 2022-06-09 NOTE — RESULT ENCOUNTER NOTE
Please let them know that their stress test was abnromal. Please make follow up in next 1 week. Thanks.

## 2022-06-09 NOTE — TELEPHONE ENCOUNTER
UACS is positive for infection. I sent in culture specific omnicef. Take this antibiotic until gone. Take this with food and eat yogurt once per day to prevent GI upset. If you develop nausea, vomiting, or fevers call the office or go to the ER. If your urinary symptoms do not improve once completing the antibiotics call our office.

## 2022-06-09 NOTE — TELEPHONE ENCOUNTER
----- Message from Feliz Claudio PA-C sent at 6/9/2022  9:07 AM EDT -----  Please let them know that their CAM monitor was overall unremarkable. We will discuss at their follow up appointment.

## 2022-06-17 ENCOUNTER — HOSPITAL ENCOUNTER (OUTPATIENT)
Age: 82
Discharge: HOME | End: 2022-06-17
Payer: MEDICARE

## 2022-06-17 ENCOUNTER — OFFICE VISIT (OUTPATIENT)
Dept: CARDIOLOGY | Age: 82
End: 2022-06-17
Payer: MEDICARE

## 2022-06-17 ENCOUNTER — TELEPHONE (OUTPATIENT)
Dept: CARDIOLOGY | Age: 82
End: 2022-06-17

## 2022-06-17 VITALS
DIASTOLIC BLOOD PRESSURE: 67 MMHG | OXYGEN SATURATION: 97 % | WEIGHT: 220 LBS | RESPIRATION RATE: 18 BRPM | SYSTOLIC BLOOD PRESSURE: 105 MMHG | BODY MASS INDEX: 31.5 KG/M2 | HEART RATE: 83 BPM | HEIGHT: 70 IN

## 2022-06-17 DIAGNOSIS — R94.39 ABNORMAL STRESS TEST: Primary | ICD-10-CM

## 2022-06-17 DIAGNOSIS — I73.9 PVD (PERIPHERAL VASCULAR DISEASE) (HCC): ICD-10-CM

## 2022-06-17 DIAGNOSIS — R94.31 ABNORMAL EKG: ICD-10-CM

## 2022-06-17 DIAGNOSIS — R94.39 ABNORMAL STRESS TEST: ICD-10-CM

## 2022-06-17 DIAGNOSIS — I10 PRIMARY HYPERTENSION: ICD-10-CM

## 2022-06-17 DIAGNOSIS — R06.02 SOB (SHORTNESS OF BREATH): ICD-10-CM

## 2022-06-17 LAB
ANION GAP SERPL CALCULATED.3IONS-SCNC: 13 MMOL/L (ref 9–17)
BUN BLDV-MCNC: 23 MG/DL (ref 8–23)
BUN/CREAT BLD: 13 (ref 9–20)
CALCIUM SERPL-MCNC: 9.4 MG/DL (ref 8.6–10.4)
CHLORIDE BLD-SCNC: 102 MMOL/L (ref 98–107)
CO2: 23 MMOL/L (ref 20–31)
CREAT SERPL-MCNC: 1.79 MG/DL (ref 0.7–1.2)
GFR AFRICAN AMERICAN: 45 ML/MIN
GFR NON-AFRICAN AMERICAN: 37 ML/MIN
GFR SERPL CREATININE-BSD FRML MDRD: ABNORMAL ML/MIN/{1.73_M2}
GFR SERPL CREATININE-BSD FRML MDRD: ABNORMAL ML/MIN/{1.73_M2}
GLUCOSE BLD-MCNC: 120 MG/DL (ref 70–99)
HCT VFR BLD CALC: 43.5 % (ref 40.7–50.3)
HEMOGLOBIN: 14.4 G/DL (ref 13–17)
MCH RBC QN AUTO: 32 PG (ref 25.2–33.5)
MCHC RBC AUTO-ENTMCNC: 33.1 G/DL (ref 28.4–34.8)
MCV RBC AUTO: 96.7 FL (ref 82.6–102.9)
NRBC AUTOMATED: 0 PER 100 WBC
PDW BLD-RTO: 13.2 % (ref 11.8–14.4)
PLATELET # BLD: 268 K/UL (ref 138–453)
PMV BLD AUTO: 9.9 FL (ref 8.1–13.5)
POTASSIUM SERPL-SCNC: 4.9 MMOL/L (ref 3.7–5.3)
RBC # BLD: 4.5 M/UL (ref 4.21–5.77)
SODIUM BLD-SCNC: 138 MMOL/L (ref 135–144)
WBC # BLD: 9.2 K/UL (ref 3.5–11.3)

## 2022-06-17 PROCEDURE — 1036F TOBACCO NON-USER: CPT | Performed by: PHYSICIAN ASSISTANT

## 2022-06-17 PROCEDURE — 99214 OFFICE O/P EST MOD 30 MIN: CPT | Performed by: PHYSICIAN ASSISTANT

## 2022-06-17 PROCEDURE — 85027 COMPLETE CBC AUTOMATED: CPT

## 2022-06-17 PROCEDURE — 80048 BASIC METABOLIC PNL TOTAL CA: CPT

## 2022-06-17 PROCEDURE — 36415 COLL VENOUS BLD VENIPUNCTURE: CPT

## 2022-06-17 PROCEDURE — 1123F ACP DISCUSS/DSCN MKR DOCD: CPT | Performed by: PHYSICIAN ASSISTANT

## 2022-06-17 PROCEDURE — 99211 OFF/OP EST MAY X REQ PHY/QHP: CPT | Performed by: PHYSICIAN ASSISTANT

## 2022-06-17 PROCEDURE — G8417 CALC BMI ABV UP PARAM F/U: HCPCS | Performed by: PHYSICIAN ASSISTANT

## 2022-06-17 PROCEDURE — G8427 DOCREV CUR MEDS BY ELIG CLIN: HCPCS | Performed by: PHYSICIAN ASSISTANT

## 2022-06-17 NOTE — TELEPHONE ENCOUNTER
----- Message from Bita Jain PA-C sent at 6/17/2022  2:22 PM EDT -----  Please notify patient that their lab results are normal.   Please continue current treatment and follow up.

## 2022-06-17 NOTE — PROGRESS NOTES
I, Tad Peterson am scribing for and in the presence of Taina Hartmann PA-C. Patient: Ramiro Warner  : 1940  Date of Visit: 2022    REASON FOR VISIT / CONSULTATION: Shortness of Breath (Hx:SOB,HTN,Abn EKG,Tachy, 1st Degree AV Block, RBBB,Fatigue,PVD. CAM & EKG . Stress on . he states he is doing okSOB unchanged. Denies:CP,lightheaded/dizziness,palp)      History of Present Illness:      Dear Becca Jaime MD    I had the pleasure of seeing Ramiro Warner, who is a [de-identified] y.o. male to establish care. He has two brothers with heart disease he is unsure of details or ages when the problems started. One brother has a pacemaker and the other brother has had open heart surgery. He believes they had heart attacks in the past. Also reports mother and father with CAD, he believes everyone was in their 66's prior to having any issues. He is former smoker quit over 60 years ago. He smoked 1/2 pack per day for 3 years. He was diagnosed with hypertension for only a few years and hyperlipidemia and diabetes. He denies having sleep apnea. He states until he was 81years old he never seen a provider and never went to the doctor's office. He has urinary retention and has to self cath once daily. EKG done 2022: Sinus tachycardia with 1st degree AV block. Left anterior fascicular block, right bundle branch block. Heart rate 101 bpm.    Stress test done on 2022- EF 27% Abnormal myocardial perfusion study. There is a moderate/large perfusion defect of severe intensity in the septal, apical, anteroapical  and inferoapical region(s) during stress and rest imaging which is most  consistent with an old myocardial infarction with edith-infarct ischemia. Overall, these results are most consistent with high risk for significant coronary artery disease. CAM done on 2022-   1. 6 days and 23 hours recorded.   2. Baseline rhythm is sinus with average heart rate of 80 bpm, ranging  between 59 and 128 bpm.  3. Sinus tachycardia represented 4% of the study duration. 4. Occasional premature atrial contractions, PACs burden 2% with  occasional pairs. No atrial runs. 5. Rare isolated premature ventricular contractions noted. 6. No patient activated events recorded. 7. Overall fairly unremarkable study    YURY done on 5/31/2022- Normal right YURY. Mild PAD suggested left lower extremity    Mr. Anirudh Dominique is here today for follow up after having testing done, he was brought back early due to having a high risk stress test. He states he is doing ok he has chronic stable shortness of breath. Denies chest pain, pressure or tightness. Denies palpitations, heart racing or skipping beats, lightheaded/dizziness. Weight is stable. He denies stomach pain, nausea or vomiting. Denies blood in urine or stools. His mobility is limited by knee pain bilaterally. PAST MEDICAL HISTORY:        Past Medical History:   Diagnosis Date    Acute renal failure superimposed on stage 3 chronic kidney disease ([facility] [address] 3/1/2017    Arthritis     Diabetes mellitus ([facility] [address]     Hyperlipidemia     Hypertension     Kidney stone        CURRENT ALLERGIES: Patient has no known allergies. REVIEW OF SYSTEMS: 14 systems were reviewed. Pertinent positives and negatives as above, all else negative. Past Surgical History:   Procedure Laterality Date    CATARACT REMOVAL WITH IMPLANT Right 8/26/2013    CYSTOSCOPY INSERTION / REMOVAL STENT / STONE Right 11/1/2017    CYSTOSCOPY STENT INSERTION [facility] by Marisel Reyna MD at [address] [place] [address] [facility] Talon Prieto N/A 11/3/2017    CYSTOSCOPY STENT INSERTION performed by Marisel Reyna MD at [address] [place] [address]  11/03/2017    with stent placement on right side.  Changed schulz catheter    HERNIA REPAIR      umbilical 5000    INTRACAPSULAR CATARACT EXTRACTION Left 9/28/2020    EYE CATARACT EMULSIFICATION IOL IMPLANT performed by Pastora Alvarez DO at [address]      right knee 2012    JOINT REPLACEMENT Right junu  partial knee    JOINT REPLACEMENT Right 2014    complete removal    KNEE SURGERY Right     x 2    LITHOTRIPSY Right 2017    with stent placement - Dr. Iza Reyna LITHOTRIPSY Left 2018    [place] CYSTO/URETERO/PYELOSCOPY W/LITHOTRIPSY Right 2017    CYSTOSCOPY URETEROSCOPY LASER-WITH HLL performed by Cindy Farfan MD at [address] ESWL Left 2018    ESWL [address] LITHOTRIPSY performed by Cindy Farfan MD at [address] TURP  2017    [employer] PVP and TUIBNC    TURP  2015    [employer] PVP    Social History:  Social History     Tobacco Use    Smoking status: Former Smoker     Packs/day: 0.50     Years: 3.00     Pack years: 1.50     Quit date: 1960     Years since quittin.5    Smokeless tobacco: Former User     Quit date: 1960    Tobacco comment: quit 50 years ago   Vaping Use    Vaping Use: Never used   Substance Use Topics    Alcohol use: No    Drug use: No        CURRENT MEDICATIONS:        Outpatient Medications Marked as Taking for the 22 encounter (Office Visit) with Misti Montero PA-C   Medication Sig Dispense Refill    cefdinir (OMNICEF) 300 MG capsule Take 1 capsule by mouth 2 times daily for 10 days 20 capsule 0    tamsulosin (FLOMAX) 0.4 mg capsule Take 1 capsule by mouth daily 30 capsule 1    albuterol (ACCUNEB) 1.25 MG/3ML nebulizer solution Inhale 1 ampule into the lungs every 6 hours as needed for Wheezing      losartan (COZAAR) 50 MG tablet TAKE 1 TABLET BY MOUTH ONCE DAILY      metoprolol succinate (TOPROL XL) 25 MG extended release tablet Take 1 tablet by mouth daily 30 tablet 3    furosemide (LASIX) 40 MG tablet Take 20 mg by mouth daily       montelukast (SINGULAIR) 10 MG tablet Take 10 mg by mouth nightly      simvastatin (ZOCOR) 20 MG tablet TAKE 1 TABLET BY MOUTH ONCE DAILY      metFORMIN (GLUCOPHAGE) 850 MG tablet Take 850 mg by mouth 2 times daily (with meals)       Pediatric Multivitamins-Fl (MULTI VIT/FL PO) Take 1 tablet by mouth daily Daily       aspirin 81 MG tablet Take 81 mg by mouth daily. FAMILY HISTORY: family history includes Heart Failure in his brother. Physical Examination:     /67 (Site: Right Upper Arm, Position: Sitting, Cuff Size: Medium Adult)   Pulse 83   Resp 18   Ht 5' 10\" (1.778 m)   Wt 220 lb (99.8 kg)   SpO2 97%   BMI 31.57 kg/m²  Body mass index is 31.57 kg/m². Constitutional: He is oriented to person, place, and time. He appears well-developed and well-nourished. In no acute distress. HEENT: Normocephalic and atraumatic. No JVD present. Carotid bruit is not present. No mass and no thyromegaly present. No lymphadenopathy present. Cardiovascular: Normal rate, regular rhythm, normal heart sounds. Exam reveals no gallop and no friction rubs. No murmur was heard. Pulmonary/Chest: Effort normal and breath sounds normal. No respiratory distress. He has no wheezes, rhonchi or rales. Abdominal: Soft, non-tender. Bowel sounds and aorta are normal. He exhibits no organomegaly, mass or bruit. Extremities: Trace-1+ 1/2 up to the knees left worse than right. No cyanosis or clubbing. 2+ radial and carotid pulses. Distal extremity pulses: Not palpable left, right 1+. Neurological: He is alert and oriented to person, place, and time. No evidence of gross cranial nerve deficit. Coordination appeared normal.   Skin: Skin is warm and dry. There is no rash or diaphoresis. Psychiatric: He has a normal mood and affect.  His speech is normal and behavior is normal.      MOST RECENT LABS ON RECORD:   Lab Results   Component Value Date    WBC 9.7 04/29/2022    HGB 14.2 04/29/2022    HCT 42.9 04/29/2022     04/29/2022    CHOL 131 05/23/2022    TRIG 175 (H) 05/23/2022    HDL 39 (L) 05/23/2022    ALT 20 04/29/2022    AST 20 04/29/2022     05/23/2022    K 4.7 05/23/2022    CL 97 (L) 05/23/2022    CREATININE 1.93 (H) 05/23/2022    BUN 34 (H) 05/23/2022    CO2 24 05/23/2022    INR 1.0 03/03/2017       ASSESSMENT:      Diagnosis Orders   1. Abnormal stress test  CBC    Basic Metabolic Panel    Referral to Cardiac Cath   2. SOB (shortness of breath)  CBC    Basic Metabolic Panel    Referral to Cardiac Cath   3. Primary hypertension  CBC    Basic Metabolic Panel    Referral to Cardiac Cath   4. Abnormal EKG  CBC    Basic Metabolic Panel    Referral to Cardiac Cath   5. PVD (peripheral vascular disease) ([facility] [address]  CBC    Basic Metabolic Panel    Referral to Cardiac Cath     PLAN:       Abnormal Stress Test:    For these reasons, I recommended that the patient consider undergoing a cardiac catheterization with coronary angiography to assess their coronary anatomy and facilitate better treatment recommendations. I discussed the risks, benefits, and alternatives to the procedure including the risk of bleeding, contrast induced allergy and/or kidney damage, arrythmia, stroke, heart attack, death, or the need for further procedures. I also discussed the fact that although treatment with simple medical management is a potential treatment option in place of cardiac catheterization, I expressed my opinion that cardiac catheterization in order to define their coronary anatomy and rule out severe 3 vessel or left main coronary artery disease would significant help guide the most appropriate treatment strategy ranging from no treatment, to medications, stents, to even coronary bypass surgery. The patient verbalized understanding of the risks benefits and alternatives and stated that they would like to undergo the procedure. We will plan to schedule the procedure here at [facility] on 6/23/2022.    Risk factors: smoking/ tobacco exposure      Medical Management for suspected Coronary artery disease and myocardial ischemia:   Antiplatelet Agent: Continue Aspirin 81 mg daily.  Beta Blocker: Continue Metoprolol succinate (Toprol XL) 25 mg daily.  Anti-anginal medications: Not indicated at this time.  Cholesterol Reduction Therapy: Continue simvastatin (Zocor) 20 mg daily.  Additional Testing List: I took the liberty of ordering a BMP for today to assess their potassium and renal function. I told them that they could get their lab work performed at the location of their choosing, unfortunately, if the lab work was not performed at a [facility]) facility I could not guarantee my ability to follow up with them on their results. and  I took the liberty of ordering a CBC. I told them that they could get their lab work performed at the location of their choosing, unfortunately, if the lab work was not performed at a [facility]) facility I could not guarantee my ability to follow up with them on their results. · Shortness of breath with mild exertion:   Heart catheterization as listed above. · Essential Hypertension: Controlled   Beta Blocker: Continue Metoprolol succinate (Toprol XL) 25 mg daily.  ACE Inibitor/ARB: Continue losartan (Cozaar) 50 mg daily.  Calcium Channel Blocker: Not indicated at this time.  Diuretics: Continue furosemide (Lasix) 40 mg every morning.  Additional Testing List: None    I asked them to keep log of BP and call office with number.  Abnormal EKG: Tachycardia with 1st degree AV block, Right bundle branch block, left anterior fascicular block: Probably symptomatic  · Beta Blocker: Continue Metoprolol succinate (Toprol XL) 25 mg daily. · Calcium Channel Blocker: Not indicated at this time. · He has his echo scheduled for 6/28, I asked them to keep this appointment. · Peripheral vascular disease: Weak pulse on right (1+) no palpable pulse on the left, left leg cold to touch. Recent YURY showed mild PAD on the left, no evidence of PAD on the right lower extremity. · Continue Aspirin and Statin. Finally, I recommended that he continue his other medications and follow up with you as previously scheduled. FOLLOW UP:   I told Mr. Scar [facility] to call my office if he had any problems, but otherwise I asked him to Return in about 3 weeks (around 7/8/2022). However, I would be happy to see him sooner should the need arise. Sincerely,  Gumaro Gunderson PA-C  [facility] Cardiology Specialist    [address], [place], [address] [place] [address]  Phone: [phone], Fax: [phone]     I believe that the risk of significant morbidity and mortality related to the patient's current medical conditions are: intermediate-high. Approximately 35 minutes were spent during prep work, discussion and exam of the patient, and follow up documentation and all of their questions were answered. The documentation recorded by the scribe, accurately and completely reflects the services I personally performed and the decisions made by me.  Gumaro Gunderson PA-C June 17, 2022

## 2022-06-22 ENCOUNTER — HOSPITAL ENCOUNTER (OUTPATIENT)
Dept: CT IMAGING | Age: 82
Discharge: HOME | End: 2022-06-24
Payer: MEDICARE

## 2022-06-22 ENCOUNTER — HOSPITAL ENCOUNTER (OUTPATIENT)
Age: 82
Discharge: HOME | End: 2022-06-22
Payer: MEDICARE

## 2022-06-22 DIAGNOSIS — N17.9 ACUTE KIDNEY INJURY SUPERIMPOSED ON CHRONIC KIDNEY DISEASE (HCC): ICD-10-CM

## 2022-06-22 DIAGNOSIS — N18.32 STAGE 3B CHRONIC KIDNEY DISEASE (HCC): ICD-10-CM

## 2022-06-22 DIAGNOSIS — N18.9 ACUTE KIDNEY INJURY SUPERIMPOSED ON CHRONIC KIDNEY DISEASE (HCC): ICD-10-CM

## 2022-06-22 DIAGNOSIS — N20.0 KIDNEY STONES: ICD-10-CM

## 2022-06-22 LAB
CREATININE URINE: 55.7 MG/DL
CREATININE, 24H UR: 1671 MG/24 H (ref 1040–2350)
HOURS COLLECTED: 24 H
VOLUME: 3000 ML

## 2022-06-22 PROCEDURE — 74176 CT ABD & PELVIS W/O CONTRAST: CPT

## 2022-06-22 PROCEDURE — 81050 URINALYSIS VOLUME MEASURE: CPT

## 2022-06-22 PROCEDURE — 82570 ASSAY OF URINE CREATININE: CPT

## 2022-06-23 ENCOUNTER — HOSPITAL ENCOUNTER (OUTPATIENT)
Dept: CARDIAC CATH/INVASIVE PROCEDURES | Age: 82
Discharge: HOME | End: 2022-06-23
Attending: FAMILY MEDICINE | Admitting: FAMILY MEDICINE
Payer: MEDICARE

## 2022-06-23 VITALS
HEART RATE: 72 BPM | OXYGEN SATURATION: 96 % | RESPIRATION RATE: 18 BRPM | DIASTOLIC BLOOD PRESSURE: 67 MMHG | BODY MASS INDEX: 31.5 KG/M2 | SYSTOLIC BLOOD PRESSURE: 148 MMHG | WEIGHT: 220 LBS | TEMPERATURE: 97.7 F | HEIGHT: 70 IN

## 2022-06-23 DIAGNOSIS — R06.02 SHORTNESS OF BREATH: Primary | ICD-10-CM

## 2022-06-23 PROCEDURE — 6360000002 HC RX W HCPCS

## 2022-06-23 PROCEDURE — 6360000004 HC RX CONTRAST MEDICATION: Performed by: FAMILY MEDICINE

## 2022-06-23 PROCEDURE — 93458 L HRT ARTERY/VENTRICLE ANGIO: CPT | Performed by: FAMILY MEDICINE

## 2022-06-23 PROCEDURE — 2580000003 HC RX 258: Performed by: FAMILY MEDICINE

## 2022-06-23 PROCEDURE — 2500000003 HC RX 250 WO HCPCS

## 2022-06-23 PROCEDURE — C1769 GUIDE WIRE: HCPCS

## 2022-06-23 PROCEDURE — C1887 CATHETER, GUIDING: HCPCS

## 2022-06-23 PROCEDURE — 93458 L HRT ARTERY/VENTRICLE ANGIO: CPT

## 2022-06-23 PROCEDURE — C1894 INTRO/SHEATH, NON-LASER: HCPCS

## 2022-06-23 PROCEDURE — 2709999900 HC NON-CHARGEABLE SUPPLY

## 2022-06-23 RX ORDER — SODIUM CHLORIDE 0.9 % (FLUSH) 0.9 %
5-40 SYRINGE (ML) INJECTION EVERY 12 HOURS SCHEDULED
Status: DISCONTINUED | OUTPATIENT
Start: 2022-06-23 | End: 2022-06-23 | Stop reason: HOSPADM

## 2022-06-23 RX ORDER — SODIUM CHLORIDE 0.9 % (FLUSH) 0.9 %
5-40 SYRINGE (ML) INJECTION PRN
Status: DISCONTINUED | OUTPATIENT
Start: 2022-06-23 | End: 2022-06-23 | Stop reason: HOSPADM

## 2022-06-23 RX ORDER — DIPHENHYDRAMINE HCL 50 MG
50 CAPSULE ORAL ONCE
Status: DISCONTINUED | OUTPATIENT
Start: 2022-06-23 | End: 2022-06-23 | Stop reason: HOSPADM

## 2022-06-23 RX ORDER — SODIUM CHLORIDE 9 MG/ML
INJECTION, SOLUTION INTRAVENOUS PRN
Status: DISCONTINUED | OUTPATIENT
Start: 2022-06-23 | End: 2022-06-23 | Stop reason: HOSPADM

## 2022-06-23 RX ORDER — SODIUM CHLORIDE 9 MG/ML
INJECTION, SOLUTION INTRAVENOUS CONTINUOUS
Status: DISCONTINUED | OUTPATIENT
Start: 2022-06-23 | End: 2022-06-23 | Stop reason: HOSPADM

## 2022-06-23 RX ORDER — ACETAMINOPHEN 325 MG/1
650 TABLET ORAL EVERY 4 HOURS PRN
Status: DISCONTINUED | OUTPATIENT
Start: 2022-06-23 | End: 2022-06-23 | Stop reason: HOSPADM

## 2022-06-23 RX ORDER — NITROGLYCERIN 0.4 MG/1
0.4 TABLET SUBLINGUAL EVERY 5 MIN PRN
Status: DISCONTINUED | OUTPATIENT
Start: 2022-06-23 | End: 2022-06-23 | Stop reason: HOSPADM

## 2022-06-23 RX ADMIN — SODIUM CHLORIDE: 9 INJECTION, SOLUTION INTRAVENOUS at 11:53

## 2022-06-23 RX ADMIN — IOPAMIDOL 40 ML: 755 INJECTION, SOLUTION INTRAVENOUS at 13:01

## 2022-06-23 NOTE — OP NOTE
-  Coronary Angiography Brief Post Operative Note:    Severe two vessel coronary artery disease involving a 80% ostial stenosis in a fairly small OM2 branch of the Cx, a proximal 80% stenosis in a fairly small D1 branch of the LAD and a mid 100% stenosis in the left anterior descending coronary artery. Normal left ventricular end diastolic pressure. Proceed with a trial of guideline directed maximal medical management. However, if the patients symptoms persist, reconsidering angioplasty and/or stenting at that time may be reasonable.      Electronically signed by Marck Tabor MD on 6/23/2022 at 12:40 PM

## 2022-06-23 NOTE — PROGRESS NOTES
Patient discharged to home with wife and daughter per scooter. Patient ambulatory with walker and has all belongings.

## 2022-06-28 ENCOUNTER — HOSPITAL ENCOUNTER (OUTPATIENT)
Dept: NON INVASIVE DIAGNOSTICS | Age: 82
Discharge: HOME | End: 2022-06-28
Payer: MEDICARE

## 2022-06-28 DIAGNOSIS — I10 PRIMARY HYPERTENSION: ICD-10-CM

## 2022-06-28 DIAGNOSIS — R00.0 TACHYCARDIA: ICD-10-CM

## 2022-06-28 DIAGNOSIS — I73.9 PVD (PERIPHERAL VASCULAR DISEASE) (HCC): ICD-10-CM

## 2022-06-28 DIAGNOSIS — R53.83 OTHER FATIGUE: ICD-10-CM

## 2022-06-28 DIAGNOSIS — R94.31 ABNORMAL EKG: ICD-10-CM

## 2022-06-28 DIAGNOSIS — R06.02 SOB (SHORTNESS OF BREATH): ICD-10-CM

## 2022-06-28 LAB
LV EF: 20 %
LVEF MODALITY: NORMAL

## 2022-06-28 PROCEDURE — 93306 TTE W/DOPPLER COMPLETE: CPT

## 2022-06-28 PROCEDURE — C8929 TTE W OR WO FOL WCON,DOPPLER: HCPCS

## 2022-06-28 PROCEDURE — 6360000004 HC RX CONTRAST MEDICATION: Performed by: PHYSICIAN ASSISTANT

## 2022-06-28 RX ADMIN — PERFLUTREN 1.65 MG: 6.52 INJECTION, SUSPENSION INTRAVENOUS at 09:02

## 2022-06-29 ENCOUNTER — TELEPHONE (OUTPATIENT)
Dept: CARDIOLOGY | Age: 82
End: 2022-06-29

## 2022-06-29 NOTE — TELEPHONE ENCOUNTER
----- Message from Taina Hartmann PA-C sent at 6/29/2022  8:08 AM EDT -----  Please let them know their echo was abnormal. EF is reduced to 20% please schedule follow up sooner to discuss life vest. Thanks

## 2022-06-29 NOTE — RESULT ENCOUNTER NOTE
Please let them know their echo was abnormal. EF is reduced to 20% please schedule follow up sooner to discuss life vest. Thanks

## 2022-07-01 ENCOUNTER — OFFICE VISIT (OUTPATIENT)
Dept: CARDIOLOGY | Age: 82
End: 2022-07-01
Payer: MEDICARE

## 2022-07-01 VITALS
HEART RATE: 80 BPM | SYSTOLIC BLOOD PRESSURE: 121 MMHG | WEIGHT: 220.6 LBS | OXYGEN SATURATION: 98 % | BODY MASS INDEX: 31.58 KG/M2 | DIASTOLIC BLOOD PRESSURE: 71 MMHG | RESPIRATION RATE: 16 BRPM | HEIGHT: 70 IN

## 2022-07-01 DIAGNOSIS — I25.5 ISCHEMIC CARDIOMYOPATHY: ICD-10-CM

## 2022-07-01 DIAGNOSIS — R94.39 ABNORMAL STRESS TEST: ICD-10-CM

## 2022-07-01 DIAGNOSIS — R94.31 ABNORMAL EKG: ICD-10-CM

## 2022-07-01 DIAGNOSIS — R06.02 SOB (SHORTNESS OF BREATH): ICD-10-CM

## 2022-07-01 DIAGNOSIS — I73.9 PVD (PERIPHERAL VASCULAR DISEASE) (HCC): ICD-10-CM

## 2022-07-01 DIAGNOSIS — I25.10 ASHD (ARTERIOSCLEROTIC HEART DISEASE): ICD-10-CM

## 2022-07-01 DIAGNOSIS — I10 PRIMARY HYPERTENSION: ICD-10-CM

## 2022-07-01 DIAGNOSIS — R60.0 LEG EDEMA: ICD-10-CM

## 2022-07-01 PROCEDURE — 1036F TOBACCO NON-USER: CPT | Performed by: PHYSICIAN ASSISTANT

## 2022-07-01 PROCEDURE — G8417 CALC BMI ABV UP PARAM F/U: HCPCS | Performed by: PHYSICIAN ASSISTANT

## 2022-07-01 PROCEDURE — 1123F ACP DISCUSS/DSCN MKR DOCD: CPT | Performed by: PHYSICIAN ASSISTANT

## 2022-07-01 PROCEDURE — 99215 OFFICE O/P EST HI 40 MIN: CPT | Performed by: PHYSICIAN ASSISTANT

## 2022-07-01 PROCEDURE — G8427 DOCREV CUR MEDS BY ELIG CLIN: HCPCS | Performed by: PHYSICIAN ASSISTANT

## 2022-07-01 PROCEDURE — 99211 OFF/OP EST MAY X REQ PHY/QHP: CPT

## 2022-07-01 NOTE — PROGRESS NOTES
Mis Kinney am scribing for and in the presence of Iva Smith PA-C. Patient: King Mathias  : 1940  Date of Visit: 2022    REASON FOR VISIT / CONSULTATION: Shortness of Breath (Hx:Abn Stress,SOB,HTN,Abn EKG,PVD. Stress & Echo on . Cath on 22. He has been doing alright. Always SOB, not worsening. Denies: CP, Lightheaded/dizziness, Palpitations. )      History of Present Illness:      Dear Koffi Armstrong MD    I had the pleasure of seeing King Mathias, who is a [de-identified] y.o. male to establish care. He has two brothers with heart disease he is unsure of details or ages when the problems started. One brother has a pacemaker and the other brother has had open heart surgery. He believes they had heart attacks in the past. Also reports mother and father with CAD, he believes everyone was in their 66's prior to having any issues. He is former smoker quit over 60 years ago. He smoked 1/2 pack per day for 3 years. He was diagnosed with hypertension for only a few years and hyperlipidemia and diabetes. He denies having sleep apnea. He states until he was 81years old he never seen a provider and never went to the doctor's office. He has urinary retention and has to self cath once daily. EKG done 2022: Sinus tachycardia with 1st degree AV block. Left anterior fascicular block, right bundle branch block. Heart rate 101 bpm.    Stress test done on 2022- EF 27% Abnormal myocardial perfusion study. There is a moderate/large perfusion defect of severe intensity in the septal, apical, anteroapical  and inferoapical region(s) during stress and rest imaging which is most consistent with an old myocardial infarction with edith-infarct ischemia. Overall, these results are most consistent with high risk for significant coronary artery disease. CAM done on 2022- 6 days and 23 hours recorded.  Baseline rhythm is sinus with average heart rate of 80 bpm, ranging  between 59 and 128 bpm. Sinus tachycardia represented 4% of the study duration. Occasional premature atrial contractions, PACs burden 2% with occasional pairs. No atrial runs. Rare isolated premature ventricular contractions noted. YURY done on 5/31/2022- Normal right YURY. Mild PAD suggested left lower extremity    Cardiac catheterization done on 6/23/2022: Severe two vessel disease involving the LAD a fairly small OM3 branch of the circumflex coronary artery. Normal left ventricular end diastolic pressure. (LVEDP). Echocardiogram done on 6/28/2022: EF of 20%. Moderate left ventricular hypertrophy. Left atrium is moderately dilated. Mild diastolic dysfunction is seen. Aortic root is mildly dilated. Mr. Ruperto Lennon is here today for a follow up. He reports he has been doing alright. He does continue to have shortness of breath but this is not worsening. He cannot do as much walking without tiring out. He does have swelling in his legs that has been an issue for him for at least the last 6 months. He used to wear compression socks but they did not seem to help. He denies having any chest pain, pressure or tightness. He denies having any lightheaded/dizziness or palpitations. No cough, fever or chills. No abdominal pain, bleeding problems, bowel issues, problems with medications or any other concerns at this time. PAST MEDICAL HISTORY:        Past Medical History:   Diagnosis Date    Acute renal failure superimposed on stage 3 chronic kidney disease ([facility] [address] 3/1/2017    Arthritis     Diabetes mellitus ([facility] [address]     Hyperlipidemia     Hypertension     Kidney stone        CURRENT ALLERGIES: Patient has no known allergies. REVIEW OF SYSTEMS: 14 systems were reviewed. Pertinent positives and negatives as above, all else negative.      Past Surgical History:   Procedure Laterality Date    CATARACT REMOVAL WITH IMPLANT Right 8/26/2013    CYSTOSCOPY INSERTION / REMOVAL STENT / STONE Right 11/1/2017 CYSTOSCOPY STENT INSERTION performed by Chris Bernard MD at [facility] / [place] / [place] N/A 11/3/2017    CYSTOSCOPY STENT INSERTION performed by Chris Bernard MD at [facility]  2017    with stent placement on right side.  Changed schulz catheter    HERNIA REPAIR      umbilical 7802    INTRACAPSULAR CATARACT EXTRACTION Left 2020    EYE CATARACT EMULSIFICATION IOL IMPLANT performed by Berenice Cruz DO at [address]      right knee 2012    JOINT REPLACEMENT Right 2014 partial knee    JOINT REPLACEMENT Right     complete removal    KNEE SURGERY Right     x 2    LITHOTRIPSY Right 2017    with stent placement - Dr. Jann France LITHOTRIPSY Left 2018    MA CYSTO/URETERO/PYELOSCOPY W/LITHOTRIPSY Right 2017    CYSTOSCOPY URETEROSCOPY LASER-WITH HLL performed by Chris Bernard MD at [facility] [place] ESWL Left 2018    ESWL EXTRACORPEAL SHOCK WAVE LITHOTRIPSY performed by Chris Bernard MD at [facility] TURP  2017    Greenlight PVP and TUIBNC    TURP  2015    Greenlight PVP    Social History:  Social History     Tobacco Use    Smoking status: Former Smoker     Packs/day: 0.50     Years: 3.00     Pack years: 1.50     Quit date: 1960     Years since quittin.5    Smokeless tobacco: Former User     Quit date: 1960    Tobacco comment: quit 50 years ago   Vaping Use    Vaping Use: Never used   Substance Use Topics    Alcohol use: No    Drug use: No        CURRENT MEDICATIONS:        Outpatient Medications Marked as Taking for the 22 encounter (Office Visit) with Francine Young PA-C   Medication Sig Dispense Refill    tamsulosin (FLOMAX) 0.4 mg capsule Take 1 capsule by mouth daily 30 capsule 1    albuterol (ACCUNEB) 1.25 MG/3ML nebulizer solution Inhale 1 ampule into the lungs every 6 hours as needed for Wheezing      losartan (COZAAR) 50 MG tablet TAKE 1 TABLET BY MOUTH ONCE DAILY      metoprolol succinate (TOPROL XL) 25 MG extended release tablet Take 1 tablet by mouth daily 30 tablet 3    furosemide (LASIX) 20 MG tablet Take 20 mg by mouth daily       montelukast (SINGULAIR) 10 MG tablet Take 10 mg by mouth nightly      simvastatin (ZOCOR) 20 MG tablet TAKE 1 TABLET BY MOUTH ONCE DAILY      metFORMIN (GLUCOPHAGE) 850 MG tablet Take 850 mg by mouth 2 times daily (with meals)       Pediatric Multivitamins-Fl (MULTI VIT/FL PO) Take 1 tablet by mouth daily Daily       aspirin 81 MG tablet Take 81 mg by mouth daily. FAMILY HISTORY: family history includes Heart Failure in his brother. Physical Examination:     /71 (Site: Left Upper Arm, Position: Sitting, Cuff Size: Medium Adult)   Pulse 80   Resp 16   Ht 5' 10\" (1.778 m)   Wt 220 lb 9.6 oz (100.1 kg)   SpO2 98%   BMI 31.65 kg/m²  Body mass index is 31.65 kg/m². Constitutional: He is oriented to person, place, and time. He appears well-developed and well-nourished. In no acute distress. HEENT: Normocephalic and atraumatic. No JVD present. Carotid bruit is not present. No mass and no thyromegaly present. No lymphadenopathy present. Cardiovascular: Normal rate, regular rhythm, normal heart sounds. Exam reveals no gallop and no friction rubs. No murmur was heard. Pulmonary/Chest: Effort normal and breath sounds normal. No respiratory distress. He has no wheezes, rhonchi or rales. Abdominal: Soft, non-tender. Bowel sounds and aorta are normal. He exhibits no organomegaly, mass or bruit. Extremities: Trace-1+ 1/2 up to the knees left worse than right. No cyanosis or clubbing. 2+ radial and carotid pulses. Distal extremity pulses: Not palpable left, right 1+. Neurological: He is alert and oriented to person, place, and time. No evidence of gross cranial nerve deficit. Coordination appeared normal.   Skin: Skin is warm and dry. There is no rash or diaphoresis. Psychiatric: He has a normal mood and affect. His speech is normal and behavior is normal.      MOST RECENT LABS ON RECORD:   Lab Results   Component Value Date    WBC 9.2 06/17/2022    HGB 14.4 06/17/2022    HCT 43.5 06/17/2022     06/17/2022    CHOL 131 05/23/2022    TRIG 175 (H) 05/23/2022    HDL 39 (L) 05/23/2022    ALT 20 04/29/2022    AST 20 04/29/2022     06/17/2022    K 4.9 06/17/2022     06/17/2022    CREATININE 1.79 (H) 06/17/2022    BUN 23 06/17/2022    CO2 23 06/17/2022    INR 1.0 03/03/2017       ASSESSMENT:      Diagnosis Orders   1. ASHD (arteriosclerotic heart disease)     2. Abnormal stress test     3. SOB (shortness of breath)     4. Primary hypertension     5. Abnormal EKG     6. PVD (peripheral vascular disease) (HCC)     7. Leg edema       PLAN:         ischemic cardiomyopathy: EF 20% on echo   Beta Blocker: Continue Metoprolol succinate (Toprol XL) 25 mg daily.  ACE Inibitor/ARB: STOP losartan (Cozaar) and START sacubitril/valsartan (Entresto) 24/26 mg twice daily. I also discussed the potential side effects of this medication including lightheadedness and dizziness and instructed them to stop the medication of this occurs and call our office if this occurs.  Because of his persistent New [place] Heart Association class II-III symptoms of shortness of breath with mild to moderate exertion in spite of arguably maximal tolerated medical management, combined with her reduced ejection fraction <40%, I would like to consider starting on a newer agent called Entresto (sacubitril/valsartan). This has been shown to improve heart failure symptoms as well as reduce hospitalization in patients with class II through class IV heart failure who also have an EF <40%. I discussed the risks and benefits of the drug including the risk of lightheadedness and dizziness as well as rare but serious potential angioedema and he was very interested in trying this medication.  Therefore, I potential side effects of this medication including lightheadedness and dizziness and instructed them to stop the medication of this occurs and call our office if this occurs.  Antiplatelet Agent: Continue Aspirin 81 mg daily.  Beta Blocker: Continue Metoprolol succinate (Toprol XL) 25 mg daily.  Anti-anginal medications: Not indicated at this time.  Cholesterol Reduction Therapy: Continue simvastatin (Zocor) 20 mg daily.  Lifevest discussed and he would like to proceed with a life vest at this time.  Additional Testing List: I took the liberty of ordering a BMP in 5-7 days to assess their potassium and renal function. I told them that they could get their lab work performed at the location of their choosing, unfortunately, if the lab work was not performed at a [facility] facility I could not guarantee my ability to follow up with them on their results. and I ordered a echocardiogram to be completed in 6 weeks before his next visit to reevaluate his heart strength. · Shortness of Breath: Not worsening. I believe this is due to his reduced EF at this time.  Continue to monitor.  Treatment for cardiomyopathy as above. · Essential Hypertension: Controlled   Beta Blocker: Continue Metoprolol succinate (Toprol XL) 25 mg daily.  ACE Inibitor/ARB: STOP losartan (Cozaar) and START sacubitril/valsartan (Entresto) 24/26 mg twice daily.  Calcium Channel Blocker: Not indicated at this time.  Diuretics: Continue furosemide (Lasix) 20 mg every morning.  Abnormal EKG: Tachycardia with 1st degree AV block, Right bundle branch block, left anterior fascicular block: Possibly symptomatic. · Beta Blocker: Continue Metoprolol succinate (Toprol XL) 25 mg daily. · Calcium Channel Blocker: Not indicated at this time. · Peripheral Vascular Disease: Recent YURY showed mild PAD on the left, no evidence of PAD on the right lower extremity.   Antiplatelet Agent: Continue Aspirin 81 mg daily.  · Cholesterol Reduction Therapy: Continue simvastatin (Zocor) 20 mg daily. · Leg Edema: Leg swelling is improved at this time. I told them to start wearing lower extremity compression stockings and I advised them to try and keep their legs up whenever possible and to limit salt in their diet. Finally, I recommended that he continue his other medications and follow up with you as previously scheduled. FOLLOW UP:   I told Mr. Jigna Oviedo to call my office if he had any problems, but otherwise I asked him to Return in about 6 weeks (around 8/12/2022). However, I would be happy to see him sooner should the need arise. Sincerely,  Palma Willingham PA-C  [facility] Cardiology Specialist    [address], [address] [place] [address]  Phone: [phone], Fax: [phone]     I believe that the risk of significant morbidity and mortality related to the patient's current medical conditions are: high. Approximately 50 minutes were spent during prep work, discussion and exam of the patient, and follow up documentation and all of their questions were answered. The documentation recorded by the scribe, accurately and completely reflects the services I personally performed and the decisions made by me.  Palma Willingham PA-C July 1, 2022

## 2022-07-01 NOTE — PATIENT INSTRUCTIONS
START ENTRESTO 7/2/2022 IN THE EVENING. SURVEY:    You may be receiving a survey from [employer] regarding your visit today. Please complete the survey to enable us to provide the highest quality of care to you and your family. If you cannot score us a very good on any question, please call the office to discuss how we could have made your experience a very good one. Thank you.

## 2022-07-07 ENCOUNTER — HOSPITAL ENCOUNTER (OUTPATIENT)
Age: 82
Discharge: HOME | End: 2022-07-07
Payer: MEDICARE

## 2022-07-07 LAB
ALBUMIN SERPL-MCNC: 4.2 G/DL (ref 3.5–5.2)
ANION GAP SERPL CALCULATED.3IONS-SCNC: 15 MMOL/L (ref 9–17)
BUN BLDV-MCNC: 26 MG/DL (ref 8–23)
BUN/CREAT BLD: 18 (ref 9–20)
CALCIUM SERPL-MCNC: 9.5 MG/DL (ref 8.6–10.4)
CHLORIDE BLD-SCNC: 101 MMOL/L (ref 98–107)
CO2: 17 MMOL/L (ref 20–31)
CREAT SERPL-MCNC: 1.47 MG/DL (ref 0.7–1.2)
GFR AFRICAN AMERICAN: 56 ML/MIN
GFR NON-AFRICAN AMERICAN: 46 ML/MIN
GFR SERPL CREATININE-BSD FRML MDRD: ABNORMAL ML/MIN/{1.73_M2}
GFR SERPL CREATININE-BSD FRML MDRD: ABNORMAL ML/MIN/{1.73_M2}
GLUCOSE BLD-MCNC: 106 MG/DL (ref 70–99)
MAGNESIUM: 2 MG/DL (ref 1.6–2.6)
PHOSPHORUS: 3.1 MG/DL (ref 2.5–4.5)
POTASSIUM SERPL-SCNC: 4.9 MMOL/L (ref 3.7–5.3)
PTH INTACT: 30.17 PG/ML (ref 15–65)
SODIUM BLD-SCNC: 133 MMOL/L (ref 135–144)
TSH SERPL DL<=0.05 MIU/L-ACNC: 5.2 UIU/ML (ref 0.3–5)
URIC ACID: 6.5 MG/DL (ref 3.4–7)
VITAMIN D 25-HYDROXY: 29.1 NG/ML

## 2022-07-07 PROCEDURE — 84550 ASSAY OF BLOOD/URIC ACID: CPT

## 2022-07-07 PROCEDURE — 83735 ASSAY OF MAGNESIUM: CPT

## 2022-07-07 PROCEDURE — 82610 CYSTATIN C: CPT

## 2022-07-07 PROCEDURE — 84100 ASSAY OF PHOSPHORUS: CPT

## 2022-07-07 PROCEDURE — 36415 COLL VENOUS BLD VENIPUNCTURE: CPT

## 2022-07-07 PROCEDURE — 84443 ASSAY THYROID STIM HORMONE: CPT

## 2022-07-07 PROCEDURE — 82040 ASSAY OF SERUM ALBUMIN: CPT

## 2022-07-07 PROCEDURE — 83970 ASSAY OF PARATHORMONE: CPT

## 2022-07-07 PROCEDURE — 80048 BASIC METABOLIC PNL TOTAL CA: CPT

## 2022-07-07 PROCEDURE — 82306 VITAMIN D 25 HYDROXY: CPT

## 2022-07-09 LAB
CYSTATIN C: 2 MG/L (ref 0.5–1.2)
EGFR BY CYSTATIN C: 29 ML/MIN/BSA

## 2022-07-13 ENCOUNTER — TELEPHONE (OUTPATIENT)
Dept: UROLOGY | Age: 82
End: 2022-07-13

## 2022-07-15 ENCOUNTER — OFFICE VISIT (OUTPATIENT)
Dept: UROLOGY | Age: 82
End: 2022-07-15
Payer: MEDICARE

## 2022-07-15 VITALS
HEIGHT: 70 IN | SYSTOLIC BLOOD PRESSURE: 128 MMHG | DIASTOLIC BLOOD PRESSURE: 83 MMHG | WEIGHT: 224 LBS | BODY MASS INDEX: 32.07 KG/M2 | HEART RATE: 93 BPM | TEMPERATURE: 97.8 F

## 2022-07-15 DIAGNOSIS — N40.1 BPH WITH OBSTRUCTION/LOWER URINARY TRACT SYMPTOMS: ICD-10-CM

## 2022-07-15 DIAGNOSIS — N32.0 BNC (BLADDER NECK CONTRACTURE): ICD-10-CM

## 2022-07-15 DIAGNOSIS — N13.8 BPH WITH OBSTRUCTION/LOWER URINARY TRACT SYMPTOMS: ICD-10-CM

## 2022-07-15 DIAGNOSIS — N20.0 KIDNEY STONES: Primary | ICD-10-CM

## 2022-07-15 PROCEDURE — 99213 OFFICE O/P EST LOW 20 MIN: CPT | Performed by: UROLOGY

## 2022-07-15 PROCEDURE — G8427 DOCREV CUR MEDS BY ELIG CLIN: HCPCS | Performed by: UROLOGY

## 2022-07-15 PROCEDURE — 99211 OFF/OP EST MAY X REQ PHY/QHP: CPT

## 2022-07-15 PROCEDURE — G8417 CALC BMI ABV UP PARAM F/U: HCPCS | Performed by: UROLOGY

## 2022-07-15 PROCEDURE — 1123F ACP DISCUSS/DSCN MKR DOCD: CPT | Performed by: UROLOGY

## 2022-07-15 PROCEDURE — 1036F TOBACCO NON-USER: CPT | Performed by: UROLOGY

## 2022-07-15 NOTE — PROGRESS NOTES
HPI:          Patient is a [de-identified] y.o. male in no acute distress. He is alert and oriented to person, place, and time. History  1/2015 Greenlight PVP     PVR remained elevated post-op, but was improving: Feb 839 mL, March 600 mL, April 505 mL, July 452 mL, Oct 252 mL.     7/2015 Flomax stopped     10/2016 PVR back up, >850 mL. Does not feel full or uncomfortable. He did self cath previously. He does report intermittent urine stream about half the time, mild straining. Offered option of resuming Flomax or resuming self cath qhs. Prefered to start self cath.     1/2017 Gross hematuria and some difficulty with cathing. Cysto showed small caliber BNC.     1/2017 Greenlight PVP and TUIBNC     11/2017 Right HLL     1/2018 Left ESWL     Currently  Patient is here today for 1 month follow-up. Patient is currently undergoing cardiac work-up. He is wearing a LifeVest.  Patient did want to wait until this is complete to perform a cystoscopy. Patient did have a recent CT scan. This film was independently reviewed today. This does show a 9 mm stone in the left collecting system. This was visualized on previous KUB. Patient is currently doing well. Continues to take Flomax. He also caths himself 1 time at night. He is having no issues with this. Past Medical History:   Diagnosis Date    Acute renal failure superimposed on stage 3 chronic kidney disease ([facility] [address] 3/1/2017    Arthritis     Diabetes mellitus ([facility] [address]     Hyperlipidemia     Hypertension     Kidney stone      Past Surgical History:   Procedure Laterality Date    CATARACT REMOVAL WITH IMPLANT Right 8/26/2013    CYSTOSCOPY INSERTION / REMOVAL STENT / STONE Right 11/1/2017    CYSTOSCOPY STENT INSERTION performed by Luis Felipe Acosta MD at [facility] / [address] / [place] N/A 11/3/2017    CYSTOSCOPY STENT INSERTION performed by Luis Felipe Acosta MD at [address]   11/03/2017    with stent placement on right side. Changed schulz catheter    HERNIA REPAIR      umbilical 5816    INTRACAPSULAR CATARACT EXTRACTION Left 9/28/2020    EYE CATARACT EMULSIFICATION IOL IMPLANT performed by Rosaline Alvarez DO at [address]      right knee march 22, 2012    JOINT REPLACEMENT Right junu 2014 partial knee    JOINT REPLACEMENT Right 2014    complete removal    KNEE SURGERY Right     x 2    LITHOTRIPSY Right 11/01/2017    with stent placement - Dr. Laxmi Matta     LITHOTRIPSY Left 01/16/2018    AL CYSTO/URETERO/PYELOSCOPY W/LITHOTRIPSY Right 11/1/2017    CYSTOSCOPY URETEROSCOPY LASER-WITH HLL performed by Lester Keyes MD at [address] ESWL Left 1/16/2018    ESWL [address] LITHOTRIPSY performed by Lester Keyes MD at [address]    TURP  01/2017    Greenlight PVP and TUIBNC    TURP  01/2015    Greenlight PVP     Outpatient Encounter Medications as of 7/15/2022   Medication Sig Dispense Refill    sacubitril-valsartan (ENTRESTO) 24-26 MG per tablet Take 1 tablet by mouth 2 times daily 60 tablet 3    tamsulosin (FLOMAX) 0.4 mg capsule Take 1 capsule by mouth daily 30 capsule 1    albuterol (ACCUNEB) 1.25 MG/3ML nebulizer solution Inhale 1 ampule into the lungs every 6 hours as needed for Wheezing      metoprolol succinate (TOPROL XL) 25 MG extended release tablet Take 1 tablet by mouth daily 30 tablet 3    furosemide (LASIX) 20 MG tablet Take 20 mg by mouth daily       montelukast (SINGULAIR) 10 MG tablet Take 10 mg by mouth nightly      simvastatin (ZOCOR) 20 MG tablet TAKE 1 TABLET BY MOUTH ONCE DAILY      metFORMIN (GLUCOPHAGE) 850 MG tablet Take 850 mg by mouth 2 times daily (with meals)       Pediatric Multivitamins-Fl (MULTI VIT/FL PO) Take 1 tablet by mouth daily Daily       aspirin 81 MG tablet Take 81 mg by mouth daily. No facility-administered encounter medications on file as of 7/15/2022.       Current Outpatient Medications on File Prior to Visit   Medication Sig Dispense Refill    sacubitril-valsartan (ENTRESTO) 24-26 MG per tablet Take 1 tablet by mouth 2 times daily 60 tablet 3    tamsulosin (FLOMAX) 0.4 mg capsule Take 1 capsule by mouth daily 30 capsule 1    albuterol (ACCUNEB) 1.25 MG/3ML nebulizer solution Inhale 1 ampule into the lungs every 6 hours as needed for Wheezing      metoprolol succinate (TOPROL XL) 25 MG extended release tablet Take 1 tablet by mouth daily 30 tablet 3    furosemide (LASIX) 20 MG tablet Take 20 mg by mouth daily       montelukast (SINGULAIR) 10 MG tablet Take 10 mg by mouth nightly      simvastatin (ZOCOR) 20 MG tablet TAKE 1 TABLET BY MOUTH ONCE DAILY      metFORMIN (GLUCOPHAGE) 850 MG tablet Take 850 mg by mouth 2 times daily (with meals)       Pediatric Multivitamins-Fl (MULTI VIT/FL PO) Take 1 tablet by mouth daily Daily       aspirin 81 MG tablet Take 81 mg by mouth daily. No current facility-administered medications on file prior to visit. Patient has no known allergies. Family History   Problem Relation Age of Onset    Heart Failure Brother      Social History     Tobacco Use   Smoking Status Former    Packs/day: 0.50    Years: 3.00    Pack years: 1.50    Types: Cigarettes    Quit date: 1960    Years since quittin.5   Smokeless Tobacco Former    Quit date: 1960   Tobacco Comments    quit 50 years ago       Social History     Substance and Sexual Activity   Alcohol Use No       Review of Systems    /83 (Site: Right Upper Arm, Position: Sitting, Cuff Size: Large Adult)   Pulse 93   Temp 97.8 °F (36.6 °C)   Ht 5' 10\" (1.778 m)   Wt 224 lb (101.6 kg)   BMI 32.14 kg/m²       PHYSICAL EXAM:  Constitutional: Patient in no acute distress; Neuro: alert and oriented to person place and time.     Psych: Mood and affect normal.  Skin: Normal  Lungs: Respiratory effort normal  Cardiovascular:  Normal peripheral pulses  Abdomen: Soft, non-tender, non-distended with no CVA, flank pain  Bladder non-tender and not distended. Lymphatics: no palpable lymphadenopathy  Penis normal  Urethral meatus normal  Scrotal exam normal  Testicles normal bilaterally  Epididymis normal bilaterally  No evidence of inguinal hernia        Lab Results   Component Value Date    BUN 26 (H) 07/07/2022     Lab Results   Component Value Date    CREATININE 1.47 (H) 07/07/2022     No results found for: PSA    ASSESSMENT:  This is a [de-identified] y.o. male with the following diagnoses:   Diagnosis Orders   1. Kidney stones        2. BPH with obstruction/lower urinary tract symptoms        3. [address] [place] [address] (bladder neck contracture)             PLAN:  Patient will follow up with us in 6 months. This point time we will forego any further plans for cystoscopy. We can reassess his urinary symptoms in 6 months. He will maintain Flomax for now.

## 2022-07-15 NOTE — PATIENT INSTRUCTIONS
SURVEY:    You may be receiving a survey from [employer] regarding your visit today. Please complete the survey to enable us to provide the highest quality of care to you and your family. If you cannot score us a very good on any question, please call the office to discuss how we could have made your experience a very good one. Thank you.

## 2022-08-01 RX ORDER — TAMSULOSIN HYDROCHLORIDE 0.4 MG/1
0.4 CAPSULE ORAL DAILY
Qty: 90 CAPSULE | Refills: 3 | Status: SHIPPED | OUTPATIENT
Start: 2022-08-01

## 2022-08-01 NOTE — TELEPHONE ENCOUNTER
Patient was seen within the past 6 months. We do want him to continue Flomax. We will refill this medication.

## 2022-08-01 NOTE — TELEPHONE ENCOUNTER
Patient called stating he is out of Flomax and needs refill if he is to continue taking.   If needs to continue he would like 90 day supply

## 2022-08-09 ENCOUNTER — HOSPITAL ENCOUNTER (OUTPATIENT)
Dept: NON INVASIVE DIAGNOSTICS | Age: 82
Discharge: HOME | End: 2022-08-09
Payer: MEDICARE

## 2022-08-09 DIAGNOSIS — R94.39 ABNORMAL STRESS TEST: ICD-10-CM

## 2022-08-09 DIAGNOSIS — R94.31 ABNORMAL EKG: ICD-10-CM

## 2022-08-09 DIAGNOSIS — R60.0 LEG EDEMA: ICD-10-CM

## 2022-08-09 DIAGNOSIS — I25.10 ASHD (ARTERIOSCLEROTIC HEART DISEASE): ICD-10-CM

## 2022-08-09 DIAGNOSIS — I10 PRIMARY HYPERTENSION: ICD-10-CM

## 2022-08-09 DIAGNOSIS — R06.02 SOB (SHORTNESS OF BREATH): ICD-10-CM

## 2022-08-09 DIAGNOSIS — I73.9 PVD (PERIPHERAL VASCULAR DISEASE) (HCC): ICD-10-CM

## 2022-08-09 LAB
LV EF: 28 %
LVEF MODALITY: NORMAL

## 2022-08-09 PROCEDURE — C8929 TTE W OR WO FOL WCON,DOPPLER: HCPCS

## 2022-08-09 PROCEDURE — 93306 TTE W/DOPPLER COMPLETE: CPT

## 2022-08-09 PROCEDURE — 6360000004 HC RX CONTRAST MEDICATION: Performed by: INTERNAL MEDICINE

## 2022-08-09 RX ADMIN — PERFLUTREN 1.65 MG: 6.52 INJECTION, SUSPENSION INTRAVENOUS at 13:38

## 2022-08-11 ENCOUNTER — TELEPHONE (OUTPATIENT)
Dept: CARDIOLOGY | Age: 82
End: 2022-08-11

## 2022-08-11 NOTE — RESULT ENCOUNTER NOTE
Please let them know their echo doesn't show any change sine June, will discuss at follow up appointment. Thanks.

## 2022-08-11 NOTE — TELEPHONE ENCOUNTER
----- Message from Steph Francis PA-C sent at 8/11/2022  9:46 AM EDT -----  Please let them know their echo doesn't show any change sine June, will discuss at follow up appointment. Thanks.

## 2022-08-15 ENCOUNTER — OFFICE VISIT (OUTPATIENT)
Dept: CARDIOLOGY | Age: 82
End: 2022-08-15
Payer: MEDICARE

## 2022-08-15 VITALS
DIASTOLIC BLOOD PRESSURE: 79 MMHG | WEIGHT: 224 LBS | HEART RATE: 85 BPM | RESPIRATION RATE: 18 BRPM | SYSTOLIC BLOOD PRESSURE: 119 MMHG | HEIGHT: 70 IN | OXYGEN SATURATION: 94 % | BODY MASS INDEX: 32.07 KG/M2

## 2022-08-15 DIAGNOSIS — R06.02 SOB (SHORTNESS OF BREATH): ICD-10-CM

## 2022-08-15 DIAGNOSIS — I10 PRIMARY HYPERTENSION: ICD-10-CM

## 2022-08-15 DIAGNOSIS — R94.31 ABNORMAL EKG: ICD-10-CM

## 2022-08-15 DIAGNOSIS — R60.0 LEG EDEMA: ICD-10-CM

## 2022-08-15 DIAGNOSIS — R00.0 TACHYCARDIA: ICD-10-CM

## 2022-08-15 DIAGNOSIS — I73.9 PVD (PERIPHERAL VASCULAR DISEASE) (HCC): ICD-10-CM

## 2022-08-15 DIAGNOSIS — I25.5 ISCHEMIC CARDIOMYOPATHY: Primary | ICD-10-CM

## 2022-08-15 DIAGNOSIS — I25.10 ASHD (ARTERIOSCLEROTIC HEART DISEASE): ICD-10-CM

## 2022-08-15 DIAGNOSIS — R53.83 OTHER FATIGUE: ICD-10-CM

## 2022-08-15 PROCEDURE — 99214 OFFICE O/P EST MOD 30 MIN: CPT | Performed by: FAMILY MEDICINE

## 2022-08-15 PROCEDURE — 1036F TOBACCO NON-USER: CPT | Performed by: FAMILY MEDICINE

## 2022-08-15 PROCEDURE — 99211 OFF/OP EST MAY X REQ PHY/QHP: CPT | Performed by: FAMILY MEDICINE

## 2022-08-15 PROCEDURE — 1123F ACP DISCUSS/DSCN MKR DOCD: CPT | Performed by: FAMILY MEDICINE

## 2022-08-15 PROCEDURE — G8417 CALC BMI ABV UP PARAM F/U: HCPCS | Performed by: FAMILY MEDICINE

## 2022-08-15 PROCEDURE — 93292 WCD DEVICE INTERROGATE: CPT | Performed by: FAMILY MEDICINE

## 2022-08-15 PROCEDURE — G8427 DOCREV CUR MEDS BY ELIG CLIN: HCPCS | Performed by: FAMILY MEDICINE

## 2022-08-15 RX ORDER — METOPROLOL SUCCINATE 25 MG/1
25 TABLET, EXTENDED RELEASE ORAL DAILY
Qty: 90 TABLET | Refills: 3 | Status: SHIPPED | OUTPATIENT
Start: 2022-08-15

## 2022-08-15 NOTE — PROGRESS NOTES
Yecenia Mosley am scribing for and in the presence of Cj Luciano MD, MS, F.A.C.C..    Patient: Ramsey Arredondo  : 1940  Date of Visit: August 15, 2022    REASON FOR VISIT / CONSULTATION: Follow-up (Hx: NICM, ASHD, Sob, HTN, abn EKG, PVD, leg edema. Pt is here for 6 week f/u. Doing okay other than wearing life vest. Sob same  Denies: CP, dizziness, lightheaded, palps)      History of Present Illness:        Dear Katherine Stringer MD    I had the pleasure of seeing Ramsey Arredondo, who is a [de-identified] y.o. male to establish care. He has two brothers with heart disease he is unsure of details or ages when the problems started. One brother has a pacemaker and the other brother has had open heart surgery. He believes they had heart attacks in the past. Also reports mother and father with CAD, he believes everyone was in their 66's prior to having any issues. He is former smoker quit over 60 years ago. He smoked 1/2 pack per day for 3 years. He was diagnosed with hypertension for only a few years and hyperlipidemia and diabetes. He denies having sleep apnea. He states until he was 81years old he never seen a provider and never went to the doctor's office. He has urinary retention and has to self cath once daily. EKG done 2022: Sinus tachycardia with 1st degree AV block. Left anterior fascicular block, right bundle branch block. Heart rate 101 bpm.    Stress test done on 2022- EF 27% Abnormal myocardial perfusion study. There is a moderate/large perfusion defect of severe intensity in the septal, apical, anteroapical  and inferoapical region(s) during stress and rest imaging which is most consistent with an old myocardial infarction with edith-infarct ischemia. Overall, these results are most consistent with high risk for significant coronary artery disease. CAM done on 2022- 6 days and 23 hours recorded.  Baseline rhythm is sinus with average heart rate of 80 bpm, ranging  between 59 and 128 bpm. Sinus tachycardia represented 4% of the study duration. Occasional premature atrial contractions, PACs burden 2% with occasional pairs. No atrial runs. Rare isolated premature ventricular contractions noted. YURY done on 5/31/2022- Normal right YURY. Mild PAD suggested left lower extremity    Cardiac catheterization done on 6/23/2022: Severe two vessel disease involving the LAD a fairly small OM3 branch of the circumflex coronary artery. Normal left ventricular end diastolic pressure. (LVEDP). Echocardiogram done on 6/28/2022: EF of 20%. Moderate left ventricular hypertrophy. Left atrium is moderately dilated. Mild diastolic dysfunction is seen. Aortic root is mildly dilated. Echo done on 8/9/2022- EF 25-30%, Mild left ventricular hypertrophy,Thinning and dyskinesis of the distal half of the septum and the entire apex. Mild aortic regurgitation    Mr. David Damon is here today for a follow up. He continues to wear his Lifevest and tomorrow will be 8 weeks of wearing it. He feels about the same as his last visit. He has chronic stable shortness of breath. He denies having any chest pain, pressure or tightness. He denies having any lightheaded/dizziness or palpitations. No cough, fever or chills. No abdominal pain, bleeding problems, bowel issues, problems with medications or any other concerns at this time. Exercise Tolerance: Mr. David Damon reports that he has a fairly poor exercise tolerance. His says that he could walk 50 yards before becoming fatigue and short of breath. PAST MEDICAL HISTORY:        Past Medical History:   Diagnosis Date    Acute renal failure superimposed on stage 3 chronic kidney disease ([facility] [address] 3/1/2017    Arthritis     Diabetes mellitus ([facility] [address]     Hyperlipidemia     Hypertension     Kidney stone        CURRENT ALLERGIES: Patient has no known allergies. REVIEW OF SYSTEMS: 14 systems were reviewed. Pertinent positives and negatives as above, all else negative.      Past Surgical History:   Procedure Laterality Date    CATARACT REMOVAL WITH IMPLANT Right 2013    CYSTOSCOPY INSERTION / REMOVAL STENT / STONE Right 2017    CYSTOSCOPY STENT INSERTION performed by Jose Luis Chritsensen MD at [facility] / [address] [place] [address] / Kimberly Darnell N/A 11/3/2017    CYSTOSCOPY STENT INSERTION performed by Jose Luis Christensen MD at [address] [facility]   2017    with stent placement on right side. Changed schulz catheter    HERNIA REPAIR      umbilical [id number]    INTRACAPSULAR CATARACT EXTRACTION Left 2020    EYE CATARACT EMULSIFICATION IOL IMPLANT performed by Dusty Hamlin DO at [address] [facility]      right knee 2012    JOINT REPLACEMENT Right 2014 partial knee    JOINT REPLACEMENT Right 2014    complete removal    KNEE SURGERY Right     x 2    LITHOTRIPSY Right 2017    with stent placement - Dr. Ophelia Robins     LITHOTRIPSY Left 2018    CT CYSTO/URETERO/PYELOSCOPY W/LITHOTRIPSY Right 2017    CYSTOSCOPY URETEROSCOPY LASER-WITH HLL performed by Jose Luis Christensen MD at [address] [facility] ESWL Left 2018    ESWL EXTRACORPEAL SHOCK WAVE LITHOTRIPSY performed by Jose Luis Christensen MD at [address]    TURP  2017    [facility] PVP and TUIBNC    TURP  2015    [employer]light PVP    Social History:  Social History     Tobacco Use    Smoking status: Former     Packs/day: 0.50     Years: 3.00     Pack years: 1.50     Types: Cigarettes     Quit date: 1960     Years since quittin.6    Smokeless tobacco: Former     Quit date: 1960    Tobacco comments:     quit 50 years ago   Vaping Use    Vaping Use: Never used   Substance Use Topics    Alcohol use: No    Drug use: No        CURRENT MEDICATIONS:        Outpatient Medications Marked as Taking for the 8/15/22 encounter (Office Visit) with Sendy Vaughn MD   Medication Sig Dispense Refill    tamsulosin (FLOMAX) 0.4 MG capsule Take 1 capsule by mouth in the morning. 90 capsule 3    sacubitril-valsartan (ENTRESTO) 24-26 MG per tablet Take 1 tablet by mouth 2 times daily 60 tablet 3    albuterol (ACCUNEB) 1.25 MG/3ML nebulizer solution Inhale 1 ampule into the lungs every 6 hours as needed for Wheezing      metoprolol succinate (TOPROL XL) 25 MG extended release tablet Take 1 tablet by mouth daily 30 tablet 3    furosemide (LASIX) 20 MG tablet Take 20 mg by mouth daily       montelukast (SINGULAIR) 10 MG tablet Take 10 mg by mouth nightly      simvastatin (ZOCOR) 20 MG tablet TAKE 1 TABLET BY MOUTH ONCE DAILY      metFORMIN (GLUCOPHAGE) 850 MG tablet Take 850 mg by mouth 2 times daily (with meals)       Pediatric Multivitamins-Fl (MULTI VIT/FL PO) Take 1 tablet by mouth daily Daily       aspirin 81 MG tablet Take 81 mg by mouth daily. FAMILY HISTORY: family history includes Heart Failure in his brother. Physical Examination:     /79 (Site: Right Upper Arm, Position: Sitting, Cuff Size: Medium Adult)   Pulse 85   Resp 18   Ht 5' 10\" (1.778 m)   Wt 224 lb (101.6 kg)   SpO2 94%   BMI 32.14 kg/m²  Body mass index is 32.14 kg/m². Constitutional: He is oriented to person, place, and time. He appears well-developed and well-nourished. In no acute distress. HEENT: Normocephalic and atraumatic. No JVD present. Carotid bruit is not present. No mass and no thyromegaly present. No lymphadenopathy present. Cardiovascular: Normal rate, regular rhythm, normal heart sounds. Exam reveals no gallop and no friction rubs. 1/6 systolic murmur, 2nd intercostal space on the RIGHT just lateral to the sternum  Pulmonary/Chest: Effort normal and breath sounds normal. No respiratory distress. He has no wheezes, rhonchi or rales. Abdominal: Soft, non-tender. Bowel sounds and aorta are normal. He exhibits no organomegaly, mass or bruit. Extremities: Trace-1+ 1/2 up to the knees on the right 2 + on the left No cyanosis or clubbing. 2+ radial and carotid pulses.  Distal extremity pulses: Not palpable left, right 1+. Neurological: He is alert and oriented to person, place, and time. No evidence of gross cranial nerve deficit. Coordination appeared normal.   Skin: Skin is warm and dry. There is no rash or diaphoresis. Psychiatric: He has a normal mood and affect. His speech is normal and behavior is normal.      MOST RECENT LABS ON RECORD:   Lab Results   Component Value Date    WBC 9.2 06/17/2022    HGB 14.4 06/17/2022    HCT 43.5 06/17/2022     06/17/2022    CHOL 131 05/23/2022    TRIG 175 (H) 05/23/2022    HDL 39 (L) 05/23/2022    ALT 20 04/29/2022    AST 20 04/29/2022     (L) 07/07/2022    K 4.9 07/07/2022     07/07/2022    CREATININE 1.47 (H) 07/07/2022    BUN 26 (H) 07/07/2022    CO2 17 (L) 07/07/2022    TSH 5.20 (H) 07/07/2022    INR 1.0 03/03/2017       ASSESSMENT:      Diagnosis Orders   1. Ischemic cardiomyopathy  MD INTERROGATION EVAL IN PERSON WR DEFIBRILLATOR    metoprolol succinate (TOPROL XL) 25 MG extended release tablet    Echo 2D w doppler w color complete      2. ASHD (arteriosclerotic heart disease)  MD INTERROGATION EVAL IN PERSON WR DEFIBRILLATOR    metoprolol succinate (TOPROL XL) 25 MG extended release tablet    Echo 2D w doppler w color complete      3. SOB (shortness of breath)  MD INTERROGATION EVAL IN PERSON WR DEFIBRILLATOR    metoprolol succinate (TOPROL XL) 25 MG extended release tablet    Echo 2D w doppler w color complete      4. Primary hypertension  MD INTERROGATION EVAL IN PERSON WR DEFIBRILLATOR    metoprolol succinate (TOPROL XL) 25 MG extended release tablet    Echo 2D w doppler w color complete      5. Leg edema  MD INTERROGATION EVAL IN PERSON WR DEFIBRILLATOR    metoprolol succinate (TOPROL XL) 25 MG extended release tablet    Echo 2D w doppler w color complete      6.  PVD (peripheral vascular disease) ([facility])  MD INTERROGATION EVAL IN PERSON WR DEFIBRILLATOR    metoprolol succinate (TOPROL XL) 25 MG extended release tablet    Echo 2D w doppler w color complete      7. Tachycardia  [place] INTERROGATION EVAL IN PERSON WR DEFIBRILLATOR    metoprolol succinate (TOPROL XL) 25 MG extended release tablet    Echo 2D w doppler w color complete      8. Other fatigue  W[place] INTERROGATION EVAL IN PERSON WR DEFIBRILLATOR    metoprolol succinate (TOPROL XL) 25 MG extended release tablet    Echo 2D w doppler w color complete      9. Abnormal EKG  [place] INTERROGATION EVAL IN PERSON WR DEFIBRILLATOR    metoprolol succinate (TOPROL XL) 25 MG extended release tablet    Echo 2D w doppler w color complete          PLAN:        ischemic cardiomyopathy : EF 20% on echo, repeat echo on 8/9/2022 EF 25-30%  Beta Blocker: Continue Metoprolol succinate (Toprol XL) 25 mg daily. ACE Inibitor/ARB: Continue sacubitril/valsartan (Entresto) 24/26 mg twice daily. Patient cannot afford to pay for this medication. I will write a letter stating this medication is needed. Diuretics: Continue furosemide (Lasix) 20 mg every morning. Heart failure counseling: I told them to start wearing lower extremity compression stockings and I advised them to try and keep their legs up whenever possible and to limit salt in their diet. I will get a repeat echo to be done on or after 9/23/2022, I did discuss if his ejection fraction is not improved then he needs to consider an ICD placement because he is at increased risk of sudden cardiac death. I did review his Lifevest report with him. His heart rate is around the 70-80's. There have been no events. Atherosclerotic Heart Disease: Recent Abnormal Stress on 6/23/2022 - Severe 2 vessel disease. Heart cath done 6/23/22 no stents were placed. Echo showed reduced EF of 20% on 6/28/2022. Repeat echo on 8/9/2022 EF 25-30%  ACE Inibitor/ARB: Continue sacubitril/valsartan (Entresto) 24/26 mg twice daily. Antiplatelet Agent: Continue Aspirin 81 mg daily. Beta Blocker: Continue Metoprolol succinate (Toprol XL) 25 mg daily.    Anti-anginal medications: Not indicated at this time. Cholesterol Reduction Therapy: Continue simvastatin (Zocor) 20 mg daily. Shortness of Breath: Not worsening. I believe this is due to his reduced EF at this time. Continue to monitor. Essential Hypertension: Controlled  Beta Blocker: Continue Metoprolol succinate (Toprol XL) 25 mg daily. ACE Inibitor/ARB: Continue sacubitril/valsartan (Entresto) 24/26 mg twice daily. Calcium Channel Blocker: Not indicated at this time. Diuretics: Continue furosemide (Lasix) 20 mg every morning. Abnormal EKG: Tachycardia with 1st degree AV block, Right bundle branch block, left anterior fascicular block:  Possibly symptomatic . Beta Blocker: Continue Metoprolol succinate (Toprol XL) 25 mg daily. Calcium Channel Blocker: Not indicated at this time. Peripheral Vascular Disease: Recent YURY showed mild PAD on the left, no evidence of PAD on the right lower extremity. Antiplatelet Agent: Continue Aspirin 81 mg daily. Cholesterol Reduction Therapy: Continue simvastatin (Zocor) 20 mg daily. Leg swelling: Mild to moderate but continues to slowly improve  Continue current therapy    Finally, I recommended that he continue his other medications and follow up with you as previously scheduled. FOLLOW UP:   I told Mr. Ruperto Lennon to call my office if he had any problems, but otherwise I asked him to Return in about 4 weeks (around 9/12/2022). However, I would be happy to see him sooner should the need arise. Sincerely,  Wen Wagner MD, MS, .A.[facility]     [address]  Phone: [phone], Fax: [phone]     I believe that the risk of significant morbidity and mortality related to the patient's current medical conditions are: intermediate-high. The documentation recorded by the scribe, accurately and completely reflects the services I personally performed and the decisions made by me.  Wen Wagner MD, MS, MARTINE GOFF  August 15, 2022

## 2022-08-15 NOTE — PATIENT INSTRUCTIONS
SURVEY:    You may be receiving a survey from [employer] regarding your visit today. Please complete the survey to enable us to provide the highest quality of care to you and your family. If you cannot score us a very good on any question, please call the office to discuss how we could have made your experience a very good one. Thank you.

## 2022-08-16 NOTE — PROGRESS NOTES
8/16/2022                      Patient: Lexii Harris    YOB: 1940   Date of Visit: 8/16/2022       To Whom It May Concern:    Lexii Harris has Class III heart failure symptoms for shortness of breath with even mild exertion and has an ejection fraction of 25-30% as seen on his most recent echocardiogram on 7/1/22. This is in spite of arguably maximal standard medical management, including treatment with a beta-blocker. I am trying to avoid not only having him being re-admitted to the hospital but also hopefully strengthen his heart to avoid referring him for an intracardiac defibrillator (ICD) for primary prevention of sudden cardiac death if his EF does not improve. Consequently, I would like to start him on a newer medication called Entresto (sacubitril/valsartan). This has been shown to improve heart failure symptoms as well as reduce hospitalization in patients with class II through class IV heart failure who also have an EF of 40% or less in the recent landmark PARADIGM-HF trial published in the [address] in September 2014. I personally have also seem tremendous results in the quality of life of patients started on Entresto who I had previously lost hope in ever being able to significantly improve. I discussed the risks and benefits of the drug including the risk of lightheadedness and dizziness as well as rare but serious potential angioedema and he was very interested in trying this medication. Therefore, I took the liberty of starting him on the starting dose of 24mg/26mg with plans to titrate up as tolerated. I believe that this medication is perhaps his last best option to improve his severe heart failure symptoms, reduce his chances of hospitalization, and ultimately his overall quality of life. Therefore, It is my medical opinion that Mr. Aj Peñaloza should not be without this important and potentially life saving medication.  If you have any questions or concerns, please feel free to contact me. Sincerely,  Nancy Tavera. Gabriel MANE, MS, F.A.C.C.   [facility] Cardiology Specialist, [address] [facility], [address]  Phone: [phone], Fax: [phone]

## 2022-09-23 ENCOUNTER — HOSPITAL ENCOUNTER (OUTPATIENT)
Dept: NON INVASIVE DIAGNOSTICS | Age: 82
Discharge: HOME | End: 2022-09-23
Payer: MEDICARE

## 2022-09-23 DIAGNOSIS — R60.0 LEG EDEMA: ICD-10-CM

## 2022-09-23 DIAGNOSIS — R00.0 TACHYCARDIA: ICD-10-CM

## 2022-09-23 DIAGNOSIS — R94.31 ABNORMAL EKG: ICD-10-CM

## 2022-09-23 DIAGNOSIS — R06.02 SOB (SHORTNESS OF BREATH): ICD-10-CM

## 2022-09-23 DIAGNOSIS — I25.5 ISCHEMIC CARDIOMYOPATHY: ICD-10-CM

## 2022-09-23 DIAGNOSIS — R53.83 OTHER FATIGUE: ICD-10-CM

## 2022-09-23 DIAGNOSIS — I25.10 ASHD (ARTERIOSCLEROTIC HEART DISEASE): ICD-10-CM

## 2022-09-23 DIAGNOSIS — I10 PRIMARY HYPERTENSION: ICD-10-CM

## 2022-09-23 DIAGNOSIS — I73.9 PVD (PERIPHERAL VASCULAR DISEASE) (HCC): ICD-10-CM

## 2022-09-23 LAB
LV EF: 28 %
LVEF MODALITY: NORMAL

## 2022-09-23 PROCEDURE — 93306 TTE W/DOPPLER COMPLETE: CPT

## 2022-09-26 ENCOUNTER — OFFICE VISIT (OUTPATIENT)
Dept: CARDIOLOGY | Age: 82
End: 2022-09-26
Payer: MEDICARE

## 2022-09-26 ENCOUNTER — TELEPHONE (OUTPATIENT)
Dept: CARDIOLOGY | Age: 82
End: 2022-09-26

## 2022-09-26 VITALS
SYSTOLIC BLOOD PRESSURE: 119 MMHG | RESPIRATION RATE: 18 BRPM | OXYGEN SATURATION: 93 % | WEIGHT: 219.4 LBS | HEART RATE: 60 BPM | DIASTOLIC BLOOD PRESSURE: 73 MMHG | HEIGHT: 70 IN | BODY MASS INDEX: 31.41 KG/M2

## 2022-09-26 DIAGNOSIS — I10 PRIMARY HYPERTENSION: ICD-10-CM

## 2022-09-26 DIAGNOSIS — I73.9 PVD (PERIPHERAL VASCULAR DISEASE) (HCC): ICD-10-CM

## 2022-09-26 DIAGNOSIS — I25.10 ASHD (ARTERIOSCLEROTIC HEART DISEASE): ICD-10-CM

## 2022-09-26 DIAGNOSIS — I25.5 ISCHEMIC CARDIOMYOPATHY: Primary | ICD-10-CM

## 2022-09-26 DIAGNOSIS — R60.0 LEG EDEMA: ICD-10-CM

## 2022-09-26 DIAGNOSIS — R06.02 SOB (SHORTNESS OF BREATH): ICD-10-CM

## 2022-09-26 PROCEDURE — 1036F TOBACCO NON-USER: CPT | Performed by: FAMILY MEDICINE

## 2022-09-26 PROCEDURE — 99214 OFFICE O/P EST MOD 30 MIN: CPT | Performed by: FAMILY MEDICINE

## 2022-09-26 PROCEDURE — G8427 DOCREV CUR MEDS BY ELIG CLIN: HCPCS | Performed by: FAMILY MEDICINE

## 2022-09-26 PROCEDURE — 99211 OFF/OP EST MAY X REQ PHY/QHP: CPT | Performed by: FAMILY MEDICINE

## 2022-09-26 PROCEDURE — 1123F ACP DISCUSS/DSCN MKR DOCD: CPT | Performed by: FAMILY MEDICINE

## 2022-09-26 PROCEDURE — G8417 CALC BMI ABV UP PARAM F/U: HCPCS | Performed by: FAMILY MEDICINE

## 2022-09-26 NOTE — TELEPHONE ENCOUNTER
----- Message from Christa Cleveland MD sent at 9/24/2022 12:19 AM EDT -----  Let Patient know heart still weak but similar to previous. Thanks. Thanks.

## 2022-09-26 NOTE — PROGRESS NOTES
Usama Montero am scribing for and in the presence of Colby Bay MD, MS, F.A.C.C..    Patient: Claudene Bolster  : 1940  Date of Visit: 2022    REASON FOR VISIT / CONSULTATION: Follow-up (HX:isc cardiomyopathy, ASHD, SOB, HTN, edema, PVD Pt is here for 4 week follow up he states he is doing well. Sob always, lightheaded/dizziness in morning Denies:CP, palp)      History of Present Illness:        Dear Ernie Palacios MD,    I had the pleasure of seeing Claudene Bolster, who is a [de-identified] y.o. male to establish care. He has two brothers with heart disease he is unsure of details or ages when the problems started. One brother has a pacemaker and the other brother has had open heart surgery. He believes they had heart attacks in the past. Also reports mother and father with CAD, he believes everyone was in their 66's prior to having any issues. He is former smoker quit over 60 years ago. He smoked 1/2 pack per day for 3 years. He was diagnosed with hypertension for only a few years and hyperlipidemia and diabetes. He denies having sleep apnea. He states until he was 81years old he never seen a provider and never went to the doctor's office. He has urinary retention and has to self cath once daily. EKG done 2022: Sinus tachycardia with 1st degree AV block. Left anterior fascicular block, right bundle branch block. Heart rate 101 bpm.    Stress test done on 2022- EF 27% Abnormal myocardial perfusion study. There is a moderate/large perfusion defect of severe intensity in the septal, apical, anteroapical  and inferoapical region(s) during stress and rest imaging which is most consistent with an old myocardial infarction with edith-infarct ischemia. Overall, these results are most consistent with high risk for significant coronary artery disease. CAM done on 2022- 6 days and 23 hours recorded.  Baseline rhythm is sinus with average heart rate of 80 bpm, ranging  between 59 and 128 bpm. Sinus tachycardia represented 4% of the study duration. Occasional premature atrial contractions, PACs burden 2% with occasional pairs. No atrial runs. Rare isolated premature ventricular contractions noted. YURY done on 5/31/2022- Normal right YURY. Mild PAD suggested left lower extremity    Cardiac catheterization done on 6/23/2022: Severe two vessel disease involving the LAD a fairly small OM3 branch of the circumflex coronary artery. Normal left ventricular end diastolic pressure. (LVEDP). Echocardiogram done on 6/28/2022: EF of 20%. Moderate left ventricular hypertrophy. Left atrium is moderately dilated. Mild diastolic dysfunction is seen. Aortic root is mildly dilated. Echo done on 8/9/2022- EF 25-30%, Mild left ventricular hypertrophy,Thinning and dyskinesis of the distal half of the septum and the entire apex. Mild aortic regurgitation    Echo done on 9/23/2022- EF 25-30% Compared to the previous study of 8/9/2022, no significant change was seen    Mr. Dane Story is here today for a four week follow up. He states he is doing well since last visit. He continues to wear his Lifevest. He has chronic stable shortness of breath. He does have some mild lightheaded/dizziness mostly in the mornings but denies any falls or near falls. He denies having any chest pain, pressure or tightness. No cough, fever or chills. No abdominal pain, bleeding problems, bowel issues, problems with medications or any other concerns at this time. PAST MEDICAL HISTORY:        Past Medical History:   Diagnosis Date    Acute renal failure superimposed on stage 3 chronic kidney disease ([facility] [address] 3/1/2017    Arthritis     Diabetes mellitus ([facility] [address]     Hyperlipidemia     Hypertension     Kidney stone      CURRENT ALLERGIES: Patient has no known allergies. REVIEW OF SYSTEMS: 14 systems were reviewed. Pertinent positives and negatives as above, all else negative.      Past Surgical History:   Procedure Laterality Date CATARACT REMOVAL WITH IMPLANT Right 2013    CYSTOSCOPY INSERTION / REMOVAL STENT / STONE Right 2017    CYSTOSCOPY STENT INSERTION performed by Rubin Winslow MD at [facility] / [address] [place] [address] / Randa Burgess N/A 11/3/2017    CYSTOSCOPY STENT INSERTION performed by Rubin Winslow MD at [facility]   2017    with stent placement on right side. Changed schulz catheter    HERNIA REPAIR      umbilical [id number]    INTRACAPSULAR CATARACT EXTRACTION Left 2020    EYE CATARACT EMULSIFICATION IOL IMPLANT performed by Diomedes Keyes DO at [address]      right knee 2012    JOINT REPLACEMENT Right 2014 partial knee    JOINT REPLACEMENT Right 2014    complete removal    KNEE SURGERY Right     x 2    LITHOTRIPSY Right 2017    with stent placement - Dr. Renetta Allen     LITHOTRIPSY Left 2018    [place] CYSTO/URETERO/PYELOSCOPY W/LITHOTRIPSY Right 2017    CYSTOSCOPY URETEROSCOPY LASER-WITH HLL performed by Rubin Winslow MD at [address] [facility] ESWL Left 2018    ESWL EXTRACORPEAL SHOCK WAVE LITHOTRIPSY performed by Rubin Winslow MD at [address]    TURP  2017    Greenlight PVP and TUIBNC    TURP  2015    Greenlight PVP    Social History:  Social History     Tobacco Use    Smoking status: Former     Packs/day: 0.50     Years: 3.00     Pack years: 1.50     Types: Cigarettes     Quit date: 1960     Years since quittin.7    Smokeless tobacco: Former     Quit date: 1960    Tobacco comments:     quit 50 years ago   Vaping Use    Vaping Use: Never used   Substance Use Topics    Alcohol use: No    Drug use: No        CURRENT MEDICATIONS:        Outpatient Medications Marked as Taking for the 22 encounter (Office Visit) with Adina Burr MD   Medication Sig Dispense Refill    metoprolol succinate (TOPROL XL) 25 MG extended release tablet Take 1 tablet by mouth in the morning.  90 tablet 3 tamsulosin (FLOMAX) 0.4 MG capsule Take 1 capsule by mouth in the morning. 90 capsule 3    sacubitril-valsartan (ENTRESTO) 24-26 MG per tablet Take 1 tablet by mouth 2 times daily 60 tablet 3    albuterol (ACCUNEB) 1.25 MG/3ML nebulizer solution Inhale 1 ampule into the lungs every 6 hours as needed for Wheezing      furosemide (LASIX) 20 MG tablet Take 20 mg by mouth daily       montelukast (SINGULAIR) 10 MG tablet Take 10 mg by mouth nightly      simvastatin (ZOCOR) 20 MG tablet TAKE 1 TABLET BY MOUTH ONCE DAILY      metFORMIN (GLUCOPHAGE) 850 MG tablet Take 850 mg by mouth 2 times daily (with meals)       Pediatric Multivitamins-Fl (MULTI VIT/FL PO) Take 1 tablet by mouth daily Daily       aspirin 81 MG tablet Take 81 mg by mouth daily. FAMILY HISTORY: family history includes Heart Failure in his brother. Physical Examination:     /73 (Site: Right Upper Arm, Position: Sitting, Cuff Size: Large Adult)   Pulse 60   Resp 18   Ht 5' 10\" (1.778 m)   Wt 219 lb 6.4 oz (99.5 kg)   SpO2 93%   BMI 31.48 kg/m²  Body mass index is 31.48 kg/m². Constitutional: He is oriented to person, place, and time. He appears well-developed and well-nourished. In no acute distress. HEENT: Normocephalic and atraumatic. No JVD present. Carotid bruit is not present. No mass and no thyromegaly present. No lymphadenopathy present. Cardiovascular: Normal rate, regular rhythm, normal heart sounds. Exam reveals no gallop and no friction rubs. 1/6 systolic murmur, 2nd intercostal space on the RIGHT just lateral to the sternum  Pulmonary/Chest: Effort normal and breath sounds normal. No respiratory distress. He has no wheezes, rhonchi or rales. Abdominal: Soft, non-tender. Bowel sounds and aorta are normal. He exhibits no organomegaly, mass or bruit. Extremities: Trace-1+ 1/2 up to the knees on the right 2 + on the left No cyanosis or clubbing. 2+ radial and carotid pulses.  Distal extremity pulses: Not palpable left, right 1+. Neurological: He is alert and oriented to person, place, and time. No evidence of gross cranial nerve deficit. Coordination appeared normal.   Skin: Skin is warm and dry. There is no rash or diaphoresis. Psychiatric: He has a normal mood and affect. His speech is normal and behavior is normal.      MOST RECENT LABS ON RECORD:   Lab Results   Component Value Date    WBC 9.2 06/17/2022    HGB 14.4 06/17/2022    HCT 43.5 06/17/2022     06/17/2022    CHOL 131 05/23/2022    TRIG 175 (H) 05/23/2022    HDL 39 (L) 05/23/2022    ALT 20 04/29/2022    AST 20 04/29/2022     (L) 07/07/2022    K 4.9 07/07/2022     07/07/2022    CREATININE 1.47 (H) 07/07/2022    BUN 26 (H) 07/07/2022    CO2 17 (L) 07/07/2022    TSH 5.20 (H) 07/07/2022    INR 1.0 03/03/2017     ASSESSMENT:      Diagnosis Orders   1. Ischemic cardiomyopathy        2. ASHD (arteriosclerotic heart disease)        3. SOB (shortness of breath)        4. Primary hypertension        5. PVD (peripheral vascular disease) ([facility])        6. Leg edema          PLAN:        ischemic cardiomyopathy : EF 20% on echo, repeat echo on 8/9/2022 EF 25-30%  Beta Blocker: Continue Metoprolol succinate (Toprol XL) 25 mg daily. ACE Inibitor/ARB: Continue sacubitril/valsartan (Entresto) 24/26 mg twice daily. Diuretics: Continue furosemide (Lasix) 20 mg every morning. Heart failure counseling: I told them to start wearing lower extremity compression stockings and I advised them to try and keep their legs up whenever possible and to limit salt in their diet. Additional Testing List: Because of the patient reduced EF <35% in spite of guideline directed maximal medical management, he is at increased risk of sudden cardiac death. Therefore we discussed the risks, benefits and alternatives to placement of an ICD. He was agreeable with proceeding and therefore I have taken the liberty of referring them to [facility] Puente.  I personally contacted Dr. Kim Puente to try and get this set up ASAP. Atherosclerotic Heart Disease: Recent Abnormal Stress on 6/23/2022 - Severe 2 vessel disease. Heart cath done 6/23/22 no stents were placed. Echo showed reduced EF of 20% on 6/28/2022. Repeat echo on 8/9/2022 EF 25-30%  ACE Inibitor/ARB: Continue sacubitril/valsartan (Entresto) 24/26 mg twice daily. Antiplatelet Agent: Continue Aspirin 81 mg daily. Beta Blocker: Continue Metoprolol succinate (Toprol XL) 25 mg daily. Anti-anginal medications: Not indicated at this time. Cholesterol Reduction Therapy: Continue simvastatin (Zocor) 20 mg daily. Shortness of Breath: Not worsening. I believe this is due to his reduced EF at this time. Continue to monitor. Essential Hypertension: Controlled  Beta Blocker: Continue Metoprolol succinate (Toprol XL) 25 mg daily. ACE Inibitor/ARB: Continue sacubitril/valsartan (Entresto) 24/26 mg twice daily. Calcium Channel Blocker: Not indicated at this time. Diuretics: Continue furosemide (Lasix) 20 mg every morning. Abnormal EKG: Tachycardia with 1st degree AV block, Right bundle branch block, left anterior fascicular block:  Possibly symptomatic . Beta Blocker: Continue Metoprolol succinate (Toprol XL) 25 mg daily. Calcium Channel Blocker: Not indicated at this time. Peripheral Vascular Disease: Recent YURY showed mild PAD on the left, no evidence of PAD on the right lower extremity. Antiplatelet Agent: Continue Aspirin 81 mg daily. Cholesterol Reduction Therapy: Continue simvastatin (Zocor) 20 mg daily. Leg swelling: Mild to moderate but continues to slowly improve  Continue current therapy    Finally, I recommended that he continue his other medications and follow up with you as previously scheduled. FOLLOW UP:   I told Mr. Mago Murray to call my office if he had any problems, but otherwise I asked him to Return in about 3 weeks (around 10/17/2022). However, I would be happy to see him sooner should the need arise. Sincerely,  Jumana Aguiar MD, MS, F.A.C.C. [facility] Cardiology Specialist    [address], [place], [address] [place] [address]  Phone: [phone], Fax: [phone]     I believe that the risk of significant morbidity and mortality related to the patient's current medical conditions are: intermediate-high. The documentation recorded by the scribe, accurately and completely reflects the services I personally performed and the decisions made by me. Jumana Aguiar MD, MS, F.A.C.C.  September 26, 2022

## 2022-09-26 NOTE — PATIENT INSTRUCTIONS
SURVEY:    You may be receiving a survey from [employer] regarding your visit today. Please complete the survey to enable us to provide the highest quality of care to you and your family. If you cannot score us a very good on any question, please call the office to discuss how we could have made your experience a very good one. Thank you.

## 2022-09-27 ENCOUNTER — TELEPHONE (OUTPATIENT)
Dept: CARDIOLOGY CLINIC | Age: 82
End: 2022-09-27

## 2022-09-27 DIAGNOSIS — I25.5 ISCHEMIC CARDIOMYOPATHY: Primary | ICD-10-CM

## 2022-09-27 NOTE — TELEPHONE ENCOUNTER
Dr. Myles Umana-  do you want to see this patient in office? Or ok to go ahead and schedule ICD implant?

## 2022-09-28 NOTE — TELEPHONE ENCOUNTER
-  patient does not want to come to the office for a consultation visit. He just wants to move forward with the icd implant. Single? Dual? And what company? Procedure: ICD implant  Date: 10.03.2022  Arrival Time: 8am   Meds to Hold: none    Incision check with [facility]- 10.10.2022 at 230pm     Instructions verbalized to the patient.

## 2022-09-30 ENCOUNTER — PREP FOR PROCEDURE (OUTPATIENT)
Dept: CARDIOLOGY | Age: 82
End: 2022-09-30

## 2022-09-30 RX ORDER — SODIUM CHLORIDE 9 MG/ML
INJECTION, SOLUTION INTRAVENOUS PRN
Status: CANCELLED | OUTPATIENT
Start: 2022-09-30

## 2022-09-30 RX ORDER — SODIUM CHLORIDE 0.9 % (FLUSH) 0.9 %
5-40 SYRINGE (ML) INJECTION PRN
Status: CANCELLED | OUTPATIENT
Start: 2022-09-30

## 2022-09-30 RX ORDER — SODIUM CHLORIDE 9 MG/ML
INJECTION, SOLUTION INTRAVENOUS CONTINUOUS
Status: CANCELLED | OUTPATIENT
Start: 2022-09-30

## 2022-09-30 RX ORDER — SODIUM CHLORIDE 0.9 % (FLUSH) 0.9 %
5-40 SYRINGE (ML) INJECTION EVERY 12 HOURS SCHEDULED
Status: CANCELLED | OUTPATIENT
Start: 2022-09-30

## 2022-10-03 ENCOUNTER — APPOINTMENT (OUTPATIENT)
Dept: CARDIAC CATH/INVASIVE PROCEDURES | Age: 82
End: 2022-10-03
Attending: INTERNAL MEDICINE
Payer: MEDICARE

## 2022-10-03 ENCOUNTER — HOSPITAL ENCOUNTER (OUTPATIENT)
Dept: INPATIENT UNIT | Age: 82
Discharge: HOME | End: 2022-10-03
Attending: INTERNAL MEDICINE | Admitting: INTERNAL MEDICINE
Payer: MEDICARE

## 2022-10-03 ENCOUNTER — APPOINTMENT (OUTPATIENT)
Dept: GENERAL RADIOLOGY | Age: 82
End: 2022-10-03
Attending: INTERNAL MEDICINE
Payer: MEDICARE

## 2022-10-03 VITALS
OXYGEN SATURATION: 98 % | HEIGHT: 70 IN | TEMPERATURE: 97.7 F | BODY MASS INDEX: 31.35 KG/M2 | RESPIRATION RATE: 20 BRPM | HEART RATE: 91 BPM | SYSTOLIC BLOOD PRESSURE: 134 MMHG | DIASTOLIC BLOOD PRESSURE: 61 MMHG | WEIGHT: 219 LBS

## 2022-10-03 LAB
ANION GAP SERPL CALCULATED.3IONS-SCNC: 13 MEQ/L (ref 8–16)
APTT: 31 SECONDS (ref 22–38)
BUN BLDV-MCNC: 33 MG/DL (ref 7–22)
CALCIUM SERPL-MCNC: 9.8 MG/DL (ref 8.5–10.5)
CHLORIDE BLD-SCNC: 102 MEQ/L (ref 98–111)
CO2: 24 MEQ/L (ref 23–33)
CREAT SERPL-MCNC: 1.7 MG/DL (ref 0.4–1.2)
EKG ATRIAL RATE: 78 BPM
EKG P AXIS: 34 DEGREES
EKG P-R INTERVAL: 294 MS
EKG Q-T INTERVAL: 394 MS
EKG QRS DURATION: 130 MS
EKG QTC CALCULATION (BAZETT): 449 MS
EKG R AXIS: -73 DEGREES
EKG T AXIS: 82 DEGREES
EKG VENTRICULAR RATE: 78 BPM
ERYTHROCYTE [DISTWIDTH] IN BLOOD BY AUTOMATED COUNT: 14 % (ref 11.5–14.5)
ERYTHROCYTE [DISTWIDTH] IN BLOOD BY AUTOMATED COUNT: 50.4 FL (ref 35–45)
GFR SERPL CREATININE-BSD FRML MDRD: 39 ML/MIN/1.73M2
GLUCOSE BLD-MCNC: 137 MG/DL (ref 70–108)
HCT VFR BLD CALC: 49.9 % (ref 42–52)
HEMOGLOBIN: 16.1 GM/DL (ref 14–18)
INR BLD: 1 (ref 0.85–1.13)
MCH RBC QN AUTO: 31.3 PG (ref 26–33)
MCHC RBC AUTO-ENTMCNC: 32.3 GM/DL (ref 32.2–35.5)
MCV RBC AUTO: 97.1 FL (ref 80–94)
PLATELET # BLD: 261 THOU/MM3 (ref 130–400)
PMV BLD AUTO: 10 FL (ref 9.4–12.4)
POTASSIUM REFLEX MAGNESIUM: 5 MEQ/L (ref 3.5–5.2)
RBC # BLD: 5.14 MILL/MM3 (ref 4.7–6.1)
SODIUM BLD-SCNC: 139 MEQ/L (ref 135–145)
WBC # BLD: 8.9 THOU/MM3 (ref 4.8–10.8)

## 2022-10-03 PROCEDURE — 36415 COLL VENOUS BLD VENIPUNCTURE: CPT

## 2022-10-03 PROCEDURE — 93010 ELECTROCARDIOGRAM REPORT: CPT | Performed by: INTERNAL MEDICINE

## 2022-10-03 PROCEDURE — 6360000002 HC RX W HCPCS

## 2022-10-03 PROCEDURE — 80048 BASIC METABOLIC PNL TOTAL CA: CPT

## 2022-10-03 PROCEDURE — 85610 PROTHROMBIN TIME: CPT

## 2022-10-03 PROCEDURE — C1894 INTRO/SHEATH, NON-LASER: HCPCS

## 2022-10-03 PROCEDURE — 99204 OFFICE O/P NEW MOD 45 MIN: CPT | Performed by: INTERNAL MEDICINE

## 2022-10-03 PROCEDURE — 93005 ELECTROCARDIOGRAM TRACING: CPT | Performed by: INTERNAL MEDICINE

## 2022-10-03 PROCEDURE — 85730 THROMBOPLASTIN TIME PARTIAL: CPT

## 2022-10-03 PROCEDURE — C1769 GUIDE WIRE: HCPCS

## 2022-10-03 PROCEDURE — C1892 INTRO/SHEATH,FIXED,PEEL-AWAY: HCPCS

## 2022-10-03 PROCEDURE — 2500000003 HC RX 250 WO HCPCS

## 2022-10-03 PROCEDURE — 85027 COMPLETE CBC AUTOMATED: CPT

## 2022-10-03 PROCEDURE — C1898 LEAD, PMKR, OTHER THAN TRANS: HCPCS

## 2022-10-03 PROCEDURE — 6360000002 HC RX W HCPCS: Performed by: STUDENT IN AN ORGANIZED HEALTH CARE EDUCATION/TRAINING PROGRAM

## 2022-10-03 PROCEDURE — 71046 X-RAY EXAM CHEST 2 VIEWS: CPT

## 2022-10-03 PROCEDURE — C1777 LEAD, AICD, ENDO SINGLE COIL: HCPCS

## 2022-10-03 PROCEDURE — 2580000003 HC RX 258: Performed by: STUDENT IN AN ORGANIZED HEALTH CARE EDUCATION/TRAINING PROGRAM

## 2022-10-03 PROCEDURE — 33249 INSJ/RPLCMT DEFIB W/LEAD(S): CPT

## 2022-10-03 PROCEDURE — C1722 AICD, SINGLE CHAMBER: HCPCS

## 2022-10-03 RX ORDER — SODIUM CHLORIDE 0.9 % (FLUSH) 0.9 %
5-40 SYRINGE (ML) INJECTION EVERY 12 HOURS SCHEDULED
Status: DISCONTINUED | OUTPATIENT
Start: 2022-10-03 | End: 2022-10-03 | Stop reason: HOSPADM

## 2022-10-03 RX ORDER — SODIUM CHLORIDE 9 MG/ML
INJECTION, SOLUTION INTRAVENOUS CONTINUOUS
Status: DISCONTINUED | OUTPATIENT
Start: 2022-10-03 | End: 2022-10-03 | Stop reason: HOSPADM

## 2022-10-03 RX ORDER — SODIUM CHLORIDE 0.9 % (FLUSH) 0.9 %
5-40 SYRINGE (ML) INJECTION PRN
Status: DISCONTINUED | OUTPATIENT
Start: 2022-10-03 | End: 2022-10-03 | Stop reason: HOSPADM

## 2022-10-03 RX ORDER — SODIUM CHLORIDE 9 MG/ML
INJECTION, SOLUTION INTRAVENOUS PRN
Status: DISCONTINUED | OUTPATIENT
Start: 2022-10-03 | End: 2022-10-03 | Stop reason: HOSPADM

## 2022-10-03 RX ADMIN — CEFAZOLIN 2000 MG: 10 INJECTION, POWDER, FOR SOLUTION INTRAVENOUS at 09:19

## 2022-10-03 RX ADMIN — SODIUM CHLORIDE: 9 INJECTION, SOLUTION INTRAVENOUS at 09:17

## 2022-10-03 NOTE — PRE SEDATION
[facility] ()  [id number] [place] [id number]  H&P and SEDATION/ANALGESIA     Patient demographics:  Date:   10/3/2022  Patient name: Jonnie Quintanilla  YOB: 1940  Sex: male   MRN:   [id number]    Reason for admission or planned procedure:  Dual Chamber AICD implantation    Code Status: Full Code    Consent:The risk and benefits of dual chamber AICD implantation including pneumothorax, hemothorax, bleeding, vascular complications, infection, pericardial tamponade requiring emergency pericardiocentesis, MI, death, exposure to radiation, lead dislodgement requiring reposition, future operations for generator change or device revision or upgrade were discussed with the patient. He verbalized understanding of the discussion. His questions were answered. The patient wishes to proceed. Past Medical History:  Past Medical History:   Diagnosis Date    Acute renal failure superimposed on stage 3 chronic kidney disease ([facility] [address] 3/1/2017    Arthritis     Diabetes mellitus ([facility] [address]     Hyperlipidemia     Hypertension     Kidney stone        Past Surgical History:  Past Surgical History:   Procedure Laterality Date    CATARACT REMOVAL WITH IMPLANT Right 8/26/2013    CYSTOSCOPY INSERTION / REMOVAL STENT / STONE Right 11/1/2017    CYSTOSCOPY STENT INSERTION performed by Yelena Coyne MD at [facility] / Nichol Willard N/A 11/3/2017    CYSTOSCOPY STENT INSERTION performed by Yelena Coyne MD at [address] [place] [address]  11/03/2017    with stent placement on right side.  Changed schulz catheter    HERNIA REPAIR      umbilical [id number]    INTRACAPSULAR CATARACT EXTRACTION Left 9/28/2020    EYE CATARACT EMULSIFICATION IOL IMPLANT performed by Kevin Nick DO at [facility]      right knee march 22, 2012    JOINT REPLACEMENT Right junu 2014 partial knee    JOINT REPLACEMENT Right 2014    complete removal    KNEE SURGERY Right     x 2    LITHOTRIPSY Right 11/01/2017    with stent placement - Dr. Jeff Taveras     LITHOTRIPSY Left 01/16/2018    [place] CYSTO/URETERO/PYELOSCOPY W/LITHOTRIPSY Right 11/1/2017    CYSTOSCOPY URETEROSCOPY LASER-WITH HLL performed by Gates Canavan, MD at [address] [facility] ESWL Left 1/16/2018    ESWL [address]  LITHOTRIPSY performed by Gates Canavan, MD at [address]  01/2017    Greenlight PVP and TUIBNC    TURP  01/2015    Greenlight PVP        Allergies: Allergies as of 10/03/2022    (No Known Allergies)     Additional information:       Medications     Current Facility-Administered Medications:     0.9 % sodium chloride infusion, , IntraVENous, Continuous, Chrissie Goldman PA-C, Last Rate: 100 mL/hr at 10/03/22 0917, New Bag at 10/03/22 0917    sodium chloride flush 0.9 % injection 5-40 mL, 5-40 mL, IntraVENous, 2 times per day, Chrissie Goldman PA-C    sodium chloride flush 0.9 % injection 5-40 mL, 5-40 mL, IntraVENous, PRN, Chrissie Goldman PA-C    0.9 % sodium chloride infusion, , IntraVENous, PRN, Chrissie Goldman PA-C    ceFAZolin (ANCEF) 2000 mg in dextrose 5 % 50 mL IVPB, 2,000 mg, IntraVENous, On Call to OR, Chrissie Glodman PA-C, Last Rate: 100 mL/hr at 10/03/22 0919, 2,000 mg at 10/03/22 0919    vancomycin (VANCOCIN) 250 mg in sodium chloride 0.9 % 250 mL irrigation, 250 mg, Irrigation, Once, Chrissie Goldman PA-C  Prior to Admission medications    Medication Sig Start Date End Date Taking? Authorizing Provider   metoprolol succinate (TOPROL XL) 25 MG extended release tablet Take 1 tablet by mouth in the morning. 8/15/22   Fab Alejo MD   tamsulosin (FLOMAX) 0.4 MG capsule Take 1 capsule by mouth in the morning.  8/1/22   Anne Marie Nolasco PA-C   sacubitril-valsartan (ENTRESTO) 24-26 MG per tablet Take 1 tablet by mouth 2 times daily 7/1/22   Sushma Montano PA-C   albuterol (ACCUNEB) 1.25 MG/3ML nebulizer solution Inhale 1 ampule into the lungs every 6 hours as needed for Wheezing    Historical Provider, MD   furosemide (LASIX) 20 MG tablet Take 20 mg by mouth daily     Historical Provider, MD   montelukast (SINGULAIR) 10 MG tablet Take 10 mg by mouth nightly    Historical Provider, MD   simvastatin (ZOCOR) 20 MG tablet TAKE 1 TABLET BY MOUTH ONCE DAILY 4/6/21   Historical Provider, MD   metFORMIN (GLUCOPHAGE) 850 MG tablet Take 850 mg by mouth 2 times daily (with meals)  9/12/16   Historical Provider, MD   Pediatric Multivitamins-Fl (MULTI VIT/FL PO) Take 1 tablet by mouth daily Daily     Historical Provider, MD   aspirin 81 MG tablet Take 81 mg by mouth daily. Historical Provider, MD     Aspirin  [] 81 mg  [] 325 mg  [] None  Antiplatelet drug therapy use last 5 days  []No []Yes  Coumadin Use Last 5 Days []No []Yes  Other anticoagulant use last 5 days  []No []Yes  Additional information: Per medical records       Vitals:   Vitals:    10/03/22 0805   BP: 121/67   Pulse: 79   Resp: 18   Temp: 97.7 °F (36.5 °C)   SpO2: 97%       Procedure Plannd:   [] AF ablation [] Atrial Flutter ablation [] SVT ablation [] PVC/VT ablation [] EP study  [] Pacemaker implantation [x] AICD implantation [] BiV PM/ICD implantation   [] Generator change [] Loop recorder implantation [] Loop recorder explantation. [] Micra leadless pacemaker implantation. [] His bundle/Left bundle pacemaker implantation. [] Lead revision. [] Pocket Revision. [] ELIA. [] Cardioversion    []Other:       Planned Sedation/Analgesia:  [] General anesthesia. [x] Midazolam [x] Fentanyl [] Propofol [] Propofol with anesthesia team.    []Midazolam []Meperidine []Sublimaze []Morphine [] Diazepam    []Other:       ASA Classification  []1 []2 [x]3 []4 []5  Class 1: A normal healthy patient  Class 2: Pt with mild to moderate systemic disease  Class 3: Severe systemic disease or disturbance  Class 4: Severe systemic disorders that are already life threatening.   Class 5: Moribund pt with little chances of survival, for more than 24 hours. Mallampati Airway Classification:   []1 []2 [x]3 []4    [x]Pre-procedure diagnostic studies complete and results available. Comment:    [x]Previous sedation/anesthesia experiences assessed. Comment:    [x]The patient is an appropriate candidate to undergo the planned procedure sedation and anesthesia. (Refer to nursing sedation/analgesia documentation record)  [x]Formulation and discussion of sedation/procedure plan, risks, and expectations with patient and/or responsible adult completed. [x]Patient examined immediately prior to the procedure.  (Refer to nursing sedation/analgesia documentation record)        Kp Rutledge MD MD [facility]  Electronically signed 10/3/2022 at 9:35 AM

## 2022-10-03 NOTE — PROCEDURES
lead at desired location. In an identical fashion, the right atrial lead was advanced through 7-[place]ican sheath and positioned into the right atrial appendage. The electric parameters of the leads were checked using external analyzing system and noted to be adequate. High output pacing with 10 volts was performed with both the leads and there was no phrenic nerve capture. The leads were secured to the pectoral fascia using 0 Ethibond over the suture sleeves. The pocket was thoroughly irrigated with antibiotic solution. The leads were attached to the pulse generator and lead parameters were tested one more time. The leads were wrapped around the device and the device was placed inside the pocket. The pocket was closed in 3 layers using, 2-0, 3-0 and 4-0 Vicryl sutures. Steri-strips were applied to incision and site covered with by a sterile light dressing. Safeguard air pressure dressing was applied as well (60 cc of air). The fluoroscopy of the chest showed no pneumothorax or pericardial or pleural effusion. The patient's hemodynamics were monitored throughout the procedure. He tolerated the procedure well. Medications:  Midazolam 3 mg intravenously. Fentanyl 75 mcg intravenously. Cefazolin 2 gm intravenously for surgical prophylaxis. Lidocaine/Marcaine 30 cc ID/SQ for local anaesthesia. Fluoroscopic time:   13 minutes. Blood loss:  Minimal.    Complications:  None. Device details:  Pulse generator: [employer]tronic, model [id number] and serial [id number]. Right atrial lead:  Medtronic, model [id number] and serial [id number]. Right ventricular lead: Medtronic, model [id number] and serial [id number]. Intraoperative electric parameters:  RA lead: [email or web address]. RV lead: 7.8 [email or web address].      Tachycardia settings:   bpm (monitor) and VF >200 bpm (direct shock)    Bradycardia settings:  MVP  pulses per minute     Defibrillation threshold testing[de-identified]  Not performed    Summary:  Successful dual chamber AICD implantation for primary prevention of sudden cardiac death  Normal pacemaker functions (DFT not performed)     RECOMMENDATIONS:    Observation for 2 hours. Chest x-ray (2 views) and device check in 2 hours. Post operative care and follow up per protocol.          Electronically signed by Lashay Martinez MD, Santiago Chalet on 10/3/2022 at 12:55 PM

## 2022-10-03 NOTE — CONSULTS
[address] ([address]) [address] [facility]  Dept: [phone]  Cardiac Electrophysiology: Consultation Note  Patient's demographics:  Date:   10/3/2022  Patient name:              Erla Duverney  YOB: 1940  Sex:    male   MRN:   [id number]    Primary Care Physician:  Neo Gutierrez MD    Referring Physician:  Huong Angeles MD, [facility]    Reason for Consultation:  Ischemic cardiomyopathy, AICD implantation for primary prevention of sudden cardiac death. Clinical Summary:  Mr. Deana Anderson, a pleasant 29years old gentleman was seen by Dr. Shanel Gonzalez at [facility] for 6 months history of progressive shortness of breath and left lower extremity swelling. Upon evaluation he was noted to have severe left ventricle systolic dysfunction, LVEF 20 to 25%. Lexiscan showed anterior/inferior infarct. Cardiac catheterization showed 100% occlusion of LAD and 90% occlusion of left circumflex artery. He was started on appropriate heart failure medications including Entresto and metoprolol succinate and his left finger ejection fraction continue to be less than 35%. He was referred here for AICD implantation for primary prevention of sudden cardiac death. Continues to have exertional shortness of breath. He is able to walk comfortably around half a block. No orthopnea, paroxysmal nocturnal dyspnea, palpitation, syncope. His left lower extremity swelling has improved. Ultrasound lower extremities was negative for DVT. He is currently wearing LifeVest and denies any therapies from it. One of his brothers  of heart attack and another has CAD with PCI and a defibrillator for ischemic cardiomyopathy. Medical history: Severe two-vessel CAD not revascularized (dx 2022), ICM (LVEF 25-30%, dx 2022, bifascicular block ( ms), AsAA (4 cm), HTN, DM2, obesity, CKD-III osteoarthritis.     Review of systems:  Constitutional: Negative for chills and fever  HENT: Negative for congestion, sinus pressure, sneezing and sore throat. Eyes: Negative for pain, discharge, redness and itching. Respiratory: Negative for apnea, cough  Gastrointestinal: Negative for blood in stool, constipation, diarrhea   Endocrine: Negative for cold intolerance, heat intolerance, polydipsia. Genitourinary: Negative for dysuria, enuresis, flank pain and hematuria. Musculoskeletal: Negative for arthralgias, joint swelling and neck pain. Neurological: Negative for numbness and headaches. Psychiatric/Behavioral: Negative for agitation, confusion, decreased concentration and dysphoric mood. Past Medical History[de-identified]  Past Medical History:   Diagnosis Date    Acute renal failure superimposed on stage 3 chronic kidney disease ([facility] [address] 3/1/2017    Arthritis     Diabetes mellitus ([facility] [address]     Hyperlipidemia     Hypertension     Kidney stone        Past Surgical History:  Past Surgical History:   Procedure Laterality Date    CATARACT REMOVAL WITH IMPLANT Right 8/26/2013    CYSTOSCOPY INSERTION / REMOVAL STENT / STONE Right 11/1/2017    CYSTOSCOPY STENT INSERTION performed by Tasia Tavarez MD at [facility] / [address] [place] [address] / Blaine Villa N/A 11/3/2017    CYSTOSCOPY STENT INSERTION performed by Tasia Tavarez MD at [address] [facility]  11/03/2017    with stent placement on right side.  Changed schulz catheter    HERNIA REPAIR      umbilical 2853    INTRACAPSULAR CATARACT EXTRACTION Left 9/28/2020    EYE CATARACT EMULSIFICATION IOL IMPLANT performed by Chandler Loving DO at [address] [facility]      right knee march 22, 2012    JOINT REPLACEMENT Right junu 2014 partial knee    JOINT REPLACEMENT Right 2014    complete removal    KNEE SURGERY Right     x 2    LITHOTRIPSY Right 11/01/2017    with stent placement - Dr. Kaplan Lank     LITHOTRIPSY Left 01/16/2018    TX CYSTO/URETERO/PYELOSCOPY W/LITHOTRIPSY Right 11/1/2017    CYSTOSCOPY URETEROSCOPY LASER-WITH HLL performed by Ita Smalls MD at [address] [facility] ESWL Left 2018    ESWL EXTRACORPEAL SHOCK WAVE LITHOTRIPSY performed by Ita Smalls MD at [address]    TURP  2017    [facility] and [facility]    TURP  2015    [facility] P[facility]       Family History:  One of his brothers  of heart attack and another has CAD with PCI and a defibrillator for ischemic cardiomyopathy.   Family History   Problem Relation Age of Onset    Heart Failure Brother         Social History:  Social History     Socioeconomic History    Marital status:      Spouse name: None    Number of children: None    Years of education: None    Highest education level: None   Tobacco Use    Smoking status: Former     Packs/day: 0.50     Years: 3.00     Pack years: 1.50     Types: Cigarettes     Quit date: 1960     Years since quittin.7    Smokeless tobacco: Former     Quit date: 1960    Tobacco comments:     quit 50 years ago   Vaping Use    Vaping Use: Never used   Substance and Sexual Activity    Alcohol use: No    Drug use: No        Allergies:  No Known Allergies     Medications:  Current Facility-Administered Medications   Medication Dose Route Frequency Provider Last Rate Last Admin    0.9 % sodium chloride infusion   IntraVENous Continuous Alonza Layer, PA-C        sodium chloride flush 0.9 % injection 5-40 mL  5-40 mL IntraVENous 2 times per day Alonza Layer, PA-C        sodium chloride flush 0.9 % injection 5-40 mL  5-40 mL IntraVENous PRN Alonza Layer, PA-C        0.9 % sodium chloride infusion   IntraVENous PRN Alonza Layer, PA-C        ceFAZolin (ANCEF) 2000 mg in dextrose 5 % 50 mL IVPB  2,000 mg IntraVENous On Call to [address] [place] [address], PA-C        vancomycin (VANCOCIN) 250 mg in sodium chloride 0.9 % 250 mL irrigation  250 mg Irrigation Once Alonza Layer, PA-C           Physical Examination:  /67   Pulse 79   Temp 97.7 °F (36.5 °C) (Oral)   Resp 18   Ht 5' 10\" (1.778 m)   Wt 219 lb (99.3 kg)   SpO2 97%   BMI 31.42 kg/m²   No intake or output data in the 24 hours ending 10/03/22 0918  Patient Vitals for the past 96 hrs (Last 3 readings):   Weight   10/03/22 0757 219 lb (99.3 kg)     GENERAL: Alert and oriented. No distress. EYES: No pallor or icterus. ENT: No cyanosis. No thyromegaly or cervical LAP. VESSELS: No jugular venous distension or carotid bruits. HEART: Normal S1/S2. LVS3 +. No murmur, rub or gallop. LUNGS: Clear to auscultation. ABDOMEN: Soft and non-tender. EXTREMITIES: 1+ LLE pitting edema. Feet are warm. NEUROLOGICAL: Grossly normal.     Laboratory And Diagnostic Data  I have personally reviewed and interpreted the results of the following diagnostic testing    Lab Results   Component Value Date    WBC 8.9 10/03/2022    HGB 16.1 10/03/2022    HCT 49.9 10/03/2022     10/03/2022    CHOL 131 05/23/2022    TRIG 175 (H) 05/23/2022    HDL 39 (L) 05/23/2022    ALT 20 04/29/2022    AST 20 04/29/2022     10/03/2022    K 5.0 10/03/2022     10/03/2022    CREATININE 1.7 (H) 10/03/2022    BUN 33 (H) 10/03/2022    CO2 24 10/03/2022    TSH 5.20 (H) 07/07/2022    INR 1.00 10/03/2022       Echocardiogram 9/23/2022: LVEF 25 to 30%, LVEDD 4.3 cm, LVWT 1.2 cm, LAD/LAURA 4.1 cm. Ascending aortic aneurysm, 4 cm. No significant valvular abnormalities. TTE 6/17/2022, LVEF 20 to 25%. ECG bifascicular block (RBBB and LAFB). Coronary angiogram 6/23/2022: % and left circumflex 90% disease  Lexiscan stress test 6/20/2022: Infarction in LAD/RCA territory. Impression:  Chronic systolic heart failure, LVEF 25-30%, NYHA class III on maximally tolerated guideline directed medical therapy. ICM  Bifascicular block (QRS duration 120 ms). Two vessel severe CAD (non-revascularized),   HTN, DM, HPL and CKD-II/III.        Assessment And Recommendations:  Patient has severe ischemic cardiomyopathy and known revascularized vessels and branches no reversible ischemia) and his LVEF continues to be less than 35% despite being on maximally tolerated guideline directed medical therapy including Entresto and metoprolol succinate. Due to chronic kidney disease addition of spironolactone was not deemed safe. He is at risk of sudden cardiac death from ventricular arrhythmias. He has bifascicular block and as such will be benefited by a dual-chamber AICD for primary prevention of sudden cardiac death. No documented ventricular arrhythmias or syncope. Discussed risk and benefits and he wishes to proceed. Will benefit from [place]. Questions answered. Thank you for allowing me to participate in the care of your patient. Please call me if you have any questions. **This report has been created using voice recognition software. It may contain minor errors which are inherent in voice recognition technology. **       Electronically signed by Diaz Boyle MD, MRCP, Mac Gilbert on 10/3/2022 at 9:18 AM

## 2022-10-03 NOTE — BRIEF OP NOTE
Brief Postoperative Note    Date:   10/3/2022  Patient name: Reno Lorenz  YOB: 1940  Sex: male   MRN:   [id number]    PCP: Maurisio Ahmadi MD     Procedure: Dual Chamber AICD implantation    Pre-Op Diagnosis: ICM    Post-Op Diagnosis: Same    Surgeon: Joi Warner MD, MRCP, [facility], [address]    Assistant: Emilee Sanchez. Anesthesia/sedation:  Local lidocaine/fentanyl and midazolam as needed. Estimated Blood Loss (mL): Minimal    Complications: None    Recommendations:  See Epic orders. Bed rest for 2 hours. Keep pocket site dry. No shower for 7 days ((sponge bath and tub bath OK). ICE packs locally. Monitor site for bleeding or swelling. Release pressure by 30 cc after 2 hours and remove safeguard dressing x 24 hours. Chest x-ray PA and lateral views. Follow up in device clinic in 1 week.        Electronically signed by Jerry Pereira MD, Yael Waddell on 10/3/2022 at 12:21 PM

## 2022-10-03 NOTE — PROGRESS NOTES
Discharge teaching and instructions  completed with patient wife and daughter using teachback method. AVS reviewed. No new prescriptions. Patient, wife and daughter voiced understanding regarding medications, site care, follow up appointments, and care of self at home. Discharged in a wheelchair to home with support per family.

## 2022-10-03 NOTE — DISCHARGE INSTRUCTIONS
You should gradually return to your normal activities. For the first 4 weeks:     Do not lift more than 10 pounds     Do not swim, golf, bowl, or vacuum. Do not raise your device side arm above your shoulder for 30 days. At your 2 week follow up visit, ask your doctor which of the above activities you can resume. ALWAYS avoid any contact sports or hard blows to chest or abdomin. Avoid sleeping on the left side of face and face down. You may resume your sexual activity when you feel ready. WOUND CARE     Remove pressure dressing tomorrow morning. May cover steri strips with dry dressing to keep clean. Keep incision dry for at least 7 days, and than you may shower. Spongue bathes around the device insertion site for the first week, keeping the incision dry. Do not apply any ointment, talc or lotion to the incision. Do not scratch or rub incision with your hands. The Steri-strips (tape strips) will be removed at your follow up visit, if they have not yet peeled off  on their own. Call your doctor immediately if your incision looks red, becomes swollen or tender, or begins to ooze fluid. Also, notify your doctor at once if you develop a temperature greater than 100 degrees Fahrenheit. WHAT TO DO IF YOU GET A SHOCK OR PASS OUT (DEFIBRILLATORS ONLY)     If you get a shock one time and feel normal afterwards, make an appointment to see your doctor. If you get a shock more than once over a few minutes or if you do not feel normal after one shock, call 911 and notify doctor. MAGNETS AND ELECTROMAGNETIC INTERFERENCE     Some devices or equipment that we encounter send out electrical signals that can:     Prevent your defibrillator from detecting life-threatening rhythm abnormalities in your heart.          You must avoid              Airport magnet security wands              Diathermy or other heart treatments               ARC or resistance welding Electrical power generator plants              Electric cautery that is used for surgical procedures              MRI scans              Radio or television transmitting towers        Special procedures: You must stay at arm's length from magnets of any kind        Cellular phones should be used on the ear opposite to your device. Do not keep cellular phones in a pocket over your defibrillator        To avoid any interference with your device, you must pass through any security devices or garcía (airports, department stores, and other public places) within 10 seconds. Microwave ovens, televisions, electric tools and gardening equipment should not cause problems. If you have any questions about equipment not listed, call the  of your device or the pacemaker/Implantable Cardioverter-Defibrillator (ICD) clinic at [facility]. GENERAL INSTRUCTIONS     Always carry your device Identification card with you. The device  will mail a permanent ID card to you in 6-8 weeks. Keep your follow up appointment with your doctor and the device clinic to be sure your device and the leads are working properly. Always take your medications at the times prescribed by your doctor. Always tell medical personnel that you have a device. Memorize the name of the [address] [place] [address]. [facility], [employer], [employer] , [employer]    Follow up appointments     Make a follow up appointment with your doctor for 1-2 weeks after your implantation. If you have any problems or questions about your device, call the Pacemaker [address] [facility]. Clinic hours:  Monday through Friday 8AM to 4 Pm. IN case of an emergency , call 911.              SEDATION/ANALGESIA INFORMATION/HOME GOING ADVICE    SEDATION / ANALGESIA INFORMATION / HOME GOING ADVICE  You have received the sedation/analgesia medication during your visit Sedation/analgesia is used during short medical procedures under controlled supervision. The medication will produce a strong relaxation. You will be able to hear, speak and follow instructions, but your memory and alertness will be decreased. You will be able to swallow and breathe on your own. During sedation/analgesia your blood pressure, heart and breathing will be watched closely. After the procedure, you may not remember what was said or done. You may have the following effects from the medication. \"           Drowsiness, dizziness, sleepiness or confusion. \"           Difficulty remembering or delayed reaction times. \"           Loss of fine muscle control or difficulty with your balance especially while walking. \"           Difficulty focusing or blurred vision. You may not be aware of slight changes in your behavior and/or your reaction time because of the medication used during the procedure. Therefore you should follow these instructions. \"           Have someone responsible help you with your care. \"           Do not drive until instructed by physician  \"           Do not operate equipment for 24 hours (lawnmowers, power tools, kitchen accessories, stove). \"           Do not drink any alcoholic beverages for a minimum of 24 hours. \"           Do not make important personal, legal or business decisions for 24 hours. \"           You may experience dizziness or lightheadedness. Move slowly and carefully, do not make sudden position changes. \"           Drink extra amounts of fluids today. \"           Increase your diet as tolerated (unless you have received specific instructions from your doctor). \"           If you feel nauseated, continue with liquids until the nausea is gone. \"           Notify your physician if you have not urinated within 8 hours after the procedure. \"           Resume your medications unless otherwise instructed.      Contact your physician if you have any questions or concerns.      IF YOU REPORT TO AN EMERGENCY ROOM, DOCTOR'S OFFICE OR HOSPITAL WITHIN 24 HOURS AFTER YOUR PROCEDURE, BRING THIS SHEET AND YOUR AFTER VISIT SUMMARY WITH YOU AND GIVE IT TO THE PHYSICIAN OR NURSE ATTENDING YOU.

## 2022-10-10 ENCOUNTER — OFFICE VISIT (OUTPATIENT)
Dept: CARDIOLOGY | Age: 82
End: 2022-10-10
Payer: MEDICARE

## 2022-10-10 VITALS
DIASTOLIC BLOOD PRESSURE: 75 MMHG | HEART RATE: 69 BPM | RESPIRATION RATE: 18 BRPM | SYSTOLIC BLOOD PRESSURE: 120 MMHG | HEIGHT: 70 IN | BODY MASS INDEX: 31.12 KG/M2 | WEIGHT: 217.4 LBS | OXYGEN SATURATION: 97 %

## 2022-10-10 DIAGNOSIS — I25.5 ISCHEMIC CARDIOMYOPATHY: ICD-10-CM

## 2022-10-10 DIAGNOSIS — I10 PRIMARY HYPERTENSION: ICD-10-CM

## 2022-10-10 DIAGNOSIS — R60.0 LEG EDEMA: ICD-10-CM

## 2022-10-10 DIAGNOSIS — I25.10 ASHD (ARTERIOSCLEROTIC HEART DISEASE): ICD-10-CM

## 2022-10-10 DIAGNOSIS — I44.0 1ST DEGREE AV BLOCK: ICD-10-CM

## 2022-10-10 DIAGNOSIS — I45.10 RIGHT BUNDLE BRANCH BLOCK: ICD-10-CM

## 2022-10-10 DIAGNOSIS — Z95.810 ICD (IMPLANTABLE CARDIOVERTER-DEFIBRILLATOR) IN PLACE: Primary | ICD-10-CM

## 2022-10-10 DIAGNOSIS — R06.02 SOB (SHORTNESS OF BREATH): ICD-10-CM

## 2022-10-10 DIAGNOSIS — I44.4 LEFT ANTERIOR FASCICULAR BLOCK: ICD-10-CM

## 2022-10-10 DIAGNOSIS — I73.9 PVD (PERIPHERAL VASCULAR DISEASE) (HCC): ICD-10-CM

## 2022-10-10 PROCEDURE — G8484 FLU IMMUNIZE NO ADMIN: HCPCS | Performed by: PHYSICIAN ASSISTANT

## 2022-10-10 PROCEDURE — G8417 CALC BMI ABV UP PARAM F/U: HCPCS | Performed by: PHYSICIAN ASSISTANT

## 2022-10-10 PROCEDURE — G8427 DOCREV CUR MEDS BY ELIG CLIN: HCPCS | Performed by: PHYSICIAN ASSISTANT

## 2022-10-10 PROCEDURE — 1036F TOBACCO NON-USER: CPT | Performed by: PHYSICIAN ASSISTANT

## 2022-10-10 PROCEDURE — 1123F ACP DISCUSS/DSCN MKR DOCD: CPT | Performed by: PHYSICIAN ASSISTANT

## 2022-10-10 PROCEDURE — 99214 OFFICE O/P EST MOD 30 MIN: CPT | Performed by: PHYSICIAN ASSISTANT

## 2022-10-10 NOTE — PROGRESS NOTES
Patient: Nik Lott  : 1940  Date of Visit: October 10, 2022    REASON FOR VISIT / CONSULTATION: Follow-up (HX: ischemic cardiomyopathy, CAD, HTN, HLD. Pt is here today for a follow up after his ICD placement by Dr. Vicente Martinez. He says he is doing well. No issues with ICD just some itching which could be from the tape. Denies CP, palpitations. SOB and light headed/dizziness once in a while. )      History of Present Illness:        Dear Ted Solis MD,    I had the pleasure of seeing Nik Lott, who is a [de-identified] y.o. male to establish care. He has two brothers with heart disease he is unsure of details or ages when the problems started. One brother has a pacemaker and the other brother has had open heart surgery. He believes they had heart attacks in the past. Also reports mother and father with CAD, he believes everyone was in their 66's prior to having any issues. He is former smoker quit over 60 years ago. He smoked 1/2 pack per day for 3 years. He was diagnosed with hypertension for only a few years and hyperlipidemia and diabetes. He denies having sleep apnea. He states until he was 81years old he never seen a provider and never went to the doctor's office. He has urinary retention and has to self cath once daily. EKG done 2022: Sinus tachycardia with 1st degree AV block. Left anterior fascicular block, right bundle branch block. Heart rate 101 bpm.    Stress test done on 2022- EF 27% Abnormal myocardial perfusion study. There is a moderate/large perfusion defect of severe intensity in the septal, apical, anteroapical and inferoapical region(s) during stress and rest imaging which is most consistent with an old myocardial infarction with edith-infarct ischemia. Overall, these results are most consistent with high risk for significant coronary artery disease. CAM done on 2022- 6 days and 23 hours recorded.  Baseline rhythm is sinus with average heart rate of 80 bpm, ranging between 59 and 128 bpm. Sinus tachycardia represented 4% of the study duration. Occasional premature atrial contractions, PACs burden 2% with occasional pairs. No atrial runs. Rare isolated premature ventricular contractions noted. YURY done on 5/31/2022- Normal right YURY. Mild PAD suggested left lower extremity    Cardiac catheterization done on 6/23/2022: Severe two vessel disease involving the LAD a fairly small OM3 branch of the circumflex coronary artery. Normal left ventricular end diastolic pressure. (LVEDP). Echocardiogram done on 6/28/2022: EF of 20%. Moderate left ventricular hypertrophy. Left atrium is moderately dilated. Mild diastolic dysfunction is seen. Aortic root is mildly dilated. Echo done on 8/9/2022- EF 25-30%, Mild left ventricular hypertrophy,Thinning and dyskinesis of the distal half of the septum and the entire apex. Mild aortic regurgitation    Echo done on 9/23/2022- EF 25-30% Compared to the previous study of 8/9/2022, no significant change was seen    ICD placed 10/3/2022 BY DR Leon. Mr. Holli Bain is here today for a follow up after having an ICD placed by Dr Katelyn Pal on 10/3/2022. He reports he had some pain the following day, but has not had any pain since. He denies any chest pain or pressure. He denies any worsening shortness of breath, it has been staying the same. He denies any abdominal pain, nausea, vomiting. He does have some mild lightheaded/dizziness mostly in the mornings but denies any falls or near falls. He denies having any chest pain, pressure or tightness. No cough, fever or chills. No abdominal pain, bleeding problems, bowel issues, problems with medications or any other concerns at this time.         PAST MEDICAL HISTORY:        Past Medical History:   Diagnosis Date    Acute renal failure superimposed on stage 3 chronic kidney disease ([address] 3/1/2017    Arthritis     Diabetes mellitus ([address]     Hyperlipidemia     Hypertension     Kidney stone CURRENT ALLERGIES: Patient has no known allergies. REVIEW OF SYSTEMS: 14 systems were reviewed. Pertinent positives and negatives as above, all else negative. Past Surgical History:   Procedure Laterality Date    CATARACT REMOVAL WITH IMPLANT Right 2013    CYSTOSCOPY INSERTION / REMOVAL STENT / STONE Right 2017    CYSTOSCOPY STENT INSERTION performed by Denny Suárez MD at [facility] / [address] / [place] N/A 11/3/2017    CYSTOSCOPY STENT INSERTION performed by Denny Suárez MD at [address] [facility] [place]   2017    with stent placement on right side.  Changed schulz catheter    HERNIA REPAIR      umbilical [id number]    INTRACAPSULAR CATARACT EXTRACTION Left 2020    EYE CATARACT EMULSIFICATION IOL IMPLANT performed by Mookie Herr DO at [address]      right knee 2012    JOINT REPLACEMENT Right 2014 partial knee    JOINT REPLACEMENT Right 2014    complete removal    KNEE SURGERY Right     x 2    LITHOTRIPSY Right 2017    with stent placement - Dr. Devi Brownlee     LITHOTRIPSY Left 2018    MO CYSTO/URETERO/PYELOSCOPY W/LITHOTRIPSY Right 2017    CYSTOSCOPY URETEROSCOPY LASER-WITH HLL performed by Denny Suárez MD at [address] [facility] ESWL Left 2018    ESWL EXTRACORPEAL SHOCK WAVE LITHOTRIPSY performed by Denny Suárez MD at [address]    TURP  2017    Greenlight PVP and TUIBNC    TURP  2015    [employer]light PVP    Social History:  Social History     Tobacco Use    Smoking status: Former     Packs/day: 0.50     Years: 3.00     Pack years: 1.50     Types: Cigarettes     Quit date: 1960     Years since quittin.8    Smokeless tobacco: Former     Quit date: 1960    Tobacco comments:     quit 50 years ago   Vaping Use    Vaping Use: Never used   Substance Use Topics    Alcohol use: No    Drug use: No        CURRENT MEDICATIONS:        Outpatient Medications Marked as Taking for the 10/10/22 encounter (Office Visit) with Nancy Goodman PA-C   Medication Sig Dispense Refill    metoprolol succinate (TOPROL XL) 25 MG extended release tablet Take 1 tablet by mouth in the morning. 90 tablet 3    tamsulosin (FLOMAX) 0.4 MG capsule Take 1 capsule by mouth in the morning. 90 capsule 3    sacubitril-valsartan (ENTRESTO) 24-26 MG per tablet Take 1 tablet by mouth 2 times daily 60 tablet 3    albuterol (ACCUNEB) 1.25 MG/3ML nebulizer solution Inhale 1 ampule into the lungs every 6 hours as needed for Wheezing      furosemide (LASIX) 20 MG tablet Take 20 mg by mouth daily       montelukast (SINGULAIR) 10 MG tablet Take 10 mg by mouth nightly      simvastatin (ZOCOR) 20 MG tablet TAKE 1 TABLET BY MOUTH ONCE DAILY      metFORMIN (GLUCOPHAGE) 850 MG tablet Take 850 mg by mouth 2 times daily (with meals)       Pediatric Multivitamins-Fl (MULTI VIT/FL PO) Take 1 tablet by mouth daily Daily       aspirin 81 MG tablet Take 81 mg by mouth daily. FAMILY HISTORY: family history includes Heart Failure in his brother. Physical Examination:     /75 (Site: Left Upper Arm, Position: Sitting, Cuff Size: Medium Adult)   Pulse 69   Resp 18   Ht 5' 10\" (1.778 m)   Wt 217 lb 6.4 oz (98.6 kg)   SpO2 97%   BMI 31.19 kg/m²  Body mass index is 31.19 kg/m². Constitutional: He is oriented to person, place, and time. He appears well-developed and well-nourished. In no acute distress. HEENT: Normocephalic and atraumatic. No JVD present. Carotid bruit is not present. No mass and no thyromegaly present. No lymphadenopathy present. Cardiovascular: Normal rate, regular rhythm, normal heart sounds. Exam reveals no gallop and no friction rubs. 1/6 systolic murmur, 2nd intercostal space on the RIGHT just lateral to the sternum  Pulmonary/Chest: Effort normal and breath sounds normal. No respiratory distress. He has no wheezes, rhonchi or rales. Abdominal: Soft, non-tender.  Bowel sounds and aorta are normal. He exhibits no organomegaly, mass or bruit. Extremities: Trace-1+ 1/2 up to the knees on the right 1+ on the left No cyanosis or clubbing. 2+ radial and carotid pulses. Distal extremity pulses: Not palpable left, right 1+. Neurological: He is alert and oriented to person, place, and time. No evidence of gross cranial nerve deficit. Coordination appeared normal.   Skin: Skin is warm and dry. There is no rash or diaphoresis. Psychiatric: He has a normal mood and affect. His speech is normal and behavior is normal.      MOST RECENT LABS ON RECORD:   Lab Results   Component Value Date    WBC 8.9 10/03/2022    HGB 16.1 10/03/2022    HCT 49.9 10/03/2022     10/03/2022    CHOL 131 05/23/2022    TRIG 175 (H) 05/23/2022    HDL 39 (L) 05/23/2022    ALT 20 04/29/2022    AST 20 04/29/2022     10/03/2022    K 5.0 10/03/2022     10/03/2022    CREATININE 1.7 (H) 10/03/2022    BUN 33 (H) 10/03/2022    CO2 24 10/03/2022    TSH 5.20 (H) 07/07/2022    INR 1.00 10/03/2022     ASSESSMENT:      Diagnosis Orders   1. ICD (implantable cardioverter-defibrillator) in place        2. Ischemic cardiomyopathy        3. ASHD (arteriosclerotic heart disease)        4. SOB (shortness of breath)        5. Primary hypertension        6. 1st degree AV block        7. Left anterior fascicular block        8. Right bundle branch block        9. PVD (peripheral vascular disease) ([facility] [address]        10. Leg edema            PLAN:        Implantable Cardioverter Defibrillator (ICD):  Non Ischemic Cardiomyopathy  Pacemaker Incision site:     Appearance of his pacemaker incision: healing appropriately around incision area. Incision Care: Education  Keep the area clean and dry as much as possible and that it was ok for him to shower. Avoid direct water on the site for a few more weeks and especially to avoid scrubbing the area.  I told him to watch for signs of infection including increased pain at the site, swelling, drainage, fever or chills and call my office if any of these signs or symptoms developed. Interrogation Follow up: I iInstructed him to return in about 3 weeks to have the ICD interrogated followed by repeat  interrogations every 3 months via home monitoring with once a year office checks    ischemic cardiomyopathy : EF 20% on echo, repeat echo on 8/9/2022 EF 25-30%  Beta Blocker: Continue Metoprolol succinate (Toprol XL) 25 mg daily. ACE Inibitor/ARB: Continue sacubitril/valsartan (Entresto) 24/26 mg twice daily. Diuretics: Continue furosemide (Lasix) 20 mg every morning. Heart failure counseling: I told them to start wearing lower extremity compression stockings and I advised them to try and keep their legs up whenever possible and to limit salt in their diet. Atherosclerotic Heart Disease: Recent Abnormal Stress on 6/23/2022 - Severe 2 vessel disease. Heart cath done 6/23/22 no stents were placed. Echo showed reduced EF of 20% on 6/28/2022. Repeat echo on 8/9/2022 EF 25-30%  ACE Inibitor/ARB: Continue sacubitril/valsartan (Entresto) 24/26 mg twice daily. Antiplatelet Agent: Continue Aspirin 81 mg daily. Beta Blocker: Continue Metoprolol succinate (Toprol XL) 25 mg daily. Anti-anginal medications: Not indicated at this time. Cholesterol Reduction Therapy: Continue simvastatin (Zocor) 20 mg daily. Shortness of Breath: Not worsening. I believe this is due to his reduced EF at this time. Continue to monitor. Essential Hypertension: Controlled  Beta Blocker: Continue Metoprolol succinate (Toprol XL) 25 mg daily. ACE Inibitor/ARB: Continue sacubitril/valsartan (Entresto) 24/26 mg twice daily. Calcium Channel Blocker: Not indicated at this time. Diuretics: Continue furosemide (Lasix) 20 mg every morning. Abnormal EKG: Tachycardia with 1st degree AV block, Right bundle branch block, left anterior fascicular block:  Possibly symptomatic .    Beta Blocker: Continue Metoprolol succinate (Toprol XL) 25 mg daily. Calcium Channel Blocker: Not indicated at this time. Peripheral Vascular Disease: Recent YURY showed mild PAD on the left, no evidence of PAD on the right lower extremity. Antiplatelet Agent: Continue Aspirin 81 mg daily. Cholesterol Reduction Therapy: Continue simvastatin (Zocor) 20 mg daily. Leg swelling: Mild to moderate but improved since last being seen. Continue current therapy    Finally, I recommended that he continue his other medications and follow up with you as previously scheduled. FOLLOW UP:   I told Mr. Yi Iglesias to call my office if he had any problems, but otherwise I asked him to Return in about 3 weeks (around 10/31/2022). However, I would be happy to see him sooner should the need arise. Sincerely,  Alan Benoit PA-C  [facility]    [address] [facility] [facility], [address] [place] [address]  Phone: [phone], Fax: [phone]     I believe that the risk of significant morbidity and mortality related to the patient's current medical conditions are: intermediate-high. Approximately 40 minutes were spent during prep work, discussion and exam of the patient, and follow up documentation and all of their questions were answered.       October 10, 2022

## 2022-11-03 DIAGNOSIS — R94.39 ABNORMAL STRESS TEST: ICD-10-CM

## 2022-11-03 DIAGNOSIS — R60.0 LEG EDEMA: ICD-10-CM

## 2022-11-03 DIAGNOSIS — I73.9 PVD (PERIPHERAL VASCULAR DISEASE) (HCC): ICD-10-CM

## 2022-11-03 DIAGNOSIS — R94.31 ABNORMAL EKG: ICD-10-CM

## 2022-11-03 DIAGNOSIS — I10 PRIMARY HYPERTENSION: ICD-10-CM

## 2022-11-03 DIAGNOSIS — I25.10 ASHD (ARTERIOSCLEROTIC HEART DISEASE): ICD-10-CM

## 2022-11-03 DIAGNOSIS — R06.02 SOB (SHORTNESS OF BREATH): ICD-10-CM

## 2022-11-04 ENCOUNTER — HOSPITAL ENCOUNTER (OUTPATIENT)
Age: 82
Discharge: HOME | End: 2022-11-04
Payer: MEDICARE

## 2022-11-04 LAB
ALBUMIN SERPL-MCNC: 4.4 G/DL (ref 3.5–5.2)
ANION GAP SERPL CALCULATED.3IONS-SCNC: 10 MMOL/L (ref 9–17)
BACTERIA: ABNORMAL
BILIRUBIN URINE: NEGATIVE
BUN BLDV-MCNC: 27 MG/DL (ref 8–23)
BUN/CREAT BLD: 18 (ref 9–20)
CALCIUM SERPL-MCNC: 9.7 MG/DL (ref 8.6–10.4)
CHLORIDE BLD-SCNC: 100 MMOL/L (ref 98–107)
CO2: 25 MMOL/L (ref 20–31)
COLOR: YELLOW
CREAT SERPL-MCNC: 1.51 MG/DL (ref 0.7–1.2)
CREATININE URINE: 54 MG/DL (ref 39–259)
EPITHELIAL CELLS UA: ABNORMAL /HPF (ref 0–5)
GFR SERPL CREATININE-BSD FRML MDRD: 46 ML/MIN/1.73M2
GLUCOSE BLD-MCNC: 93 MG/DL (ref 70–99)
GLUCOSE URINE: NEGATIVE
HCT VFR BLD CALC: 45.8 % (ref 40.7–50.3)
HEMOGLOBIN: 15.2 G/DL (ref 13–17)
KETONES, URINE: NEGATIVE
LEUKOCYTE ESTERASE, URINE: ABNORMAL
MAGNESIUM: 2.1 MG/DL (ref 1.6–2.6)
MCH RBC QN AUTO: 32.1 PG (ref 25.2–33.5)
MCHC RBC AUTO-ENTMCNC: 33.2 G/DL (ref 28.4–34.8)
MCV RBC AUTO: 96.8 FL (ref 82.6–102.9)
NITRITE, URINE: POSITIVE
NRBC AUTOMATED: 0 PER 100 WBC
PDW BLD-RTO: 13.5 % (ref 11.8–14.4)
PH UA: 7 (ref 5–9)
PHOSPHORUS: 3.2 MG/DL (ref 2.5–4.5)
PLATELET # BLD: 199 K/UL (ref 138–453)
PMV BLD AUTO: 9.9 FL (ref 8.1–13.5)
POTASSIUM SERPL-SCNC: 5.3 MMOL/L (ref 3.7–5.3)
PROTEIN UA: NEGATIVE
PTH INTACT: 32.1 PG/ML (ref 14–72)
RBC # BLD: 4.73 M/UL (ref 4.21–5.77)
RBC UA: ABNORMAL /HPF (ref 0–2)
SODIUM BLD-SCNC: 135 MMOL/L (ref 135–144)
SPECIFIC GRAVITY UA: 1.01 (ref 1.01–1.02)
TOTAL PROTEIN, URINE: 24 MG/DL
TURBIDITY: CLEAR
URIC ACID: 6.9 MG/DL (ref 3.4–7)
URINE HGB: ABNORMAL
URINE TOTAL PROTEIN CREATININE RATIO: 0.44 (ref 0–0.2)
UROBILINOGEN, URINE: NORMAL
WBC # BLD: 8 K/UL (ref 3.5–11.3)
WBC UA: ABNORMAL /HPF (ref 0–5)

## 2022-11-04 PROCEDURE — 84156 ASSAY OF PROTEIN URINE: CPT

## 2022-11-04 PROCEDURE — 87077 CULTURE AEROBIC IDENTIFY: CPT

## 2022-11-04 PROCEDURE — 81001 URINALYSIS AUTO W/SCOPE: CPT

## 2022-11-04 PROCEDURE — 82570 ASSAY OF URINE CREATININE: CPT

## 2022-11-04 PROCEDURE — 83735 ASSAY OF MAGNESIUM: CPT

## 2022-11-04 PROCEDURE — 87086 URINE CULTURE/COLONY COUNT: CPT

## 2022-11-04 PROCEDURE — 84550 ASSAY OF BLOOD/URIC ACID: CPT

## 2022-11-04 PROCEDURE — 87186 SC STD MICRODIL/AGAR DIL: CPT

## 2022-11-04 PROCEDURE — 85027 COMPLETE CBC AUTOMATED: CPT

## 2022-11-04 PROCEDURE — 80069 RENAL FUNCTION PANEL: CPT

## 2022-11-04 PROCEDURE — 36415 COLL VENOUS BLD VENIPUNCTURE: CPT

## 2022-11-04 PROCEDURE — 83970 ASSAY OF PARATHORMONE: CPT

## 2022-11-06 LAB
CULTURE: ABNORMAL
SPECIMEN DESCRIPTION: ABNORMAL

## 2022-11-16 ENCOUNTER — OFFICE VISIT (OUTPATIENT)
Dept: CARDIOLOGY | Age: 82
End: 2022-11-16
Payer: MEDICARE

## 2022-11-16 VITALS
WEIGHT: 218.2 LBS | HEIGHT: 70 IN | SYSTOLIC BLOOD PRESSURE: 112 MMHG | HEART RATE: 53 BPM | OXYGEN SATURATION: 96 % | RESPIRATION RATE: 18 BRPM | DIASTOLIC BLOOD PRESSURE: 76 MMHG | BODY MASS INDEX: 31.24 KG/M2

## 2022-11-16 DIAGNOSIS — R60.0 LEG EDEMA: ICD-10-CM

## 2022-11-16 DIAGNOSIS — Z95.810 ICD (IMPLANTABLE CARDIOVERTER-DEFIBRILLATOR) IN PLACE: Primary | ICD-10-CM

## 2022-11-16 DIAGNOSIS — R94.31 ABNORMAL EKG: ICD-10-CM

## 2022-11-16 DIAGNOSIS — I25.10 ASHD (ARTERIOSCLEROTIC HEART DISEASE): ICD-10-CM

## 2022-11-16 DIAGNOSIS — I10 PRIMARY HYPERTENSION: ICD-10-CM

## 2022-11-16 DIAGNOSIS — R06.02 SOB (SHORTNESS OF BREATH): ICD-10-CM

## 2022-11-16 DIAGNOSIS — I25.5 ISCHEMIC CARDIOMYOPATHY: ICD-10-CM

## 2022-11-16 PROCEDURE — 3074F SYST BP LT 130 MM HG: CPT | Performed by: FAMILY MEDICINE

## 2022-11-16 PROCEDURE — 93289 INTERROG DEVICE EVAL HEART: CPT | Performed by: FAMILY MEDICINE

## 2022-11-16 PROCEDURE — 3078F DIAST BP <80 MM HG: CPT | Performed by: FAMILY MEDICINE

## 2022-11-16 PROCEDURE — 1123F ACP DISCUSS/DSCN MKR DOCD: CPT | Performed by: FAMILY MEDICINE

## 2022-11-16 PROCEDURE — G8427 DOCREV CUR MEDS BY ELIG CLIN: HCPCS | Performed by: FAMILY MEDICINE

## 2022-11-16 PROCEDURE — 99214 OFFICE O/P EST MOD 30 MIN: CPT | Performed by: FAMILY MEDICINE

## 2022-11-16 PROCEDURE — G8417 CALC BMI ABV UP PARAM F/U: HCPCS | Performed by: FAMILY MEDICINE

## 2022-11-16 PROCEDURE — 99211 OFF/OP EST MAY X REQ PHY/QHP: CPT | Performed by: FAMILY MEDICINE

## 2022-11-16 PROCEDURE — G8484 FLU IMMUNIZE NO ADMIN: HCPCS | Performed by: FAMILY MEDICINE

## 2022-11-16 PROCEDURE — 1036F TOBACCO NON-USER: CPT | Performed by: FAMILY MEDICINE

## 2022-11-16 NOTE — PROGRESS NOTES
Jim Baker am scribing for and in the presence of Silvano Rich MD, MS, F.A.C.C..    Patient: Lexie Pitts  : 1940  Date of Visit: 2022    REASON FOR VISIT / CONSULTATION: Follow-up (HX:ICD,ischemic cardiomyopathy, SOB, HTN,RBBB, PVD,leg edema Pt is here for one month follow up ICD he states he is dong well occasional lightheaded/dizziness when getting up denies:CP,SOB, palp )      History of Present Illness:        Dear Nichelle Marion MD,    I had the pleasure of seeing Lexie Pitts in follow up today. He is a [de-identified] y.o. male with a history of severe coronary artery disease, reduced EF and an ICD. He has two brothers with heart disease he is unsure of details or ages when the problems started. One brother has a pacemaker and the other brother has had open heart surgery. He believes they had heart attacks in the past. Also reports mother and father with CAD, he believes everyone was in their 66's prior to having any issues. He is former smoker quit over 60 years ago. He smoked 1/2 pack per day for 3 years. He was diagnosed with hypertension for only a few years and hyperlipidemia and diabetes. He denies having sleep apnea. He states until he was 81years old he never seen a provider and never went to the doctor's office. He has urinary retention and has to self cath once daily. EKG done 2022: Sinus tachycardia with 1st degree AV block. Left anterior fascicular block, right bundle branch block. Heart rate 101 bpm.    Stress test done on 2022- EF 27% Abnormal myocardial perfusion study. There is a moderate/large perfusion defect of severe intensity in the septal, apical, anteroapical and inferoapical region(s) during stress and rest imaging which is most consistent with an old myocardial infarction with edith-infarct ischemia. Overall, these results are most consistent with high risk for significant coronary artery disease.     CAM done on 2022- 6 days and 23 hours recorded. Baseline rhythm is sinus with average heart rate of 80 bpm, ranging between 59 and 128 bpm. Sinus tachycardia represented 4% of the study duration. Occasional premature atrial contractions, PACs burden 2% with occasional pairs. No atrial runs. Rare isolated premature ventricular contractions noted. YURY done on 5/31/2022- Normal right YURY. Mild PAD suggested left lower extremity    Cardiac catheterization done on 6/23/2022: Severe two vessel disease involving the LAD a fairly small OM3 branch of the circumflex coronary artery. Normal left ventricular end diastolic pressure. (LVEDP). Echocardiogram done on 6/28/2022: EF of 20%. Moderate left ventricular hypertrophy. Left atrium is moderately dilated. Mild diastolic dysfunction is seen. Aortic root is mildly dilated. Echo done on 8/9/2022- EF 25-30%, Mild left ventricular hypertrophy,Thinning and dyskinesis of the distal half of the septum and the entire apex. Mild aortic regurgitation    Echo done on 9/23/2022- EF 25-30% Compared to the previous study of 8/9/2022, no significant change was seen    ICD placed 10/3/2022 BY DR Leon. Mr. Rahel Reis is here today for a one month ICD follow up and interogation. He states he is doing well. He denies any chest pain or pressure. He denies any worsening shortness of breath, it has been staying the same. He denies any abdominal pain, nausea, vomiting. He does have some mild lightheaded/dizziness mostly in the mornings but denies any falls or near falls. He denies having any chest pain, pressure or tightness. No cough, fever or chills. No abdominal pain, bleeding problems, bowel issues, problems with medications or any other concerns at this time.         PAST MEDICAL HISTORY:        Past Medical History:   Diagnosis Date    Acute renal failure superimposed on stage 3 chronic kidney disease ([address] 3/1/2017    Arthritis     Diabetes mellitus ([address]     Hyperlipidemia     Hypertension     Kidney stone CURRENT ALLERGIES: Patient has no known allergies. REVIEW OF SYSTEMS: 14 systems were reviewed. Pertinent positives and negatives as above, all else negative. Past Surgical History:   Procedure Laterality Date    CATARACT REMOVAL WITH IMPLANT Right 2013    CYSTOSCOPY INSERTION / REMOVAL STENT / STONE Right 2017    CYSTOSCOPY STENT INSERTION performed by Denny Suárez MD at [facility] / [address] / Dov Ha N/A 11/3/2017    CYSTOSCOPY STENT INSERTION performed by Denny Suárez MD at [address] [facility]   2017    with stent placement on right side.  Changed schulz catheter    HERNIA REPAIR      umbilical 7297    INTRACAPSULAR CATARACT EXTRACTION Left 2020    EYE CATARACT EMULSIFICATION IOL IMPLANT performed by Mookie Herr DO at [facility]      right knee 2012    JOINT REPLACEMENT Right 2014 partial knee    JOINT REPLACEMENT Right 2014    complete removal    KNEE SURGERY Right     x 2    LITHOTRIPSY Right 2017    with stent placement - Dr. Devi Brownlee     LITHOTRIPSY Left 2018    HI CYSTO/URETERO/PYELOSCOPY W/LITHOTRIPSY Right 2017    CYSTOSCOPY URETEROSCOPY LASER-WITH HLL performed by Denny Suárez MD at [address] [facility] ESWL Left 2018    ESWL EXTRACORPEAL SHOCK WAVE LITHOTRIPSY performed by Denny Suárez MD at [address]    TURP  2017    Greenlight PVP and TUIBNC    TURP  2015    Greenlight PVP    Social History:  Social History     Tobacco Use    Smoking status: Former     Packs/day: 0.50     Years: 3.00     Pack years: 1.50     Types: Cigarettes     Quit date: 1960     Years since quittin.9    Smokeless tobacco: Former     Quit date: 1960    Tobacco comments:     quit 50 years ago   Vaping Use    Vaping Use: Never used   Substance Use Topics    Alcohol use: No    Drug use: No        CURRENT MEDICATIONS:        Outpatient Medications Marked as Taking for the 11/16/22 encounter (Office Visit) with Star Garcia MD   Medication Sig Dispense Refill    sacubitril-valsartan (ENTRESTO) 24-26 MG per tablet Take 1 tablet by mouth 2 times daily 180 tablet 3    metoprolol succinate (TOPROL XL) 25 MG extended release tablet Take 1 tablet by mouth in the morning. 90 tablet 3    tamsulosin (FLOMAX) 0.4 MG capsule Take 1 capsule by mouth in the morning. 90 capsule 3    albuterol (ACCUNEB) 1.25 MG/3ML nebulizer solution Inhale 1 ampule into the lungs every 6 hours as needed for Wheezing      furosemide (LASIX) 20 MG tablet Take 20 mg by mouth daily       montelukast (SINGULAIR) 10 MG tablet Take 10 mg by mouth nightly      simvastatin (ZOCOR) 20 MG tablet TAKE 1 TABLET BY MOUTH ONCE DAILY      metFORMIN (GLUCOPHAGE) 850 MG tablet Take 850 mg by mouth 2 times daily (with meals)       Pediatric Multivitamins-Fl (MULTI VIT/FL PO) Take 1 tablet by mouth daily Daily       aspirin 81 MG tablet Take 81 mg by mouth daily. FAMILY HISTORY: family history includes Heart Failure in his brother. Physical Examination:     /76 (Site: Left Upper Arm, Position: Sitting, Cuff Size: Medium Adult)   Pulse 53   Resp 18   Ht 5' 10\" (1.778 m)   Wt 218 lb 3.2 oz (99 kg)   SpO2 96%   BMI 31.31 kg/m²  Body mass index is 31.31 kg/m². Constitutional: He is oriented to person, place, and time. He appears well-developed and well-nourished. In no acute distress. HEENT: Normocephalic and atraumatic. No JVD present. Carotid bruit is not present. No mass and no thyromegaly present. No lymphadenopathy present. Cardiovascular: Normal rate, regular rhythm, normal heart sounds. Exam reveals no gallop and no friction rubs. 2/6 systolic murmur, 2nd intercostal space on the RIGHT just lateral to the sternum  Pulmonary/Chest: Effort normal and breath sounds normal. No respiratory distress. He has no wheezes, rhonchi or rales. Abdominal: Soft, non-tender.  Bowel sounds and aorta are normal. He exhibits no organomegaly, mass or bruit. Extremities: Trace-1+ 1/2 up to the knees on the right 1+ on the left No cyanosis or clubbing. 2+ radial and carotid pulses. Distal extremity pulses: Not palpable left, right 1+. Neurological: He is alert and oriented to person, place, and time. No evidence of gross cranial nerve deficit. Coordination appeared normal.   Skin: Skin is warm and dry. There is no rash or diaphoresis. Psychiatric: He has a normal mood and affect. His speech is normal and behavior is normal.      MOST RECENT LABS ON RECORD:   Lab Results   Component Value Date    WBC 8.0 11/04/2022    HGB 15.2 11/04/2022    HCT 45.8 11/04/2022     11/04/2022    CHOL 131 05/23/2022    TRIG 175 (H) 05/23/2022    HDL 39 (L) 05/23/2022    ALT 20 04/29/2022    AST 20 04/29/2022     11/04/2022    K 5.3 11/04/2022     11/04/2022    CREATININE 1.51 (H) 11/04/2022    BUN 27 (H) 11/04/2022    CO2 25 11/04/2022    TSH 5.20 (H) 07/07/2022    INR 1.00 10/03/2022     ASSESSMENT:      Diagnosis Orders   1. ICD (implantable cardioverter-defibrillator) in place  MO INTERROG EVAL F2F 1/DUAL/MLT LEADS IMPLTBL DFB      2. Ischemic cardiomyopathy  MO INTERROG EVAL F2F 1/DUAL/MLT LEADS IMPLTBL DFB      3. ASHD (arteriosclerotic heart disease)  MO INTERROG EVAL F2F 1/DUAL/MLT LEADS IMPLTBL DFB      4. SOB (shortness of breath)  MO INTERROG EVAL F2F 1/DUAL/MLT LEADS IMPLTBL DFB      5. Primary hypertension  MO INTERROG EVAL F2F 1/DUAL/MLT LEADS IMPLTBL DFB      6. Abnormal EKG  MO INTERROG EVAL F2F 1/DUAL/MLT LEADS IMPLTBL DFB      7. Leg edema  MO INTERROG EVAL F2F 1/DUAL/MLT LEADS IMPLTBL DFB          PLAN:        Implantable Cardioverter Defibrillator (ICD): Implanted 10/3/2022. Non Ischemic Cardiomyopathy  Pacemaker Incision site:   healing well, no sign of infection. Interrogation Findings: Within normal limits and therefore no changes were made.     ischemic cardiomyopathy : EF 20% on echo, repeat echo on 8/9/2022 EF 25-30%. No change EF on echo 9/23/22. Beta Blocker: Continue Metoprolol succinate (Toprol XL) 25 mg daily. ACE Inibitor/ARB: Continue sacubitril/valsartan (Entresto) 24/26 mg twice daily. Diuretics: Continue furosemide (Lasix) 20 mg every morning. Heart failure counseling: I told them to start wearing lower extremity compression stockings and I advised them to try and keep their legs up whenever possible and to limit salt in their diet. Atherosclerotic Heart Disease: Recent Abnormal Stress on 6/23/2022 - Severe 2 vessel disease. Heart cath done 6/23/22 no stents were placed. Echo showed reduced EF of 20% on 6/28/2022. echo on 8/9/2022 EF 25-30%. ACE Inibitor/ARB: Continue sacubitril/valsartan (Entresto) 24/26 mg twice daily. Antiplatelet Agent: Continue Aspirin 81 mg daily. Beta Blocker: Continue Metoprolol succinate (Toprol XL) 25 mg daily. Anti-anginal medications: Not indicated at this time. Cholesterol Reduction Therapy: Continue simvastatin (Zocor) 20 mg daily. Shortness of Breath: Not worsening. I believe this is due to his reduced EF at this time but is better. Continue to monitor. Essential Hypertension: Controlled  Beta Blocker: Continue Metoprolol succinate (Toprol XL) 25 mg daily. ACE Inibitor/ARB: Continue sacubitril/valsartan (Entresto) 24/26 mg twice daily. Calcium Channel Blocker: Not indicated at this time. Diuretics: Continue furosemide (Lasix) 20 mg every morning. Abnormal EKG: Tachycardia with 1st degree AV block, Right bundle branch block, left anterior fascicular block:  Possibly symptomatic . Beta Blocker: Continue Metoprolol succinate (Toprol XL) 25 mg daily. Calcium Channel Blocker: Not indicated at this time. Peripheral Vascular Disease: Recent YURY showed mild PAD on the left, no evidence of PAD on the right lower extremity. Antiplatelet Agent: Continue Aspirin 81 mg daily.   Cholesterol Reduction Therapy: Continue simvastatin (Zocor) 20 mg daily. Leg swelling: Mild to moderate but improved since last being seen. Encouraged compression stockings which family promised to order for him. Continue current therapy    Finally, I recommended that he continue his other medications and follow up with you as previously scheduled. FOLLOW UP:   I told Mr. Chary Majano to call my office if he had any problems, but otherwise I asked him to Return in about 6 months (around 5/16/2023). However, I would be happy to see him sooner should the need arise. Sincerely,  Una Lopez MD, MS, F.A.C.C. [facility] Cardiology Specialist    [address] [place] [address]  Phone: [phone], Fax: [phone]     I believe that the risk of significant morbidity and mortality related to the patient's current medical conditions are: Intermediate. The documentation recorded by the scribe, accurately and completely reflects the services I personally performed and the decisions made by me. Una Lopez MD, MS, F.A.C.C.  November 16, 2022

## 2022-11-16 NOTE — PATIENT INSTRUCTIONS
SURVEY:    You may be receiving a survey from [employer] regarding your visit today. Please complete the survey to enable us to provide the highest quality of care to you and your family. If you cannot score us a very good on any question, please call the office to discuss how we could have made your experience a very good one. Thank you.

## 2023-01-16 ENCOUNTER — OFFICE VISIT (OUTPATIENT)
Dept: UROLOGY | Age: 83
End: 2023-01-16
Payer: MEDICARE

## 2023-01-16 VITALS
WEIGHT: 225 LBS | SYSTOLIC BLOOD PRESSURE: 102 MMHG | BODY MASS INDEX: 32.21 KG/M2 | HEIGHT: 70 IN | TEMPERATURE: 97.3 F | DIASTOLIC BLOOD PRESSURE: 62 MMHG

## 2023-01-16 DIAGNOSIS — N18.32 STAGE 3B CHRONIC KIDNEY DISEASE (HCC): ICD-10-CM

## 2023-01-16 DIAGNOSIS — N32.0 BNC (BLADDER NECK CONTRACTURE): ICD-10-CM

## 2023-01-16 DIAGNOSIS — N40.1 BPH WITH OBSTRUCTION/LOWER URINARY TRACT SYMPTOMS: ICD-10-CM

## 2023-01-16 DIAGNOSIS — N20.0 KIDNEY STONES: Primary | ICD-10-CM

## 2023-01-16 DIAGNOSIS — R33.9 INCOMPLETE BLADDER EMPTYING: ICD-10-CM

## 2023-01-16 DIAGNOSIS — N13.8 BPH WITH OBSTRUCTION/LOWER URINARY TRACT SYMPTOMS: ICD-10-CM

## 2023-01-16 PROCEDURE — 3078F DIAST BP <80 MM HG: CPT | Performed by: NURSE PRACTITIONER

## 2023-01-16 PROCEDURE — G8427 DOCREV CUR MEDS BY ELIG CLIN: HCPCS | Performed by: NURSE PRACTITIONER

## 2023-01-16 PROCEDURE — 3074F SYST BP LT 130 MM HG: CPT | Performed by: NURSE PRACTITIONER

## 2023-01-16 PROCEDURE — 1123F ACP DISCUSS/DSCN MKR DOCD: CPT | Performed by: NURSE PRACTITIONER

## 2023-01-16 PROCEDURE — 99213 OFFICE O/P EST LOW 20 MIN: CPT | Performed by: NURSE PRACTITIONER

## 2023-01-16 PROCEDURE — 1036F TOBACCO NON-USER: CPT | Performed by: NURSE PRACTITIONER

## 2023-01-16 PROCEDURE — G8417 CALC BMI ABV UP PARAM F/U: HCPCS | Performed by: NURSE PRACTITIONER

## 2023-01-16 PROCEDURE — G8484 FLU IMMUNIZE NO ADMIN: HCPCS | Performed by: NURSE PRACTITIONER

## 2023-01-16 RX ORDER — LANCETS
EACH MISCELLANEOUS
COMMUNITY
Start: 2022-12-22

## 2023-01-16 ASSESSMENT — ENCOUNTER SYMPTOMS
BACK PAIN: 0
SHORTNESS OF BREATH: 0
WHEEZING: 0
CONSTIPATION: 0
NAUSEA: 0
VOMITING: 0
COUGH: 0
COLOR CHANGE: 0
EYE REDNESS: 0
ABDOMINAL PAIN: 0

## 2023-01-16 NOTE — PROGRESS NOTES
HPI:          Patient is a [de-identified] y.o. male in no acute distress. He is alert and oriented to person, place, and time. History  1/2015 Greenlight PVP     PVR remained elevated post-op, but was improving: Feb 839 mL, March 600 mL, April 505 mL, July 452 mL, Oct 252 mL.     7/2015 Flomax stopped     10/2016 PVR back up, >850 mL. Does not feel full or uncomfortable. He did self cath previously. He does report intermittent urine stream about half the time, mild straining. Offered option of resuming Flomax or resuming self cath qhs. Prefered to start self cath.     1/2017 Gross hematuria and some difficulty with cathing. Cysto showed small caliber BNC.     1/2017 Greenlight PVP and TUIBNC     11/2017 Right HLL     1/2018 Left ESWL    Today  Here today to follow-up for stones, hematuria, BPH and BNC. He urinates every 4 hours during the day and urinates approximately 300-500 mL each time. He denies any daytime incontinence. He denies any difficulty with urination. He is cathing once per night without difficulty. Recent creatinine is 1.5, GFR 46. This has improved from prior. He is on a 40 ounce per day fluid restriction per nephrology. He states that 20 of this is coffee and the rest is water. He did have a pacemaker and defibrillator placed in October.     Past Medical History:   Diagnosis Date    Acute renal failure superimposed on stage 3 chronic kidney disease ([facility] 3/1/2017    Arthritis     Diabetes mellitus ([facility]     Hyperlipidemia     Hypertension     Kidney stone      Past Surgical History:   Procedure Laterality Date    CATARACT REMOVAL WITH IMPLANT Right 8/26/2013    CYSTOSCOPY INSERTION / REMOVAL STENT / STONE Right 11/1/2017    CYSTOSCOPY STENT INSERTION performed by Cristela Mejias MD at [facility] [address] / Cally Sanford N/A 11/3/2017    CYSTOSCOPY STENT INSERTION performed by Cristela Mejias MD at [facility]   11/03/2017    with stent placement on right side. Changed schulz catheter    HERNIA REPAIR      umbilical [id number]    INTRACAPSULAR CATARACT EXTRACTION Left 9/28/2020    EYE CATARACT EMULSIFICATION IOL IMPLANT performed by Pravin Villa DO at [address]      right knee march 22, 2012    JOINT REPLACEMENT Right junu 2014 partial knee    JOINT REPLACEMENT Right 2014    complete removal    KNEE SURGERY Right     x 2    LITHOTRIPSY Right 11/01/2017    with stent placement - Dr. Isabel Christianson     LITHOTRIPSY Left 01/16/2018    AR CYSTO W/URETEROSCOPY W/LITHOTRIPSY Right 11/1/2017    CYSTOSCOPY URETEROSCOPY LASER-WITH HLL performed by Renee Chamberlain MD at [address] Left 1/16/2018    ESWL EXTRACORPEAL SHOCK WAVE LITHOTRIPSY performed by Renee Chamberlain MD at [address]  01/2017    Greenlight PVP and TUIBNC    TURP  01/2015    Greenlight PVP     Outpatient Encounter Medications as of 1/16/2023   Medication Sig Dispense Refill    sacubitril-valsartan (ENTRESTO) 24-26 MG per tablet Take 1 tablet by mouth 2 times daily 180 tablet 3    metoprolol succinate (TOPROL XL) 25 MG extended release tablet Take 1 tablet by mouth in the morning. 90 tablet 3    tamsulosin (FLOMAX) 0.4 MG capsule Take 1 capsule by mouth in the morning. 90 capsule 3    albuterol (ACCUNEB) 1.25 MG/3ML nebulizer solution Inhale 1 ampule into the lungs every 6 hours as needed for Wheezing      furosemide (LASIX) 20 MG tablet Take 20 mg by mouth daily       montelukast (SINGULAIR) 10 MG tablet Take 10 mg by mouth nightly      simvastatin (ZOCOR) 20 MG tablet TAKE 1 TABLET BY MOUTH ONCE DAILY      metFORMIN (GLUCOPHAGE) 850 MG tablet Take 850 mg by mouth 2 times daily (with meals)       Pediatric Multivitamins-Fl (MULTI VIT/FL PO) Take 1 tablet by mouth daily Daily       aspirin 81 MG tablet Take 81 mg by mouth daily.       Microlet Lancets MISC USE 1 LANCET TO CHECK GLUCOSE ONCE DAILY AND AS NEEDED       No facility-administered encounter medications on file as of 2023. Current Outpatient Medications on File Prior to Visit   Medication Sig Dispense Refill    sacubitril-valsartan (ENTRESTO) 24-26 MG per tablet Take 1 tablet by mouth 2 times daily 180 tablet 3    metoprolol succinate (TOPROL XL) 25 MG extended release tablet Take 1 tablet by mouth in the morning. 90 tablet 3    tamsulosin (FLOMAX) 0.4 MG capsule Take 1 capsule by mouth in the morning. 90 capsule 3    albuterol (ACCUNEB) 1.25 MG/3ML nebulizer solution Inhale 1 ampule into the lungs every 6 hours as needed for Wheezing      furosemide (LASIX) 20 MG tablet Take 20 mg by mouth daily       montelukast (SINGULAIR) 10 MG tablet Take 10 mg by mouth nightly      simvastatin (ZOCOR) 20 MG tablet TAKE 1 TABLET BY MOUTH ONCE DAILY      metFORMIN (GLUCOPHAGE) 850 MG tablet Take 850 mg by mouth 2 times daily (with meals)       Pediatric Multivitamins-Fl (MULTI VIT/FL PO) Take 1 tablet by mouth daily Daily       aspirin 81 MG tablet Take 81 mg by mouth daily. Microlet Lancets MISC USE 1 LANCET TO CHECK GLUCOSE ONCE DAILY AND AS NEEDED       No current facility-administered medications on file prior to visit. Patient has no known allergies. Family History   Problem Relation Age of Onset    Heart Failure Brother      Social History     Tobacco Use   Smoking Status Former    Packs/day: 0.50    Years: 3.00    Pack years: 1.50    Types: Cigarettes    Quit date: 1960    Years since quittin.0   Smokeless Tobacco Former    Quit date: 1960   Tobacco Comments    quit 50 years ago       Social History     Substance and Sexual Activity   Alcohol Use No       Review of Systems   Constitutional:  Negative for appetite change, chills and fever. Eyes:  Negative for redness and visual disturbance. Respiratory:  Negative for cough, shortness of breath and wheezing. Cardiovascular:  Negative for chest pain and leg swelling.    Gastrointestinal:  Negative for abdominal pain, constipation, nausea and vomiting. Genitourinary:  Negative for decreased urine volume, difficulty urinating, dysuria, enuresis, flank pain, frequency, hematuria, penile discharge, penile pain, scrotal swelling, testicular pain and urgency. Musculoskeletal:  Negative for back pain, joint swelling and myalgias. Skin:  Negative for color change, rash and wound. Neurological:  Negative for dizziness, tremors and numbness. Hematological:  Negative for adenopathy. Does not bruise/bleed easily. /62 (Site: Right Upper Arm, Position: Sitting, Cuff Size: Large Adult)   Temp 97.3 °F (36.3 °C) (Infrared)   Ht 5' 10\" (1.778 m)   Wt 225 lb (102.1 kg)   BMI 32.28 kg/m²       PHYSICAL EXAM:  Constitutional: Patient in no acute distress; Neuro: alert and oriented to person place and time. Psych: Mood and affect normal.  Skin: Normal  Lungs: Respiratory effort normal  Cardiovascular:  Normal peripheral pulses  Abdomen: Soft, non-tender, non-distended with no CVA, flank pain  Bladder non-tender and not distended. Lab Results   Component Value Date    BUN 27 (H) 11/04/2022     Lab Results   Component Value Date    CREATININE 1.51 (H) 11/04/2022     No results found for: PSA    ASSESSMENT:   Diagnosis Orders   1. Kidney stones  XR ABDOMEN (KUB) (SINGLE AP VIEW)      2. BPH with obstruction/lower urinary tract symptoms        3. BNC (bladder neck contracture)        4. Stage 3b chronic kidney disease ([address]        5. Incomplete bladder emptying              PLAN:    Continue flomax daily    Maintain fluid restriction per nephrology, but decrease coffee intake!     Continue to perform intermittent catheterization at bedtime    Follow-up in June with a KUB prior or sooner if needed for new or worsening symptoms

## 2023-01-16 NOTE — PATIENT INSTRUCTIONS
SURVEY:    You may be receiving a survey from [employer] regarding your visit today. Please complete the survey to enable us to provide the highest quality of care to you and your family. If you cannot score us a very good on any question, please call the office to discuss how we could have made your experience a very good one. Thank you.

## 2023-02-21 ENCOUNTER — NURSE ONLY (OUTPATIENT)
Dept: CARDIOLOGY | Age: 83
End: 2023-02-21
Payer: MEDICARE

## 2023-02-21 DIAGNOSIS — I25.5 ISCHEMIC CARDIOMYOPATHY: ICD-10-CM

## 2023-02-21 DIAGNOSIS — Z95.810 ICD (IMPLANTABLE CARDIOVERTER-DEFIBRILLATOR) IN PLACE: Primary | ICD-10-CM

## 2023-02-21 PROCEDURE — 93295 DEV INTERROG REMOTE 1/2/MLT: CPT | Performed by: INTERNAL MEDICINE

## 2023-02-27 ENCOUNTER — HOSPITAL ENCOUNTER (OUTPATIENT)
Dept: VASCULAR LAB | Age: 83
Discharge: HOME | End: 2023-03-01
Payer: MEDICARE

## 2023-02-27 DIAGNOSIS — I70.223 ATHEROSCLEROSIS OF NATIVE ARTERY OF BOTH LOWER EXTREMITIES WITH REST PAIN (HCC): ICD-10-CM

## 2023-02-27 DIAGNOSIS — L97.429 HEEL ULCERATION, LEFT, WITH UNSPECIFIED SEVERITY (HCC): ICD-10-CM

## 2023-02-27 PROCEDURE — 93923 UPR/LXTR ART STDY 3+ LVLS: CPT

## 2023-03-03 ENCOUNTER — HOSPITAL ENCOUNTER (OUTPATIENT)
Age: 83
Discharge: HOME | End: 2023-03-03
Payer: MEDICARE

## 2023-03-03 LAB
ABSOLUTE EOS #: NORMAL K/UL
ABSOLUTE IMMATURE GRANULOCYTE: NORMAL K/UL (ref 0–0.3)
ABSOLUTE LYMPH #: NORMAL K/UL
ABSOLUTE MONO #: NORMAL K/UL
ALBUMIN SERPL-MCNC: 4.2 G/DL (ref 3.5–5.2)
ALBUMIN/GLOBULIN RATIO: 1.2 (ref 1–2.5)
ALP SERPL-CCNC: 79 U/L (ref 40–129)
ALT SERPL-CCNC: 13 U/L (ref 5–41)
ANION GAP SERPL CALCULATED.3IONS-SCNC: 12 MMOL/L (ref 9–17)
AST SERPL-CCNC: 17 U/L
BACTERIA: ABNORMAL
BASOPHILS # BLD: NORMAL %
BASOPHILS ABSOLUTE: NORMAL K/UL (ref 0–0.2)
BILIRUB SERPL-MCNC: 0.4 MG/DL (ref 0.3–1.2)
BILIRUBIN URINE: NEGATIVE
BUN SERPL-MCNC: 40 MG/DL (ref 8–23)
BUN/CREAT BLD: 20 (ref 9–20)
CALCIUM SERPL-MCNC: 9.7 MG/DL (ref 8.6–10.4)
CHLORIDE SERPL-SCNC: 102 MMOL/L (ref 98–107)
CO2 SERPL-SCNC: 23 MMOL/L (ref 20–31)
COLOR: YELLOW
CREAT SERPL-MCNC: 1.99 MG/DL (ref 0.7–1.2)
CREATININE URINE: 62.6 MG/DL (ref 39–259)
CRP SERPL HS-MCNC: <3 MG/L (ref 0–5)
EOSINOPHILS RELATIVE PERCENT: NORMAL %
EPITHELIAL CELLS UA: ABNORMAL /HPF (ref 0–5)
ERYTHROCYTE [SEDIMENTATION RATE] IN BLOOD BY WESTERGREN METHOD: 4 MM/HR (ref 0–20)
GFR SERPL CREATININE-BSD FRML MDRD: 33 ML/MIN/1.73M2
GLUCOSE SERPL-MCNC: 180 MG/DL (ref 70–99)
GLUCOSE UR STRIP.AUTO-MCNC: NEGATIVE MG/DL
HCT VFR BLD AUTO: 46 % (ref 40.7–50.3)
HGB BLD-MCNC: 15.5 G/DL (ref 13–17)
IMMATURE GRANULOCYTES: NORMAL %
KETONES UR STRIP.AUTO-MCNC: NEGATIVE MG/DL
LEUKOCYTE ESTERASE UR QL STRIP.AUTO: ABNORMAL
LYMPHOCYTES # BLD: NORMAL %
MAGNESIUM SERPL-MCNC: 2.1 MG/DL (ref 1.6–2.6)
MCH RBC QN AUTO: 32.7 PG (ref 25.2–33.5)
MCHC RBC AUTO-ENTMCNC: 33.7 G/DL (ref 28.4–34.8)
MCV RBC AUTO: 97 FL (ref 82.6–102.9)
MONOCYTES # BLD: NORMAL %
NITRITE UR QL STRIP.AUTO: NEGATIVE
NRBC AUTOMATED: 0 PER 100 WBC
PDW BLD-RTO: 13.3 % (ref 11.8–14.4)
PHOSPHATE SERPL-MCNC: 3.2 MG/DL (ref 2.5–4.5)
PLATELET # BLD AUTO: 233 K/UL (ref 138–453)
PMV BLD AUTO: 10.1 FL (ref 8.1–13.5)
POTASSIUM SERPL-SCNC: 5.5 MMOL/L (ref 3.7–5.3)
PROT SERPL-MCNC: 7.7 G/DL (ref 6.4–8.3)
PROT UR STRIP.AUTO-MCNC: 6 MG/DL (ref 5–9)
PROT UR STRIP.AUTO-MCNC: NEGATIVE MG/DL
PTH-INTACT SERPL-MCNC: 36.5 PG/ML (ref 14–72)
RBC # BLD: 4.74 M/UL (ref 4.21–5.77)
RBC CLUMPS #/AREA URNS AUTO: ABNORMAL /HPF (ref 0–2)
SEG NEUTROPHILS: NORMAL %
SEGMENTED NEUTROPHILS ABSOLUTE COUNT: NORMAL K/UL
SODIUM SERPL-SCNC: 137 MMOL/L (ref 135–144)
SPECIFIC GRAVITY UA: 1.01 (ref 1.01–1.02)
TOTAL PROTEIN, URINE: 13 MG/DL
TURBIDITY: CLEAR
URATE SERPL-MCNC: 7.5 MG/DL (ref 3.4–7)
URINE HGB: NEGATIVE
URINE TOTAL PROTEIN CREATININE RATIO: 0.21 (ref 0–0.2)
UROBILINOGEN, URINE: NORMAL
WBC # BLD AUTO: 7.7 K/UL (ref 3.5–11.3)
WBC UA: ABNORMAL /HPF (ref 0–5)

## 2023-03-03 PROCEDURE — 80053 COMPREHEN METABOLIC PANEL: CPT

## 2023-03-03 PROCEDURE — 84156 ASSAY OF PROTEIN URINE: CPT

## 2023-03-03 PROCEDURE — 84550 ASSAY OF BLOOD/URIC ACID: CPT

## 2023-03-03 PROCEDURE — 36415 COLL VENOUS BLD VENIPUNCTURE: CPT

## 2023-03-03 PROCEDURE — 85652 RBC SED RATE AUTOMATED: CPT

## 2023-03-03 PROCEDURE — 83970 ASSAY OF PARATHORMONE: CPT

## 2023-03-03 PROCEDURE — 82570 ASSAY OF URINE CREATININE: CPT

## 2023-03-03 PROCEDURE — 81001 URINALYSIS AUTO W/SCOPE: CPT

## 2023-03-03 PROCEDURE — 87086 URINE CULTURE/COLONY COUNT: CPT

## 2023-03-03 PROCEDURE — 84100 ASSAY OF PHOSPHORUS: CPT

## 2023-03-03 PROCEDURE — 85025 COMPLETE CBC W/AUTO DIFF WBC: CPT

## 2023-03-03 PROCEDURE — 86140 C-REACTIVE PROTEIN: CPT

## 2023-03-03 PROCEDURE — 83735 ASSAY OF MAGNESIUM: CPT

## 2023-03-04 LAB
MICROORGANISM SPEC CULT: NORMAL
SPECIMEN DESCRIPTION: NORMAL

## 2023-03-20 ENCOUNTER — HOSPITAL ENCOUNTER (OUTPATIENT)
Age: 83
Discharge: HOME | End: 2023-03-20
Payer: MEDICARE

## 2023-03-20 ENCOUNTER — OFFICE VISIT (OUTPATIENT)
Dept: CARDIOLOGY | Age: 83
End: 2023-03-20
Payer: MEDICARE

## 2023-03-20 VITALS
HEIGHT: 70 IN | SYSTOLIC BLOOD PRESSURE: 99 MMHG | DIASTOLIC BLOOD PRESSURE: 66 MMHG | HEART RATE: 76 BPM | WEIGHT: 223.8 LBS | OXYGEN SATURATION: 96 % | RESPIRATION RATE: 18 BRPM | BODY MASS INDEX: 32.04 KG/M2

## 2023-03-20 DIAGNOSIS — I25.10 ASHD (ARTERIOSCLEROTIC HEART DISEASE): ICD-10-CM

## 2023-03-20 DIAGNOSIS — I10 PRIMARY HYPERTENSION: ICD-10-CM

## 2023-03-20 DIAGNOSIS — I44.0 1ST DEGREE AV BLOCK: ICD-10-CM

## 2023-03-20 DIAGNOSIS — I44.4 LEFT ANTERIOR FASCICULAR BLOCK: ICD-10-CM

## 2023-03-20 DIAGNOSIS — R00.0 TACHYCARDIA: ICD-10-CM

## 2023-03-20 DIAGNOSIS — R94.31 ABNORMAL EKG: ICD-10-CM

## 2023-03-20 DIAGNOSIS — I45.10 RIGHT BUNDLE BRANCH BLOCK: ICD-10-CM

## 2023-03-20 DIAGNOSIS — I25.5 ISCHEMIC CARDIOMYOPATHY: ICD-10-CM

## 2023-03-20 DIAGNOSIS — I73.9 PVD (PERIPHERAL VASCULAR DISEASE) (HCC): ICD-10-CM

## 2023-03-20 DIAGNOSIS — R06.02 SOB (SHORTNESS OF BREATH): ICD-10-CM

## 2023-03-20 DIAGNOSIS — Z95.810 ICD (IMPLANTABLE CARDIOVERTER-DEFIBRILLATOR) IN PLACE: Primary | ICD-10-CM

## 2023-03-20 DIAGNOSIS — I73.9 PAD (PERIPHERAL ARTERY DISEASE) (HCC): ICD-10-CM

## 2023-03-20 LAB
ALBUMIN SERPL-MCNC: 4.1 G/DL (ref 3.5–5.2)
ANION GAP SERPL CALCULATED.3IONS-SCNC: 11 MMOL/L (ref 9–17)
BUN SERPL-MCNC: 26 MG/DL (ref 8–23)
BUN/CREAT BLD: 15 (ref 9–20)
CALCIUM SERPL-MCNC: 9.5 MG/DL (ref 8.6–10.4)
CHLORIDE SERPL-SCNC: 103 MMOL/L (ref 98–107)
CO2 SERPL-SCNC: 23 MMOL/L (ref 20–31)
CREAT SERPL-MCNC: 1.72 MG/DL (ref 0.7–1.2)
GFR SERPL CREATININE-BSD FRML MDRD: 39 ML/MIN/1.73M2
GLUCOSE SERPL-MCNC: 112 MG/DL (ref 70–99)
MAGNESIUM SERPL-MCNC: 1.9 MG/DL (ref 1.6–2.6)
PHOSPHATE SERPL-MCNC: 3 MG/DL (ref 2.5–4.5)
POTASSIUM SERPL-SCNC: 4.7 MMOL/L (ref 3.7–5.3)
SODIUM SERPL-SCNC: 137 MMOL/L (ref 135–144)

## 2023-03-20 PROCEDURE — 3074F SYST BP LT 130 MM HG: CPT | Performed by: PHYSICIAN ASSISTANT

## 2023-03-20 PROCEDURE — 99211 OFF/OP EST MAY X REQ PHY/QHP: CPT

## 2023-03-20 PROCEDURE — 3078F DIAST BP <80 MM HG: CPT | Performed by: PHYSICIAN ASSISTANT

## 2023-03-20 PROCEDURE — G8427 DOCREV CUR MEDS BY ELIG CLIN: HCPCS | Performed by: PHYSICIAN ASSISTANT

## 2023-03-20 PROCEDURE — 1123F ACP DISCUSS/DSCN MKR DOCD: CPT | Performed by: PHYSICIAN ASSISTANT

## 2023-03-20 PROCEDURE — 1036F TOBACCO NON-USER: CPT | Performed by: PHYSICIAN ASSISTANT

## 2023-03-20 PROCEDURE — 36415 COLL VENOUS BLD VENIPUNCTURE: CPT

## 2023-03-20 PROCEDURE — 80069 RENAL FUNCTION PANEL: CPT

## 2023-03-20 PROCEDURE — G8484 FLU IMMUNIZE NO ADMIN: HCPCS | Performed by: PHYSICIAN ASSISTANT

## 2023-03-20 PROCEDURE — 83735 ASSAY OF MAGNESIUM: CPT

## 2023-03-20 PROCEDURE — 99214 OFFICE O/P EST MOD 30 MIN: CPT | Performed by: PHYSICIAN ASSISTANT

## 2023-03-20 PROCEDURE — G8417 CALC BMI ABV UP PARAM F/U: HCPCS | Performed by: PHYSICIAN ASSISTANT

## 2023-03-20 RX ORDER — MONTELUKAST SODIUM 10 MG/1
10 TABLET ORAL NIGHTLY
Qty: 30 TABLET | Refills: 3 | Status: SHIPPED | OUTPATIENT
Start: 2023-03-20

## 2023-03-20 RX ORDER — BUMETANIDE 1 MG/1
1 TABLET ORAL DAILY
Qty: 30 TABLET | Refills: 3 | Status: CANCELLED | OUTPATIENT
Start: 2023-03-20

## 2023-03-20 RX ORDER — FUROSEMIDE 20 MG/1
10 TABLET ORAL DAILY
Qty: 45 TABLET | Refills: 3 | Status: SHIPPED
Start: 2023-03-20 | End: 2023-03-20 | Stop reason: ALTCHOICE

## 2023-03-20 NOTE — PROGRESS NOTES
or dizziness since last being seen. He fell in the garage a couple months ago, the rolling walker slide out from underneath him. He denies having any chest pain, pressure or tightness. No cough, fever or chills. No abdominal pain, bleeding problems, bowel issues, problems with medications or any other concerns at this time. PAST MEDICAL HISTORY:        Past Medical History:   Diagnosis Date    Acute renal failure superimposed on stage 3 chronic kidney disease ([facility] 3/1/2017    Arthritis     Diabetes mellitus ([facility]     Hyperlipidemia     Hypertension     Kidney stone      CURRENT ALLERGIES: Patient has no known allergies. REVIEW OF SYSTEMS: 14 systems were reviewed. Pertinent positives and negatives as above, all else negative. Past Surgical History:   Procedure Laterality Date    CATARACT REMOVAL WITH IMPLANT Right 8/26/2013    CYSTOSCOPY INSERTION / REMOVAL STENT / STONE Right 11/1/2017    CYSTOSCOPY STENT INSERTION performed by Jaja Wade MD at [facility] / [address] [place] [address] / Lauro Allen N/A 11/3/2017    CYSTOSCOPY STENT INSERTION performed by Jaja Wade MD at [facility]  11/03/2017    with stent placement on right side.  Changed schulz catheter    HERNIA REPAIR      umbilical 1449    INTRACAPSULAR CATARACT EXTRACTION Left 9/28/2020    EYE CATARACT EMULSIFICATION IOL IMPLANT performed by Zayda Martines DO at [address]      right knee march 22, 2012    JOINT REPLACEMENT Right junu 2014 partial knee    JOINT REPLACEMENT Right 2014    complete removal    KNEE SURGERY Right     x 2    LITHOTRIPSY Right 11/01/2017    with stent placement - Dr. Carie Valdovinos     LITHOTRIPSY Left 01/16/2018    CT CYSTO W/URETEROSCOPY W/LITHOTRIPSY Right 11/1/2017    CYSTOSCOPY URETEROSCOPY LASER-WITH HLL performed by Jaja Wade MD at [address] Left 1/16/2018    ESWL [address] LITHOTRIPSY performed by Mahalia Soulier

## 2023-03-20 NOTE — PATIENT INSTRUCTIONS
SURVEY:    You may be receiving a survey from [employer] regarding your visit today. Please complete the survey to enable us to provide the highest quality of care to you and your family. If you cannot score us a very good on any question, please call the office to discuss how we could have made your experience a very good one. Thank you.

## 2023-03-29 ENCOUNTER — OFFICE VISIT (OUTPATIENT)
Dept: VASCULAR SURGERY | Age: 83
End: 2023-03-29
Payer: MEDICARE

## 2023-03-29 VITALS
SYSTOLIC BLOOD PRESSURE: 110 MMHG | DIASTOLIC BLOOD PRESSURE: 71 MMHG | BODY MASS INDEX: 31.92 KG/M2 | RESPIRATION RATE: 16 BRPM | WEIGHT: 223 LBS | HEIGHT: 70 IN | HEART RATE: 73 BPM | TEMPERATURE: 97.2 F

## 2023-03-29 DIAGNOSIS — I70.244 ATHSCL NATIVE ART OF LEFT LEG W ULCER OF HEEL AND MIDFOOT (HCC): Primary | ICD-10-CM

## 2023-03-29 PROCEDURE — 3074F SYST BP LT 130 MM HG: CPT | Performed by: SURGERY

## 2023-03-29 PROCEDURE — G8484 FLU IMMUNIZE NO ADMIN: HCPCS | Performed by: SURGERY

## 2023-03-29 PROCEDURE — 3078F DIAST BP <80 MM HG: CPT | Performed by: SURGERY

## 2023-03-29 PROCEDURE — G8417 CALC BMI ABV UP PARAM F/U: HCPCS | Performed by: SURGERY

## 2023-03-29 PROCEDURE — 99204 OFFICE O/P NEW MOD 45 MIN: CPT | Performed by: SURGERY

## 2023-03-29 PROCEDURE — 99213 OFFICE O/P EST LOW 20 MIN: CPT | Performed by: SURGERY

## 2023-03-29 PROCEDURE — 1123F ACP DISCUSS/DSCN MKR DOCD: CPT | Performed by: SURGERY

## 2023-03-29 PROCEDURE — 1036F TOBACCO NON-USER: CPT | Performed by: SURGERY

## 2023-03-29 PROCEDURE — G8427 DOCREV CUR MEDS BY ELIG CLIN: HCPCS | Performed by: SURGERY

## 2023-03-29 ASSESSMENT — ENCOUNTER SYMPTOMS
ABDOMINAL PAIN: 0
TROUBLE SWALLOWING: 0
SHORTNESS OF BREATH: 0
CHEST TIGHTNESS: 0
COUGH: 0
VOICE CHANGE: 0
ABDOMINAL DISTENTION: 0
COLOR CHANGE: 0
VOMITING: 0
EYE PAIN: 0

## 2023-03-29 NOTE — PROGRESS NOTES
[facility] [place] [address]  Dept: [phone]     Patient: Jeramy Sadler  : 1940  MRN: [id number]  DOS: 3/29/2023    Referring provider:  Vanessa Meredith PA-C         HPI:  Jeramy Sadler is a 82 y.o. male who comes to the office for the first time regarding his left medial heel ulcer. The ulcer has been there for no less than a year. It has been treated by wound care for quite some time. He has significant coronary problems and has an ejection fraction of approximately 25 to 30%. He has congestive heart failure as well as diabetes high blood pressure and high cholesterol. He does have stage IIIb kidney disease with a creatinine between 1-1/2 and 2 on several occasions. He has not seen a vascular surgeon as far as I know. He has never had stroke, TIA or amaurosis fugax. He does not notice rest pain although he has diabetic neuropathy bilaterally. He notices that his swelling has worsened over the last several years in both lower extremities. He uses a wheelchair for most of his mobility. He can walk but not very well.   He does not smoke cigarettes although he used to smoke in the past.  Past Medical History:   Diagnosis Date    Acute renal failure superimposed on stage 3 chronic kidney disease ([facility] [address] 3/1/2017    Arthritis     Diabetes mellitus ([facility] [address]     Hyperlipidemia     Hypertension     Kidney stone      Family History   Problem Relation Age of Onset    Heart Failure Brother       Social History     Socioeconomic History    Marital status:      Spouse name: Not on file    Number of children: Not on file    Years of education: Not on file    Highest education level: Not on file   Occupational History    Not on file   Tobacco Use    Smoking status: Former     Packs/day: 0.50     Years: 3.00     Pack years: 1.50     Types: Cigarettes     Quit

## 2023-03-29 NOTE — PATIENT INSTRUCTIONS
SURVEY:    You may be receiving a survey from [employer] regarding your visit today. Please complete the survey to enable us to provide the highest quality of care to you and your family. If you cannot score us a very good on any question, please call the office to discuss how we could have made your experience a very good one. Thank you.

## 2023-04-13 ENCOUNTER — HOSPITAL ENCOUNTER (OUTPATIENT)
Age: 83
Setting detail: OUTPATIENT SURGERY
Discharge: HOME | End: 2023-04-13
Attending: SURGERY | Admitting: SURGERY
Payer: MEDICARE

## 2023-04-13 VITALS
OXYGEN SATURATION: 98 % | HEART RATE: 69 BPM | RESPIRATION RATE: 17 BRPM | DIASTOLIC BLOOD PRESSURE: 69 MMHG | SYSTOLIC BLOOD PRESSURE: 133 MMHG

## 2023-04-13 PROBLEM — I70.244 ATHSCL NATIVE ART OF LEFT LEG W ULCER OF HEEL AND MIDFOOT (HCC): Status: ACTIVE | Noted: 2023-04-13

## 2023-04-13 LAB
EGFR, POC: 48 ML/MIN/1.73M2
GLUCOSE BLD-MCNC: 120 MG/DL (ref 74–100)
POC BUN: 26 MG/DL (ref 8–26)
POC CHLORIDE: 109 MMOL/L (ref 98–107)
POC CREATININE: 1.47 MG/DL (ref 0.51–1.19)
POC HEMATOCRIT: 49 % (ref 41–53)
POC HEMOGLOBIN: 16.6 G/DL (ref 13.5–17.5)
POC POTASSIUM: 5 MMOL/L (ref 3.5–4.5)
POC SODIUM: 140 MMOL/L (ref 138–146)

## 2023-04-13 PROCEDURE — 82565 ASSAY OF CREATININE: CPT

## 2023-04-13 PROCEDURE — 2580000003 HC RX 258: Performed by: SURGERY

## 2023-04-13 PROCEDURE — 84295 ASSAY OF SERUM SODIUM: CPT

## 2023-04-13 PROCEDURE — 36247 INS CATH ABD/L-EXT ART 3RD: CPT | Performed by: SURGERY

## 2023-04-13 PROCEDURE — 99152 MOD SED SAME PHYS/QHP 5/>YRS: CPT | Performed by: SURGERY

## 2023-04-13 PROCEDURE — 85014 HEMATOCRIT: CPT

## 2023-04-13 PROCEDURE — 10702042 HC CATHETER GUIDING

## 2023-04-13 PROCEDURE — 7100000010 HC PHASE II RECOVERY - FIRST 15 MIN: Performed by: SURGERY

## 2023-04-13 PROCEDURE — 6360000004 HC RX CONTRAST MEDICATION: Performed by: SURGERY

## 2023-04-13 PROCEDURE — A4217 STERILE WATER/SALINE, 500 ML: HCPCS | Performed by: SURGERY

## 2023-04-13 PROCEDURE — 6360000002 HC RX W HCPCS: Performed by: SURGERY

## 2023-04-13 PROCEDURE — 7100000011 HC PHASE II RECOVERY - ADDTL 15 MIN

## 2023-04-13 PROCEDURE — 84520 ASSAY OF UREA NITROGEN: CPT

## 2023-04-13 PROCEDURE — C1887 CATHETER, GUIDING: HCPCS | Performed by: SURGERY

## 2023-04-13 PROCEDURE — C1769 GUIDE WIRE: HCPCS | Performed by: SURGERY

## 2023-04-13 PROCEDURE — 99153 MOD SED SAME PHYS/QHP EA: CPT | Performed by: SURGERY

## 2023-04-13 PROCEDURE — 3600000007 HC SURGERY HYBRID BASE: Performed by: SURGERY

## 2023-04-13 PROCEDURE — 82947 ASSAY GLUCOSE BLOOD QUANT: CPT

## 2023-04-13 PROCEDURE — 2709999900 HC NON-CHARGEABLE SUPPLY: Performed by: SURGERY

## 2023-04-13 PROCEDURE — 84132 ASSAY OF SERUM POTASSIUM: CPT

## 2023-04-13 PROCEDURE — 2500000003 HC RX 250 WO HCPCS: Performed by: SURGERY

## 2023-04-13 PROCEDURE — 7100000011 HC PHASE II RECOVERY - ADDTL 15 MIN: Performed by: SURGERY

## 2023-04-13 PROCEDURE — 3600000017 HC SURGERY HYBRID ADDL 15MIN: Performed by: SURGERY

## 2023-04-13 PROCEDURE — C1887 CATHETER, GUIDING: HCPCS

## 2023-04-13 PROCEDURE — 75710 ARTERY X-RAYS ARM/LEG: CPT | Performed by: SURGERY

## 2023-04-13 PROCEDURE — C1894 INTRO/SHEATH, NON-LASER: HCPCS | Performed by: SURGERY

## 2023-04-13 PROCEDURE — 82435 ASSAY OF BLOOD CHLORIDE: CPT

## 2023-04-13 PROCEDURE — 7100000010 HC PHASE II RECOVERY - FIRST 15 MIN

## 2023-04-13 RX ORDER — LIDOCAINE HYDROCHLORIDE 10 MG/ML
INJECTION, SOLUTION EPIDURAL; INFILTRATION; INTRACAUDAL; PERINEURAL PRN
Status: DISCONTINUED | OUTPATIENT
Start: 2023-04-13 | End: 2023-04-13 | Stop reason: ALTCHOICE

## 2023-04-13 RX ORDER — SODIUM CHLORIDE 9 MG/ML
INJECTION, SOLUTION INTRAVENOUS CONTINUOUS
Status: DISCONTINUED | OUTPATIENT
Start: 2023-04-13 | End: 2023-04-13 | Stop reason: HOSPADM

## 2023-04-13 RX ORDER — IODIXANOL 320 MG/ML
INJECTION, SOLUTION INTRAVASCULAR
Status: DISCONTINUED
Start: 2023-04-13 | End: 2023-04-13 | Stop reason: HOSPADM

## 2023-04-13 RX ORDER — IODIXANOL 320 MG/ML
INJECTION, SOLUTION INTRAVASCULAR PRN
Status: DISCONTINUED | OUTPATIENT
Start: 2023-04-13 | End: 2023-04-13 | Stop reason: ALTCHOICE

## 2023-04-13 RX ADMIN — SODIUM CHLORIDE: 9 INJECTION, SOLUTION INTRAVENOUS at 10:53

## 2023-04-13 NOTE — PROGRESS NOTES
Patient admitted, consent signed and questions answered. Patient ready for procedure. Call light to reach with side rails up 2 of 2. Left and right groin clipped with writer and RN Antonia Conroy present. Family at bedside with patient. History and physical completed.

## 2023-04-13 NOTE — DISCHARGE INSTRUCTIONS
things you can do. If you need help quitting, talk to your doctor about stop-smoking programs and medicines. These can increase your chances of quitting for good. Eating heart-healthy foods. Staying at a healthy weight. Lose weight if you need to. Regular exercise (if your doctor says it's safe). Try walking, swimming, or biking for at least 30 minutes on most, if not all, days of the week. If you have leg symptoms when you exercise, your doctor might recommend a specialized exercise program that may relieve symptoms. The goal is to be able to walk farther without pain. Medicines that help manage other problems such as high blood pressure and high cholesterol. Medicine, such as aspirin, that prevents blood clots which could cause a heart attack or stroke. Procedures, such as opening narrowed or blocked arteries (angioplasty) or using healthy blood vessels to create detours around narrowed or blocked arteries (bypass surgery). Follow-up care is a key part of your treatment and safety. Be sure to make and go to all appointments, and call your doctor if you are having problems. It's also a good idea to know your test results and keep a list of the medicines you take. Where can you learn more? Go to [email or web address].BuzzCity. org and sign in to your [employer] account. Enter Y115 in the [facility] box to learn more about \"Learning About Peripheral Arterial Disease of the Legs. \"     If you do not have an account, please click on the \"Sign Up Now\" link. Current as of: December 16, 2019               Content Version: 12.5  © 8984-4943 [employer], Incorporated. Care instructions adapted under license by [facility] ([facility]). If you have questions about a medical condition or this instruction, always ask your healthcare professional. [address] any warranty or liability for your use of this information.

## 2023-04-13 NOTE — INTERVAL H&P NOTE
Update History & Physical    The patient's History and Physical of March 29,  was reviewed with the patient and I examined the patient. There was no change. The surgical site was confirmed by the patient and me. Plan: The risks, benefits, expected outcome, and alternative to the recommended procedure have been discussed with the patient. Patient understands and wants to proceed with the procedure.      Electronically signed by Sreekanth Sanchez MD on 4/13/2023 at 1:33 PM

## 2023-04-13 NOTE — OP NOTE
unsuccessful. Detailed Description of Procedure: The patient was brought to the operating room placed in the supine position with his arms tucked at his sides. He was identified as Anika Bang for left lower extremity arteriography and possible intervention. He was given minimal sedation as he has significant reduction in myocardial function with an EF of 25 to 30%. The groins were prepped and draped in a sterile fashion. Lidocaine without epinephrine was then infiltrated into the skin and subcutaneous tissue overlying the right common femoral artery. The right common femoral artery was then accessed with an 18-gauge needle under ultrasound guidance through which modified Seldinger technique was used to establish a 4 [place]la sheath. The needle was visualized entering the artery and the wire was visualized within the artery. Once the sheath was in place a wire was placed into the distal aorta and passed immediately into the contralateral iliac system. The wire was then placed to the common femoral artery on the left. A Lenz cross catheter was placed to the common femoral artery on the left. Wire was removed and left common femoral profundofemoral proximal SFA arteriography was obtained. Images were saved. The wire was then reinserted and placed into the SFA and the catheter was placed to the mid SFA. SFA and popliteal arteriography was obtained. Images were saved. The catheter was advanced to the popliteal artery at the level of the knee joint. Distal popliteal arteriography with tibial peroneal arteriography was obtained. Images were saved. Foot arteriography was obtained from the same location. Images were saved. A Viabahn's catheter was then placed through the catheter and into the distal popliteal artery. I was able to break the initial But I could not go past approximately a centimeter and a half into the popliteal artery.   I tried for approximately 5 minutes and then decided that

## 2023-04-13 NOTE — PROGRESS NOTES
Received post procedure to [facility] to room 5 . Assessment obtained. Restrictions reviewed with patient. Post procedure pathway initiated. Right groin site soft, gauze with clear dressing dry and intact / site soft. No hematoma noted. Family at side. Patient without complaints. Head of bed flat with right leg straight. Fluids offered.

## 2023-04-13 NOTE — PROGRESS NOTES
Pt ambulated through hallway with walker, no difficulty noted. Pt discharged home with spouse, daughter and all belongings via wheelchair. All questions answered. Groin site soft, no hematoma or bleeding noted on discharged.

## 2023-04-19 ENCOUNTER — TELEPHONE (OUTPATIENT)
Dept: CARDIOLOGY | Age: 83
End: 2023-04-19

## 2023-04-19 DIAGNOSIS — I25.10 ASHD (ARTERIOSCLEROTIC HEART DISEASE): ICD-10-CM

## 2023-04-19 DIAGNOSIS — I25.5 ISCHEMIC CARDIOMYOPATHY: ICD-10-CM

## 2023-04-19 DIAGNOSIS — R94.31 ABNORMAL EKG: ICD-10-CM

## 2023-04-19 DIAGNOSIS — I10 PRIMARY HYPERTENSION: ICD-10-CM

## 2023-04-19 DIAGNOSIS — Z01.810 PREOP CARDIOVASCULAR EXAM: Primary | ICD-10-CM

## 2023-04-19 DIAGNOSIS — R06.02 SOB (SHORTNESS OF BREATH): ICD-10-CM

## 2023-04-19 NOTE — TELEPHONE ENCOUNTER
. Abhi Montiel called in to report that he wanted to make sure he was cleared for his procedure (distal SFA to posterior tibial bypass on the left) with Dr. Carlos Enrique Cleary on 4/27/23. Per telephone encounter on 4/14 from vascular - they want to know his 'risk stratification' before he can come to the operating room. Do you need to see him in office to clear him or can just write a letter of clearance? Please advise.

## 2023-04-20 ENCOUNTER — HOSPITAL ENCOUNTER (OUTPATIENT)
Dept: NON INVASIVE DIAGNOSTICS | Age: 83
Discharge: HOME | End: 2023-04-20
Payer: MEDICARE

## 2023-04-20 DIAGNOSIS — I10 PRIMARY HYPERTENSION: ICD-10-CM

## 2023-04-20 DIAGNOSIS — I25.10 ASHD (ARTERIOSCLEROTIC HEART DISEASE): ICD-10-CM

## 2023-04-20 DIAGNOSIS — R06.02 SOB (SHORTNESS OF BREATH): ICD-10-CM

## 2023-04-20 DIAGNOSIS — R94.31 ABNORMAL EKG: ICD-10-CM

## 2023-04-20 DIAGNOSIS — Z01.810 PREOP CARDIOVASCULAR EXAM: ICD-10-CM

## 2023-04-20 DIAGNOSIS — I25.5 ISCHEMIC CARDIOMYOPATHY: ICD-10-CM

## 2023-04-20 LAB
LV EF: 30 %
LVEF MODALITY: NORMAL

## 2023-04-20 PROCEDURE — 93306 TTE W/DOPPLER COMPLETE: CPT

## 2023-04-20 NOTE — TELEPHONE ENCOUNTER
Please let patient know that Lets have him get an Echo ASAP, hopefully this week or early next week and then I can risk stratify but as long as not worse should be ok. Thanks.

## 2023-04-20 NOTE — TELEPHONE ENCOUNTER
Spoke with Edwardo Hayes in Cardiopulmonary and seen if we could get [place] scheduled for an echo this week or early next week - they were able to get him in at 6 AM or 1 PM today. I called Maryan and let him know we needed an echo prior to his surgery and told him they could get him in today at 11 AM or 1 PM today and he preferred 1 PM.     I called Edwardo Hayes and let him know patient preferred the 1 PM and he would put him on the schedule.

## 2023-04-21 ENCOUNTER — TELEPHONE (OUTPATIENT)
Dept: CARDIOLOGY | Age: 83
End: 2023-04-21

## 2023-04-21 NOTE — TELEPHONE ENCOUNTER
----- Message from Harley Dawson MD sent at 4/20/2023 11:47 PM EDT -----  Let Mr. Diallo know their test result was ok. Will discuss at next visit. Thanks.

## 2023-04-27 ENCOUNTER — ANESTHESIA (OUTPATIENT)
Dept: OPERATING ROOM | Age: 83
End: 2023-04-27
Payer: MEDICARE

## 2023-04-27 ENCOUNTER — HOSPITAL ENCOUNTER (INPATIENT)
Age: 83
LOS: 3 days | Discharge: HOME | DRG: 253 | End: 2023-04-30
Attending: SURGERY | Admitting: SURGERY
Payer: MEDICARE

## 2023-04-27 ENCOUNTER — ANESTHESIA EVENT (OUTPATIENT)
Dept: OPERATING ROOM | Age: 83
End: 2023-04-27
Payer: MEDICARE

## 2023-04-27 DIAGNOSIS — G89.18 ACUTE POSTOPERATIVE PAIN: Primary | ICD-10-CM

## 2023-04-27 LAB
ALLEN TEST: ABNORMAL
CA-I BLD-SCNC: 1.2 MMOL/L (ref 1.13–1.33)
CA-I BLD-SCNC: 1.24 MMOL/L (ref 1.13–1.33)
CA-I BLD-SCNC: 1.24 MMOL/L (ref 1.13–1.33)
CARBOXYHEMOGLOBIN: 0.5 % (ref 0–5)
CARBOXYHEMOGLOBIN: 0.7 % (ref 0–5)
CARBOXYHEMOGLOBIN: 0.9 % (ref 0–5)
CHLORIDE, WHOLE BLOOD: 113 MMOL/L (ref 98–110)
CHLORIDE, WHOLE BLOOD: 114 MMOL/L (ref 98–110)
CHLORIDE, WHOLE BLOOD: 117 MMOL/L (ref 98–110)
EGFR, POC: 53 ML/MIN/1.73M2
FIO2: 50
FIO2: 50
FIO2: ABNORMAL
GLUCOSE BLD-MCNC: 111 MG/DL (ref 75–110)
GLUCOSE BLD-MCNC: 115 MG/DL (ref 75–110)
GLUCOSE BLD-MCNC: 140 MG/DL (ref 75–110)
GLUCOSE BLD-MCNC: 143 MG/DL (ref 74–100)
GLUCOSE BLD-MCNC: 158 MG/DL (ref 75–110)
GLUCOSE BLD-MCNC: 209 MG/DL (ref 75–110)
HCO3 ARTERIAL: 18.5 MMOL/L (ref 22–27)
HCO3 ARTERIAL: 18.9 MMOL/L (ref 22–27)
HCO3 ARTERIAL: 20 MMOL/L (ref 22–27)
HCT VFR BLD CALC: 42.1 % (ref 40.7–50.3)
HCT VFR BLD CALC: 42.6 % (ref 40.7–50.3)
HCT VFR BLD CALC: 44.3 % (ref 40.7–50.3)
HEMOGLOBIN: 13.7 GM/DL (ref 13–17)
HEMOGLOBIN: 13.9 GM/DL (ref 13–17)
HEMOGLOBIN: 14.5 GM/DL (ref 13–17)
LACTIC ACID, WHOLE BLOOD: 1.2 MMOL/L (ref 0.7–2.1)
LACTIC ACID, WHOLE BLOOD: 1.2 MMOL/L (ref 0.7–2.1)
LACTIC ACID, WHOLE BLOOD: 1.4 MMOL/L (ref 0.7–2.1)
NEGATIVE BASE EXCESS, ART: 4.7 MMOL/L (ref 0–2)
NEGATIVE BASE EXCESS, ART: 5.6 MMOL/L (ref 0–2)
NEGATIVE BASE EXCESS, ART: 5.9 MMOL/L (ref 0–2)
O2 SAT, ARTERIAL: 98.4 % (ref 94–100)
O2 SAT, ARTERIAL: 98.7 % (ref 94–100)
O2 SAT, ARTERIAL: 98.8 % (ref 94–100)
PATIENT TEMP: 35.7
PATIENT TEMP: 36.1
PATIENT TEMP: 37
PCO2 ARTERIAL: 34.7 MMHG (ref 32–45)
PCO2 ARTERIAL: 35.8 MMHG (ref 32–45)
PCO2 ARTERIAL: 37.9 MMHG (ref 32–45)
PH ARTERIAL: 7.34 (ref 7.35–7.45)
PH ARTERIAL: 7.34 (ref 7.35–7.45)
PH ARTERIAL: 7.35 (ref 7.35–7.45)
PO2 ARTERIAL: 115 MMHG (ref 75–95)
PO2 ARTERIAL: 137 MMHG (ref 75–95)
PO2 ARTERIAL: 146 MMHG (ref 75–95)
POC BUN: 31 MG/DL (ref 8–26)
POC CHLORIDE: 108 MMOL/L (ref 98–107)
POC CREATININE: 1.34 MG/DL (ref 0.51–1.19)
POC HEMATOCRIT: 53 % (ref 41–53)
POC HEMOGLOBIN: 18.1 G/DL (ref 13.5–17.5)
POC IONIZED CALCIUM: 1.22 MMOL/L (ref 1.15–1.33)
POC POTASSIUM: 5 MMOL/L (ref 3.5–4.5)
POC POTASSIUM: 6.2 MMOL/L (ref 3.5–4.5)
POC SODIUM: 140 MMOL/L (ref 138–146)
POTASSIUM, WHOLE BLOOD: 4.5 MMOL/L (ref 3.6–5)
POTASSIUM, WHOLE BLOOD: 4.5 MMOL/L (ref 3.6–5)
POTASSIUM, WHOLE BLOOD: 4.7 MMOL/L (ref 3.6–5)
SODIUM, WHOLE BLOOD: 138 MMOL/L (ref 136–145)
SODIUM, WHOLE BLOOD: 140 MMOL/L (ref 136–145)
SODIUM, WHOLE BLOOD: 141 MMOL/L (ref 136–145)

## 2023-04-27 PROCEDURE — 82565 ASSAY OF CREATININE: CPT

## 2023-04-27 PROCEDURE — 6370000000 HC RX 637 (ALT 250 FOR IP): Performed by: STUDENT IN AN ORGANIZED HEALTH CARE EDUCATION/TRAINING PROGRAM

## 2023-04-27 PROCEDURE — 2500000003 HC RX 250 WO HCPCS: Performed by: STUDENT IN AN ORGANIZED HEALTH CARE EDUCATION/TRAINING PROGRAM

## 2023-04-27 PROCEDURE — 83605 ASSAY OF LACTIC ACID: CPT

## 2023-04-27 PROCEDURE — 3600000013 HC SURGERY LEVEL 3 ADDTL 15MIN: Performed by: SURGERY

## 2023-04-27 PROCEDURE — 82947 ASSAY GLUCOSE BLOOD QUANT: CPT

## 2023-04-27 PROCEDURE — 3600000003 HC SURGERY LEVEL 3 BASE: Performed by: SURGERY

## 2023-04-27 PROCEDURE — 6360000002 HC RX W HCPCS: Performed by: REGISTERED NURSE

## 2023-04-27 PROCEDURE — 84132 ASSAY OF SERUM POTASSIUM: CPT

## 2023-04-27 PROCEDURE — 2580000003 HC RX 258

## 2023-04-27 PROCEDURE — 82805 BLOOD GASES W/O2 SATURATION: CPT

## 2023-04-27 PROCEDURE — 35566 ART BYP FEM-ANT-POST TIB/PRL: CPT | Performed by: SURGERY

## 2023-04-27 PROCEDURE — 82330 ASSAY OF CALCIUM: CPT

## 2023-04-27 PROCEDURE — A4217 STERILE WATER/SALINE, 500 ML: HCPCS | Performed by: SURGERY

## 2023-04-27 PROCEDURE — 7100000001 HC PACU RECOVERY - ADDTL 15 MIN

## 2023-04-27 PROCEDURE — 82962 GLUCOSE BLOOD TEST: CPT

## 2023-04-27 PROCEDURE — 2709999900 HC NON-CHARGEABLE SUPPLY: Performed by: SURGERY

## 2023-04-27 PROCEDURE — 2580000003 HC RX 258: Performed by: SURGERY

## 2023-04-27 PROCEDURE — 94640 AIRWAY INHALATION TREATMENT: CPT

## 2023-04-27 PROCEDURE — 2580000003 HC RX 258: Performed by: STUDENT IN AN ORGANIZED HEALTH CARE EDUCATION/TRAINING PROGRAM

## 2023-04-27 PROCEDURE — 7100000000 HC PACU RECOVERY - FIRST 15 MIN

## 2023-04-27 PROCEDURE — 6360000002 HC RX W HCPCS: Performed by: STUDENT IN AN ORGANIZED HEALTH CARE EDUCATION/TRAINING PROGRAM

## 2023-04-27 PROCEDURE — 85018 HEMOGLOBIN: CPT

## 2023-04-27 PROCEDURE — 6360000002 HC RX W HCPCS

## 2023-04-27 PROCEDURE — 3700000000 HC ANESTHESIA ATTENDED CARE: Performed by: SURGERY

## 2023-04-27 PROCEDURE — 84295 ASSAY OF SERUM SODIUM: CPT

## 2023-04-27 PROCEDURE — 86920 COMPATIBILITY TEST SPIN: CPT

## 2023-04-27 PROCEDURE — 2720000010 HC SURG SUPPLY STERILE: Performed by: SURGERY

## 2023-04-27 PROCEDURE — 6370000000 HC RX 637 (ALT 250 FOR IP): Performed by: SURGERY

## 2023-04-27 PROCEDURE — C1760 CLOSURE DEV, VASC: HCPCS | Performed by: SURGERY

## 2023-04-27 PROCEDURE — 84520 ASSAY OF UREA NITROGEN: CPT

## 2023-04-27 PROCEDURE — 6360000002 HC RX W HCPCS: Performed by: SURGERY

## 2023-04-27 PROCEDURE — 041L09N BYPASS LEFT FEMORAL ARTERY TO POSTERIOR TIBIAL ARTERY WITH AUTOLOGOUS VENOUS TISSUE, OPEN APPROACH: ICD-10-PCS | Performed by: SURGERY

## 2023-04-27 PROCEDURE — 3700000001 HC ADD 15 MINUTES (ANESTHESIA): Performed by: SURGERY

## 2023-04-27 PROCEDURE — 82435 ASSAY OF BLOOD CHLORIDE: CPT

## 2023-04-27 PROCEDURE — 86900 BLOOD TYPING SEROLOGIC ABO: CPT

## 2023-04-27 PROCEDURE — 06BQ0ZZ EXCISION OF LEFT SAPHENOUS VEIN, OPEN APPROACH: ICD-10-PCS | Performed by: SURGERY

## 2023-04-27 PROCEDURE — 86850 RBC ANTIBODY SCREEN: CPT

## 2023-04-27 PROCEDURE — 2000000000 HC ICU R&B

## 2023-04-27 PROCEDURE — 2500000003 HC RX 250 WO HCPCS: Performed by: REGISTERED NURSE

## 2023-04-27 PROCEDURE — 85014 HEMATOCRIT: CPT

## 2023-04-27 PROCEDURE — 86901 BLOOD TYPING SEROLOGIC RH(D): CPT

## 2023-04-27 PROCEDURE — 2500000003 HC RX 250 WO HCPCS: Performed by: SURGERY

## 2023-04-27 PROCEDURE — 2500000003 HC RX 250 WO HCPCS

## 2023-04-27 PROCEDURE — P9045 ALBUMIN (HUMAN), 5%, 250 ML: HCPCS

## 2023-04-27 RX ORDER — SODIUM CHLORIDE 9 MG/ML
INJECTION, SOLUTION INTRAVENOUS CONTINUOUS
Status: DISCONTINUED | OUTPATIENT
Start: 2023-04-27 | End: 2023-04-28

## 2023-04-27 RX ORDER — TAMSULOSIN HYDROCHLORIDE 0.4 MG/1
0.4 CAPSULE ORAL DAILY
Status: DISCONTINUED | OUTPATIENT
Start: 2023-04-27 | End: 2023-04-30 | Stop reason: HOSPADM

## 2023-04-27 RX ORDER — SODIUM CHLORIDE 9 MG/ML
INJECTION, SOLUTION INTRAVENOUS PRN
Status: DISCONTINUED | OUTPATIENT
Start: 2023-04-27 | End: 2023-04-30 | Stop reason: HOSPADM

## 2023-04-27 RX ORDER — MAGNESIUM HYDROXIDE 1200 MG/15ML
LIQUID ORAL CONTINUOUS PRN
Status: COMPLETED | OUTPATIENT
Start: 2023-04-27 | End: 2023-04-27

## 2023-04-27 RX ORDER — CALCIUM CHLORIDE 100 MG/ML
INJECTION INTRAVENOUS; INTRAVENTRICULAR PRN
Status: DISCONTINUED | OUTPATIENT
Start: 2023-04-27 | End: 2023-04-27 | Stop reason: SDUPTHER

## 2023-04-27 RX ORDER — INSULIN LISPRO 100 [IU]/ML
0-4 INJECTION, SOLUTION INTRAVENOUS; SUBCUTANEOUS NIGHTLY
Status: DISCONTINUED | OUTPATIENT
Start: 2023-04-27 | End: 2023-04-30 | Stop reason: HOSPADM

## 2023-04-27 RX ORDER — SODIUM CHLORIDE 0.9 % (FLUSH) 0.9 %
5-40 SYRINGE (ML) INJECTION EVERY 12 HOURS SCHEDULED
Status: DISCONTINUED | OUTPATIENT
Start: 2023-04-27 | End: 2023-04-30 | Stop reason: HOSPADM

## 2023-04-27 RX ORDER — SODIUM CHLORIDE 9 MG/ML
INJECTION, SOLUTION INTRAVENOUS PRN
Status: DISCONTINUED | OUTPATIENT
Start: 2023-04-27 | End: 2023-04-27

## 2023-04-27 RX ORDER — ONDANSETRON 2 MG/ML
4 INJECTION INTRAMUSCULAR; INTRAVENOUS EVERY 6 HOURS PRN
Status: DISCONTINUED | OUTPATIENT
Start: 2023-04-27 | End: 2023-04-30 | Stop reason: HOSPADM

## 2023-04-27 RX ORDER — LIDOCAINE HYDROCHLORIDE 10 MG/ML
INJECTION, SOLUTION EPIDURAL; INFILTRATION; INTRACAUDAL; PERINEURAL PRN
Status: DISCONTINUED | OUTPATIENT
Start: 2023-04-27 | End: 2023-04-27 | Stop reason: SDUPTHER

## 2023-04-27 RX ORDER — SODIUM CHLORIDE 9 MG/ML
INJECTION, SOLUTION INTRAVENOUS CONTINUOUS PRN
Status: DISCONTINUED | OUTPATIENT
Start: 2023-04-27 | End: 2023-04-27 | Stop reason: SDUPTHER

## 2023-04-27 RX ORDER — SODIUM CHLORIDE 0.9 % (FLUSH) 0.9 %
5-40 SYRINGE (ML) INJECTION PRN
Status: DISCONTINUED | OUTPATIENT
Start: 2023-04-27 | End: 2023-04-30 | Stop reason: HOSPADM

## 2023-04-27 RX ORDER — ALBUMIN, HUMAN INJ 5% 5 %
SOLUTION INTRAVENOUS PRN
Status: DISCONTINUED | OUTPATIENT
Start: 2023-04-27 | End: 2023-04-27 | Stop reason: SDUPTHER

## 2023-04-27 RX ORDER — PROTAMINE SULFATE 10 MG/ML
INJECTION, SOLUTION INTRAVENOUS PRN
Status: DISCONTINUED | OUTPATIENT
Start: 2023-04-27 | End: 2023-04-27 | Stop reason: SDUPTHER

## 2023-04-27 RX ORDER — FENTANYL CITRATE 50 UG/ML
50 INJECTION, SOLUTION INTRAMUSCULAR; INTRAVENOUS EVERY 5 MIN PRN
Status: DISCONTINUED | OUTPATIENT
Start: 2023-04-27 | End: 2023-04-28

## 2023-04-27 RX ORDER — PROPOFOL 10 MG/ML
INJECTION, EMULSION INTRAVENOUS PRN
Status: DISCONTINUED | OUTPATIENT
Start: 2023-04-27 | End: 2023-04-27 | Stop reason: SDUPTHER

## 2023-04-27 RX ORDER — ONDANSETRON 4 MG/1
4 TABLET, ORALLY DISINTEGRATING ORAL EVERY 8 HOURS PRN
Status: DISCONTINUED | OUTPATIENT
Start: 2023-04-27 | End: 2023-04-30 | Stop reason: HOSPADM

## 2023-04-27 RX ORDER — ALBUTEROL SULFATE 90 UG/1
2 AEROSOL, METERED RESPIRATORY (INHALATION) 4 TIMES DAILY
Status: DISCONTINUED | OUTPATIENT
Start: 2023-04-27 | End: 2023-04-30 | Stop reason: HOSPADM

## 2023-04-27 RX ORDER — FENTANYL CITRATE 50 UG/ML
25 INJECTION, SOLUTION INTRAMUSCULAR; INTRAVENOUS EVERY 5 MIN PRN
Status: DISCONTINUED | OUTPATIENT
Start: 2023-04-27 | End: 2023-04-28

## 2023-04-27 RX ORDER — EPHEDRINE SULFATE/0.9% NACL/PF 50 MG/5 ML
SYRINGE (ML) INTRAVENOUS PRN
Status: DISCONTINUED | OUTPATIENT
Start: 2023-04-27 | End: 2023-04-27 | Stop reason: SDUPTHER

## 2023-04-27 RX ORDER — ROCURONIUM BROMIDE 10 MG/ML
INJECTION, SOLUTION INTRAVENOUS PRN
Status: DISCONTINUED | OUTPATIENT
Start: 2023-04-27 | End: 2023-04-27 | Stop reason: SDUPTHER

## 2023-04-27 RX ORDER — INSULIN LISPRO 100 [IU]/ML
0-16 INJECTION, SOLUTION INTRAVENOUS; SUBCUTANEOUS
Status: DISCONTINUED | OUTPATIENT
Start: 2023-04-27 | End: 2023-04-30 | Stop reason: HOSPADM

## 2023-04-27 RX ORDER — ACETAMINOPHEN 500 MG
1000 TABLET ORAL EVERY 8 HOURS SCHEDULED
Status: DISCONTINUED | OUTPATIENT
Start: 2023-04-27 | End: 2023-04-30 | Stop reason: HOSPADM

## 2023-04-27 RX ORDER — SODIUM CHLORIDE, SODIUM LACTATE, POTASSIUM CHLORIDE, CALCIUM CHLORIDE 600; 310; 30; 20 MG/100ML; MG/100ML; MG/100ML; MG/100ML
INJECTION, SOLUTION INTRAVENOUS CONTINUOUS PRN
Status: DISCONTINUED | OUTPATIENT
Start: 2023-04-27 | End: 2023-04-27

## 2023-04-27 RX ORDER — ONDANSETRON 2 MG/ML
4 INJECTION INTRAMUSCULAR; INTRAVENOUS
Status: ACTIVE | OUTPATIENT
Start: 2023-04-27 | End: 2023-04-28

## 2023-04-27 RX ORDER — ATORVASTATIN CALCIUM 40 MG/1
20 TABLET, FILM COATED ORAL DAILY
Status: DISCONTINUED | OUTPATIENT
Start: 2023-04-28 | End: 2023-04-30 | Stop reason: HOSPADM

## 2023-04-27 RX ORDER — HEPARIN SODIUM 5000 [USP'U]/ML
5000 INJECTION, SOLUTION INTRAVENOUS; SUBCUTANEOUS EVERY 8 HOURS SCHEDULED
Status: DISCONTINUED | OUTPATIENT
Start: 2023-04-28 | End: 2023-04-30 | Stop reason: HOSPADM

## 2023-04-27 RX ORDER — METOPROLOL SUCCINATE 25 MG/1
25 TABLET, EXTENDED RELEASE ORAL DAILY
Status: DISCONTINUED | OUTPATIENT
Start: 2023-04-28 | End: 2023-04-30 | Stop reason: HOSPADM

## 2023-04-27 RX ORDER — HEPARIN SODIUM 1000 [USP'U]/ML
INJECTION, SOLUTION INTRAVENOUS; SUBCUTANEOUS PRN
Status: DISCONTINUED | OUTPATIENT
Start: 2023-04-27 | End: 2023-04-27 | Stop reason: SDUPTHER

## 2023-04-27 RX ORDER — MONTELUKAST SODIUM 10 MG/1
10 TABLET ORAL NIGHTLY
Status: DISCONTINUED | OUTPATIENT
Start: 2023-04-27 | End: 2023-04-30 | Stop reason: HOSPADM

## 2023-04-27 RX ORDER — PROTAMINE SULFATE 10 MG/ML
INJECTION, SOLUTION INTRAVENOUS
Status: COMPLETED
Start: 2023-04-27 | End: 2023-04-27

## 2023-04-27 RX ORDER — MORPHINE SULFATE 2 MG/ML
2 INJECTION, SOLUTION INTRAMUSCULAR; INTRAVENOUS EVERY 4 HOURS PRN
Status: DISCONTINUED | OUTPATIENT
Start: 2023-04-27 | End: 2023-04-28

## 2023-04-27 RX ORDER — CEFAZOLIN SODIUM 1 G/3ML
INJECTION, POWDER, FOR SOLUTION INTRAMUSCULAR; INTRAVENOUS PRN
Status: DISCONTINUED | OUTPATIENT
Start: 2023-04-27 | End: 2023-04-27 | Stop reason: SDUPTHER

## 2023-04-27 RX ORDER — FENTANYL CITRATE 50 UG/ML
INJECTION, SOLUTION INTRAMUSCULAR; INTRAVENOUS PRN
Status: DISCONTINUED | OUTPATIENT
Start: 2023-04-27 | End: 2023-04-27 | Stop reason: SDUPTHER

## 2023-04-27 RX ORDER — ASPIRIN 81 MG/1
81 TABLET ORAL DAILY
Status: DISCONTINUED | OUTPATIENT
Start: 2023-04-28 | End: 2023-04-30 | Stop reason: HOSPADM

## 2023-04-27 RX ORDER — PHENYLEPHRINE HCL IN 0.9% NACL 1 MG/10 ML
SYRINGE (ML) INTRAVENOUS PRN
Status: DISCONTINUED | OUTPATIENT
Start: 2023-04-27 | End: 2023-04-27 | Stop reason: SDUPTHER

## 2023-04-27 RX ORDER — OXYCODONE HYDROCHLORIDE 5 MG/1
2.5 TABLET ORAL EVERY 4 HOURS PRN
Status: DISCONTINUED | OUTPATIENT
Start: 2023-04-27 | End: 2023-04-30 | Stop reason: HOSPADM

## 2023-04-27 RX ORDER — LABETALOL HYDROCHLORIDE 5 MG/ML
10 INJECTION, SOLUTION INTRAVENOUS EVERY 6 HOURS PRN
Status: DISCONTINUED | OUTPATIENT
Start: 2023-04-27 | End: 2023-04-30 | Stop reason: HOSPADM

## 2023-04-27 RX ORDER — DEXTROSE MONOHYDRATE 100 MG/ML
INJECTION, SOLUTION INTRAVENOUS CONTINUOUS PRN
Status: DISCONTINUED | OUTPATIENT
Start: 2023-04-27 | End: 2023-04-30 | Stop reason: HOSPADM

## 2023-04-27 RX ADMIN — SODIUM BICARBONATE 50 MEQ: 84 INJECTION, SOLUTION INTRAVENOUS at 13:20

## 2023-04-27 RX ADMIN — HEPARIN SODIUM 10000 UNITS: 1000 INJECTION INTRAVENOUS; SUBCUTANEOUS at 13:53

## 2023-04-27 RX ADMIN — CALCIUM CHLORIDE INJECTION 0.2 G: 100 INJECTION, SOLUTION INTRAVENOUS at 12:00

## 2023-04-27 RX ADMIN — PROPOFOL 150 MG: 10 INJECTION, EMULSION INTRAVENOUS at 11:37

## 2023-04-27 RX ADMIN — SUGAMMADEX 200 MG: 100 INJECTION, SOLUTION INTRAVENOUS at 16:05

## 2023-04-27 RX ADMIN — SODIUM CHLORIDE: 9 INJECTION, SOLUTION INTRAVENOUS at 11:22

## 2023-04-27 RX ADMIN — SODIUM CHLORIDE: 9 INJECTION, SOLUTION INTRAVENOUS at 16:17

## 2023-04-27 RX ADMIN — CALCIUM CHLORIDE INJECTION 0.1 G: 100 INJECTION, SOLUTION INTRAVENOUS at 14:40

## 2023-04-27 RX ADMIN — CEFAZOLIN 2 G: 1 INJECTION, POWDER, FOR SOLUTION INTRAMUSCULAR; INTRAVENOUS at 11:42

## 2023-04-27 RX ADMIN — ALBUTEROL SULFATE 2 PUFF: 90 AEROSOL, METERED RESPIRATORY (INHALATION) at 19:30

## 2023-04-27 RX ADMIN — ROCURONIUM BROMIDE 20 MG: 10 INJECTION, SOLUTION INTRAVENOUS at 12:22

## 2023-04-27 RX ADMIN — Medication 5 MG: at 13:48

## 2023-04-27 RX ADMIN — ROCURONIUM BROMIDE 50 MG: 10 INJECTION, SOLUTION INTRAVENOUS at 11:37

## 2023-04-27 RX ADMIN — Medication 200 MCG: at 11:42

## 2023-04-27 RX ADMIN — SODIUM CHLORIDE: 9 INJECTION, SOLUTION INTRAVENOUS at 16:51

## 2023-04-27 RX ADMIN — FENTANYL CITRATE 100 MCG: 0.05 INJECTION, SOLUTION INTRAMUSCULAR; INTRAVENOUS at 11:37

## 2023-04-27 RX ADMIN — ROCURONIUM BROMIDE 10 MG: 10 INJECTION, SOLUTION INTRAVENOUS at 13:45

## 2023-04-27 RX ADMIN — ACETAMINOPHEN 1000 MG: 500 TABLET ORAL at 20:49

## 2023-04-27 RX ADMIN — FENTANYL CITRATE 50 MCG: 0.05 INJECTION, SOLUTION INTRAMUSCULAR; INTRAVENOUS at 15:22

## 2023-04-27 RX ADMIN — TAMSULOSIN HYDROCHLORIDE 0.4 MG: 0.4 CAPSULE ORAL at 16:54

## 2023-04-27 RX ADMIN — FENTANYL CITRATE 50 MCG: 0.05 INJECTION, SOLUTION INTRAMUSCULAR; INTRAVENOUS at 16:05

## 2023-04-27 RX ADMIN — Medication 100 MCG: at 11:50

## 2023-04-27 RX ADMIN — SODIUM CHLORIDE: 9 INJECTION, SOLUTION INTRAVENOUS at 11:44

## 2023-04-27 RX ADMIN — ALBUMIN (HUMAN) 12.5 G: 12.5 INJECTION, SOLUTION INTRAVENOUS at 12:50

## 2023-04-27 RX ADMIN — Medication 100 MCG: at 14:48

## 2023-04-27 RX ADMIN — ROCURONIUM BROMIDE 10 MG: 10 INJECTION, SOLUTION INTRAVENOUS at 13:39

## 2023-04-27 RX ADMIN — MONTELUKAST SODIUM 10 MG: 10 TABLET, FILM COATED ORAL at 20:49

## 2023-04-27 RX ADMIN — Medication 2 G: at 15:37

## 2023-04-27 RX ADMIN — ROCURONIUM BROMIDE 20 MG: 10 INJECTION, SOLUTION INTRAVENOUS at 12:56

## 2023-04-27 RX ADMIN — FENTANYL CITRATE 50 MCG: 0.05 INJECTION, SOLUTION INTRAMUSCULAR; INTRAVENOUS at 13:31

## 2023-04-27 RX ADMIN — Medication 100 MCG: at 12:39

## 2023-04-27 RX ADMIN — CALCIUM CHLORIDE INJECTION 0.1 G: 100 INJECTION, SOLUTION INTRAVENOUS at 12:11

## 2023-04-27 RX ADMIN — Medication 100 MCG: at 14:40

## 2023-04-27 RX ADMIN — FENTANYL CITRATE 50 MCG: 0.05 INJECTION, SOLUTION INTRAMUSCULAR; INTRAVENOUS at 13:27

## 2023-04-27 RX ADMIN — Medication 100 MCG: at 11:48

## 2023-04-27 RX ADMIN — PHENYLEPHRINE HYDROCHLORIDE 50 MCG/MIN: 10 INJECTION INTRAVENOUS at 12:13

## 2023-04-27 RX ADMIN — PROTAMINE SULFATE 40 MG: 10 INJECTION, SOLUTION INTRAVENOUS at 15:13

## 2023-04-27 RX ADMIN — LIDOCAINE HYDROCHLORIDE 50 MG: 10 INJECTION, SOLUTION EPIDURAL; INFILTRATION; INTRACAUDAL; PERINEURAL at 11:37

## 2023-04-27 RX ADMIN — Medication 10 MG: at 12:16

## 2023-04-27 RX ADMIN — ROCURONIUM BROMIDE 10 MG: 10 INJECTION, SOLUTION INTRAVENOUS at 14:47

## 2023-04-27 RX ADMIN — Medication 100 MCG: at 12:10

## 2023-04-27 RX ADMIN — CALCIUM CHLORIDE INJECTION 0.1 G: 100 INJECTION, SOLUTION INTRAVENOUS at 14:37

## 2023-04-27 RX ADMIN — FENTANYL CITRATE 50 MCG: 0.05 INJECTION, SOLUTION INTRAMUSCULAR; INTRAVENOUS at 12:32

## 2023-04-27 RX ADMIN — Medication 200 MCG: at 11:51

## 2023-04-27 RX ADMIN — Medication 300 MCG: at 11:53

## 2023-04-27 RX ADMIN — CALCIUM CHLORIDE INJECTION 0.1 G: 100 INJECTION, SOLUTION INTRAVENOUS at 14:26

## 2023-04-27 RX ADMIN — Medication 100 MCG: at 14:56

## 2023-04-27 RX ADMIN — SODIUM CHLORIDE: 9 INJECTION, SOLUTION INTRAVENOUS at 09:29

## 2023-04-27 RX ADMIN — Medication 5 MG: at 13:03

## 2023-04-27 NOTE — ANESTHESIA PROCEDURE NOTES
Arterial Line:    An arterial line was placed using ultrasound guidance, in the OR for the following indication(s): continuous blood pressure monitoring and blood sampling needed. A 20 gauge (size), 4.45 cm (length), Arrow (type) catheter was placed, Seldinger technique used, into the right radial artery, secured by tape and Tegaderm. Anesthesia type: General    Events:  patient tolerated procedure well with no complications. 4/27/2023 11:48 AM4/27/2023 11:53 AM  Anesthesiologist: Gloria Jiménez MD  Other anesthesia staff: Shabbir Mirza RN  Performed: Anesthesiologist   Preanesthetic Checklist  Completed: patient identified, IV checked, site marked, risks and benefits discussed, surgical/procedural consents, equipment checked, pre-op evaluation, timeout performed, anesthesia consent given, oxygen available, monitors applied/VS acknowledged, fire risk safety assessment completed and verbalized and blood product R/B/A discussed and consented

## 2023-04-27 NOTE — ANESTHESIA POSTPROCEDURE EVALUATION
Department of Anesthesiology  Postprocedure Note    Patient: Tara Diez  MRN: [id number]  YOB: 1940  Date of evaluation: 4/27/2023      Procedure Summary     Date: 04/27/23 Room / Location: [address]    Anesthesia Start: 1127 Anesthesia Stop: 1626    Procedure: LEFT DISTAL SUPERFICIAL FEMORAL ARTERY TO POSTERIOR TIBIAL ARTERY BYPASS (Left: Leg Upper) Diagnosis:       Arteriosclerosis      (ARTERIOSCLEROSIS OF LEFT LOWER EXTREMITY WITH ULCERATION)    Surgeons: Jose M Sorensen MD Responsible Provider: Radha Tejeda MD    Anesthesia Type: general ASA Status: 3          Anesthesia Type: No value filed.     Annelise Phase I: Annelise Score: 9    Annelise Phase II:        Anesthesia Post Evaluation    Patient location during evaluation: bedside  Patient participation: complete - patient participated  Level of consciousness: awake  Airway patency: patent  Nausea & Vomiting: no nausea and no vomiting  Complications: no  Cardiovascular status: hemodynamically stable  Respiratory status: acceptable  Hydration status: stable  Comments: /68   Pulse 77   Temp 97.6 °F (36.4 °C) (Axillary)   Resp 20   Ht 5' 10\" (1.778 m)   Wt 219 lb (99.3 kg)   SpO2 98%   BMI 31.42 kg/m²

## 2023-04-27 NOTE — ANESTHESIA PRE PROCEDURE
Department of Anesthesiology  Preprocedure Note       Name:  Fay Pritchard   Age:  82 y.o.  :  1940                                          MRN:  [id number]         Date:  2023      Surgeon: Queta Rosales):  Keyur Bledsoe MD    Procedure: Procedure(s):  DISTAL SUPERFICIAL FEMORAL ARTERY TO POSTERIOR TIBIAL ARTERY BYPASS    Medications prior to admission:   Prior to Admission medications    Medication Sig Start Date End Date Taking? Authorizing Provider   montelukast (SINGULAIR) 10 MG tablet Take 1 tablet by mouth nightly 3/20/23   Jesus Montano PA-C   Microlet Lancets MISC USE 1 LANCET TO CHECK GLUCOSE ONCE DAILY AND AS NEEDED 22   Historical Provider, MD   sacubitril-valsartan (ENTRESTO) 24-26 MG per tablet Take 1 tablet by mouth 2 times daily 11/3/22   Lorena Mchugh MD   metoprolol succinate (TOPROL XL) 25 MG extended release tablet Take 1 tablet by mouth in the morning. 8/15/22   Lorena Mchugh MD   tamsulosin (FLOMAX) 0.4 MG capsule Take 1 capsule by mouth in the morning.  22   Jaz Nolasco PA-C   albuterol (ACCUNEB) 1.25 MG/3ML nebulizer solution Inhale 3 mLs into the lungs every 6 hours as needed for Wheezing    Historical Provider, MD   simvastatin (ZOCOR) 20 MG tablet TAKE 1 TABLET BY MOUTH ONCE DAILY 21   Historical Provider, MD   metFORMIN (GLUCOPHAGE) 850 MG tablet Take 1 tablet by mouth 2 times daily (with meals) 16   Historical Provider, MD   Pediatric Multivitamins-Fl (MULTI VIT/FL PO) Take 1 tablet by mouth daily Daily     Historical Provider, MD   aspirin 81 MG tablet Take 1 tablet by mouth daily    Historical Provider, MD       Current medications:    Current Facility-Administered Medications   Medication Dose Route Frequency Provider Last Rate Last Admin    0.9 % sodium chloride infusion   IntraVENous Continuous Keyur Bledsoe MD 75 mL/hr at 23 New Bag at 23       Allergies:  No Known Allergies    Problem List:    Patient Active

## 2023-04-28 LAB
ABO/RH: NORMAL
ABSOLUTE EOS #: 0.09 K/UL (ref 0–0.44)
ABSOLUTE IMMATURE GRANULOCYTE: 0.05 K/UL (ref 0–0.3)
ABSOLUTE LYMPH #: 1.6 K/UL (ref 1.1–3.7)
ABSOLUTE MONO #: 1.01 K/UL (ref 0.1–1.2)
ANION GAP SERPL CALCULATED.3IONS-SCNC: 10 MMOL/L (ref 9–17)
ANTIBODY SCREEN: NEGATIVE
ARM BAND NUMBER: NORMAL
BASOPHILS # BLD: 0 % (ref 0–2)
BASOPHILS ABSOLUTE: 0.04 K/UL (ref 0–0.2)
BLD PROD TYP BPU: NORMAL
BLD PROD TYP BPU: NORMAL
BPU ID: NORMAL
BPU ID: NORMAL
BUN SERPL-MCNC: 22 MG/DL (ref 8–23)
CALCIUM SERPL-MCNC: 8.3 MG/DL (ref 8.6–10.4)
CHLORIDE SERPL-SCNC: 108 MMOL/L (ref 98–107)
CO2 SERPL-SCNC: 19 MMOL/L (ref 20–31)
CREAT SERPL-MCNC: 1.26 MG/DL (ref 0.7–1.2)
CROSSMATCH RESULT: NORMAL
CROSSMATCH RESULT: NORMAL
DISPENSE STATUS BLOOD BANK: NORMAL
DISPENSE STATUS BLOOD BANK: NORMAL
EOSINOPHILS RELATIVE PERCENT: 1 % (ref 1–4)
EXPIRATION DATE: NORMAL
GFR SERPL CREATININE-BSD FRML MDRD: 57 ML/MIN/1.73M2
GLUCOSE BLD-MCNC: 127 MG/DL (ref 75–110)
GLUCOSE BLD-MCNC: 137 MG/DL (ref 75–110)
GLUCOSE BLD-MCNC: 206 MG/DL (ref 75–110)
GLUCOSE BLD-MCNC: 229 MG/DL (ref 75–110)
GLUCOSE SERPL-MCNC: 131 MG/DL (ref 70–99)
HCT VFR BLD AUTO: 39 % (ref 40.7–50.3)
HGB BLD-MCNC: 13.1 G/DL (ref 13–17)
IMMATURE GRANULOCYTES: 1 %
LYMPHOCYTES # BLD: 15 % (ref 24–43)
MCH RBC QN AUTO: 31.8 PG (ref 25.2–33.5)
MCHC RBC AUTO-ENTMCNC: 33.6 G/DL (ref 28.4–34.8)
MCV RBC AUTO: 94.7 FL (ref 82.6–102.9)
MONOCYTES # BLD: 10 % (ref 3–12)
NRBC AUTOMATED: 0 PER 100 WBC
PDW BLD-RTO: 13.3 % (ref 11.8–14.4)
PLATELET # BLD AUTO: 206 K/UL (ref 138–453)
PMV BLD AUTO: 10.4 FL (ref 8.1–13.5)
POTASSIUM SERPL-SCNC: 4.6 MMOL/L (ref 3.7–5.3)
RBC # BLD: 4.12 M/UL (ref 4.21–5.77)
SEG NEUTROPHILS: 73 % (ref 36–65)
SEGMENTED NEUTROPHILS ABSOLUTE COUNT: 7.65 K/UL (ref 1.5–8.1)
SODIUM SERPL-SCNC: 137 MMOL/L (ref 135–144)
TRANSFUSION STATUS: NORMAL
TRANSFUSION STATUS: NORMAL
UNIT DIVISION: 0
UNIT DIVISION: 0
WBC # BLD AUTO: 10.4 K/UL (ref 3.5–11.3)

## 2023-04-28 PROCEDURE — 6360000002 HC RX W HCPCS: Performed by: STUDENT IN AN ORGANIZED HEALTH CARE EDUCATION/TRAINING PROGRAM

## 2023-04-28 PROCEDURE — 85025 COMPLETE CBC W/AUTO DIFF WBC: CPT

## 2023-04-28 PROCEDURE — 2580000003 HC RX 258: Performed by: STUDENT IN AN ORGANIZED HEALTH CARE EDUCATION/TRAINING PROGRAM

## 2023-04-28 PROCEDURE — 97530 THERAPEUTIC ACTIVITIES: CPT

## 2023-04-28 PROCEDURE — 82947 ASSAY GLUCOSE BLOOD QUANT: CPT

## 2023-04-28 PROCEDURE — 2580000003 HC RX 258: Performed by: ANESTHESIOLOGY

## 2023-04-28 PROCEDURE — 97166 OT EVAL MOD COMPLEX 45 MIN: CPT

## 2023-04-28 PROCEDURE — 6370000000 HC RX 637 (ALT 250 FOR IP): Performed by: STUDENT IN AN ORGANIZED HEALTH CARE EDUCATION/TRAINING PROGRAM

## 2023-04-28 PROCEDURE — 97162 PT EVAL MOD COMPLEX 30 MIN: CPT

## 2023-04-28 PROCEDURE — 2060000000 HC ICU INTERMEDIATE R&B

## 2023-04-28 PROCEDURE — 94761 N-INVAS EAR/PLS OXIMETRY MLT: CPT

## 2023-04-28 PROCEDURE — 80048 BASIC METABOLIC PNL TOTAL CA: CPT

## 2023-04-28 PROCEDURE — 94640 AIRWAY INHALATION TREATMENT: CPT

## 2023-04-28 RX ADMIN — TAMSULOSIN HYDROCHLORIDE 0.4 MG: 0.4 CAPSULE ORAL at 08:32

## 2023-04-28 RX ADMIN — HEPARIN SODIUM 5000 UNITS: 5000 INJECTION INTRAVENOUS; SUBCUTANEOUS at 08:33

## 2023-04-28 RX ADMIN — INSULIN LISPRO 4 UNITS: 100 INJECTION, SOLUTION INTRAVENOUS; SUBCUTANEOUS at 12:26

## 2023-04-28 RX ADMIN — SODIUM CHLORIDE, PRESERVATIVE FREE 10 ML: 5 INJECTION INTRAVENOUS at 08:34

## 2023-04-28 RX ADMIN — ALBUTEROL SULFATE 2 PUFF: 90 AEROSOL, METERED RESPIRATORY (INHALATION) at 20:54

## 2023-04-28 RX ADMIN — MONTELUKAST SODIUM 10 MG: 10 TABLET, FILM COATED ORAL at 20:40

## 2023-04-28 RX ADMIN — ALBUTEROL SULFATE 2 PUFF: 90 AEROSOL, METERED RESPIRATORY (INHALATION) at 11:41

## 2023-04-28 RX ADMIN — ASPIRIN 81 MG: 81 TABLET, COATED ORAL at 08:32

## 2023-04-28 RX ADMIN — SACUBITRIL AND VALSARTAN 1 TABLET: 24; 26 TABLET, FILM COATED ORAL at 08:32

## 2023-04-28 RX ADMIN — SACUBITRIL AND VALSARTAN 1 TABLET: 24; 26 TABLET, FILM COATED ORAL at 20:40

## 2023-04-28 RX ADMIN — Medication 2000 MG: at 23:03

## 2023-04-28 RX ADMIN — HEPARIN SODIUM 5000 UNITS: 5000 INJECTION INTRAVENOUS; SUBCUTANEOUS at 20:41

## 2023-04-28 RX ADMIN — SODIUM CHLORIDE: 9 INJECTION, SOLUTION INTRAVENOUS at 06:55

## 2023-04-28 RX ADMIN — ALBUTEROL SULFATE 2 PUFF: 90 AEROSOL, METERED RESPIRATORY (INHALATION) at 16:35

## 2023-04-28 RX ADMIN — METOPROLOL SUCCINATE 25 MG: 25 TABLET, FILM COATED, EXTENDED RELEASE ORAL at 08:32

## 2023-04-28 RX ADMIN — SODIUM CHLORIDE, PRESERVATIVE FREE 10 ML: 5 INJECTION INTRAVENOUS at 20:41

## 2023-04-28 RX ADMIN — ACETAMINOPHEN 1000 MG: 500 TABLET ORAL at 06:19

## 2023-04-28 RX ADMIN — ACETAMINOPHEN 1000 MG: 500 TABLET ORAL at 20:40

## 2023-04-28 RX ADMIN — ACETAMINOPHEN 1000 MG: 500 TABLET ORAL at 14:15

## 2023-04-28 RX ADMIN — ATORVASTATIN CALCIUM 20 MG: 40 TABLET, FILM COATED ORAL at 08:32

## 2023-04-28 RX ADMIN — ALBUTEROL SULFATE 2 PUFF: 90 AEROSOL, METERED RESPIRATORY (INHALATION) at 09:02

## 2023-04-29 LAB
ABSOLUTE EOS #: 0.89 K/UL (ref 0–0.44)
ABSOLUTE IMMATURE GRANULOCYTE: 0.07 K/UL (ref 0–0.3)
ABSOLUTE LYMPH #: 2.2 K/UL (ref 1.1–3.7)
ABSOLUTE MONO #: 1.12 K/UL (ref 0.1–1.2)
ANION GAP SERPL CALCULATED.3IONS-SCNC: 11 MMOL/L (ref 9–17)
BASOPHILS # BLD: 0 % (ref 0–2)
BASOPHILS ABSOLUTE: 0.04 K/UL (ref 0–0.2)
BUN SERPL-MCNC: 22 MG/DL (ref 8–23)
CALCIUM SERPL-MCNC: 8.5 MG/DL (ref 8.6–10.4)
CHLORIDE SERPL-SCNC: 105 MMOL/L (ref 98–107)
CO2 SERPL-SCNC: 20 MMOL/L (ref 20–31)
CREAT SERPL-MCNC: 1.44 MG/DL (ref 0.7–1.2)
EOSINOPHILS RELATIVE PERCENT: 9 % (ref 1–4)
GFR SERPL CREATININE-BSD FRML MDRD: 49 ML/MIN/1.73M2
GLUCOSE BLD-MCNC: 134 MG/DL (ref 75–110)
GLUCOSE BLD-MCNC: 148 MG/DL (ref 75–110)
GLUCOSE BLD-MCNC: 156 MG/DL (ref 75–110)
GLUCOSE BLD-MCNC: 166 MG/DL (ref 75–110)
GLUCOSE SERPL-MCNC: 124 MG/DL (ref 70–99)
HCT VFR BLD AUTO: 41.7 % (ref 40.7–50.3)
HGB BLD-MCNC: 13.7 G/DL (ref 13–17)
IMMATURE GRANULOCYTES: 1 %
LYMPHOCYTES # BLD: 22 % (ref 24–43)
MCH RBC QN AUTO: 31.3 PG (ref 25.2–33.5)
MCHC RBC AUTO-ENTMCNC: 32.9 G/DL (ref 28.4–34.8)
MCV RBC AUTO: 95.2 FL (ref 82.6–102.9)
MONOCYTES # BLD: 11 % (ref 3–12)
NRBC AUTOMATED: 0 PER 100 WBC
PDW BLD-RTO: 13.6 % (ref 11.8–14.4)
PLATELET # BLD AUTO: 198 K/UL (ref 138–453)
PMV BLD AUTO: 9.8 FL (ref 8.1–13.5)
POTASSIUM SERPL-SCNC: 4.5 MMOL/L (ref 3.7–5.3)
RBC # BLD: 4.38 M/UL (ref 4.21–5.77)
SEG NEUTROPHILS: 57 % (ref 36–65)
SEGMENTED NEUTROPHILS ABSOLUTE COUNT: 5.81 K/UL (ref 1.5–8.1)
SODIUM SERPL-SCNC: 136 MMOL/L (ref 135–144)
WBC # BLD AUTO: 10.1 K/UL (ref 3.5–11.3)

## 2023-04-29 PROCEDURE — 2580000003 HC RX 258: Performed by: ANESTHESIOLOGY

## 2023-04-29 PROCEDURE — 82947 ASSAY GLUCOSE BLOOD QUANT: CPT

## 2023-04-29 PROCEDURE — 80048 BASIC METABOLIC PNL TOTAL CA: CPT

## 2023-04-29 PROCEDURE — 94640 AIRWAY INHALATION TREATMENT: CPT

## 2023-04-29 PROCEDURE — 36415 COLL VENOUS BLD VENIPUNCTURE: CPT

## 2023-04-29 PROCEDURE — 6370000000 HC RX 637 (ALT 250 FOR IP): Performed by: STUDENT IN AN ORGANIZED HEALTH CARE EDUCATION/TRAINING PROGRAM

## 2023-04-29 PROCEDURE — 2580000003 HC RX 258: Performed by: STUDENT IN AN ORGANIZED HEALTH CARE EDUCATION/TRAINING PROGRAM

## 2023-04-29 PROCEDURE — 85025 COMPLETE CBC W/AUTO DIFF WBC: CPT

## 2023-04-29 PROCEDURE — 6360000002 HC RX W HCPCS: Performed by: STUDENT IN AN ORGANIZED HEALTH CARE EDUCATION/TRAINING PROGRAM

## 2023-04-29 PROCEDURE — 94761 N-INVAS EAR/PLS OXIMETRY MLT: CPT

## 2023-04-29 PROCEDURE — 2060000000 HC ICU INTERMEDIATE R&B

## 2023-04-29 RX ADMIN — SODIUM CHLORIDE, PRESERVATIVE FREE 5 ML: 5 INJECTION INTRAVENOUS at 21:15

## 2023-04-29 RX ADMIN — SODIUM CHLORIDE, PRESERVATIVE FREE 10 ML: 5 INJECTION INTRAVENOUS at 08:11

## 2023-04-29 RX ADMIN — HEPARIN SODIUM 5000 UNITS: 5000 INJECTION INTRAVENOUS; SUBCUTANEOUS at 05:47

## 2023-04-29 RX ADMIN — ALBUTEROL SULFATE 2 PUFF: 90 AEROSOL, METERED RESPIRATORY (INHALATION) at 12:43

## 2023-04-29 RX ADMIN — Medication 2000 MG: at 14:34

## 2023-04-29 RX ADMIN — ACETAMINOPHEN 1000 MG: 500 TABLET ORAL at 21:09

## 2023-04-29 RX ADMIN — TAMSULOSIN HYDROCHLORIDE 0.4 MG: 0.4 CAPSULE ORAL at 08:10

## 2023-04-29 RX ADMIN — ASPIRIN 81 MG: 81 TABLET, COATED ORAL at 08:11

## 2023-04-29 RX ADMIN — SACUBITRIL AND VALSARTAN 1 TABLET: 24; 26 TABLET, FILM COATED ORAL at 21:13

## 2023-04-29 RX ADMIN — METOPROLOL SUCCINATE 25 MG: 25 TABLET, FILM COATED, EXTENDED RELEASE ORAL at 08:10

## 2023-04-29 RX ADMIN — Medication 2000 MG: at 06:42

## 2023-04-29 RX ADMIN — MONTELUKAST SODIUM 10 MG: 10 TABLET, FILM COATED ORAL at 21:14

## 2023-04-29 RX ADMIN — ATORVASTATIN CALCIUM 20 MG: 40 TABLET, FILM COATED ORAL at 08:11

## 2023-04-29 RX ADMIN — SACUBITRIL AND VALSARTAN 1 TABLET: 24; 26 TABLET, FILM COATED ORAL at 08:11

## 2023-04-29 RX ADMIN — ACETAMINOPHEN 1000 MG: 500 TABLET ORAL at 05:47

## 2023-04-29 RX ADMIN — ACETAMINOPHEN 1000 MG: 500 TABLET ORAL at 14:34

## 2023-04-29 RX ADMIN — HEPARIN SODIUM 5000 UNITS: 5000 INJECTION INTRAVENOUS; SUBCUTANEOUS at 14:34

## 2023-04-29 RX ADMIN — ALBUTEROL SULFATE 2 PUFF: 90 AEROSOL, METERED RESPIRATORY (INHALATION) at 08:37

## 2023-04-29 RX ADMIN — SODIUM CHLORIDE, PRESERVATIVE FREE 10 ML: 5 INJECTION INTRAVENOUS at 08:12

## 2023-04-29 RX ADMIN — ALBUTEROL SULFATE 2 PUFF: 90 AEROSOL, METERED RESPIRATORY (INHALATION) at 16:11

## 2023-04-29 RX ADMIN — HEPARIN SODIUM 5000 UNITS: 5000 INJECTION INTRAVENOUS; SUBCUTANEOUS at 21:10

## 2023-04-29 ASSESSMENT — PAIN SCALES - GENERAL
PAINLEVEL_OUTOF10: 0
PAINLEVEL_OUTOF10: 1

## 2023-04-30 VITALS
HEART RATE: 74 BPM | DIASTOLIC BLOOD PRESSURE: 70 MMHG | HEIGHT: 70 IN | OXYGEN SATURATION: 90 % | WEIGHT: 219 LBS | RESPIRATION RATE: 17 BRPM | BODY MASS INDEX: 31.35 KG/M2 | SYSTOLIC BLOOD PRESSURE: 128 MMHG | TEMPERATURE: 98 F

## 2023-04-30 LAB
GLUCOSE BLD-MCNC: 132 MG/DL (ref 75–110)
GLUCOSE BLD-MCNC: 140 MG/DL (ref 75–110)

## 2023-04-30 PROCEDURE — 6370000000 HC RX 637 (ALT 250 FOR IP): Performed by: STUDENT IN AN ORGANIZED HEALTH CARE EDUCATION/TRAINING PROGRAM

## 2023-04-30 PROCEDURE — 94760 N-INVAS EAR/PLS OXIMETRY 1: CPT

## 2023-04-30 PROCEDURE — 82947 ASSAY GLUCOSE BLOOD QUANT: CPT

## 2023-04-30 PROCEDURE — 94640 AIRWAY INHALATION TREATMENT: CPT

## 2023-04-30 PROCEDURE — 6360000002 HC RX W HCPCS: Performed by: STUDENT IN AN ORGANIZED HEALTH CARE EDUCATION/TRAINING PROGRAM

## 2023-04-30 PROCEDURE — 2580000003 HC RX 258: Performed by: ANESTHESIOLOGY

## 2023-04-30 PROCEDURE — 2580000003 HC RX 258: Performed by: STUDENT IN AN ORGANIZED HEALTH CARE EDUCATION/TRAINING PROGRAM

## 2023-04-30 RX ORDER — OXYCODONE HYDROCHLORIDE 5 MG/1
2.5 TABLET ORAL EVERY 6 HOURS PRN
Qty: 10 TABLET | Refills: 0 | Status: SHIPPED | OUTPATIENT
Start: 2023-04-30 | End: 2023-05-05

## 2023-04-30 RX ADMIN — ATORVASTATIN CALCIUM 20 MG: 40 TABLET, FILM COATED ORAL at 08:53

## 2023-04-30 RX ADMIN — ALBUTEROL SULFATE 2 PUFF: 90 AEROSOL, METERED RESPIRATORY (INHALATION) at 11:58

## 2023-04-30 RX ADMIN — ACETAMINOPHEN 1000 MG: 500 TABLET ORAL at 14:03

## 2023-04-30 RX ADMIN — METOPROLOL SUCCINATE 25 MG: 25 TABLET, FILM COATED, EXTENDED RELEASE ORAL at 08:53

## 2023-04-30 RX ADMIN — HEPARIN SODIUM 5000 UNITS: 5000 INJECTION INTRAVENOUS; SUBCUTANEOUS at 06:15

## 2023-04-30 RX ADMIN — SODIUM CHLORIDE, PRESERVATIVE FREE 10 ML: 5 INJECTION INTRAVENOUS at 08:55

## 2023-04-30 RX ADMIN — ASPIRIN 81 MG: 81 TABLET, COATED ORAL at 08:53

## 2023-04-30 RX ADMIN — TAMSULOSIN HYDROCHLORIDE 0.4 MG: 0.4 CAPSULE ORAL at 08:53

## 2023-04-30 RX ADMIN — ALBUTEROL SULFATE 2 PUFF: 90 AEROSOL, METERED RESPIRATORY (INHALATION) at 08:21

## 2023-04-30 RX ADMIN — SACUBITRIL AND VALSARTAN 1 TABLET: 24; 26 TABLET, FILM COATED ORAL at 08:53

## 2023-04-30 RX ADMIN — ACETAMINOPHEN 1000 MG: 500 TABLET ORAL at 06:15

## 2023-04-30 RX ADMIN — HEPARIN SODIUM 5000 UNITS: 5000 INJECTION INTRAVENOUS; SUBCUTANEOUS at 14:03

## 2023-04-30 ASSESSMENT — PAIN SCALES - GENERAL: PAINLEVEL_OUTOF10: 0

## 2023-05-23 ENCOUNTER — NURSE ONLY (OUTPATIENT)
Dept: CARDIOLOGY | Age: 83
End: 2023-05-23
Payer: MEDICARE

## 2023-05-23 DIAGNOSIS — I25.5 ISCHEMIC CARDIOMYOPATHY: Primary | ICD-10-CM

## 2023-05-23 DIAGNOSIS — Z95.810 ICD (IMPLANTABLE CARDIOVERTER-DEFIBRILLATOR) IN PLACE: ICD-10-CM

## 2023-05-23 PROCEDURE — 93295 DEV INTERROG REMOTE 1/2/MLT: CPT | Performed by: FAMILY MEDICINE

## 2023-05-26 ENCOUNTER — OFFICE VISIT (OUTPATIENT)
Dept: VASCULAR SURGERY | Age: 83
End: 2023-05-26

## 2023-05-26 VITALS
RESPIRATION RATE: 17 BRPM | SYSTOLIC BLOOD PRESSURE: 132 MMHG | WEIGHT: 219 LBS | HEART RATE: 72 BPM | HEIGHT: 69 IN | DIASTOLIC BLOOD PRESSURE: 76 MMHG | OXYGEN SATURATION: 99 % | BODY MASS INDEX: 32.44 KG/M2 | TEMPERATURE: 97.9 F

## 2023-05-26 DIAGNOSIS — Z98.890 S/P FEMORAL-TIBIAL BYPASS: ICD-10-CM

## 2023-05-26 DIAGNOSIS — I70.244 ATHSCL NATIVE ART OF LEFT LEG W ULCER OF HEEL AND MIDFOOT (HCC): Primary | ICD-10-CM

## 2023-05-26 NOTE — PROGRESS NOTES
[facility]  [address] [facility] [address] [place] [address]  Dept: [phone]     Patient: Karie Sheikh  : 1940  MRN: [id number]  DOS: 2023            HPI:  Karie Sheikh is a 82 y.o. male who returns to the office regarding his left lower extremity proximal SFA to distal posterior tibial bypass. The bypass seems to be working quite well. He has a palpable posterior tibial pulse and a good biphasic audible signal through the graft. He has a robust popliteal pulse in the graft. His foot is warm and well-perfused on the left. He does have dependent edema which is not unexpected after this procedure. His foot has no pain. His ulcer is healing. Review of Systems    Vitals:    23 0929   BP: 132/76   Site: Right Upper Arm   Position: Sitting   Cuff Size: Large Adult   Pulse: 72   Resp: 17   Temp: 97.9 °F (36.6 °C)   TempSrc: Temporal   SpO2: 99%   Weight: 219 lb (99.3 kg)   Height: 5' 9\" (1.753 m)          Physical Exam  Examination is as described above. All of the incisions have healed well. There are no signs of infection. No seroma or hematoma. Assessment:  1. 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 S/P femoral-tibial bypass          Plan:  Sutures will be removed today. We will go ahead with bypass graft duplex in another month in [place] as well as an YURY and PVRs bilaterally and follow-up at that location as well. Understands and agrees.     Electronically signed by:  Sreekanth Sanchez MD

## 2023-06-05 ENCOUNTER — HOSPITAL ENCOUNTER (OUTPATIENT)
Dept: GENERAL RADIOLOGY | Age: 83
Discharge: HOME | End: 2023-06-07
Payer: MEDICARE

## 2023-06-05 ENCOUNTER — HOSPITAL ENCOUNTER (OUTPATIENT)
Age: 83
Discharge: HOME | End: 2023-06-07
Payer: MEDICARE

## 2023-06-05 DIAGNOSIS — N20.0 KIDNEY STONES: ICD-10-CM

## 2023-06-05 PROCEDURE — 74018 RADEX ABDOMEN 1 VIEW: CPT

## 2023-06-08 ENCOUNTER — OFFICE VISIT (OUTPATIENT)
Dept: UROLOGY | Age: 83
End: 2023-06-08

## 2023-06-08 VITALS
SYSTOLIC BLOOD PRESSURE: 112 MMHG | WEIGHT: 219 LBS | HEIGHT: 69 IN | DIASTOLIC BLOOD PRESSURE: 71 MMHG | HEART RATE: 62 BPM | TEMPERATURE: 97.5 F | BODY MASS INDEX: 32.44 KG/M2

## 2023-06-08 DIAGNOSIS — N13.8 BPH WITH OBSTRUCTION/LOWER URINARY TRACT SYMPTOMS: ICD-10-CM

## 2023-06-08 DIAGNOSIS — N18.32 STAGE 3B CHRONIC KIDNEY DISEASE (HCC): ICD-10-CM

## 2023-06-08 DIAGNOSIS — N20.0 KIDNEY STONES: Primary | ICD-10-CM

## 2023-06-08 DIAGNOSIS — N40.1 BPH WITH OBSTRUCTION/LOWER URINARY TRACT SYMPTOMS: ICD-10-CM

## 2023-06-08 DIAGNOSIS — N32.0 BNC (BLADDER NECK CONTRACTURE): ICD-10-CM

## 2023-06-08 DIAGNOSIS — R31.0 GROSS HEMATURIA: ICD-10-CM

## 2023-06-08 ASSESSMENT — ENCOUNTER SYMPTOMS
COLOR CHANGE: 0
CONSTIPATION: 0
ABDOMINAL PAIN: 0
NAUSEA: 0
EYE REDNESS: 0
VOMITING: 0
BACK PAIN: 0
COUGH: 0
WHEEZING: 0
SHORTNESS OF BREATH: 0

## 2023-06-08 NOTE — PROGRESS NOTES
1/1/1958   Tobacco Comments    quit 50 years ago       Social History     Substance and Sexual Activity   Alcohol Use No       Review of Systems   Constitutional:  Negative for appetite change, chills and fever. Eyes:  Negative for redness and visual disturbance. Respiratory:  Negative for cough, shortness of breath and wheezing. Cardiovascular:  Negative for chest pain and leg swelling. Gastrointestinal:  Negative for abdominal pain, constipation, nausea and vomiting. Genitourinary:  Negative for decreased urine volume, difficulty urinating, dysuria, enuresis, flank pain, frequency, hematuria, penile discharge, penile pain, scrotal swelling, testicular pain and urgency. Musculoskeletal:  Negative for back pain, joint swelling and myalgias. Skin:  Negative for color change, rash and wound. Neurological:  Negative for dizziness, tremors and numbness. Hematological:  Negative for adenopathy. Does not bruise/bleed easily. /71 (Site: Left Upper Arm, Position: Sitting, Cuff Size: Large Adult)   Pulse 62   Temp 97.5 °F (36.4 °C) (Infrared)   Ht 5' 9\" (1.753 m)   Wt 219 lb (99.3 kg)   BMI 32.34 kg/m²       PHYSICAL EXAM:  Constitutional: Patient in no acute distress; Neuro: alert and oriented to person place and time. Psych: Mood and affect normal.  Skin: Normal  Lungs: Respiratory effort normal  Cardiovascular:  Normal peripheral pulses  Abdomen: Soft, non-tender, non-distended with no CVA, flank pain  Bladder non-tender and not distended. Lab Results   Component Value Date    BUN 22 04/29/2023     Lab Results   Component Value Date    CREATININE 1.44 (H) 04/29/2023     No results found for: PSA    ASSESSMENT:   Diagnosis Orders   1. Kidney stones  XR ABDOMEN (KUB) (SINGLE AP VIEW)      2. BPH with obstruction/lower urinary tract symptoms        3. BNC (bladder neck contracture)        4. Stage 3b chronic kidney disease ([address]        5.  Gross hematuria

## 2023-06-08 NOTE — PATIENT INSTRUCTIONS
SURVEY:    You may be receiving a survey from [employer] regarding your visit today. Please complete the survey to enable us to provide the highest quality of care to you and your family. If you cannot score us a very good on any question, please call the office to discuss how we could of made your experience a very good one. Thank you.

## 2023-06-21 ENCOUNTER — HOSPITAL ENCOUNTER (OUTPATIENT)
Dept: VASCULAR LAB | Age: 83
Discharge: HOME | End: 2023-06-23
Attending: SURGERY
Payer: MEDICARE

## 2023-06-21 ENCOUNTER — HOSPITAL ENCOUNTER (OUTPATIENT)
Dept: VASCULAR LAB | Age: 83
Discharge: HOME | End: 2023-06-23
Payer: MEDICARE

## 2023-06-21 ENCOUNTER — HOSPITAL ENCOUNTER (OUTPATIENT)
Age: 83
Discharge: HOME | End: 2023-06-21
Payer: MEDICARE

## 2023-06-21 DIAGNOSIS — I70.244 ATHEROSCLEROSIS OF NATIVE ARTERIES OF LEFT LEG WITH ULCERATION OF HEEL AND MIDFOOT (HCC): ICD-10-CM

## 2023-06-21 DIAGNOSIS — I70.244 ATHSCL NATIVE ART OF LEFT LEG W ULCER OF HEEL AND MIDFOOT (HCC): ICD-10-CM

## 2023-06-21 DIAGNOSIS — Z98.890 S/P FEMORAL-TIBIAL BYPASS: ICD-10-CM

## 2023-06-21 LAB
ALBUMIN SERPL-MCNC: 4 G/DL (ref 3.5–5.2)
ANION GAP SERPL CALCULATED.3IONS-SCNC: 12 MMOL/L (ref 9–17)
BACTERIA URNS QL MICRO: ABNORMAL
BILIRUB UR QL STRIP: NEGATIVE
BUN SERPL-MCNC: 35 MG/DL (ref 8–23)
BUN/CREAT SERPL: 23 (ref 9–20)
CALCIUM SERPL-MCNC: 9.2 MG/DL (ref 8.6–10.4)
CHLORIDE SERPL-SCNC: 104 MMOL/L (ref 98–107)
CLARITY UR: CLEAR
CO2 SERPL-SCNC: 19 MMOL/L (ref 20–31)
COLOR UR: YELLOW
CREAT SERPL-MCNC: 1.53 MG/DL (ref 0.7–1.2)
CREAT UR-MCNC: 81.3 MG/DL (ref 39–259)
EPI CELLS #/AREA URNS HPF: ABNORMAL /HPF (ref 0–5)
ERYTHROCYTE [DISTWIDTH] IN BLOOD BY AUTOMATED COUNT: 13.6 % (ref 11.8–14.4)
GFR SERPL CREATININE-BSD FRML MDRD: 45 ML/MIN/1.73M2
GLUCOSE SERPL-MCNC: 159 MG/DL (ref 70–99)
GLUCOSE UR STRIP-MCNC: NEGATIVE MG/DL
HCT VFR BLD AUTO: 43.1 % (ref 40.7–50.3)
HGB BLD-MCNC: 14.3 G/DL (ref 13–17)
HGB UR QL STRIP.AUTO: ABNORMAL
KETONES UR STRIP-MCNC: NEGATIVE MG/DL
LEUKOCYTE ESTERASE UR QL STRIP: ABNORMAL
MAGNESIUM SERPL-MCNC: 1.9 MG/DL (ref 1.6–2.6)
MCH RBC QN AUTO: 31.8 PG (ref 25.2–33.5)
MCHC RBC AUTO-ENTMCNC: 33.2 G/DL (ref 28.4–34.8)
MCV RBC AUTO: 96 FL (ref 82.6–102.9)
NITRITE UR QL STRIP: POSITIVE
NRBC AUTOMATED: 0 PER 100 WBC
PH UR STRIP: 6 [PH] (ref 5–9)
PHOSPHATE SERPL-MCNC: 3.5 MG/DL (ref 2.5–4.5)
PLATELET # BLD AUTO: 213 K/UL (ref 138–453)
PMV BLD AUTO: 10.8 FL (ref 8.1–13.5)
POTASSIUM SERPL-SCNC: 4.7 MMOL/L (ref 3.7–5.3)
PROT UR STRIP-MCNC: NEGATIVE MG/DL
PTH-INTACT SERPL-MCNC: 26.6 PG/ML (ref 14–72)
RBC # BLD AUTO: 4.49 M/UL (ref 4.21–5.77)
RBC #/AREA URNS HPF: ABNORMAL /HPF (ref 0–2)
SODIUM SERPL-SCNC: 135 MMOL/L (ref 135–144)
SP GR UR STRIP: 1.02 (ref 1.01–1.02)
TOTAL PROTEIN, URINE: 33 MG/DL
URATE SERPL-MCNC: 6.6 MG/DL (ref 3.4–7)
URINE TOTAL PROTEIN CREATININE RATIO: 0.41 (ref 0–0.2)
UROBILINOGEN UR STRIP-ACNC: NORMAL
WBC #/AREA URNS HPF: ABNORMAL /HPF (ref 0–5)
WBC OTHER # BLD: 7.2 K/UL (ref 3.5–11.3)

## 2023-06-21 PROCEDURE — 80069 RENAL FUNCTION PANEL: CPT

## 2023-06-21 PROCEDURE — 84550 ASSAY OF BLOOD/URIC ACID: CPT

## 2023-06-21 PROCEDURE — 36415 COLL VENOUS BLD VENIPUNCTURE: CPT

## 2023-06-21 PROCEDURE — 93926 LOWER EXTREMITY STUDY: CPT

## 2023-06-21 PROCEDURE — 81001 URINALYSIS AUTO W/SCOPE: CPT

## 2023-06-21 PROCEDURE — 87077 CULTURE AEROBIC IDENTIFY: CPT

## 2023-06-21 PROCEDURE — 87086 URINE CULTURE/COLONY COUNT: CPT

## 2023-06-21 PROCEDURE — 93923 UPR/LXTR ART STDY 3+ LVLS: CPT

## 2023-06-21 PROCEDURE — 87186 SC STD MICRODIL/AGAR DIL: CPT

## 2023-06-21 PROCEDURE — 84156 ASSAY OF PROTEIN URINE: CPT

## 2023-06-21 PROCEDURE — 82570 ASSAY OF URINE CREATININE: CPT

## 2023-06-21 PROCEDURE — 85027 COMPLETE CBC AUTOMATED: CPT

## 2023-06-21 PROCEDURE — 83970 ASSAY OF PARATHORMONE: CPT

## 2023-06-21 PROCEDURE — 83735 ASSAY OF MAGNESIUM: CPT

## 2023-06-22 LAB
MICROORGANISM SPEC CULT: ABNORMAL
SPECIMEN DESCRIPTION: ABNORMAL

## 2023-06-28 ENCOUNTER — TELEPHONE (OUTPATIENT)
Dept: VASCULAR SURGERY | Age: 83
End: 2023-06-28

## 2023-07-14 ENCOUNTER — OFFICE VISIT (OUTPATIENT)
Dept: VASCULAR SURGERY | Age: 83
End: 2023-07-14

## 2023-07-14 VITALS
WEIGHT: 219 LBS | BODY MASS INDEX: 32.44 KG/M2 | TEMPERATURE: 97.9 F | HEIGHT: 69 IN | HEART RATE: 74 BPM | DIASTOLIC BLOOD PRESSURE: 74 MMHG | SYSTOLIC BLOOD PRESSURE: 121 MMHG | OXYGEN SATURATION: 98 % | RESPIRATION RATE: 20 BRPM

## 2023-07-14 DIAGNOSIS — I70.213 ATHEROSCLEROSIS OF NATIVE ARTERY OF BOTH LOWER EXTREMITIES WITH INTERMITTENT CLAUDICATION (HCC): Primary | ICD-10-CM

## 2023-07-14 DIAGNOSIS — Z98.890 S/P FEMORAL-TIBIAL BYPASS: ICD-10-CM

## 2023-07-14 PROCEDURE — 99024 POSTOP FOLLOW-UP VISIT: CPT | Performed by: SURGERY

## 2023-07-14 RX ORDER — FUROSEMIDE 20 MG/1
TABLET ORAL
COMMUNITY
Start: 2023-06-23

## 2023-07-19 NOTE — TELEPHONE ENCOUNTER
----- Message from Clarence Mauricio PA-C sent at 6/1/2022  8:29 AM EDT -----  Please notify patient that his YURY came back okay, he had normal right YURY and mild PAD in the left lower extremity. Continue his aspirin and simvastatin daily as prescribed for his mild peripheral artery disease. Resent in prescriptions related to increased physiological demand for nutrient

## 2023-07-31 ENCOUNTER — HOSPITAL ENCOUNTER (OUTPATIENT)
Age: 83
Discharge: HOME | End: 2023-07-31
Payer: MEDICARE

## 2023-07-31 ENCOUNTER — OFFICE VISIT (OUTPATIENT)
Dept: CARDIOLOGY | Age: 83
End: 2023-07-31
Payer: MEDICARE

## 2023-07-31 VITALS
DIASTOLIC BLOOD PRESSURE: 65 MMHG | OXYGEN SATURATION: 99 % | RESPIRATION RATE: 18 BRPM | WEIGHT: 227.6 LBS | HEART RATE: 79 BPM | BODY MASS INDEX: 33.71 KG/M2 | HEIGHT: 69 IN | SYSTOLIC BLOOD PRESSURE: 104 MMHG

## 2023-07-31 DIAGNOSIS — R06.02 SOB (SHORTNESS OF BREATH): Primary | ICD-10-CM

## 2023-07-31 DIAGNOSIS — I44.0 1ST DEGREE AV BLOCK: ICD-10-CM

## 2023-07-31 DIAGNOSIS — I25.10 ASHD (ARTERIOSCLEROTIC HEART DISEASE): ICD-10-CM

## 2023-07-31 DIAGNOSIS — R94.31 ABNORMAL EKG: ICD-10-CM

## 2023-07-31 DIAGNOSIS — R00.0 TACHYCARDIA: ICD-10-CM

## 2023-07-31 DIAGNOSIS — I10 PRIMARY HYPERTENSION: ICD-10-CM

## 2023-07-31 DIAGNOSIS — I25.5 ISCHEMIC CARDIOMYOPATHY: ICD-10-CM

## 2023-07-31 DIAGNOSIS — I73.9 PVD (PERIPHERAL VASCULAR DISEASE) (HCC): ICD-10-CM

## 2023-07-31 DIAGNOSIS — N18.32 STAGE 3B CHRONIC KIDNEY DISEASE (HCC): ICD-10-CM

## 2023-07-31 DIAGNOSIS — R53.83 OTHER FATIGUE: ICD-10-CM

## 2023-07-31 DIAGNOSIS — R60.0 LEG EDEMA: ICD-10-CM

## 2023-07-31 DIAGNOSIS — R06.02 SOB (SHORTNESS OF BREATH): ICD-10-CM

## 2023-07-31 DIAGNOSIS — Z95.810 ICD (IMPLANTABLE CARDIOVERTER-DEFIBRILLATOR) IN PLACE: ICD-10-CM

## 2023-07-31 LAB
ALBUMIN SERPL-MCNC: 4.2 G/DL (ref 3.5–5.2)
ANION GAP SERPL CALCULATED.3IONS-SCNC: 12 MMOL/L (ref 9–17)
BUN SERPL-MCNC: 32 MG/DL (ref 8–23)
BUN/CREAT SERPL: 20 (ref 9–20)
CALCIUM SERPL-MCNC: 9.3 MG/DL (ref 8.6–10.4)
CHLORIDE SERPL-SCNC: 102 MMOL/L (ref 98–107)
CO2 SERPL-SCNC: 23 MMOL/L (ref 20–31)
CREAT SERPL-MCNC: 1.6 MG/DL (ref 0.7–1.2)
GFR SERPL CREATININE-BSD FRML MDRD: 43 ML/MIN/1.73M2
GLUCOSE SERPL-MCNC: 98 MG/DL (ref 70–99)
MAGNESIUM SERPL-MCNC: 1.8 MG/DL (ref 1.6–2.6)
PHOSPHATE SERPL-MCNC: 3.4 MG/DL (ref 2.5–4.5)
POTASSIUM SERPL-SCNC: 4.8 MMOL/L (ref 3.7–5.3)
SODIUM SERPL-SCNC: 137 MMOL/L (ref 135–144)

## 2023-07-31 PROCEDURE — 1036F TOBACCO NON-USER: CPT | Performed by: FAMILY MEDICINE

## 2023-07-31 PROCEDURE — 36415 COLL VENOUS BLD VENIPUNCTURE: CPT

## 2023-07-31 PROCEDURE — G8417 CALC BMI ABV UP PARAM F/U: HCPCS | Performed by: FAMILY MEDICINE

## 2023-07-31 PROCEDURE — 83735 ASSAY OF MAGNESIUM: CPT

## 2023-07-31 PROCEDURE — 1123F ACP DISCUSS/DSCN MKR DOCD: CPT | Performed by: FAMILY MEDICINE

## 2023-07-31 PROCEDURE — 3074F SYST BP LT 130 MM HG: CPT | Performed by: FAMILY MEDICINE

## 2023-07-31 PROCEDURE — 80069 RENAL FUNCTION PANEL: CPT

## 2023-07-31 PROCEDURE — 3078F DIAST BP <80 MM HG: CPT | Performed by: FAMILY MEDICINE

## 2023-07-31 PROCEDURE — 99214 OFFICE O/P EST MOD 30 MIN: CPT | Performed by: FAMILY MEDICINE

## 2023-07-31 PROCEDURE — 99211 OFF/OP EST MAY X REQ PHY/QHP: CPT | Performed by: FAMILY MEDICINE

## 2023-07-31 PROCEDURE — G8427 DOCREV CUR MEDS BY ELIG CLIN: HCPCS | Performed by: FAMILY MEDICINE

## 2023-07-31 NOTE — PATIENT INSTRUCTIONS
SURVEY:    You may be receiving a survey from [employer] regarding your visit today. Please complete the survey to enable us to provide the highest quality of care to you and your family. If you cannot score us a very good on any question, please call the office to discuss how we could have made your experience a very good one. Thank you.

## 2023-07-31 NOTE — PROGRESS NOTES
Agency/Facility Name: [facility]  Spoke To: Judi  Outcome: Not contracted with medicaid.    Cristofer Proctor am scribing for and in the presence of Sofía Graves MD, MS, F.A.C.C..    Patient: Medina Hall  : 1940  Date of Visit: 2023    REASON FOR VISIT / CONSULTATION: Cardiomyopathy (HX:ischemic cardiomyopathy, ICD, CAD, PVD, PAD PT is here for 4 month follow up he did have artery replaced  with Dr Iveth Gómezties went well sob unchanged, lightheaded when out in heat  Denies:CP, palp )    History of Present Illness:        Dear Vivien Britton MD,    I had the pleasure of seeing Medina Hall in follow up today. He is a 82 y.o. male with a history of severe coronary artery disease, reduced EF and an ICD. He has two brothers with heart disease he is unsure of details or ages when the problems started. One brother has a pacemaker and the other brother has had open heart surgery. He believes they had heart attacks in the past. Also reports mother and father with CAD, he believes everyone was in their 66's prior to having any issues. He is former smoker quit over 60 years ago. He smoked 1/2 pack per day for 3 years. He was diagnosed with hypertension for only a few years and hyperlipidemia and diabetes. He denies having sleep apnea. He states until he was 81years old he never seen a provider and never went to the doctor's office. He has urinary retention and has to self cath once daily. EKG done 2022: Sinus tachycardia with 1st degree AV block. Left anterior fascicular block, right bundle branch block. Heart rate 101 bpm.    Stress test done on 2022- EF 27% Abnormal myocardial perfusion study. There is a moderate/large perfusion defect of severe intensity in the septal, apical, anteroapical and inferoapical region(s) during stress and rest imaging which is most consistent with an old myocardial infarction with edith-infarct ischemia. Overall, these results are most consistent with high risk for significant coronary artery disease.     CAM done on 2022- 6 days and 23

## 2023-08-01 RX ORDER — METOPROLOL SUCCINATE 25 MG/1
TABLET, EXTENDED RELEASE ORAL
Qty: 90 TABLET | Refills: 3 | Status: SHIPPED | OUTPATIENT
Start: 2023-08-01

## 2023-08-03 ENCOUNTER — TELEPHONE (OUTPATIENT)
Dept: CARDIOLOGY | Age: 83
End: 2023-08-03
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
## 2023-08-04 NOTE — TELEPHONE ENCOUNTER
Pacemakers are pretty tough and I would not worry about that to much but if he would like to come in for a nurse visit to take a look at the sight we would be happy to take a look. Thanks.

## 2023-08-07 RX ORDER — TAMSULOSIN HYDROCHLORIDE 0.4 MG/1
CAPSULE ORAL
Qty: 90 CAPSULE | Refills: 3 | Status: SHIPPED | OUTPATIENT
Start: 2023-08-07

## 2023-08-21 ENCOUNTER — OFFICE VISIT (OUTPATIENT)
Dept: CARDIOLOGY | Age: 83
End: 2023-08-21
Payer: MEDICARE

## 2023-08-21 VITALS
DIASTOLIC BLOOD PRESSURE: 72 MMHG | OXYGEN SATURATION: 96 % | HEIGHT: 69 IN | HEART RATE: 81 BPM | WEIGHT: 225 LBS | RESPIRATION RATE: 18 BRPM | SYSTOLIC BLOOD PRESSURE: 111 MMHG | BODY MASS INDEX: 33.33 KG/M2

## 2023-08-21 DIAGNOSIS — I10 PRIMARY HYPERTENSION: ICD-10-CM

## 2023-08-21 DIAGNOSIS — R06.02 SOB (SHORTNESS OF BREATH): ICD-10-CM

## 2023-08-21 DIAGNOSIS — N18.32 STAGE 3B CHRONIC KIDNEY DISEASE (HCC): ICD-10-CM

## 2023-08-21 DIAGNOSIS — R60.0 LEG EDEMA: ICD-10-CM

## 2023-08-21 DIAGNOSIS — I25.10 ASHD (ARTERIOSCLEROTIC HEART DISEASE): ICD-10-CM

## 2023-08-21 DIAGNOSIS — I45.10 RIGHT BUNDLE BRANCH BLOCK: ICD-10-CM

## 2023-08-21 DIAGNOSIS — I73.9 PVD (PERIPHERAL VASCULAR DISEASE) (HCC): ICD-10-CM

## 2023-08-21 DIAGNOSIS — I25.5 ISCHEMIC CARDIOMYOPATHY: ICD-10-CM

## 2023-08-21 DIAGNOSIS — I44.0 1ST DEGREE AV BLOCK: ICD-10-CM

## 2023-08-21 DIAGNOSIS — Z95.810 ICD (IMPLANTABLE CARDIOVERTER-DEFIBRILLATOR) IN PLACE: Primary | ICD-10-CM

## 2023-08-21 PROCEDURE — 3074F SYST BP LT 130 MM HG: CPT | Performed by: PHYSICIAN ASSISTANT

## 2023-08-21 PROCEDURE — 1123F ACP DISCUSS/DSCN MKR DOCD: CPT | Performed by: PHYSICIAN ASSISTANT

## 2023-08-21 PROCEDURE — 3078F DIAST BP <80 MM HG: CPT | Performed by: PHYSICIAN ASSISTANT

## 2023-08-21 PROCEDURE — 99214 OFFICE O/P EST MOD 30 MIN: CPT | Performed by: PHYSICIAN ASSISTANT

## 2023-08-21 PROCEDURE — 1036F TOBACCO NON-USER: CPT | Performed by: PHYSICIAN ASSISTANT

## 2023-08-21 PROCEDURE — G8427 DOCREV CUR MEDS BY ELIG CLIN: HCPCS | Performed by: PHYSICIAN ASSISTANT

## 2023-08-21 PROCEDURE — G8417 CALC BMI ABV UP PARAM F/U: HCPCS | Performed by: PHYSICIAN ASSISTANT

## 2023-08-21 RX ORDER — BUMETANIDE 1 MG/1
1 TABLET ORAL DAILY
Qty: 30 TABLET | Refills: 3 | Status: SHIPPED | OUTPATIENT
Start: 2023-08-21

## 2023-08-21 RX ORDER — FUROSEMIDE 20 MG/1
20 TABLET ORAL DAILY
Qty: 60 TABLET | Status: CANCELLED | OUTPATIENT
Start: 2023-08-21

## 2023-08-21 NOTE — PROGRESS NOTES
Patient: Kenyetta Tello  : 1940  Date of Visit: 2023    REASON FOR VISIT / CONSULTATION: Congestive Heart Failure (HX: CHF. Pt states he is doing well. Denies: Cp, Palpitations, Lightheaded/ dizziess, SOB. )    History of Present Illness:        Dear Madhu Martinez MD,    I had the pleasure of seeing Kenyetta Tello in follow up today. He is a 82 y.o. male with a history of severe coronary artery disease, reduced EF and an ICD. He has two brothers with heart disease he is unsure of details or ages when the problems started. One brother has a pacemaker and the other brother has had open heart surgery. He believes they had heart attacks in the past. Also reports mother and father with CAD, he believes everyone was in their 66's prior to having any issues. He is former smoker quit over 60 years ago. He smoked 1/2 pack per day for 3 years. He was diagnosed with hypertension for only a few years and hyperlipidemia and diabetes. He denies having sleep apnea. He states until he was 81years old he never seen a provider and never went to the doctor's office. He has urinary retention and has to self cath once daily. EKG done 2022: Sinus tachycardia with 1st degree AV block. Left anterior fascicular block, right bundle branch block. Heart rate 101 bpm.    Stress test done on 2022- EF 27% Abnormal myocardial perfusion study. There is a moderate/large perfusion defect of severe intensity in the septal, apical, anteroapical and inferoapical region(s) during stress and rest imaging which is most consistent with an old myocardial infarction with edith-infarct ischemia. Overall, these results are most consistent with high risk for significant coronary artery disease. CAM done on 2022- 6 days and 23 hours recorded. Baseline rhythm is sinus with average heart rate of 80 bpm, ranging between 59 and 128 bpm. Sinus tachycardia represented 4% of the study duration.  Occasional premature

## 2023-08-21 NOTE — PATIENT INSTRUCTIONS
SURVEY:    You may be receiving a survey from [employer] regarding your visit today. Please complete the survey to enable us to provide the highest quality of care to you and your family. If you cannot score us a very good on any question, please call the office to discuss how we could have made your experience a very good one. Thank you.

## 2023-08-29 ENCOUNTER — NURSE ONLY (OUTPATIENT)
Dept: CARDIOLOGY | Age: 83
End: 2023-08-29
Payer: MEDICARE

## 2023-08-29 DIAGNOSIS — Z95.810 ICD (IMPLANTABLE CARDIOVERTER-DEFIBRILLATOR) IN PLACE: Primary | ICD-10-CM

## 2023-08-29 DIAGNOSIS — I25.5 ISCHEMIC CARDIOMYOPATHY: ICD-10-CM

## 2023-08-29 PROCEDURE — 93295 DEV INTERROG REMOTE 1/2/MLT: CPT | Performed by: FAMILY MEDICINE

## 2023-09-01 ENCOUNTER — HOSPITAL ENCOUNTER (OUTPATIENT)
Age: 83
Discharge: HOME | End: 2023-09-01
Payer: MEDICARE

## 2023-09-01 LAB
ALBUMIN SERPL-MCNC: 4.3 G/DL (ref 3.5–5.2)
ANION GAP SERPL CALCULATED.3IONS-SCNC: 12 MMOL/L (ref 9–17)
BACTERIA URNS QL MICRO: ABNORMAL
BILIRUB UR QL STRIP: NEGATIVE
BUN SERPL-MCNC: 29 MG/DL (ref 8–23)
BUN/CREAT SERPL: 17 (ref 9–20)
CALCIUM SERPL-MCNC: 9.3 MG/DL (ref 8.6–10.4)
CHLORIDE SERPL-SCNC: 101 MMOL/L (ref 98–107)
CLARITY UR: ABNORMAL
CO2 SERPL-SCNC: 23 MMOL/L (ref 20–31)
COLOR UR: YELLOW
CREAT SERPL-MCNC: 1.7 MG/DL (ref 0.7–1.2)
CREAT UR-MCNC: 62.5 MG/DL (ref 39–259)
EPI CELLS #/AREA URNS HPF: ABNORMAL /HPF (ref 0–5)
ERYTHROCYTE [DISTWIDTH] IN BLOOD BY AUTOMATED COUNT: 13.2 % (ref 11.8–14.4)
GFR SERPL CREATININE-BSD FRML MDRD: 40 ML/MIN/1.73M2
GLUCOSE SERPL-MCNC: 105 MG/DL (ref 70–99)
GLUCOSE UR STRIP-MCNC: NEGATIVE MG/DL
HCT VFR BLD AUTO: 46.1 % (ref 40.7–50.3)
HGB BLD-MCNC: 15.7 G/DL (ref 13–17)
HGB UR QL STRIP.AUTO: ABNORMAL
KETONES UR STRIP-MCNC: NEGATIVE MG/DL
LEUKOCYTE ESTERASE UR QL STRIP: ABNORMAL
MAGNESIUM SERPL-MCNC: 1.7 MG/DL (ref 1.6–2.6)
MCH RBC QN AUTO: 32.2 PG (ref 25.2–33.5)
MCHC RBC AUTO-ENTMCNC: 34.1 G/DL (ref 28.4–34.8)
MCV RBC AUTO: 94.7 FL (ref 82.6–102.9)
NITRITE UR QL STRIP: POSITIVE
NRBC BLD-RTO: 0 PER 100 WBC
PH UR STRIP: 6 [PH] (ref 5–9)
PHOSPHATE SERPL-MCNC: 2.8 MG/DL (ref 2.5–4.5)
PLATELET # BLD AUTO: 207 K/UL (ref 138–453)
PMV BLD AUTO: 10 FL (ref 8.1–13.5)
POTASSIUM SERPL-SCNC: 4.3 MMOL/L (ref 3.7–5.3)
PROT UR STRIP-MCNC: ABNORMAL MG/DL
RBC # BLD AUTO: 4.87 M/UL (ref 4.21–5.77)
RBC #/AREA URNS HPF: ABNORMAL /HPF (ref 0–2)
SODIUM SERPL-SCNC: 136 MMOL/L (ref 135–144)
SP GR UR STRIP: 1.01 (ref 1.01–1.02)
TOTAL PROTEIN, URINE: 164 MG/DL
URATE SERPL-MCNC: 7.4 MG/DL (ref 3.4–7)
URINE TOTAL PROTEIN CREATININE RATIO: 2.62 (ref 0–0.2)
UROBILINOGEN UR STRIP-ACNC: NORMAL EU/DL (ref 0–1)
WBC #/AREA URNS HPF: ABNORMAL /HPF (ref 0–5)
WBC OTHER # BLD: 7 K/UL (ref 3.5–11.3)

## 2023-09-01 PROCEDURE — 83735 ASSAY OF MAGNESIUM: CPT

## 2023-09-01 PROCEDURE — 82570 ASSAY OF URINE CREATININE: CPT

## 2023-09-01 PROCEDURE — 84550 ASSAY OF BLOOD/URIC ACID: CPT

## 2023-09-01 PROCEDURE — 83970 ASSAY OF PARATHORMONE: CPT

## 2023-09-01 PROCEDURE — 87077 CULTURE AEROBIC IDENTIFY: CPT

## 2023-09-01 PROCEDURE — 85027 COMPLETE CBC AUTOMATED: CPT

## 2023-09-01 PROCEDURE — 36415 COLL VENOUS BLD VENIPUNCTURE: CPT

## 2023-09-01 PROCEDURE — 84156 ASSAY OF PROTEIN URINE: CPT

## 2023-09-01 PROCEDURE — 87086 URINE CULTURE/COLONY COUNT: CPT

## 2023-09-01 PROCEDURE — 80069 RENAL FUNCTION PANEL: CPT

## 2023-09-01 PROCEDURE — 87186 SC STD MICRODIL/AGAR DIL: CPT

## 2023-09-01 PROCEDURE — 81001 URINALYSIS AUTO W/SCOPE: CPT

## 2023-09-02 LAB — PTH-INTACT SERPL-MCNC: 47.2 PG/ML (ref 14–72)

## 2023-09-03 LAB
MICROORGANISM SPEC CULT: ABNORMAL
SPECIMEN DESCRIPTION: ABNORMAL

## 2023-10-04 ENCOUNTER — HOSPITAL ENCOUNTER (OUTPATIENT)
Age: 83
Discharge: HOME | End: 2023-10-04
Payer: MEDICARE

## 2023-10-04 LAB
ALBUMIN SERPL-MCNC: 4.4 G/DL (ref 3.5–5.2)
ANION GAP SERPL CALCULATED.3IONS-SCNC: 12 MMOL/L (ref 9–17)
BACTERIA URNS QL MICRO: ABNORMAL
BILIRUB UR QL STRIP: NEGATIVE
BUN SERPL-MCNC: 35 MG/DL (ref 8–23)
BUN/CREAT SERPL: 19 (ref 9–20)
CALCIUM SERPL-MCNC: 9.8 MG/DL (ref 8.6–10.4)
CHLORIDE SERPL-SCNC: 104 MMOL/L (ref 98–107)
CLARITY UR: CLEAR
CO2 SERPL-SCNC: 22 MMOL/L (ref 20–31)
COLOR UR: YELLOW
CREAT SERPL-MCNC: 1.8 MG/DL (ref 0.7–1.2)
CREAT UR-MCNC: 67.1 MG/DL (ref 39–259)
EPI CELLS #/AREA URNS HPF: ABNORMAL /HPF (ref 0–5)
GFR SERPL CREATININE-BSD FRML MDRD: 37 ML/MIN/1.73M2
GLUCOSE SERPL-MCNC: 116 MG/DL (ref 70–99)
GLUCOSE UR STRIP-MCNC: NEGATIVE MG/DL
HGB UR QL STRIP.AUTO: NEGATIVE
KETONES UR STRIP-MCNC: NEGATIVE MG/DL
LEUKOCYTE ESTERASE UR QL STRIP: ABNORMAL
MAGNESIUM SERPL-MCNC: 1.7 MG/DL (ref 1.6–2.6)
NITRITE UR QL STRIP: NEGATIVE
PH UR STRIP: 6 [PH] (ref 5–9)
PHOSPHATE SERPL-MCNC: 3 MG/DL (ref 2.5–4.5)
POTASSIUM SERPL-SCNC: 4.8 MMOL/L (ref 3.7–5.3)
PROT UR STRIP-MCNC: NEGATIVE MG/DL
RBC #/AREA URNS HPF: ABNORMAL /HPF (ref 0–2)
RENAL EPITHELIAL, UA: ABNORMAL /HPF
SODIUM SERPL-SCNC: 138 MMOL/L (ref 135–144)
SP GR UR STRIP: 1.01 (ref 1.01–1.02)
TOTAL PROTEIN, URINE: 7 MG/DL
URINE TOTAL PROTEIN CREATININE RATIO: 0.1 (ref 0–0.2)
UROBILINOGEN UR STRIP-ACNC: NORMAL EU/DL (ref 0–1)
WBC #/AREA URNS HPF: ABNORMAL /HPF (ref 0–5)

## 2023-10-04 PROCEDURE — 83735 ASSAY OF MAGNESIUM: CPT

## 2023-10-04 PROCEDURE — 84156 ASSAY OF PROTEIN URINE: CPT

## 2023-10-04 PROCEDURE — 36415 COLL VENOUS BLD VENIPUNCTURE: CPT

## 2023-10-04 PROCEDURE — 80069 RENAL FUNCTION PANEL: CPT

## 2023-10-04 PROCEDURE — 82570 ASSAY OF URINE CREATININE: CPT

## 2023-10-04 PROCEDURE — 81001 URINALYSIS AUTO W/SCOPE: CPT

## 2023-10-26 ENCOUNTER — HOSPITAL ENCOUNTER (OUTPATIENT)
Dept: VASCULAR LAB | Age: 83
Discharge: HOME | End: 2023-10-28
Attending: SURGERY
Payer: MEDICARE

## 2023-10-26 DIAGNOSIS — I70.213 ATHEROSCLEROSIS OF NATIVE ARTERY OF BOTH LOWER EXTREMITIES WITH INTERMITTENT CLAUDICATION (HCC): ICD-10-CM

## 2023-10-26 DIAGNOSIS — Z98.890 S/P FEMORAL-TIBIAL BYPASS: ICD-10-CM

## 2023-10-26 LAB
VAS LEFT ABI: 1.21
VAS LEFT ARM BP: 111 MMHG
VAS LEFT ARTERIAL PROX ANASTOMOSIS PSV: 79 CM/S
VAS LEFT ATA MID PSV: 23 CM/S
VAS LEFT CFA DIST PSV: 73.9 CM/S
VAS LEFT DIST OUTFLOW PSV: 114.5 CM/S
VAS LEFT DORSALIS PEDIS BP: 98 MMHG
VAS LEFT INFLOW ARTERY PSV: 52.6 CM/S
VAS LEFT MID OUTFLOW PSV: 59.2 CM/S
VAS LEFT OUTFLOW VESSEL PSV: 77.2 CM/S
VAS LEFT PERONEAL MID PSV: 51.4 CM/S
VAS LEFT PFA PROX PSV: 41.9 CM/S
VAS LEFT PROX OUTFLOW PSV: 52.6 CM/S
VAS LEFT PTA BP: 134 MMHG
VAS LEFT PTA DIST PSV: 77.2 CM/S
VAS LEFT SFA MID PSV: 52.6 CM/S
VAS LEFT SFA MID VEL RATIO: 0.79
VAS LEFT SFA PROX PSV: 66.9 CM/S
VAS LEFT SFA PROX VEL RATIO: 0.91
VAS LEFT VENOUS DIST ANASTOMOSIS PSV: 116.2 CM/S
VAS RIGHT ABI: 1.13
VAS RIGHT ARM BP: 101 MMHG
VAS RIGHT DORSALIS PEDIS BP: 125 MMHG
VAS RIGHT PTA BP: 97 MMHG

## 2023-10-26 PROCEDURE — 93926 LOWER EXTREMITY STUDY: CPT

## 2023-10-26 PROCEDURE — 93923 UPR/LXTR ART STDY 3+ LVLS: CPT | Performed by: SURGERY

## 2023-10-26 PROCEDURE — 93926 LOWER EXTREMITY STUDY: CPT | Performed by: SURGERY

## 2023-10-26 PROCEDURE — 93923 UPR/LXTR ART STDY 3+ LVLS: CPT

## 2023-10-31 ENCOUNTER — HOSPITAL ENCOUNTER (OUTPATIENT)
Dept: PHYSICAL THERAPY | Age: 83
Setting detail: THERAPIES SERIES
Discharge: HOME | End: 2023-10-31
Payer: MEDICARE

## 2023-10-31 ENCOUNTER — TELEPHONE (OUTPATIENT)
Dept: VASCULAR SURGERY | Age: 83
End: 2023-10-31

## 2023-10-31 PROCEDURE — 97140 MANUAL THERAPY 1/> REGIONS: CPT

## 2023-10-31 PROCEDURE — 97162 PT EVAL MOD COMPLEX 30 MIN: CPT

## 2023-10-31 NOTE — PLAN OF CARE
[facility]           Phone: [phone]             Outpatient Physical Therapy  Fax: [phone]                                           Date: 10/31/2023  Patient: Natasha Job : 1940 CSN #: [id number]   Referring Physician: Jill Isbell DPM      [x] Plan of Care   [] Updated Plan of Care    Dates of Service to Include: 10/31/2023 to 23    Diagnosis:  lymphedema I89.0    Rehab (Treatment) Diagnosis:  BLE lymphedema             Onset Date:  10/12/23    Attendance  Total # of Visits to Date: 1        Assessment  Assessment: pt is a 82year old male that has a chronic history of BLe lymphedema with L foot wound thatis now healed from a recent vascular surgery. Pt states compression is still contraindicated from his vascular surgery. PT called vascular surgeon to get the okay prior to starting treatment since lymphedema referral did not come from him. PT left a message for nurse who will ask doctor. Hold any further treatment until cleared.       Goals  Short Term Goals  Time Frame for Short Term Goals: 3 weeks  Short Term Goal 1: Pt will be educated on his POC and HEP-met  Short Term Goal 2: Pt will initiate pump protocol in order to reduce BLE girth measurements  Long Term Goals  Time Frame for Long Term Goals : 6 weeks  Long Term Goal 1: Pt will be safe and independent with his lymphedema management  Long Term Goal 2: Pt will demonstrate a 1-2cm decreased BLE girth measurements in order to reduce BLe pain and increase ambulation tolerance  Long Term Goal 3: Pt will be fitted for an at home pump in order to be independent with his lymphedema management  Long Term Goal 4: pt will report 505 improvement in his symptoms     Prognosis  Therapy Prognosis: Good    Treatment Plan   Plan Frequency: 2x/wk  Plan weeks: 6 weeks  [] HP/CP      [] Electrical Stim   [x] Therapeutic Exercise      [] Gait

## 2023-10-31 NOTE — PROGRESS NOTES
Phone: [address]          Fax: [phone]                      Outpatient Physical Therapy                                                             Lymphedema Evaluation  Date: 10/31/2023  Patient: Alma Rosa Bernal  : 1940  [facility] #: [id number]  Referring Physician: Referring Provider (secondary): Otto Arroyo    Diagnosis: lymphedema I89.0    Treatment Diagnosis: BLE lymphedema  Onset Date: 10/12/23  PT Insurance Information: Medicare/AARP  Total # of Visits Approved: 12   Total # of Visits to Date: 1     Subjective  Subjective: Pt reports chronic BLE swelling, mostly in his feet with his L foot worse than R. Pt had a chronic foot wound and underwent vein surgery to get more blood flow to the wound. Pt reports they have relased him from foot wound care but now has a small wound on his pinky toe. 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 pt states he uses a walker but does not walk much. Pt states he tries to keep his legs elevated throughout the day but is in his powerchair often with his legs down.   Additional Pertinent Hx: HTN, defibulator, OA, diabetes,    Lymph Assessment      Skin Integumentary:   Pitting Scale Area 1: 1+  Skin Color: Red  Skin Texture: Dry  Skin Condition/Temp: Dry  Edema Rebound: Quick  Stemmer Sign: Positive    Signs of Constriction (if applicable):   Papilloma (benign tumor arising from an epithelial layer): No  Fibrotic Areas: No  Lymphorrhea: No    Stage of Lymphedema: Stage 2: Spontaneously Irreversible Stage  Description:      Lymphedema Classification:   Type: Secondary Lymphedema  Left: Moderate     Right: Mild    Measurements: Area Measured: R LE, L LE (R LE 3\" above ankle 22.2cm, 6\" above 27cm:  L LE 3\" above 28.4cm, 6\" above 33.3cm)      Right Measurements Left Measurements   R LE Pre Girth Measurement (cm)  5th Tuberosity (cm): 22.7  Lateral malleolus: 26  Calf (cm): 31.6  Mid Knee (cm): 39.4  Thigh (cm): 43.1  Total Girth

## 2023-10-31 NOTE — TELEPHONE ENCOUNTER
Merlene PT called asking if he is cleared to wear compression stockings? And if he is cleared to do PT also.

## 2023-11-08 ENCOUNTER — HOSPITAL ENCOUNTER (OUTPATIENT)
Dept: PHYSICAL THERAPY | Age: 83
Setting detail: THERAPIES SERIES
Discharge: HOME | End: 2023-11-08
Payer: MEDICARE

## 2023-11-08 PROCEDURE — 97110 THERAPEUTIC EXERCISES: CPT

## 2023-11-08 PROCEDURE — 97016 VASOPNEUMATIC DEVICE THERAPY: CPT

## 2023-11-08 PROCEDURE — 97140 MANUAL THERAPY 1/> REGIONS: CPT

## 2023-11-09 NOTE — PROGRESS NOTES
Phone: [phone]                       [facility]          Fax: [phone]                      Outpatient Physical Therapy                                                             Lymphedema Treatment  Date: 2023  Patient: Louann Guerra  : 1940  CSN #: [id number]  Referring Physician: Referring Provider (secondary): Kevin Arango    Diagnosis: lymphedema I89.0    Treatment Diagnosis: BLE lymphedema  Onset Date: 10/12/23  PT Insurance Information: Medicare/AARP  Total # of Visits Approved: 12   Total # of Visits to Date: 2  No Show: 0  Canceled Appointment: 0     Subjective  Subjective: Pt. arrives to therapy with family present, reports noted L foot swelling increased.   Additional Pertinent Hx: HTN, defibulator, OA, diabetes,    Skin Integumentary:   Pitting Scale Area 1: 1+  Skin Color: Red  Skin Texture: Dry  Skin Condition/Temp: Dry  Edema Rebound: Quick  Stemmer Sign: Positive    Signs of Constriction (if applicable):   Papilloma (benign tumor arising from an epithelial layer): No  Fibrotic Areas: No  Lymphorrhea: No    Stage of Lymphedema: Stage 2: Spontaneously Irreversible Stage  Description:      Lymphedema Classification:   Type: Secondary Lymphedema  Left: Moderate     Right: Mild      Exercises:  Exercise 1: Pt educated on keeping his legs elevated and compressed at 30mmHg, perform daily exercises including ankle pump daily, reduce sodium and sugar intake    Manual:  Other: MLD to B LEs     Skin Integumentary  Skin Color: Red  Skin Condition/Temp: Dry  RLE Complete Decongestion Therapy  Scale: Stage 2  Lymph Drainage Pattern: Lower extremity  Compression Technique: Vaso-pneumatic pump  Force (mmHg): 30 mmHg  Duration: 60 minutes  LLE Complete Decongestion Therapy  Scale: Stage 2  Lymph Drainage Pattern: Lower extremity  Compression Technique: Vaso-pneumatic pump  Force (mmHg): 30 mmHg  Duration : 60 minutes  Other Decongestion Therapy  Force (mmHg): 30 mmHg  Duration :

## 2023-11-14 ENCOUNTER — HOSPITAL ENCOUNTER (OUTPATIENT)
Dept: PHYSICAL THERAPY | Age: 83
Setting detail: THERAPIES SERIES
Discharge: HOME | End: 2023-11-14
Payer: MEDICARE

## 2023-11-14 PROCEDURE — 97140 MANUAL THERAPY 1/> REGIONS: CPT

## 2023-11-14 PROCEDURE — 97016 VASOPNEUMATIC DEVICE THERAPY: CPT

## 2023-11-14 PROCEDURE — 97110 THERAPEUTIC EXERCISES: CPT

## 2023-11-14 NOTE — PROGRESS NOTES
Phone: [address]          Fax: [phone]                      Outpatient Physical Therapy                                                             Lymphedema Treatment  Date: 2023  Patient: oTm Warner  : 1940  [facility] #: [id number]  Referring Physician: Referring Provider (secondary): Zheng Mcfarland    Diagnosis: lymphedema I89.0    Treatment Diagnosis: BLE lymphedema  Onset Date: 10/12/23  PT Insurance Information: Medicare/AARP  Total # of Visits Approved: 12   Total # of Visits to Date: 3  No Show: 0  Canceled Appointment: 0     Subjective  Subjective: Pt. denies pain at this time, stated he noticed a decrease in swelling in B LEs for aprox 3 hours after last treatment. Additional Pertinent Hx: HTN, defibulator, OA, diabetes,    Skin Integumentary:   Pitting Scale Area 1: 1+  Skin Color: Red  Skin Texture: Dry  Skin Condition/Temp: Dry  Edema Rebound: Quick  Stemmer Sign: Positive    Signs of Constriction (if applicable):   Papilloma (benign tumor arising from an epithelial layer): No  Fibrotic Areas: No  Lymphorrhea: No    Stage of Lymphedema: Stage 2: Spontaneously Irreversible Stage  Description:      Lymphedema Classification:   Type: Secondary Lymphedema  Left: Moderate     Right: Mild      Exercises:  Exercise 1: Pt educated on keeping his legs elevated and compressed at 30mmHg, perform daily exercises including ankle pump daily, reduce sodium and sugar intake    Manual:  Skin Integumentary  Skin Color: Red  Skin Condition/Temp: Dry  Skin Integumentary  Skin Color: Red  Skin Condition/Temp: Dry      Assessment  Assessment: Pt. toleratred treatment well. MLD to B LEs. Compession pumps to B LE 35mmHg for 61' with noted decreased swelling on top of L forefoot. Will continue to progress per pt. tolerance. Pt. stated pump rep is coming to his home Friday for fitting.   Therapy Prognosis: Good      Patient Education  Patient Education: pt educated

## 2023-11-15 ENCOUNTER — OFFICE VISIT (OUTPATIENT)
Dept: CARDIOLOGY | Age: 83
End: 2023-11-15
Payer: MEDICARE

## 2023-11-15 VITALS
BODY MASS INDEX: 32.5 KG/M2 | DIASTOLIC BLOOD PRESSURE: 71 MMHG | HEIGHT: 70 IN | OXYGEN SATURATION: 96 % | WEIGHT: 227 LBS | HEART RATE: 76 BPM | SYSTOLIC BLOOD PRESSURE: 106 MMHG | RESPIRATION RATE: 18 BRPM

## 2023-11-15 DIAGNOSIS — I10 PRIMARY HYPERTENSION: ICD-10-CM

## 2023-11-15 DIAGNOSIS — I25.10 ASHD (ARTERIOSCLEROTIC HEART DISEASE): ICD-10-CM

## 2023-11-15 DIAGNOSIS — N18.32 STAGE 3B CHRONIC KIDNEY DISEASE (HCC): ICD-10-CM

## 2023-11-15 DIAGNOSIS — I25.5 ISCHEMIC CARDIOMYOPATHY: Primary | ICD-10-CM

## 2023-11-15 DIAGNOSIS — R60.0 LEG EDEMA: ICD-10-CM

## 2023-11-15 DIAGNOSIS — Z95.810 ICD (IMPLANTABLE CARDIOVERTER-DEFIBRILLATOR) IN PLACE: ICD-10-CM

## 2023-11-15 DIAGNOSIS — I73.9 PVD (PERIPHERAL VASCULAR DISEASE) (HCC): ICD-10-CM

## 2023-11-15 DIAGNOSIS — R94.31 ABNORMAL EKG: ICD-10-CM

## 2023-11-15 DIAGNOSIS — R06.02 SOB (SHORTNESS OF BREATH): ICD-10-CM

## 2023-11-15 PROCEDURE — 3074F SYST BP LT 130 MM HG: CPT | Performed by: FAMILY MEDICINE

## 2023-11-15 PROCEDURE — 99214 OFFICE O/P EST MOD 30 MIN: CPT | Performed by: FAMILY MEDICINE

## 2023-11-15 PROCEDURE — 1036F TOBACCO NON-USER: CPT | Performed by: FAMILY MEDICINE

## 2023-11-15 PROCEDURE — 3078F DIAST BP <80 MM HG: CPT | Performed by: FAMILY MEDICINE

## 2023-11-15 PROCEDURE — G8427 DOCREV CUR MEDS BY ELIG CLIN: HCPCS | Performed by: FAMILY MEDICINE

## 2023-11-15 PROCEDURE — 93289 INTERROG DEVICE EVAL HEART: CPT | Performed by: FAMILY MEDICINE

## 2023-11-15 PROCEDURE — 1123F ACP DISCUSS/DSCN MKR DOCD: CPT | Performed by: FAMILY MEDICINE

## 2023-11-15 PROCEDURE — 93005 ELECTROCARDIOGRAM TRACING: CPT | Performed by: FAMILY MEDICINE

## 2023-11-15 PROCEDURE — G8417 CALC BMI ABV UP PARAM F/U: HCPCS | Performed by: FAMILY MEDICINE

## 2023-11-15 PROCEDURE — 99211 OFF/OP EST MAY X REQ PHY/QHP: CPT | Performed by: FAMILY MEDICINE

## 2023-11-15 PROCEDURE — 93010 ELECTROCARDIOGRAM REPORT: CPT | Performed by: FAMILY MEDICINE

## 2023-11-15 PROCEDURE — G8484 FLU IMMUNIZE NO ADMIN: HCPCS | Performed by: FAMILY MEDICINE

## 2023-11-15 RX ORDER — ATORVASTATIN CALCIUM 40 MG/1
40 TABLET, FILM COATED ORAL DAILY
Qty: 90 TABLET | Refills: 1 | Status: SHIPPED | OUTPATIENT
Start: 2023-11-15

## 2023-11-15 NOTE — PATIENT INSTRUCTIONS
SURVEY:    You may be receiving a survey from [employer] regarding your visit today. Please complete the survey to enable us to provide the highest quality of care to you and your family. If you cannot score us a very good on any question, please call the office to discuss how we could have made your experience a very good one. Thank you.

## 2023-11-15 NOTE — PROGRESS NOTES
Coordination appeared normal.   Skin: Skin is warm and dry. There is no rash or diaphoresis. Psychiatric: He has a normal mood and affect. His speech is normal and behavior is normal.      MOST RECENT LABS ON RECORD:   Lab Results   Component Value Date    WBC 7.0 09/01/2023    HGB 15.7 09/01/2023    HCT 46.1 09/01/2023     09/01/2023    CHOL 131 05/23/2022    TRIG 175 (H) 05/23/2022    HDL 39 (L) 05/23/2022    ALT 13 03/03/2023    AST 17 03/03/2023     10/04/2023    K 4.8 10/04/2023     10/04/2023    CREATININE 1.8 (H) 10/04/2023    BUN 35 (H) 10/04/2023    CO2 22 10/04/2023    TSH 5.20 (H) 07/07/2022    INR 1.00 10/03/2022     ASSESSMENT:      Diagnosis Orders   1. Ischemic cardiomyopathy  EKG 12 lead      2. ICD (implantable cardioverter-defibrillator) in place  EKG 12 lead      3. ASHD (arteriosclerotic heart disease)  EKG 12 lead      4. SOB (shortness of breath)  EKG 12 lead      5. Primary hypertension  EKG 12 lead      6. Abnormal EKG  EKG 12 lead      7. PVD (peripheral vascular disease) ([facility])  EKG 12 lead      8. Leg edema  EKG 12 lead      9. Stage 3b chronic kidney disease ([address])  EKG 12 lead          PLAN:        Implantable Cardioverter Defibrillator (ICD): Implanted 10/3/2022. Non Ischemic Cardiomyopathy  Interrogation Findings: Largely unremarkable. No changes were made today. Ischemic cardiomyopathy : EF 20% on echo, repeat echo on 8/9/2022 EF 25-30%. No change EF on echo 9/23/22. Actually doing a bit better than since last visit. Beta Blocker: Continue Metoprolol succinate (Toprol XL) 25 mg daily. ACE Inibitor/ARB: Continue sacubitril/valsartan (Entresto) 24/26 mg twice daily. Diuretics: Continue bumetinide (Bumex) 1 mg every morning. Heart failure counseling: I told them to start wearing lower extremity compression stockings and I advised them to try and keep their legs up whenever possible and to limit salt in their diet.   Additional Testing List: Additional

## 2023-11-16 ENCOUNTER — HOSPITAL ENCOUNTER (OUTPATIENT)
Dept: PHYSICAL THERAPY | Age: 83
Setting detail: THERAPIES SERIES
Discharge: HOME | End: 2023-11-16
Payer: MEDICARE

## 2023-11-16 PROCEDURE — 97140 MANUAL THERAPY 1/> REGIONS: CPT

## 2023-11-16 PROCEDURE — 97016 VASOPNEUMATIC DEVICE THERAPY: CPT

## 2023-11-16 PROCEDURE — 97110 THERAPEUTIC EXERCISES: CPT

## 2023-11-16 NOTE — PROGRESS NOTES
43.7  Thigh (cm): 44  Total Girth (cm): 185. 3     Exercises:  Exercise 1: Pt educated on keeping his legs elevated and compressed at 30mmHg, perform daily exercises including ankle pump daily, reduce sodium and sugar intake    Manual:  Other: MLD to B LE     Skin Integumentary  Skin Color: Hyperpigmentation, Red  Skin Condition/Temp: Dry  Turgor: Shiney/hard  RLE Complete Decongestion Therapy  Scale: Stage 2  Lymph Drainage Pattern: Lower extremity  Compression Technique: Vaso-pneumatic pump  Force (mmHg): 40 mmHg  Duration: 60 minutes  LLE Complete Decongestion Therapy  Scale: Stage 2  Lymph Drainage Pattern: Lower extremity  Compression Technique: Vaso-pneumatic pump  Force (mmHg): 40 mmHg  Duration : 60 minutes  Other Decongestion Therapy  Force (mmHg): 40 mmHg  Duration : 60 minutes  Skin Integumentary  Skin Color: Hyperpigmentation, Red  Skin Condition/Temp: Dry  Turgor: Shiney/hard  Other Decongestion Therapy  Force (mmHg): 40 mmHg  Duration : 60 minutes      Assessment  Assessment: MLD to B LE for improved lymphatic flow. Compression pumps increased to 40 mmHg for 60 min. Pt with good tolerance and noted decrease in L foot edema. Therapy Prognosis: Good        Decision Making: Medium Complexity    Patient Education  Patient Education: Measurements  Pt verbalized/demonstrated good understanding:     [x] Yes         [] No, pt required further clarification.          Goals  Short Term Goals  Time Frame for Short Term Goals: 3 weeks  Short Term Goal 1: Pt will be educated on his POC and HEP-met  Short Term Goal 2: Pt will initiate pump protocol in order to reduce BLE girth measurements-met    Long Term Goals  Time Frame for Long Term Goals : 6 weeks  Long Term Goal 1: Pt will be safe and independent with his lymphedema management-progressing  Long Term Goal 2: Pt will demonstrate a 1-2cm decreased BLE girth measurements in order to reduce BLe pain and increase ambulation tolerance-progressing  Long Term Goal 3:

## 2023-11-20 ENCOUNTER — HOSPITAL ENCOUNTER (OUTPATIENT)
Dept: PHYSICAL THERAPY | Age: 83
Setting detail: THERAPIES SERIES
Discharge: HOME | End: 2023-11-20
Payer: MEDICARE

## 2023-11-20 PROCEDURE — 97016 VASOPNEUMATIC DEVICE THERAPY: CPT

## 2023-11-20 PROCEDURE — 97110 THERAPEUTIC EXERCISES: CPT

## 2023-11-20 PROCEDURE — 97140 MANUAL THERAPY 1/> REGIONS: CPT

## 2023-11-22 ENCOUNTER — HOSPITAL ENCOUNTER (OUTPATIENT)
Dept: PHYSICAL THERAPY | Age: 83
Setting detail: THERAPIES SERIES
Discharge: HOME | End: 2023-11-22
Payer: MEDICARE

## 2023-11-22 PROCEDURE — 97110 THERAPEUTIC EXERCISES: CPT

## 2023-11-22 PROCEDURE — 97016 VASOPNEUMATIC DEVICE THERAPY: CPT

## 2023-11-22 PROCEDURE — 97140 MANUAL THERAPY 1/> REGIONS: CPT

## 2023-11-22 NOTE — PROGRESS NOTES
Phone: [address]          Fax: [phone]                      Outpatient Physical Therapy                                                             Lymphedema Treatment  Date: 2023  Patient: Louann Guerra  : 1940  [facility] #: [id number]  Referring Physician: Referring Provider (secondary): Kevin Arango    Diagnosis: lymphedema I89.0    Treatment Diagnosis: BLE lymphedema  Onset Date: 10/12/23  PT Insurance Information: Medicare/AARP  Total # of Visits Approved: 12   Total # of Visits to Date: 4  No Show: 0  Canceled Appointment: 0     Subjective  Subjective: Pt states he is doing good, that his wife has noticed his legs are smaller. Pt states he has not noticed much changes in his legs yet. Pt states he has not worn compression yet, he has not heard from the MD if he is cleared to wear compression socks again.   Additional Pertinent Hx: HTN, defibulator, OA, diabetes,    Skin Integumentary:   Pitting Scale Area 1: 1+  Skin Color: Red, Hyperpigmentation  Skin Texture: Hyperkeratosis  Skin Condition/Temp: Dry  Turgor: Shiney/hard  Edema Rebound: Quick  Stemmer Sign: Positive    Signs of Constriction (if applicable):   Papilloma (benign tumor arising from an epithelial layer): No  Fibrotic Areas: No  Lymphorrhea: No    Stage of Lymphedema: Stage 2: Spontaneously Irreversible Stage  Description:      Lymphedema Classification:   Type: Secondary Lymphedema  Left: Moderate     Right: Mild      Exercises:  Exercise 1: Pt educated on keeping his legs elevated and compressed at 30mmHg, perform daily exercises including ankle pump daily, reduce sodium and sugar intake    Manual:  Other: MLD to B LE     Skin Integumentary  Skin Color: Red, Hyperpigmentation  Skin Condition/Temp: Dry  Turgor: Shiney/hard  RLE Complete Decongestion Therapy  Scale: Stage 2  Lymph Drainage Pattern: Lower extremity  Compression Technique: Vaso-pneumatic pump  Force (mmHg): 40

## 2023-11-22 NOTE — PROGRESS NOTES
Phone: [phone]                       [facility]          Fax: [phone]                      Outpatient Physical Therapy                                                             Lymphedema Treatment  Date: 2023  Patient: Tom Warner  : 1940  [facility] #: [id number]  Referring Physician: Referring Provider (secondary): Zheng Mcfarland    Diagnosis: lymphedema I89.0    Treatment Diagnosis: BLE lymphedema  Onset Date: 10/12/23  PT Insurance Information: Medicare/AARP  Total # of Visits Approved: 12   Total # of Visits to Date: 6  No Show: 0  Canceled Appointment: 0     Subjective  Subjective: Pt states he is doing good today. Pt states he has noticed that his legs stay down for ~3-4 hours after tx before they start to get a little bigger. Additional Pertinent Hx: HTN, defibulator, OA, diabetes,    Lymph Assessment  Skin Integumentary:   Pitting Scale Area 1: 1+  Skin Color: Red, Hyperpigmentation  Skin Texture: Hyperkeratosis  Skin Condition/Temp: Dry  Turgor: Shiney/hard  Edema Rebound: Quick  Stemmer Sign: Positive    Signs of Constriction (if applicable):   Papilloma (benign tumor arising from an epithelial layer): No  Fibrotic Areas: No  Lymphorrhea: No    Stage of Lymphedema: Stage 2: Spontaneously Irreversible Stage  Description:      Lymphedema Classification:   Type: Secondary Lymphedema  Left: Moderate     Right: Mild    Measurements: Area Measured: R LE, L LE (R LE: 3\" above 22.8 cm, 6\" above 25 cm.  L LE: 3\" above 29 cm, 6\" above 33.8 cm.)      Right Measurements Left Measurements   R LE Pre Girth Measurement (cm)  5th Tuberosity (cm): 22.9  Lateral malleolus: 27.2  Calf (cm): 33.3  Mid Knee (cm): 41.9  Thigh (cm): 44.7  Total Girth (cm): 170 L LE Pre Girth Measurement (cm)  5th Tuberosity (cm): 27.6  Lateral malleolus: 32.8  Calf (cm): 35.6  Mid Knee (cm): 43.5  Thigh (cm): 44.4  Total Girth (cm): 183.9     Exercises:  Exercise 1: Pt educated on keeping his legs elevated and

## 2023-11-27 ENCOUNTER — HOSPITAL ENCOUNTER (OUTPATIENT)
Dept: PHYSICAL THERAPY | Age: 83
Setting detail: THERAPIES SERIES
Discharge: HOME | End: 2023-11-27
Payer: MEDICARE

## 2023-11-27 PROCEDURE — 97016 VASOPNEUMATIC DEVICE THERAPY: CPT

## 2023-11-27 PROCEDURE — 97140 MANUAL THERAPY 1/> REGIONS: CPT

## 2023-11-27 PROCEDURE — 97110 THERAPEUTIC EXERCISES: CPT

## 2023-11-27 NOTE — PROGRESS NOTES
Phone: [phone]                       [facility]          Fax: [phone]                      Outpatient Physical Therapy                                                             Lymphedema Treatment  Date: 2023  Patient: Carmen Lopes  : 1940  [facility] #: [id number]  Referring Physician: Referring Provider (secondary): Jaimie Carlos    Diagnosis: lymphedema I89.0    Treatment Diagnosis: BLE lymphedema  Onset Date: 10/12/23  PT Insurance Information: Medicare/AARP  Total # of Visits Approved: 12   Total # of Visits to Date: 7  No Show: 0  Canceled Appointment: 0     Subjective  Subjective: Pt reports doing good today, states he has been trying to elevate his legs more throughout the day. Pt states he has worn compression when he thinks about it, he sees his vascular Dr on wednesday and is going to verify that he can wear compression daily.   Additional Pertinent Hx: HTN, defibulator, OA, diabetes,    Lymph Assessment  Skin Integumentary:   Pitting Scale Area 1: 1+  Skin Color: Red, Hyperpigmentation  Skin Texture: Hyperkeratosis  Skin Condition/Temp: Dry  Turgor: Shiney/hard  Edema Rebound: Quick  Stemmer Sign: Positive    Signs of Constriction (if applicable):   Papilloma (benign tumor arising from an epithelial layer): No  Fibrotic Areas: No  Lymphorrhea: No    Stage of Lymphedema: Stage 2: Spontaneously Irreversible Stage  Description:      Lymphedema Classification:   Type: Secondary Lymphedema  Left: Moderate     Right: Mild    Measurements: Area Measured: R LE, L LE      Right Measurements Left Measurements   R LE Pre Girth Measurement (cm)  Lateral malleolus: 25.7  Calf (cm): 33.7  Thigh (cm): 44  Total Girth (cm): 103.4 L LE Pre Girth Measurement (cm)  Lateral malleolus: 34.6  Calf (cm): 35.8  Thigh (cm): 45.7  Total Girth (cm): 116.1       Exercises:  Exercise 1: Pt educated on keeping his legs elevated and compressed at 30mmHg, perform daily exercises including ankle pump

## 2023-11-29 ENCOUNTER — OFFICE VISIT (OUTPATIENT)
Dept: VASCULAR SURGERY | Age: 83
End: 2023-11-29
Payer: MEDICARE

## 2023-11-29 VITALS
HEIGHT: 70 IN | RESPIRATION RATE: 16 BRPM | WEIGHT: 226 LBS | TEMPERATURE: 96.1 F | HEART RATE: 79 BPM | BODY MASS INDEX: 32.35 KG/M2 | SYSTOLIC BLOOD PRESSURE: 109 MMHG | DIASTOLIC BLOOD PRESSURE: 65 MMHG

## 2023-11-29 DIAGNOSIS — I70.213 ATHEROSCLER OF NATIVE ARTERY OF BOTH LEGS WITH INTERMIT CLAUDICATION (HCC): Primary | ICD-10-CM

## 2023-11-29 DIAGNOSIS — Z98.890 S/P FEMORAL-TIBIAL BYPASS: ICD-10-CM

## 2023-11-29 PROCEDURE — 3078F DIAST BP <80 MM HG: CPT | Performed by: SURGERY

## 2023-11-29 PROCEDURE — G8484 FLU IMMUNIZE NO ADMIN: HCPCS | Performed by: SURGERY

## 2023-11-29 PROCEDURE — 3074F SYST BP LT 130 MM HG: CPT | Performed by: SURGERY

## 2023-11-29 PROCEDURE — 99214 OFFICE O/P EST MOD 30 MIN: CPT | Performed by: SURGERY

## 2023-11-29 PROCEDURE — G8417 CALC BMI ABV UP PARAM F/U: HCPCS | Performed by: SURGERY

## 2023-11-29 PROCEDURE — G8427 DOCREV CUR MEDS BY ELIG CLIN: HCPCS | Performed by: SURGERY

## 2023-11-29 PROCEDURE — 1036F TOBACCO NON-USER: CPT | Performed by: SURGERY

## 2023-11-29 PROCEDURE — 1123F ACP DISCUSS/DSCN MKR DOCD: CPT | Performed by: SURGERY

## 2023-11-29 PROCEDURE — 99213 OFFICE O/P EST LOW 20 MIN: CPT | Performed by: SURGERY

## 2023-11-29 NOTE — PATIENT INSTRUCTIONS
SURVEY:    You may be receiving a survey from [employer] regarding your visit today. Please complete the survey to enable us to provide the highest quality of care to you and your family. If you cannot score us a very good on any question, please call the office to discuss how we could have made your experience a very good one. Thank you.

## 2023-11-30 ENCOUNTER — HOSPITAL ENCOUNTER (OUTPATIENT)
Dept: PHYSICAL THERAPY | Age: 83
Setting detail: THERAPIES SERIES
Discharge: HOME | End: 2023-11-30
Payer: MEDICARE

## 2023-11-30 PROCEDURE — 97016 VASOPNEUMATIC DEVICE THERAPY: CPT

## 2023-11-30 PROCEDURE — 97140 MANUAL THERAPY 1/> REGIONS: CPT

## 2023-11-30 PROCEDURE — 97110 THERAPEUTIC EXERCISES: CPT

## 2023-12-05 ENCOUNTER — HOSPITAL ENCOUNTER (OUTPATIENT)
Age: 83
Discharge: HOME | End: 2023-12-05
Payer: MEDICARE

## 2023-12-05 ENCOUNTER — HOSPITAL ENCOUNTER (OUTPATIENT)
Dept: PHYSICAL THERAPY | Age: 83
Setting detail: THERAPIES SERIES
Discharge: HOME | End: 2023-12-05
Payer: MEDICARE

## 2023-12-05 LAB
ALBUMIN SERPL-MCNC: 4.2 G/DL (ref 3.5–5.2)
ANION GAP SERPL CALCULATED.3IONS-SCNC: 12 MMOL/L (ref 9–17)
BACTERIA URNS QL MICRO: ABNORMAL
BILIRUB UR QL STRIP: NEGATIVE
BUN SERPL-MCNC: 25 MG/DL (ref 8–23)
BUN/CREAT SERPL: 17 (ref 9–20)
CALCIUM SERPL-MCNC: 9.6 MG/DL (ref 8.6–10.4)
CHLORIDE SERPL-SCNC: 102 MMOL/L (ref 98–107)
CLARITY UR: CLEAR
CO2 SERPL-SCNC: 24 MMOL/L (ref 20–31)
COLOR UR: YELLOW
CREAT SERPL-MCNC: 1.5 MG/DL (ref 0.7–1.2)
CREAT UR-MCNC: 57.7 MG/DL (ref 39–259)
EPI CELLS #/AREA URNS HPF: ABNORMAL /HPF (ref 0–5)
ERYTHROCYTE [DISTWIDTH] IN BLOOD BY AUTOMATED COUNT: 13.2 % (ref 11.8–14.4)
GFR SERPL CREATININE-BSD FRML MDRD: 46 ML/MIN/1.73M2
GLUCOSE SERPL-MCNC: 129 MG/DL (ref 70–99)
GLUCOSE UR STRIP-MCNC: NEGATIVE MG/DL
HCT VFR BLD AUTO: 48.3 % (ref 40.7–50.3)
HGB BLD-MCNC: 15.5 G/DL (ref 13–17)
HGB UR QL STRIP.AUTO: ABNORMAL
KETONES UR STRIP-MCNC: NEGATIVE MG/DL
LEUKOCYTE ESTERASE UR QL STRIP: ABNORMAL
MAGNESIUM SERPL-MCNC: 1.8 MG/DL (ref 1.6–2.6)
MCH RBC QN AUTO: 31.4 PG (ref 25.2–33.5)
MCHC RBC AUTO-ENTMCNC: 32.1 G/DL (ref 28.4–34.8)
MCV RBC AUTO: 97.8 FL (ref 82.6–102.9)
NITRITE UR QL STRIP: NEGATIVE
NRBC BLD-RTO: 0 PER 100 WBC
PH UR STRIP: 6 [PH] (ref 5–9)
PHOSPHATE SERPL-MCNC: 3.3 MG/DL (ref 2.5–4.5)
PLATELET # BLD AUTO: 211 K/UL (ref 138–453)
PMV BLD AUTO: 10 FL (ref 8.1–13.5)
POTASSIUM SERPL-SCNC: 4.7 MMOL/L (ref 3.7–5.3)
PROT UR STRIP-MCNC: ABNORMAL MG/DL
RBC # BLD AUTO: 4.94 M/UL (ref 4.21–5.77)
RBC #/AREA URNS HPF: ABNORMAL /HPF (ref 0–2)
SODIUM SERPL-SCNC: 138 MMOL/L (ref 135–144)
SP GR UR STRIP: 1.02 (ref 1.01–1.02)
TOTAL PROTEIN, URINE: 20 MG/DL
URATE SERPL-MCNC: 6.5 MG/DL (ref 3.4–7)
URINE TOTAL PROTEIN CREATININE RATIO: 0.35 (ref 0–0.2)
UROBILINOGEN UR STRIP-ACNC: NORMAL EU/DL (ref 0–1)
WBC #/AREA URNS HPF: ABNORMAL /HPF (ref 0–5)
WBC OTHER # BLD: 8.6 K/UL (ref 3.5–11.3)

## 2023-12-05 PROCEDURE — 87186 SC STD MICRODIL/AGAR DIL: CPT

## 2023-12-05 PROCEDURE — 85027 COMPLETE CBC AUTOMATED: CPT

## 2023-12-05 PROCEDURE — 84156 ASSAY OF PROTEIN URINE: CPT

## 2023-12-05 PROCEDURE — 87077 CULTURE AEROBIC IDENTIFY: CPT

## 2023-12-05 PROCEDURE — 97140 MANUAL THERAPY 1/> REGIONS: CPT

## 2023-12-05 PROCEDURE — 87086 URINE CULTURE/COLONY COUNT: CPT

## 2023-12-05 PROCEDURE — 84550 ASSAY OF BLOOD/URIC ACID: CPT

## 2023-12-05 PROCEDURE — 97016 VASOPNEUMATIC DEVICE THERAPY: CPT

## 2023-12-05 PROCEDURE — 82570 ASSAY OF URINE CREATININE: CPT

## 2023-12-05 PROCEDURE — 83970 ASSAY OF PARATHORMONE: CPT

## 2023-12-05 PROCEDURE — 81001 URINALYSIS AUTO W/SCOPE: CPT

## 2023-12-05 PROCEDURE — 97110 THERAPEUTIC EXERCISES: CPT

## 2023-12-05 PROCEDURE — 80069 RENAL FUNCTION PANEL: CPT

## 2023-12-05 PROCEDURE — 36415 COLL VENOUS BLD VENIPUNCTURE: CPT

## 2023-12-05 PROCEDURE — 83735 ASSAY OF MAGNESIUM: CPT

## 2023-12-05 NOTE — PROGRESS NOTES
Phone: [address]          Fax: [phone]                      Outpatient Physical Therapy                                                             Lymphedema treatment  Date: 2023  Patient: Santos Gamez  : 1940  [facility] #: [id number]  Referring Physician: Referring Provider (secondary): Poly Kat    Diagnosis: lymphedema I89.0    Treatment Diagnosis: BLE lymphedema  Onset Date: 10/12/23  PT Insurance Information: Medicare/AARP  Total # of Visits Approved: 19   Total # of Visits to Date: 9     Subjective  Subjective: Pt reports the pumps are really helping his swelling but he has noticed an increased L knee weakness with walking  Additional Pertinent Hx: HTN, defibulator, OA, diabetes,    Lymph Assessment      Skin Integumentary:   Pitting Scale Area 1: 1+ (L LE)  Skin Texture: Hyperkeratosis  Turgor: Shiney/hard  Edema Rebound: Slow  Stemmer Sign: Positive    Signs of Constriction (if applicable):   Papilloma (benign tumor arising from an epithelial layer): No  Fibrotic Areas: No  Lymphorrhea: No    Stage of Lymphedema: Stage 2: Spontaneously Irreversible Stage  Description:      Lymphedema Classification:   Type:    Left: Moderate     Right: Mild      Exercises:  Exercise 1: Pt educated on keeping his legs elevated and compressed at 30mmHg, perform daily exercises including ankle pump daily, reduce sodium and sugar intake    Manual:        Skin Integumentary  Turgor: Shiney/hard  RLE Complete Decongestion Therapy  Lymph Drainage Pattern: Lower extremity (MLD to R LE)  Compression Technique: Vaso-pneumatic pump  Force (mmHg): 50 mmHg  Duration: 60 minutes  LLE Complete Decongestion Therapy  Lymph Drainage Pattern: Lower extremity  Compression Technique: Vaso-pneumatic pump (MLD to L LE)  Force (mmHg): 50 mmHg  Duration : 60 minutes  Other Decongestion Therapy  Force (mmHg): 50 mmHg  Duration : 60 minutes  Skin Integumentary  Turgor:

## 2023-12-05 NOTE — PLAN OF CARE
[x] Soft Tissue Mobs            [] Therapeutic Activity  [] Iontophoresis    [] Orthotic casting/fitting      [] Dry Needling  [] Blood Flow Restriction [x] Vasopneumatic Compression             Electronically signed by: Abby Cohen PT, DPT    Date: 12/5/2023      ______________________________________ Date: 12/5/2023   Physician Signature

## 2023-12-06 LAB — PTH-INTACT SERPL-MCNC: 27 PG/ML (ref 15–65)

## 2023-12-07 ENCOUNTER — HOSPITAL ENCOUNTER (OUTPATIENT)
Dept: PHYSICAL THERAPY | Age: 83
Setting detail: THERAPIES SERIES
Discharge: HOME | End: 2023-12-07
Payer: MEDICARE

## 2023-12-07 LAB
MICROORGANISM SPEC CULT: ABNORMAL
SPECIMEN DESCRIPTION: ABNORMAL

## 2023-12-07 PROCEDURE — 97140 MANUAL THERAPY 1/> REGIONS: CPT

## 2023-12-07 PROCEDURE — 97016 VASOPNEUMATIC DEVICE THERAPY: CPT

## 2023-12-07 PROCEDURE — 97110 THERAPEUTIC EXERCISES: CPT

## 2023-12-07 NOTE — PROGRESS NOTES
Phone: [phone]                       [facility]          Fax: [phone]                      Outpatient Physical Therapy                                                             Lymphedema Treatment  Date: 2023  Patient: Nery Bernard  : 1940  CSN #: [id number]  Referring Physician: Referring Provider (secondary): Sakina Malloy    Diagnosis: lymphedema I89.0    Treatment Diagnosis: BLE lymphedema  Onset Date: 10/12/23  PT Insurance Information: Medicare/AARP  Total # of Visits Approved: 19   Total # of Visits to Date: 10  No Show: 0  Canceled Appointment: 0     Subjective  Subjective: Pt states he is doing ok today, states he wore his compression socks all day yesterday and could tell a difference when he took them off last night. Additional Pertinent Hx: HTN, defibulator, OA, diabetes,    Lymph Assessment  Skin Integumentary:   Location Description: R and L shin  Skin Integrity: Wound (add Wound LDA), Scars (comment)  Pitting Scale Area 1: 1+ (L LE)  Skin Color: Red, Hyperpigmentation  Skin Texture: Hyperkeratosis  Skin Condition/Temp: Dry  Turgor: Shiney/hard  Edema Rebound: Slow  Stemmer Sign: Positive    Signs of Constriction (if applicable):   Papilloma (benign tumor arising from an epithelial layer): No  Fibrotic Areas: No  Lymphorrhea: No    Stage of Lymphedema: Stage 2: Spontaneously Irreversible Stage  Description:      Lymphedema Classification:   Type: Secondary Lymphedema  Left: Moderate     Right: Mild    Measurements: Area Measured: R LE, L LE (R LE: 3\" above 23.2 cm, 6\" above 26.7 cm.  L LE: 3\" above 29 cm, 6\" above 34.1 cm.)      Right Measurements Left Measurements   R LE Pre Girth Measurement (cm)  5th Tuberosity (cm): 22.6  Lateral malleolus: 27  Calf (cm): 31.7  Mid Knee (cm): 40.1  Thigh (cm): 44.4  Total Girth (cm): 165.8 L LE Pre Girth Measurement (cm)  5th Tuberosity (cm): 28.8  Lateral malleolus: 33  Calf (cm): 35.8  Mid Knee (cm): 43  Thigh (cm):

## 2023-12-12 ENCOUNTER — HOSPITAL ENCOUNTER (OUTPATIENT)
Dept: PHYSICAL THERAPY | Age: 83
Setting detail: THERAPIES SERIES
Discharge: HOME | End: 2023-12-12
Payer: MEDICARE

## 2023-12-12 PROCEDURE — 97110 THERAPEUTIC EXERCISES: CPT

## 2023-12-12 PROCEDURE — 97016 VASOPNEUMATIC DEVICE THERAPY: CPT

## 2023-12-12 PROCEDURE — 97140 MANUAL THERAPY 1/> REGIONS: CPT

## 2023-12-12 RX ORDER — BUMETANIDE 1 MG/1
1 TABLET ORAL DAILY
Qty: 90 TABLET | Refills: 3 | Status: SHIPPED | OUTPATIENT
Start: 2023-12-12

## 2023-12-14 NOTE — PROGRESS NOTES
Phone: [phone]                       [facility]          Fax: [phone]                      Outpatient Physical Therapy                                                             Lymphedema Treatment  Date: 2023  Patient: Kenyetta Tello  : 1940  CSN #: [id number]  Referring Physician: Referring Provider (secondary): Sukhdeep Haas    Diagnosis: lymphedema I89.0    Treatment Diagnosis: BLE lymphedema  Onset Date: 10/12/23  PT Insurance Information: Medicare/AARP  Total # of Visits Approved: 19   Total # of Visits to Date: 11  No Show: 0  Canceled Appointment: 0     Subjective  Subjective: Pt. reports overall feeling like his LEs are weaker and harder to transfer. Reports that swelling has decreased overall.   Additional Pertinent Hx: HTN, defibulator, OA, diabetes,    Skin Integumentary:   Pitting Scale Area 1: 1+  Skin Color: Red, Hyperpigmentation  Skin Texture: Hyperkeratosis  Skin Condition/Temp: Dry  Edema Rebound: Slow  Stemmer Sign: Positive    Signs of Constriction (if applicable):   Papilloma (benign tumor arising from an epithelial layer): No  Fibrotic Areas: No  Lymphorrhea: No    Stage of Lymphedema: Stage 2: Spontaneously Irreversible Stage  Description:      Lymphedema Classification:   Type: Secondary Lymphedema  Left: Moderate     Right: Mild      Exercises:  Exercise 1: Pt educated on keeping his legs elevated and compressed at 30mmHg, perform daily exercises including ankle pump daily, reduce sodium and sugar intake  Exercise 2: Supine exercises B LE x15    Manual:  Other: MLD to B LE     Skin Integumentary  Skin Color: Red, Hyperpigmentation  Skin Condition/Temp: Dry  RLE Complete Decongestion Therapy  Scale: Stage 2  Lymph Drainage Pattern: Lower extremity  Compression Technique: Vaso-pneumatic pump  Force (mmHg): 55 mmHg  Duration: 60 minutes  LLE Complete Decongestion Therapy  Scale: Stage 2  Lymph Drainage Pattern: Lower extremity  Compression Technique:

## 2023-12-15 ENCOUNTER — HOSPITAL ENCOUNTER (OUTPATIENT)
Dept: PHYSICAL THERAPY | Age: 83
Setting detail: THERAPIES SERIES
Discharge: HOME | End: 2023-12-15
Payer: MEDICARE

## 2023-12-15 PROCEDURE — 97016 VASOPNEUMATIC DEVICE THERAPY: CPT

## 2023-12-15 PROCEDURE — 97110 THERAPEUTIC EXERCISES: CPT

## 2023-12-15 PROCEDURE — 97140 MANUAL THERAPY 1/> REGIONS: CPT

## 2023-12-15 NOTE — PROGRESS NOTES
Phone: [phone]                       [facility]          Fax: [phone]                      Outpatient Physical Therapy                                                             Lymphedema Treatment  Date: 12/15/2023  Patient: Jony Villafana  : 1940  [facility] #: [id number]  Referring Physician: Referring Provider (secondary): Kevon Cutler    Diagnosis: lymphedema I89.0    Treatment Diagnosis: BLE lymphedema  Onset Date: 10/12/23  PT Insurance Information: Medicare/AARP  Total # of Visits Approved: 19   Total # of Visits to Date: 12  No Show: 0  Canceled Appointment: 0     Subjective  Subjective: Pt states he is doing ok today, hoping that he can start to get stronger. Additional Pertinent Hx: HTN, defibulator, OA, diabetes,    Lymph Assessment  Skin Integumentary:   Location Description: R and L shin  Skin Integrity: Wound (add Wound LDA), Scars (comment)  Pitting Scale Area 1: 1+  Skin Color: Red, Hyperpigmentation  Skin Texture: Hyperkeratosis  Skin Condition/Temp: Dry  Turgor: Shiney/hard  Edema Rebound: Slow  Stemmer Sign: Positive    Signs of Constriction (if applicable):   Papilloma (benign tumor arising from an epithelial layer): No  Fibrotic Areas: No  Lymphorrhea: No    Stage of Lymphedema: Stage 2: Spontaneously Irreversible Stage  Description:      Lymphedema Classification:   Type: Secondary Lymphedema  Left: Moderate     Right: Mild    Measurements: Area Measured: R LE, L LE (R LE: 3\" above 24.7 cm, 6\" above 27cm.  L LE: 3\" above 29.1 cm, 6\" above 33.7 cm.)      Right Measurements Left Measurements   R LE Pre Girth Measurement (cm)  5th Tuberosity (cm): 23.1  Lateral malleolus: 27  Calf (cm): 31.8  Mid Knee (cm): 41  Thigh (cm): 43.2  Total Girth (cm): 166.1 L LE Pre Girth Measurement (cm)  5th Tuberosity (cm): 28.5  Lateral malleolus: 32.8  Calf (cm): 35  Mid Knee (cm): 42.9  Thigh (cm): 44  Total Girth (cm): 183.2     Exercises:  Exercise 1: Pt educated on keeping his legs

## 2023-12-26 ENCOUNTER — APPOINTMENT (OUTPATIENT)
Dept: PHYSICAL THERAPY | Age: 83
End: 2023-12-26
Payer: MEDICARE

## 2023-12-27 ENCOUNTER — HOSPITAL ENCOUNTER (OUTPATIENT)
Dept: PHYSICAL THERAPY | Age: 83
Setting detail: THERAPIES SERIES
Discharge: HOME | End: 2023-12-27
Payer: MEDICARE

## 2023-12-29 ENCOUNTER — HOSPITAL ENCOUNTER (OUTPATIENT)
Dept: PHYSICAL THERAPY | Age: 83
Setting detail: THERAPIES SERIES
Discharge: HOME | End: 2023-12-29
Payer: MEDICARE

## 2023-12-29 PROCEDURE — 97140 MANUAL THERAPY 1/> REGIONS: CPT

## 2023-12-29 PROCEDURE — 97110 THERAPEUTIC EXERCISES: CPT

## 2023-12-29 PROCEDURE — 97016 VASOPNEUMATIC DEVICE THERAPY: CPT

## 2023-12-29 NOTE — PROGRESS NOTES
(cm): 42.2  Thigh (cm): 42.5  Total Girth (cm): 181.6       Exercises:  Exercise 1: Pt educated on keeping his legs elevated and compressed at 30mmHg, perform daily exercises including ankle pump daily, reduce sodium and sugar intake  Exercise 2: Supine exercises B LE x15    Manual:  Other: MLD to B LE     Skin Integumentary  Skin Color: Red, Hyperpigmentation  Skin Condition/Temp: Dry  Skin Integrity: Scars (comment), Other (comment) (Scabs on B shins)  Turgor: Shiney/hard  RLE Complete Decongestion Therapy  Scale: Stage 2  Lymph Drainage Pattern: Lower extremity  Compression Technique: Vaso-pneumatic pump  Force (mmHg): 60 mmHg  Duration: 45 minutes  LLE Complete Decongestion Therapy  Scale: Stage 2  Lymph Drainage Pattern: Lower extremity  Compression Technique: Vaso-pneumatic pump  Force (mmHg): 60 mmHg  Duration : 45 minutes  Other Decongestion Therapy  Force (mmHg): 60 mmHg  Duration : 45 minutes  Skin Integumentary  Skin Color: Red, Hyperpigmentation  Skin Condition/Temp: Dry  Skin Integrity: Scars (comment), Other (comment) (Scabs on B shins)  Turgor: Shiney/hard  Other Decongestion Therapy  Force (mmHg): 60 mmHg  Duration : 45 minutes      Assessment  Assessment: MLD to B LE followed by compression pumps at 60 mmHg for 45 min. Measurements taken, with noted increase in edema this week vs last week. Educated pt on importance of low sodium diet and compression wear daily, good understanding. Pt will continueto benefit from therapy until he receives his pumps. Therapy Prognosis: Good        Decision Making: Medium Complexity    Patient Education  Patient Education: Measurements  Pt verbalized/demonstrated good understanding:     [x] Yes         [] No, pt required further clarification.          Goals  Short Term Goals  Time Frame for Short Term Goals: 3 weeks  Short Term Goal 1: Pt will be educated on his POC and HEP-met  Short Term Goal 2: Pt will initiate pump protocol in order to reduce BLE girth
measurements-met    Long Term Goals  Time Frame for Long Term Goals : 6 weeks  Long Term Goal 1: Pt will be safe and independent with his lymphedema management-progressing  Long Term Goal 2: Pt will demonstrate a 1-2cm decreased BLE girth measurements in order to reduce BLe pain and increase ambulation tolerance-progressing  Long Term Goal 3: Pt will be fitted for an at home pump in order to be independent with his lymphedema management-met  Long Term Goal 4: pt will report 50% improvement in his symptoms-progressing      Patient Goals : \"to get rid of L foot swelling\"        Minutes Tracking:  Time In: 0158  Time Out: 4646  Minutes: 79       Melissa Guzmán   Date: 12/29/2023  Electronically signed by Gallo Koenig PTA on 12/29/2023 at 12:48 PM

## 2023-12-30 ENCOUNTER — APPOINTMENT (OUTPATIENT)
Dept: GENERAL RADIOLOGY | Age: 83
End: 2023-12-30
Payer: MEDICARE

## 2023-12-30 ENCOUNTER — HOSPITAL ENCOUNTER (EMERGENCY)
Age: 83
Discharge: HOME | End: 2023-12-30
Attending: STUDENT IN AN ORGANIZED HEALTH CARE EDUCATION/TRAINING PROGRAM
Payer: MEDICARE

## 2023-12-30 VITALS
SYSTOLIC BLOOD PRESSURE: 122 MMHG | TEMPERATURE: 97.8 F | HEART RATE: 105 BPM | RESPIRATION RATE: 18 BRPM | OXYGEN SATURATION: 92 % | DIASTOLIC BLOOD PRESSURE: 69 MMHG

## 2023-12-30 DIAGNOSIS — M17.12 ARTHRITIS OF LEFT KNEE: Primary | ICD-10-CM

## 2023-12-30 PROCEDURE — 73562 X-RAY EXAM OF KNEE 3: CPT

## 2023-12-30 PROCEDURE — 99283 EMERGENCY DEPT VISIT LOW MDM: CPT

## 2023-12-30 PROCEDURE — 6370000000 HC RX 637 (ALT 250 FOR IP): Performed by: STUDENT IN AN ORGANIZED HEALTH CARE EDUCATION/TRAINING PROGRAM

## 2023-12-30 RX ORDER — ACETAMINOPHEN 500 MG
1000 TABLET ORAL ONCE
Status: COMPLETED | OUTPATIENT
Start: 2023-12-30 | End: 2023-12-30

## 2023-12-30 RX ADMIN — ACETAMINOPHEN 1000 MG: 500 TABLET, FILM COATED ORAL at 00:24

## 2023-12-30 ASSESSMENT — PAIN - FUNCTIONAL ASSESSMENT: PAIN_FUNCTIONAL_ASSESSMENT: NONE - DENIES PAIN

## 2023-12-30 NOTE — DISCHARGE INSTRUCTIONS
Please follow-up with your orthopedic surgeon for evaluation of potential treatments for the weakness and injury. There was no fracture or other significant finding other than arthritis on the x-rays.   Please return to the emergency department if there are new or worsening symptoms or other concerns that you may have

## 2023-12-30 NOTE — ED PROVIDER NOTES
[facility] ED  Emergency Department Encounter  Emergency Medicine Attending     Pt Name:Moise Reed  MRN: [id number]  [address] [facility] [place] 1940  Date of evaluation: 12/30/23  PCP:  Bhupinder Taylor MD  Note Started: 12:22 AM EST      CHIEF COMPLAINT       Chief Complaint   Patient presents with    Extremity Weakness     Has had trouble with left leg giving out and felt short of breath on EMS arrival       HISTORY OF PRESENT ILLNESS  (Location/Symptom, Timing/Onset, Context/Setting, Quality, Duration, Modifying Factors, Severity.)      Heddie Schaumann is a 82 y.o. male who presents with left knee pain. Patient states that has been ongoing problem for several years stated that he has resisted getting any transplant since the prior to that he had on his other knee have gone awry and have not significantly helped. Patient states that he was just standing this evening about his bed and the knee gave out from the knee family bring him to the floor. Patient denies incontinence, hitting his head or being on any blood thinners. Patient called out for lift assist, after they were able to do so because the patient struggling that he was short of breath and still unable to stand and as such was brought to the ER for an evaluation. Patient is sitting position feels significantly better, is rested and is no longer short of breath. Patient does have a history of congestive heart failure which she manages with Bumex. Patient does have pitting edema but states that it is not much more than his normal    PAST MEDICAL / SURGICAL / SOCIAL / FAMILY HISTORY      has a past medical history of Acute renal failure superimposed on stage 3 chronic kidney disease ([address]), AICD (automatic cardioverter/defibrillator) present, Arthritis, CHF (congestive heart failure) ([address]), Diabetes mellitus ([address]), Hyperlipidemia, Hypertension, and Kidney stone.        has a past surgical history that includes hernia repair; Cataract removal with Conjunctiva/sclera: Conjunctivae normal.      Pupils: Pupils are equal, round, and reactive to light.   Cardiovascular:      Rate and Rhythm: Normal rate and regular rhythm.      Pulses: Normal pulses.   Musculoskeletal:      Cervical back: Normal range of motion and neck supple.      Comments: Left knee tenderness with manipulation however there is no laxity or joint laxity.   Neurological:      Mental Status: He is alert.           DDX/DIAGNOSTIC RESULTS / EMERGENCY DEPARTMENT COURSE / MDM     Medical Decision Making  Patient is here for needing a lift assist for evaluation of his left.  Patient's shortness of breath is completely resolved.  Will x-ray the knee and pain control with Tylenol and reevaluate when scans have completed.    Amount and/or Complexity of Data Reviewed  Radiology: ordered.    Risk  OTC drugs.          EKG      All EKG's are interpreted by the Emergency Department Physician who either signs or Co-signs this chart in the absence of a cardiologist.    EMERGENCY DEPARTMENT COURSE:      ED Course as of 12/30/23 0149   Sat Dec 30, 2023   0040 Patient has a significant arthritis of his knees.  No other significant findings.  Bone structure is intact, will proceed with discharge [ES]      ED Course User Index  [ES] Sukumar Villela MD       PROCEDURES:      CONSULTS:  None    CRITICAL CARE:  There was significant risk of life threatening deterioration of patient's condition requiring my direct management. Critical care time 0 minutes, excluding any documented procedures.    FINAL IMPRESSION      1. Arthritis of left knee          DISPOSITION / PLAN     DISPOSITION Decision To Discharge 12/30/2023 12:41:26 AM      PATIENT REFERRED TO:  Leonardo Coleman MD  [address]  [place] [address]  [phone]    Schedule an appointment as soon as possible for a visit       [facility] ED  [address] [place] [address]  [phone]  Go to   As needed      DISCHARGE

## 2024-01-02 ENCOUNTER — HOSPITAL ENCOUNTER (OUTPATIENT)
Dept: PHYSICAL THERAPY | Age: 84
Setting detail: THERAPIES SERIES
Discharge: HOME | End: 2024-01-02

## 2024-01-02 NOTE — PROGRESS NOTES
[facility]  Inpatient/Observation/Outpatient Rehabilitation    Date: 2024  Patient Name: Moise Diallo       [] Inpatient Acute/Observation       [x]  Outpatient  : 1940      Plan of Care/Recert ends    [] Pt no showed for scheduled appointment    [] Pt refused/declined therapy at this time due to:           [x] Pt cancelled due to:  [] No Reason Given   [] Sick/ill   [] Other: Pt. Has received at home pumps, 1 month follow up scheduled.     Therapist/Assistant will attempt to see this patient, at our earliest opportunity.       Olga Jara, PTA Date: 2024

## 2024-02-01 ENCOUNTER — HOSPITAL ENCOUNTER (OUTPATIENT)
Age: 84
Discharge: HOME | End: 2024-02-01
Payer: MEDICARE

## 2024-02-01 LAB
ALBUMIN SERPL-MCNC: 4.1 G/DL (ref 3.5–5.2)
ANION GAP SERPL CALCULATED.3IONS-SCNC: 12 MMOL/L (ref 9–17)
BACTERIA URNS QL MICRO: ABNORMAL
BILIRUB UR QL STRIP: NEGATIVE
BUN SERPL-MCNC: 30 MG/DL (ref 8–23)
BUN/CREAT SERPL: 19 (ref 9–20)
CALCIUM SERPL-MCNC: 9.3 MG/DL (ref 8.6–10.4)
CHARACTER UR: ABNORMAL
CHLORIDE SERPL-SCNC: 100 MMOL/L (ref 98–107)
CLARITY UR: ABNORMAL
CO2 SERPL-SCNC: 24 MMOL/L (ref 20–31)
COLOR UR: YELLOW
CREAT SERPL-MCNC: 1.6 MG/DL (ref 0.7–1.2)
CREAT UR-MCNC: 88.5 MG/DL (ref 39–259)
EPI CELLS #/AREA URNS HPF: ABNORMAL /HPF (ref 0–5)
ERYTHROCYTE [DISTWIDTH] IN BLOOD BY AUTOMATED COUNT: 13.1 % (ref 11.8–14.4)
GFR SERPL CREATININE-BSD FRML MDRD: 43 ML/MIN/1.73M2
GLUCOSE SERPL-MCNC: 106 MG/DL (ref 70–99)
GLUCOSE UR STRIP-MCNC: NEGATIVE MG/DL
HCT VFR BLD AUTO: 47 % (ref 40.7–50.3)
HGB BLD-MCNC: 15.4 G/DL (ref 13–17)
HGB UR QL STRIP.AUTO: ABNORMAL
KETONES UR STRIP-MCNC: NEGATIVE MG/DL
LEUKOCYTE ESTERASE UR QL STRIP: ABNORMAL
MAGNESIUM SERPL-MCNC: 1.6 MG/DL (ref 1.6–2.6)
MCH RBC QN AUTO: 31.9 PG (ref 25.2–33.5)
MCHC RBC AUTO-ENTMCNC: 32.8 G/DL (ref 28.4–34.8)
MCV RBC AUTO: 97.3 FL (ref 82.6–102.9)
NITRITE UR QL STRIP: NEGATIVE
NRBC BLD-RTO: 0 PER 100 WBC
PH UR STRIP: 6 [PH] (ref 5–9)
PHOSPHATE SERPL-MCNC: 3.5 MG/DL (ref 2.5–4.5)
PLATELET # BLD AUTO: 204 K/UL (ref 138–453)
PMV BLD AUTO: 10.2 FL (ref 8.1–13.5)
POTASSIUM SERPL-SCNC: 4.7 MMOL/L (ref 3.7–5.3)
PROT UR STRIP-MCNC: ABNORMAL MG/DL
PTH-INTACT SERPL-MCNC: 30.3 PG/ML (ref 14–72)
RBC # BLD AUTO: 4.83 M/UL (ref 4.21–5.77)
RBC #/AREA URNS HPF: ABNORMAL /HPF (ref 0–2)
SODIUM SERPL-SCNC: 136 MMOL/L (ref 135–144)
SP GR UR STRIP: 1.02 (ref 1.01–1.02)
TOTAL PROTEIN, URINE: 24 MG/DL
URATE SERPL-MCNC: 7.1 MG/DL (ref 3.4–7)
URINE TOTAL PROTEIN CREATININE RATIO: 0.27 (ref 0–0.2)
UROBILINOGEN UR STRIP-ACNC: NORMAL EU/DL (ref 0–1)
WBC #/AREA URNS HPF: ABNORMAL /HPF (ref 0–5)
WBC OTHER # BLD: 9.7 K/UL (ref 3.5–11.3)

## 2024-02-01 PROCEDURE — 87086 URINE CULTURE/COLONY COUNT: CPT

## 2024-02-01 PROCEDURE — 36415 COLL VENOUS BLD VENIPUNCTURE: CPT

## 2024-02-01 PROCEDURE — 85027 COMPLETE CBC AUTOMATED: CPT

## 2024-02-01 PROCEDURE — 87186 SC STD MICRODIL/AGAR DIL: CPT

## 2024-02-01 PROCEDURE — 84550 ASSAY OF BLOOD/URIC ACID: CPT

## 2024-02-01 PROCEDURE — 83970 ASSAY OF PARATHORMONE: CPT

## 2024-02-01 PROCEDURE — 84156 ASSAY OF PROTEIN URINE: CPT

## 2024-02-01 PROCEDURE — 83735 ASSAY OF MAGNESIUM: CPT

## 2024-02-01 PROCEDURE — 82570 ASSAY OF URINE CREATININE: CPT

## 2024-02-01 PROCEDURE — 81001 URINALYSIS AUTO W/SCOPE: CPT

## 2024-02-01 PROCEDURE — 87077 CULTURE AEROBIC IDENTIFY: CPT

## 2024-02-01 PROCEDURE — 80069 RENAL FUNCTION PANEL: CPT

## 2024-02-03 LAB
MICROORGANISM SPEC CULT: ABNORMAL
SPECIMEN DESCRIPTION: ABNORMAL

## 2024-02-06 ENCOUNTER — HOSPITAL ENCOUNTER (OUTPATIENT)
Dept: PHYSICAL THERAPY | Age: 84
Setting detail: THERAPIES SERIES
Discharge: HOME | End: 2024-02-06
Payer: MEDICARE

## 2024-02-06 PROCEDURE — 97140 MANUAL THERAPY 1/> REGIONS: CPT

## 2024-02-06 NOTE — PLAN OF CARE
[facility]           Phone: [phone]             Outpatient Physical Therapy  Fax: [phone]                                           Date: 2024  Patient: Moise Diallo : 1940 [facility] #: [id number]   Referring Physician: Alecia Rogers DPM      [] Plan of Care   [x] Updated Plan of Care    Dates of Service to Include: 2024 to 05/10/24    Diagnosis:  lymphedema I89.0    Rehab (Treatment) Diagnosis:  BLE lymphedema             Onset Date:  10/12/23    Attendance  Total # of Visits to Date: 18        Assessment  Assessment: Pt reports he has been using his pumps two times daily and that his doctor is happy with the way his legs look. Pt educated on keeping legs elevated and starting compression due to continue L LE/foot swelling.      Goals  Short Term Goals  Time Frame for Short Term Goals: 3 weeks  Short Term Goal 1: Pt will be educated on his POC and HEP-met  Short Term Goal 2: Pt will initiate pump protocol in order to reduce BLE girth measurements-met  Long Term Goals  Time Frame for Long Term Goals : 6 weeks  Long Term Goal 1: Pt will be safe and independent with his lymphedema management-progressing  Long Term Goal 2: Pt will demonstrate a 1-2cm decreased BLE girth measurements in order to reduce BLe pain and increase ambulation tolerance-progressing  Long Term Goal 3: Pt will be fitted for an at home pump in order to be independent with his lymphedema management-met  Long Term Goal 4: pt will report 50% improvement in his symptoms-progressing     Prognosis  Therapy Prognosis: Good    Treatment Plan   Plan Frequency: 2x/wk  Plan weeks: 6 weeks  [] HP/CP      [] Electrical Stim   [x] Therapeutic Exercise      [] Gait Training  [] Aquatics   [] Ultrasound         [x] Patient Education/HEP   [x] Manual Therapy  [] Traction    [] Neuro-lakshmi        [x] Soft Tissue Mobs            [] Therapeutic

## 2024-02-06 NOTE — PROGRESS NOTES
Phone: [phone]                       [facility]          Fax: [phone]                      Outpatient Physical Therapy                                                             Lymphedema treatment  Date: 2024  Patient: Moise Diallo  : 1940  [facility] #: [id number]  Referring Physician: Referring Provider (secondary): Alecia Rogers    Diagnosis: lymphedema I89.0    Treatment Diagnosis: BLE lymphedema  Onset Date: 10/12/23  PT Insurance Information: Medicare/AARP  Total # of Visits Approved: 19   Total # of Visits to Date: 18     Subjective  Subjective: Pt reports he has been using his pumps daily and his legs are doing good.  Additional Pertinent Hx: HTN, defibulator, OA, diabetes,    Lymph Assessment      Skin Integumentary:   Pitting Scale Area 1: 1+  Skin Texture: Dry  Skin Condition/Temp: Dry  Edema Rebound: Slow  Stemmer Sign: Positive    Signs of Constriction (if applicable):   Papilloma (benign tumor arising from an epithelial layer): No  Fibrotic Areas: No  Lymphorrhea: No    Stage of Lymphedema: Stage 2: Spontaneously Irreversible Stage      Lymphedema Classification:   Type: Secondary Lymphedema  Left: Moderate     Right: Mild    Measurements: Area Measured: R LE, L LE (R LE 3\" above 23cm, 6\" above 28.2cm: L LE 3\" above 28.4cm, 6\" above 32.2cm)      Right Measurements Left Measurements   R LE Pre Girth Measurement (cm)  5th Tuberosity (cm): 22.9  Lateral malleolus: 27.2  Calf (cm): 31.7  Mid Knee (cm): 40.5  Thigh (cm): 42  Total Girth (cm): 164.3 L LE Pre Girth Measurement (cm)  5th Tuberosity (cm): 28.9  Lateral malleolus: 30.1  Calf (cm): 32.2  Mid Knee (cm): 41.8  Thigh (cm): 43.8  Total Girth (cm): 176.8         Exercises:  Exercise 1: Pt educated on keeping his legs elevated and compressed at 30mmHg, perform daily exercises including ankle pump daily, reduce sodium and sugar intake    Manual:  Other: measurements     Skin Integumentary  Skin Condition/Temp: Dry  Skin

## 2024-02-16 DIAGNOSIS — I25.5 ISCHEMIC CARDIOMYOPATHY: Primary | ICD-10-CM

## 2024-02-16 DIAGNOSIS — Z95.810 ICD (IMPLANTABLE CARDIOVERTER-DEFIBRILLATOR) IN PLACE: ICD-10-CM

## 2024-02-16 DIAGNOSIS — R06.02 SOB (SHORTNESS OF BREATH): ICD-10-CM

## 2024-02-16 RX ORDER — METOPROLOL SUCCINATE 50 MG/1
50 TABLET, EXTENDED RELEASE ORAL DAILY
COMMUNITY

## 2024-02-20 ENCOUNTER — NURSE ONLY (OUTPATIENT)
Dept: CARDIOLOGY | Age: 84
End: 2024-02-20

## 2024-02-20 DIAGNOSIS — Z95.810 ICD (IMPLANTABLE CARDIOVERTER-DEFIBRILLATOR) IN PLACE: ICD-10-CM

## 2024-02-20 DIAGNOSIS — I25.5 ISCHEMIC CARDIOMYOPATHY: Primary | ICD-10-CM

## 2024-02-22 RX ORDER — METOPROLOL SUCCINATE 50 MG/1
50 TABLET, EXTENDED RELEASE ORAL DAILY
Qty: 90 TABLET | Refills: 3 | Status: SHIPPED | OUTPATIENT
Start: 2024-02-22

## 2024-02-26 ENCOUNTER — HOSPITAL ENCOUNTER (EMERGENCY)
Age: 84
Discharge: HOME | End: 2024-02-27
Attending: EMERGENCY MEDICINE
Payer: MEDICARE

## 2024-02-26 VITALS
OXYGEN SATURATION: 97 % | RESPIRATION RATE: 18 BRPM | DIASTOLIC BLOOD PRESSURE: 76 MMHG | HEART RATE: 88 BPM | TEMPERATURE: 98.4 F | SYSTOLIC BLOOD PRESSURE: 141 MMHG

## 2024-02-26 DIAGNOSIS — S41.111A LACERATION OF RIGHT UPPER EXTREMITY, INITIAL ENCOUNTER: Primary | ICD-10-CM

## 2024-02-26 PROCEDURE — 12002 RPR S/N/AX/GEN/TRNK2.6-7.5CM: CPT

## 2024-02-26 PROCEDURE — 99283 EMERGENCY DEPT VISIT LOW MDM: CPT

## 2024-02-26 RX ORDER — LIDOCAINE HYDROCHLORIDE AND EPINEPHRINE 10; 10 MG/ML; UG/ML
20 INJECTION, SOLUTION INFILTRATION; PERINEURAL ONCE
Status: COMPLETED | OUTPATIENT
Start: 2024-02-27 | End: 2024-02-27

## 2024-02-26 ASSESSMENT — PAIN - FUNCTIONAL ASSESSMENT: PAIN_FUNCTIONAL_ASSESSMENT: NONE - DENIES PAIN

## 2024-02-27 PROCEDURE — 2500000003 HC RX 250 WO HCPCS: Performed by: EMERGENCY MEDICINE

## 2024-02-27 PROCEDURE — 6370000000 HC RX 637 (ALT 250 FOR IP): Performed by: EMERGENCY MEDICINE

## 2024-02-27 RX ORDER — CEPHALEXIN 500 MG/1
500 CAPSULE ORAL 4 TIMES DAILY
Qty: 28 CAPSULE | Refills: 0 | Status: SHIPPED | OUTPATIENT
Start: 2024-02-27 | End: 2024-03-05

## 2024-02-27 RX ORDER — CEPHALEXIN 500 MG/1
500 CAPSULE ORAL ONCE
Status: COMPLETED | OUTPATIENT
Start: 2024-02-27 | End: 2024-02-27

## 2024-02-27 RX ORDER — BACITRACIN ZINC 500 [USP'U]/G
OINTMENT TOPICAL 2 TIMES DAILY
Status: DISCONTINUED | OUTPATIENT
Start: 2024-02-27 | End: 2024-02-27 | Stop reason: HOSPADM

## 2024-02-27 RX ORDER — BACITRACIN ZINC AND POLYMYXIN B SULFATE 500; 1000 [USP'U]/G; [USP'U]/G
OINTMENT TOPICAL
Qty: 1 EACH | Refills: 0 | Status: SHIPPED | OUTPATIENT
Start: 2024-02-27 | End: 2024-03-05

## 2024-02-27 RX ADMIN — BACITRACIN ZINC: 500 OINTMENT TOPICAL at 00:28

## 2024-02-27 RX ADMIN — LIDOCAINE HYDROCHLORIDE AND EPINEPHRINE 20 ML: 10; 10 INJECTION, SOLUTION INFILTRATION; PERINEURAL at 00:29

## 2024-02-27 RX ADMIN — CEPHALEXIN 500 MG: 500 CAPSULE ORAL at 00:28

## 2024-02-27 NOTE — ED PROVIDER NOTES
[facility] ED  Emergency Department Encounter  Emergency Medicine Resident     Pt Name:Moise Diallo  MRN: [id number]  Birthdate 1940  Date of evaluation: 2/26/24  PCP:  Leonardo Coleman MD  10:29 PM EST      CHIEF COMPLAINT       Chief Complaint   Patient presents with    Fall     Happened around 8:30pm, did not hit head, did not lose consciousness, patient is on ASA, patient impaled inside of right arm with \"S hook\", patient unsure of last tetanus.        HISTORY OF PRESENT ILLNESS  (Location/Symptom, Timing/Onset, Context/Setting, Quality, Duration, Modifying Factors, Severity.)      Moise Diallo is a 83 y.o. male who presents with chronic weakness in his legs, felt back and caught his arm on an S hook for the shower.  And sustained a laceration to the medial distal upper arm on the right side.  Patient is right-hand dominant.  His wife had to unhook him as he was impaled on this hook.  And brought him into the ER for evaluation.  Patient denies any numbness or tingling.  Does take an aspirin.  He did not hit his head or lose consciousness and is not complaining of pain to other areas of his body    PAST MEDICAL / SURGICAL / SOCIAL / FAMILY HISTORY      has a past medical history of Acute renal failure superimposed on stage 3 chronic kidney disease (HCC), AICD (automatic cardioverter/defibrillator) present, Arthritis, CHF (congestive heart failure) (HCC), Diabetes mellitus (HCC), Hyperlipidemia, Hypertension, and Kidney stone.       has a past surgical history that includes hernia repair; Cataract removal with implant (Right, 08/26/2013); knee surgery (Right); joint replacement; joint replacement (Right, junu 2014 partial knee); joint replacement (Right, 2014); TURP (01/2017); TURP (01/2015); Lithotripsy (Right, 11/01/2017); pr cysto w/ureteroscopy w/lithotripsy (Right, 11/01/2017); CYSTOSCOPY INSERTION / REMOVAL STENT / STONE (Right, 11/01/2017); cystourethroscopy (11/03/2017); CYSTOSCOPY

## 2024-02-27 NOTE — DISCHARGE INSTRUCTIONS
Wash daily with soap and water and apply bacitracin and redress, monitor wound for any increased redness, increased swelling or increasing pain or any type of pus type drainage if these develop please return to the emergency room for repeat evaluation.  Please take your antibiotic until completed.  Follow-up with primary care provider or in the ER in 7 days for suture removal

## 2024-03-01 ENCOUNTER — HOSPITAL ENCOUNTER (OUTPATIENT)
Dept: NUCLEAR MEDICINE | Age: 84
Discharge: HOME | End: 2024-03-03
Attending: FAMILY MEDICINE
Payer: MEDICARE

## 2024-03-01 ENCOUNTER — HOSPITAL ENCOUNTER (OUTPATIENT)
Age: 84
Discharge: HOME | End: 2024-03-03
Attending: FAMILY MEDICINE
Payer: MEDICARE

## 2024-03-01 DIAGNOSIS — I25.5 ISCHEMIC CARDIOMYOPATHY: ICD-10-CM

## 2024-03-01 DIAGNOSIS — R06.02 SOB (SHORTNESS OF BREATH): ICD-10-CM

## 2024-03-01 DIAGNOSIS — Z95.810 ICD (IMPLANTABLE CARDIOVERTER-DEFIBRILLATOR) IN PLACE: ICD-10-CM

## 2024-03-01 PROCEDURE — 3430000000 HC RX DIAGNOSTIC RADIOPHARMACEUTICAL: Performed by: FAMILY MEDICINE

## 2024-03-01 PROCEDURE — 93017 CV STRESS TEST TRACING ONLY: CPT

## 2024-03-01 PROCEDURE — 6360000002 HC RX W HCPCS: Performed by: FAMILY MEDICINE

## 2024-03-01 PROCEDURE — A9500 TC99M SESTAMIBI: HCPCS | Performed by: FAMILY MEDICINE

## 2024-03-01 RX ORDER — TETRAKIS(2-METHOXYISOBUTYLISOCYANIDE)COPPER(I) TETRAFLUOROBORATE 1 MG/ML
30 INJECTION, POWDER, LYOPHILIZED, FOR SOLUTION INTRAVENOUS
Status: COMPLETED | OUTPATIENT
Start: 2024-03-01 | End: 2024-03-01

## 2024-03-01 RX ORDER — REGADENOSON 0.08 MG/ML
0.4 INJECTION, SOLUTION INTRAVENOUS
Status: COMPLETED | OUTPATIENT
Start: 2024-03-01 | End: 2024-03-01

## 2024-03-01 RX ADMIN — Medication 30 MILLICURIE: at 10:25

## 2024-03-01 RX ADMIN — REGADENOSON 0.4 MG: 0.08 INJECTION, SOLUTION INTRAVENOUS at 10:50

## 2024-03-04 ENCOUNTER — HOSPITAL ENCOUNTER (OUTPATIENT)
Dept: NUCLEAR MEDICINE | Age: 84
Discharge: HOME | End: 2024-03-06
Attending: FAMILY MEDICINE
Payer: MEDICARE

## 2024-03-04 ENCOUNTER — HOSPITAL ENCOUNTER (EMERGENCY)
Age: 84
Discharge: HOME | End: 2024-03-04
Payer: MEDICARE

## 2024-03-04 VITALS
RESPIRATION RATE: 18 BRPM | BODY MASS INDEX: 32.21 KG/M2 | SYSTOLIC BLOOD PRESSURE: 130 MMHG | TEMPERATURE: 97.6 F | HEIGHT: 70 IN | DIASTOLIC BLOOD PRESSURE: 78 MMHG | WEIGHT: 225 LBS | OXYGEN SATURATION: 98 % | HEART RATE: 73 BPM

## 2024-03-04 DIAGNOSIS — Z48.02 VISIT FOR SUTURE REMOVAL: Primary | ICD-10-CM

## 2024-03-04 LAB
NUC STRESS DIASTOLIC VOLUME INDEX: 177 ML/M2
NUC STRESS EJECTION FRACTION: 31 %
STRESS BASELINE DIAS BP: 78 MMHG
STRESS BASELINE HR: 77 BPM
STRESS BASELINE SYS BP: 112 MMHG
STRESS PEAK DIAS BP: 64 MMHG
STRESS PEAK SYS BP: 100 MMHG
STRESS PERCENT HR ACHIEVED: 63 %
STRESS POST PEAK HR: 88 BPM
STRESS RATE PRESSURE PRODUCT: 8800 BPM*MMHG
STRESS STAGE RECOVERY 1 BP: NORMAL MMHG
STRESS STAGE RECOVERY 1 HR: 88 BPM
STRESS STAGE RECOVERY 2 DURATION: 1 MIN:SEC
STRESS STAGE RECOVERY 2 HR: 77 BPM
STRESS STAGE RECOVERY 3 BP: NORMAL MMHG
STRESS STAGE RECOVERY 3 HR: 75 BPM
STRESS STAGE RECOVERY 4 BP: NORMAL MMHG
STRESS STAGE RECOVERY 4 DURATION: 5 MIN:SEC
STRESS STAGE RECOVERY 4 HR: 65 BPM
STRESS TARGET HR: 139 BPM
TID: 1.13

## 2024-03-04 PROCEDURE — A9500 TC99M SESTAMIBI: HCPCS | Performed by: FAMILY MEDICINE

## 2024-03-04 PROCEDURE — 93018 CV STRESS TEST I&R ONLY: CPT | Performed by: FAMILY MEDICINE

## 2024-03-04 PROCEDURE — 78452 HT MUSCLE IMAGE SPECT MULT: CPT | Performed by: FAMILY MEDICINE

## 2024-03-04 PROCEDURE — 3430000000 HC RX DIAGNOSTIC RADIOPHARMACEUTICAL: Performed by: FAMILY MEDICINE

## 2024-03-04 PROCEDURE — 99282 EMERGENCY DEPT VISIT SF MDM: CPT

## 2024-03-04 PROCEDURE — 93016 CV STRESS TEST SUPVJ ONLY: CPT | Performed by: FAMILY MEDICINE

## 2024-03-04 RX ORDER — TETRAKIS(2-METHOXYISOBUTYLISOCYANIDE)COPPER(I) TETRAFLUOROBORATE 1 MG/ML
30 INJECTION, POWDER, LYOPHILIZED, FOR SOLUTION INTRAVENOUS
Status: COMPLETED | OUTPATIENT
Start: 2024-03-04 | End: 2024-03-04

## 2024-03-04 RX ADMIN — Medication 30 MILLICURIE: at 13:03

## 2024-03-04 ASSESSMENT — ENCOUNTER SYMPTOMS
SHORTNESS OF BREATH: 0
COUGH: 0

## 2024-03-04 ASSESSMENT — PAIN - FUNCTIONAL ASSESSMENT: PAIN_FUNCTIONAL_ASSESSMENT: NONE - DENIES PAIN

## 2024-03-04 ASSESSMENT — LIFESTYLE VARIABLES
HOW MANY STANDARD DRINKS CONTAINING ALCOHOL DO YOU HAVE ON A TYPICAL DAY: PATIENT DOES NOT DRINK
HOW OFTEN DO YOU HAVE A DRINK CONTAINING ALCOHOL: NEVER

## 2024-03-04 NOTE — ED PROVIDER NOTES
[facility]  EMERGENCY DEPARTMENT ENCOUNTER      Pt Name: Moise Diallo  MRN: [id number]  Birthdate 1940  Date of evaluation: 3/4/2024  Provider: Linda Mcnulty PA-C    CHIEF COMPLAINT       Chief Complaint   Patient presents with    Suture / Staple Removal     Pt states here for suture removal right inner elbow. Pt states \"9 of them to take out.\"          HISTORY OF PRESENT ILLNESS      Moise Diallo is a 83 y.o. male who presents to the emergency department due to suture removal.  Patient presents emergency department with a laceration to the inner surface of his right elbow.  Patient states that he has 9 sutures that are 8 days old present.  The wound has healed without any complication and he is here for suture removal.        REVIEW OF SYSTEMS       Review of Systems   Constitutional:  Negative for fever.   Respiratory:  Negative for cough and shortness of breath.    Cardiovascular:  Negative for chest pain.         PAST MEDICAL HISTORY     Past Medical History:   Diagnosis Date    Acute renal failure superimposed on stage 3 chronic kidney disease (HCC) 03/01/2017    AICD (automatic cardioverter/defibrillator) present     Arthritis     CHF (congestive heart failure) (HCC)     Diabetes mellitus (HCC)     Hyperlipidemia     Hypertension     Kidney stone          SURGICAL HISTORY       Past Surgical History:   Procedure Laterality Date    CATARACT REMOVAL WITH IMPLANT Right 08/26/2013    CYSTOSCOPY INSERTION / REMOVAL STENT / STONE Right 11/01/2017    CYSTOSCOPY STENT INSERTION performed by Tod Patton MD at [facility] OR    CYSTOSCOPY INSERTION / REMOVAL STENT / STONE N/A 11/03/2017    CYSTOSCOPY STENT INSERTION performed by Tod Patton MD at [facility] OR    CYSTOURETHROSCOPY  11/03/2017    with stent placement on right side. Changed schulz catheter    FEMORAL BYPASS Left 4/27/2023    LEFT DISTAL SUPERFICIAL FEMORAL ARTERY TO POSTERIOR TIBIAL ARTERY BYPASS performed by Ruddy Trent MD at

## 2024-03-05 ENCOUNTER — TELEPHONE (OUTPATIENT)
Dept: CARDIOLOGY | Age: 84
End: 2024-03-05

## 2024-03-05 NOTE — TELEPHONE ENCOUNTER
----- Message from Alan Rios MD sent at 3/4/2024 11:42 PM EST -----  Let Patient know stress was mildly abnormal but not unexpected given his history. Will discuss at upcoming visit.     Thanks.

## 2024-03-06 ENCOUNTER — HOSPITAL ENCOUNTER (EMERGENCY)
Age: 84
Discharge: HOME | End: 2024-03-06
Payer: MEDICARE

## 2024-03-06 VITALS
DIASTOLIC BLOOD PRESSURE: 73 MMHG | SYSTOLIC BLOOD PRESSURE: 109 MMHG | HEART RATE: 73 BPM | RESPIRATION RATE: 16 BRPM | OXYGEN SATURATION: 98 % | TEMPERATURE: 97.4 F

## 2024-03-06 DIAGNOSIS — T81.33XA: Primary | ICD-10-CM

## 2024-03-06 PROCEDURE — 99283 EMERGENCY DEPT VISIT LOW MDM: CPT

## 2024-03-06 RX ORDER — CEPHALEXIN 500 MG/1
500 CAPSULE ORAL 3 TIMES DAILY
Qty: 21 CAPSULE | Refills: 0 | Status: SHIPPED | OUTPATIENT
Start: 2024-03-06 | End: 2024-03-13

## 2024-03-06 ASSESSMENT — PAIN - FUNCTIONAL ASSESSMENT: PAIN_FUNCTIONAL_ASSESSMENT: NONE - DENIES PAIN

## 2024-03-06 NOTE — DISCHARGE INSTRUCTIONS
Keep right elbow wound dry x 5 days. Cover with saran wrap or similar for shower and remove immediately after shower.   Change dressing daily. Leave steristrips in place.   Cephalexin 500 mg 1 by mouth every 8 hours x 7 days.  Increase protein and Vitamin C to improve healing.

## 2024-03-06 NOTE — ED PROVIDER NOTES
atraumatic.      Nose: Nose normal. No congestion or rhinorrhea.      Mouth/Throat:      Mouth: Mucous membranes are moist.      Pharynx: Oropharynx is clear.      Comments: Airway patent  Cardiovascular:      Rate and Rhythm: Normal rate and regular rhythm.      Pulses: Normal pulses.      Heart sounds: Normal heart sounds. No murmur heard.     No gallop.      Comments: S1-S2 no murmur or gallop  Pulmonary:      Effort: Pulmonary effort is normal. No respiratory distress.      Breath sounds: Normal breath sounds. No stridor. No wheezing, rhonchi or rales.      Comments: Symmetrical chest wall expansion.  Chest:      Chest wall: No tenderness.   Musculoskeletal:         General: No tenderness or signs of injury.      Cervical back: Normal range of motion and neck supple. No rigidity or tenderness.   Skin:     General: Skin is warm and dry.      Coloration: Skin is not jaundiced or pale.      Findings: No bruising, erythema, lesion or rash.      Comments: Left elbow incision/laceration 4.5 cm with complete dehiscence.  Scant serous drainage.  No appearance of secondary infection.  No foreign body.  Minimal tenderness to palpation.    Wound cleansed with Betadine.  7 Steri-Strips applied.  Nonstick dressing applied.  Patient tolerated well.  No new neurovascular impairment post repair.  Baseline range of motion right elbow.   Neurological:      General: No focal deficit present.      Mental Status: He is alert and oriented to person, place, and time.      Sensory: No sensory deficit.   Psychiatric:         Mood and Affect: Mood normal.         DIAGNOSTIC RESULTS     EKG: All EKG's are interpreted by the Emergency Department Physician who either signs or Co-signs this chart in the absence of a cardiologist.        RADIOLOGY:   Non-plain film images such as CT, Ultrasound and MRI are read by the radiologist. Plain radiographic images are visualized and preliminarily interpreted by the emergency physician with the below  [id number]  [phone]  In 5 days  For wound re-check if needed      DISCHARGE MEDICATIONS:  New Prescriptions    CEPHALEXIN (KEFLEX) 500 MG CAPSULE    Take 1 capsule by mouth 3 times daily for 7 days     Controlled Substances Monitoring:          No data to display                (Please note that portions of this note were completed with a voice recognition program.  Efforts were made to edit the dictations but occasionally words are mis-transcribed.)    CHILANGO Anglin - CNP (electronically signed)  Attending Emergency Physician           MICHELLE Sheppard, CHILANGO - CNP  03/06/24 1136

## 2024-03-11 ENCOUNTER — HOSPITAL ENCOUNTER (EMERGENCY)
Age: 84
Discharge: HOME | End: 2024-03-11
Payer: MEDICARE

## 2024-03-11 VITALS
RESPIRATION RATE: 16 BRPM | SYSTOLIC BLOOD PRESSURE: 109 MMHG | HEART RATE: 72 BPM | TEMPERATURE: 97.3 F | OXYGEN SATURATION: 98 % | DIASTOLIC BLOOD PRESSURE: 71 MMHG

## 2024-03-11 DIAGNOSIS — T81.33XA: Primary | ICD-10-CM

## 2024-03-11 PROCEDURE — 99283 EMERGENCY DEPT VISIT LOW MDM: CPT

## 2024-03-11 RX ORDER — CEPHALEXIN 500 MG/1
500 CAPSULE ORAL 3 TIMES DAILY
Qty: 9 CAPSULE | Refills: 0 | Status: SHIPPED | OUTPATIENT
Start: 2024-03-11 | End: 2024-03-14

## 2024-03-11 ASSESSMENT — PAIN - FUNCTIONAL ASSESSMENT: PAIN_FUNCTIONAL_ASSESSMENT: NONE - DENIES PAIN

## 2024-03-11 NOTE — ED PROVIDER NOTES
[place] [address]  [phone]    As needed, If symptoms worsen      DISCHARGE MEDICATIONS:  Discharge Medication List as of 3/11/2024  2:05 PM        START taking these medications    Details   !! cephALEXin (KEFLEX) 500 MG capsule Take 1 capsule by mouth 3 times daily for 3 days, Disp-9 capsule, R-0Normal       !! - Potential duplicate medications found. Please discuss with provider.        Controlled Substances Monitoring:          No data to display                (Please note that portions of this note were completed with a voice recognition program.  Efforts were made to edit the dictations but occasionally words are mis-transcribed.)    CHILANGO Anglin - CNP (electronically signed)  Attending Emergency Physician           MICHELLE Sheppard, CHILANGO - CNP  03/11/24 2046

## 2024-03-11 NOTE — DISCHARGE INSTRUCTIONS
Continue wound care/dressing changes  Keep clean and dry  Extend cephalexin 500 mg 1 by mouth every 8 hours for additional 3 days.

## 2024-03-12 ENCOUNTER — OFFICE VISIT (OUTPATIENT)
Dept: CARDIOLOGY | Age: 84
End: 2024-03-12
Payer: MEDICARE

## 2024-03-12 ENCOUNTER — HOSPITAL ENCOUNTER (OUTPATIENT)
Age: 84
Discharge: HOME | End: 2024-03-12
Payer: MEDICARE

## 2024-03-12 VITALS
OXYGEN SATURATION: 96 % | DIASTOLIC BLOOD PRESSURE: 71 MMHG | HEART RATE: 72 BPM | HEIGHT: 70 IN | BODY MASS INDEX: 32.28 KG/M2 | SYSTOLIC BLOOD PRESSURE: 106 MMHG | RESPIRATION RATE: 18 BRPM

## 2024-03-12 DIAGNOSIS — I10 ESSENTIAL HYPERTENSION: ICD-10-CM

## 2024-03-12 DIAGNOSIS — I47.29 NSVT (NONSUSTAINED VENTRICULAR TACHYCARDIA) (HCC): ICD-10-CM

## 2024-03-12 DIAGNOSIS — R94.39 ABNORMAL STRESS TEST: Primary | ICD-10-CM

## 2024-03-12 DIAGNOSIS — N18.31 STAGE 3A CHRONIC KIDNEY DISEASE (HCC): ICD-10-CM

## 2024-03-12 DIAGNOSIS — E78.2 MIXED HYPERLIPIDEMIA: ICD-10-CM

## 2024-03-12 DIAGNOSIS — R06.02 SOB (SHORTNESS OF BREATH): ICD-10-CM

## 2024-03-12 DIAGNOSIS — I25.10 CORONARY ARTERY DISEASE INVOLVING NATIVE CORONARY ARTERY OF NATIVE HEART WITHOUT ANGINA PECTORIS: ICD-10-CM

## 2024-03-12 DIAGNOSIS — I25.5 ISCHEMIC CARDIOMYOPATHY: ICD-10-CM

## 2024-03-12 DIAGNOSIS — Z95.810 ICD (IMPLANTABLE CARDIOVERTER-DEFIBRILLATOR) IN PLACE: ICD-10-CM

## 2024-03-12 DIAGNOSIS — R94.39 ABNORMAL STRESS TEST: ICD-10-CM

## 2024-03-12 LAB
ANION GAP SERPL CALCULATED.3IONS-SCNC: 15 MMOL/L (ref 9–17)
BUN SERPL-MCNC: 28 MG/DL (ref 8–23)
BUN/CREAT SERPL: 19 (ref 9–20)
CALCIUM SERPL-MCNC: 9.4 MG/DL (ref 8.6–10.4)
CHLORIDE SERPL-SCNC: 102 MMOL/L (ref 98–107)
CHOLEST SERPL-MCNC: 105 MG/DL (ref 0–199)
CHOLESTEROL/HDL RATIO: 3
CO2 SERPL-SCNC: 21 MMOL/L (ref 20–31)
CREAT SERPL-MCNC: 1.5 MG/DL (ref 0.7–1.2)
ERYTHROCYTE [DISTWIDTH] IN BLOOD BY AUTOMATED COUNT: 13.5 % (ref 11.8–14.4)
GFR SERPL CREATININE-BSD FRML MDRD: 46 ML/MIN/1.73M2
GLUCOSE SERPL-MCNC: 99 MG/DL (ref 70–99)
HCT VFR BLD AUTO: 43.8 % (ref 40.7–50.3)
HDLC SERPL-MCNC: 37 MG/DL
HGB BLD-MCNC: 14.8 G/DL (ref 13–17)
LDLC SERPL CALC-MCNC: 39 MG/DL (ref 0–100)
MCH RBC QN AUTO: 32 PG (ref 25.2–33.5)
MCHC RBC AUTO-ENTMCNC: 33.8 G/DL (ref 28.4–34.8)
MCV RBC AUTO: 94.8 FL (ref 82.6–102.9)
NRBC BLD-RTO: 0 PER 100 WBC
PLATELET # BLD AUTO: 217 K/UL (ref 138–453)
PMV BLD AUTO: 10.7 FL (ref 8.1–13.5)
POTASSIUM SERPL-SCNC: 4.7 MMOL/L (ref 3.7–5.3)
RBC # BLD AUTO: 4.62 M/UL (ref 4.21–5.77)
SODIUM SERPL-SCNC: 138 MMOL/L (ref 135–144)
TRIGL SERPL-MCNC: 146 MG/DL
VLDLC SERPL CALC-MCNC: 29 MG/DL
WBC OTHER # BLD: 9.4 K/UL (ref 3.5–11.3)

## 2024-03-12 PROCEDURE — 1036F TOBACCO NON-USER: CPT | Performed by: FAMILY MEDICINE

## 2024-03-12 PROCEDURE — 3078F DIAST BP <80 MM HG: CPT | Performed by: FAMILY MEDICINE

## 2024-03-12 PROCEDURE — 36415 COLL VENOUS BLD VENIPUNCTURE: CPT

## 2024-03-12 PROCEDURE — 3074F SYST BP LT 130 MM HG: CPT | Performed by: FAMILY MEDICINE

## 2024-03-12 PROCEDURE — G8417 CALC BMI ABV UP PARAM F/U: HCPCS | Performed by: FAMILY MEDICINE

## 2024-03-12 PROCEDURE — G8427 DOCREV CUR MEDS BY ELIG CLIN: HCPCS | Performed by: FAMILY MEDICINE

## 2024-03-12 PROCEDURE — 99211 OFF/OP EST MAY X REQ PHY/QHP: CPT | Performed by: FAMILY MEDICINE

## 2024-03-12 PROCEDURE — G8484 FLU IMMUNIZE NO ADMIN: HCPCS | Performed by: FAMILY MEDICINE

## 2024-03-12 PROCEDURE — 80048 BASIC METABOLIC PNL TOTAL CA: CPT

## 2024-03-12 PROCEDURE — 1123F ACP DISCUSS/DSCN MKR DOCD: CPT | Performed by: FAMILY MEDICINE

## 2024-03-12 PROCEDURE — 99215 OFFICE O/P EST HI 40 MIN: CPT | Performed by: FAMILY MEDICINE

## 2024-03-12 PROCEDURE — 85027 COMPLETE CBC AUTOMATED: CPT

## 2024-03-12 PROCEDURE — 80061 LIPID PANEL: CPT

## 2024-03-12 NOTE — PROGRESS NOTES
I, Elza Young am scribing for and in the presence of Alan Rios MD, MS, F.A.C.C.    Patient: Moise Diallo  : 1940  Date of Visit: 2024    REASON FOR VISIT / CONSULTATION: Congestive Heart Failure (HX: CHF, ASHD, SOB, HTN,PVD, Leg Edema, CKD, ICD. Several ER visits. Symptom wise he reports doing well. SOB is stable Denies: CP, Lightheaded/ dizziness, Palpitations. )    History of Present Illness:        Dear Leonardo Coleman MD,     I had the pleasure of seeing Moise Diallo in follow up today. He is a 84 y.o. male with a history of severe coronary artery disease, reduced EF and an ICD. He has two brothers with heart disease he is unsure of details or ages when the problems started. One brother has a pacemaker and the other brother has had open heart surgery.  He believes they had heart attacks in the past. Also reports mother and father with CAD, he believes everyone was in their 70's prior to having any issues. He is former smoker quit over 60 years ago. He smoked 1/2 pack per day for 3 years. He was diagnosed with hypertension for only a few years and hyperlipidemia and diabetes. He denies having sleep apnea. He states until he was 72 years old he never seen a provider and never went to the doctor's office. He has urinary retention and has to self cath once daily. EKG done 2022: Sinus tachycardia with 1st degree AV block. Left anterior fascicular block, right bundle branch block. Heart rate 101 bpm. Stress test done on 2022- EF 27% Abnormal myocardial perfusion study. There is a moderate/large perfusion defect of severe intensity in the septal, apical, anteroapical and inferoapical region(s) during stress and rest imaging which is most consistent with an old myocardial infarction with edith-infarct ischemia.Overall, these results are most consistent with high risk for significant coronary artery disease. CAM done on 2022- 6 days and 23 hours recorded. Baseline

## 2024-03-13 ENCOUNTER — TELEPHONE (OUTPATIENT)
Dept: CARDIOLOGY | Age: 84
End: 2024-03-13

## 2024-03-13 NOTE — TELEPHONE ENCOUNTER
----- Message from Alan Rios MD sent at 3/13/2024 12:15 AM EDT -----  Let Mr. Diallo know their test result was ok. Will discuss at next visit. Thanks.

## 2024-03-15 ENCOUNTER — OFFICE VISIT (OUTPATIENT)
Dept: SURGERY | Age: 84
End: 2024-03-15
Payer: MEDICARE

## 2024-03-15 VITALS
HEIGHT: 70 IN | BODY MASS INDEX: 31.92 KG/M2 | SYSTOLIC BLOOD PRESSURE: 98 MMHG | WEIGHT: 223 LBS | RESPIRATION RATE: 18 BRPM | HEART RATE: 76 BPM | OXYGEN SATURATION: 98 % | DIASTOLIC BLOOD PRESSURE: 65 MMHG

## 2024-03-15 DIAGNOSIS — S51.011S LACERATION OF RIGHT ELBOW, SEQUELA: Primary | ICD-10-CM

## 2024-03-15 PROBLEM — S51.011A LACERATION OF RIGHT ELBOW: Status: ACTIVE | Noted: 2024-03-15

## 2024-03-15 PROCEDURE — 3074F SYST BP LT 130 MM HG: CPT | Performed by: SURGERY

## 2024-03-15 PROCEDURE — 1036F TOBACCO NON-USER: CPT | Performed by: SURGERY

## 2024-03-15 PROCEDURE — 99202 OFFICE O/P NEW SF 15 MIN: CPT | Performed by: SURGERY

## 2024-03-15 PROCEDURE — 3078F DIAST BP <80 MM HG: CPT | Performed by: SURGERY

## 2024-03-15 PROCEDURE — G8417 CALC BMI ABV UP PARAM F/U: HCPCS | Performed by: SURGERY

## 2024-03-15 PROCEDURE — G8427 DOCREV CUR MEDS BY ELIG CLIN: HCPCS | Performed by: SURGERY

## 2024-03-15 PROCEDURE — G8484 FLU IMMUNIZE NO ADMIN: HCPCS | Performed by: SURGERY

## 2024-03-15 PROCEDURE — 1123F ACP DISCUSS/DSCN MKR DOCD: CPT | Performed by: SURGERY

## 2024-03-15 RX ORDER — CARVEDILOL 25 MG/1
TABLET, FILM COATED ORAL
COMMUNITY
Start: 2024-02-06

## 2024-03-15 NOTE — PROGRESS NOTES
Patient has a non-healing laceration of his right elbow.  He feel in the shower on 2/27/24, got caught by an S-hook.  Had sutures placed in the ED. Suture were removed POD#7.   I believe steri-strips were applied, but it is not documented in the note.  He notice drainage 3/6/24, went back to the ED and dehiscence was seen.  Steri-stripped close, and place on cephalexin and vitamin C.     Since then he has been keeping it cover with a non-stick dressing and changing it daily. Denies using any topical agents such as antibiotics, peroxide, iodine etc.    He has enough drain to cover the center of the pad.    The wound is not painful.  It is in an area where he does bear some weight while using a walker.    Risk factors for poor wound healing include:  Renal insufficiency  EGFR 46  Type II Diabetes - I don't see an A1c on him  Nutrition - he denies weight loss.  It has been well over a year since he has albumin level checked.    BP 98/65   Pulse 76   Resp 18   Ht 1.778 m (5' 10\")   Wt 101.2 kg (223 lb) Comment: Patient unable to weigh-Refused  SpO2 98%   BMI 32.00 kg/m²   We did get him weighed today after all.        This was clearly a deep laceration down to and possible involving muscle.  The wound is fairly clean, but it is also fairly dry.  One can see that it has healed some at the edges and anterior end.  It is not erythematous, tender, induration.  There mis minimal exudate with in the wound.    IMP/PLAN  1) Laceration right elbow - non-healing  - Given his medical status and the nature of the injury prolonged healing is to be expected.  - It is actually quite clean looking, and clearly and significant port of it has healed.  - His current dressing may be allowing the wound dry out too much.  - I would like to try a silicone foam dressing, Mepilex.  The flexibility of silicone will help maintain a moist wound environment, and provides some absorption of exudate/transudate.  - Recheck in 2 weeks.  - If he

## 2024-03-20 DIAGNOSIS — R94.39 ABNORMAL STRESS TEST: Primary | ICD-10-CM

## 2024-03-22 ENCOUNTER — HOSPITAL ENCOUNTER (OUTPATIENT)
Age: 84
Setting detail: OUTPATIENT SURGERY
Discharge: HOME | End: 2024-03-22
Attending: FAMILY MEDICINE | Admitting: FAMILY MEDICINE
Payer: MEDICARE

## 2024-03-22 VITALS
HEART RATE: 78 BPM | SYSTOLIC BLOOD PRESSURE: 99 MMHG | OXYGEN SATURATION: 96 % | WEIGHT: 222 LBS | RESPIRATION RATE: 19 BRPM | BODY MASS INDEX: 31.78 KG/M2 | HEIGHT: 70 IN | DIASTOLIC BLOOD PRESSURE: 67 MMHG | TEMPERATURE: 97.1 F

## 2024-03-22 DIAGNOSIS — R94.39 ABNORMAL STRESS ECG: ICD-10-CM

## 2024-03-22 LAB — GLUCOSE BLD-MCNC: 131 MG/DL (ref 74–100)

## 2024-03-22 PROCEDURE — 2580000003 HC RX 258: Performed by: FAMILY MEDICINE

## 2024-03-22 PROCEDURE — 93458 L HRT ARTERY/VENTRICLE ANGIO: CPT | Performed by: FAMILY MEDICINE

## 2024-03-22 PROCEDURE — 82947 ASSAY GLUCOSE BLOOD QUANT: CPT

## 2024-03-22 PROCEDURE — 7100000010 HC PHASE II RECOVERY - FIRST 15 MIN: Performed by: FAMILY MEDICINE

## 2024-03-22 PROCEDURE — 2709999900 HC NON-CHARGEABLE SUPPLY: Performed by: FAMILY MEDICINE

## 2024-03-22 PROCEDURE — C1894 INTRO/SHEATH, NON-LASER: HCPCS | Performed by: FAMILY MEDICINE

## 2024-03-22 PROCEDURE — 6360000004 HC RX CONTRAST MEDICATION: Performed by: FAMILY MEDICINE

## 2024-03-22 PROCEDURE — 7100000011 HC PHASE II RECOVERY - ADDTL 15 MIN: Performed by: FAMILY MEDICINE

## 2024-03-22 PROCEDURE — 2500000003 HC RX 250 WO HCPCS: Performed by: FAMILY MEDICINE

## 2024-03-22 PROCEDURE — C1769 GUIDE WIRE: HCPCS | Performed by: FAMILY MEDICINE

## 2024-03-22 PROCEDURE — 6360000002 HC RX W HCPCS: Performed by: FAMILY MEDICINE

## 2024-03-22 RX ORDER — SODIUM CHLORIDE 0.9 % (FLUSH) 0.9 %
5-40 SYRINGE (ML) INJECTION PRN
Status: DISCONTINUED | OUTPATIENT
Start: 2024-03-22 | End: 2024-03-22 | Stop reason: HOSPADM

## 2024-03-22 RX ORDER — SODIUM CHLORIDE 9 MG/ML
INJECTION, SOLUTION INTRAVENOUS PRN
Status: DISCONTINUED | OUTPATIENT
Start: 2024-03-22 | End: 2024-03-22 | Stop reason: HOSPADM

## 2024-03-22 RX ORDER — SODIUM CHLORIDE 0.9 % (FLUSH) 0.9 %
5-40 SYRINGE (ML) INJECTION EVERY 12 HOURS SCHEDULED
Status: DISCONTINUED | OUTPATIENT
Start: 2024-03-22 | End: 2024-03-22 | Stop reason: HOSPADM

## 2024-03-22 RX ORDER — NITROGLYCERIN 20 MG/100ML
INJECTION INTRAVENOUS PRN
Status: DISCONTINUED | OUTPATIENT
Start: 2024-03-22 | End: 2024-03-22 | Stop reason: HOSPADM

## 2024-03-22 RX ORDER — SODIUM CHLORIDE 9 MG/ML
INJECTION, SOLUTION INTRAVENOUS CONTINUOUS
Status: DISCONTINUED | OUTPATIENT
Start: 2024-03-22 | End: 2024-03-22 | Stop reason: HOSPADM

## 2024-03-22 RX ORDER — DIPHENHYDRAMINE HCL 25 MG
50 CAPSULE ORAL ONCE
Status: DISCONTINUED | OUTPATIENT
Start: 2024-03-22 | End: 2024-03-22 | Stop reason: HOSPADM

## 2024-03-22 RX ORDER — HEPARIN SODIUM 1000 [USP'U]/ML
INJECTION, SOLUTION INTRAVENOUS; SUBCUTANEOUS PRN
Status: DISCONTINUED | OUTPATIENT
Start: 2024-03-22 | End: 2024-03-22 | Stop reason: HOSPADM

## 2024-03-22 RX ORDER — LIDOCAINE HYDROCHLORIDE 10 MG/ML
INJECTION, SOLUTION INFILTRATION; PERINEURAL PRN
Status: DISCONTINUED | OUTPATIENT
Start: 2024-03-22 | End: 2024-03-22 | Stop reason: HOSPADM

## 2024-03-22 RX ORDER — ACETAMINOPHEN 325 MG/1
650 TABLET ORAL EVERY 4 HOURS PRN
Status: DISCONTINUED | OUTPATIENT
Start: 2024-03-22 | End: 2024-03-22 | Stop reason: HOSPADM

## 2024-03-22 RX ORDER — SODIUM CHLORIDE 9 MG/ML
INJECTION, SOLUTION INTRAVENOUS CONTINUOUS PRN
Status: COMPLETED | OUTPATIENT
Start: 2024-03-22 | End: 2024-03-22

## 2024-03-22 RX ORDER — NITROGLYCERIN 0.4 MG/1
0.4 TABLET SUBLINGUAL EVERY 5 MIN PRN
Status: DISCONTINUED | OUTPATIENT
Start: 2024-03-22 | End: 2024-03-22 | Stop reason: HOSPADM

## 2024-03-22 RX ADMIN — SODIUM CHLORIDE: 9 INJECTION, SOLUTION INTRAVENOUS at 09:02

## 2024-03-22 NOTE — DISCHARGE INSTRUCTIONS
Following Occurs   Drooping facial muscles   Changes in vision or speech   Difficulty walking or using your limbs   Change in sensation, including numbness, feeling cold, or change in color   Extreme sweating, nausea or vomiting   Dizziness or lightheadedness   Chest pain   Rapid, irregular heartbeat   Palpitations   Cough, shortness of breath, or difficulty breathing   Weakness or fainting   If you think you have an emergency, CALL 911

## 2024-03-22 NOTE — PROGRESS NOTES
Discharge instructions given, all questions answered. Patient ambulates off department with family via motorized wheelchair. All belongings taken with patient.

## 2024-03-24 LAB — ECHO BSA: 2.23 M2

## 2024-04-01 ENCOUNTER — HOSPITAL ENCOUNTER (OUTPATIENT)
Age: 84
Setting detail: SPECIMEN
Discharge: HOME | End: 2024-04-01
Payer: MEDICARE

## 2024-04-01 ENCOUNTER — OFFICE VISIT (OUTPATIENT)
Dept: SURGERY | Age: 84
End: 2024-04-01
Payer: MEDICARE

## 2024-04-01 VITALS — TEMPERATURE: 97.2 F | DIASTOLIC BLOOD PRESSURE: 73 MMHG | SYSTOLIC BLOOD PRESSURE: 110 MMHG | HEART RATE: 71 BPM

## 2024-04-01 DIAGNOSIS — E11.9 TYPE 2 DIABETES MELLITUS WITHOUT COMPLICATION, WITHOUT LONG-TERM CURRENT USE OF INSULIN (HCC): ICD-10-CM

## 2024-04-01 DIAGNOSIS — N18.32 STAGE 3B CHRONIC KIDNEY DISEASE (HCC): ICD-10-CM

## 2024-04-01 DIAGNOSIS — S51.011S LACERATION OF RIGHT ELBOW, SEQUELA: Primary | ICD-10-CM

## 2024-04-01 DIAGNOSIS — S51.011S LACERATION OF RIGHT ELBOW, SEQUELA: ICD-10-CM

## 2024-04-01 PROCEDURE — 87070 CULTURE OTHR SPECIMN AEROBIC: CPT

## 2024-04-01 PROCEDURE — 87075 CULTR BACTERIA EXCEPT BLOOD: CPT

## 2024-04-01 PROCEDURE — 87205 SMEAR GRAM STAIN: CPT

## 2024-04-01 PROCEDURE — 86403 PARTICLE AGGLUT ANTBDY SCRN: CPT

## 2024-04-01 PROCEDURE — 87186 SC STD MICRODIL/AGAR DIL: CPT

## 2024-04-01 PROCEDURE — 87176 TISSUE HOMOGENIZATION CULTR: CPT

## 2024-04-01 PROCEDURE — 11042 DBRDMT SUBQ TIS 1ST 20SQCM/<: CPT | Performed by: SURGERY

## 2024-04-01 NOTE — PROGRESS NOTES
Patient has a laceration of his right elbow, currently being managed with silicone foam dressing.  He is changing it about every 3 days.    No pain in the wound.    /73 (Site: Left Upper Arm, Position: Sitting, Cuff Size: Medium Adult)   Pulse 71   Temp 97.2 °F (36.2 °C)     The wound is draining yellow to green exudate with yellow exudate on the surface.  It is not tender nor red.    Procedure note:  Moise Diallo wound(s) debrided.  Devitalized, necrotic, fibrinous material and biofilm was removed.  Hemostasis by direct pressure as needed.  Instruments used:   Curette: Yes   Forceps and scissors:  No   Scalpel: No   Tissue nippers or rongeur: No  Additional injected local anesthetic: No  Tissue obtained for culture: Yes  Additional hemostatic agents used:   Silver Nitrate: No   Electrocautery: No   Sutures: No   Topical agents (gel foam, thrombin, Surgicel): Yes and collagen  Depth of Debridement - subcutaneous  Debridement Size:  Roughly 11 [(length+undermining) x depth x 2] cm2    Length - 2.0 cm  Width - 0.4 cm  Depth - 1.1 cm  Undermining at 3 o'clock 3.0 cm    IMP/PLAN  1) Add collagen to dressing changes.  2) Tissue culture taken today  3) Check lab work  4) Antibiotics if needed based on culture  5) Recheck in 2 weeks.

## 2024-04-04 ENCOUNTER — TELEPHONE (OUTPATIENT)
Dept: SURGERY | Age: 84
End: 2024-04-04

## 2024-04-04 LAB
MICROORGANISM SPEC CULT: ABNORMAL
MICROORGANISM/AGENT SPEC: ABNORMAL
MICROORGANISM/AGENT SPEC: ABNORMAL
SPECIMEN DESCRIPTION: ABNORMAL

## 2024-04-04 RX ORDER — SULFAMETHOXAZOLE AND TRIMETHOPRIM 800; 160 MG/1; MG/1
1 TABLET ORAL 2 TIMES DAILY
Qty: 20 TABLET | Refills: 0 | Status: SHIPPED | OUTPATIENT
Start: 2024-04-04 | End: 2024-04-14

## 2024-04-04 NOTE — TELEPHONE ENCOUNTER
Left message for patient to return call regarding pathology results and that an antibiotic prescription was sent to the pharmacy

## 2024-04-13 ENCOUNTER — APPOINTMENT (OUTPATIENT)
Dept: CT IMAGING | Age: 84
DRG: 392 | End: 2024-04-13
Payer: MEDICARE

## 2024-04-13 ENCOUNTER — APPOINTMENT (OUTPATIENT)
Dept: GENERAL RADIOLOGY | Age: 84
DRG: 392 | End: 2024-04-13
Payer: MEDICARE

## 2024-04-13 ENCOUNTER — HOSPITAL ENCOUNTER (INPATIENT)
Age: 84
LOS: 3 days | Discharge: NURSING, REHAB OR LONG-TERM CARE | DRG: 392 | End: 2024-04-16
Attending: STUDENT IN AN ORGANIZED HEALTH CARE EDUCATION/TRAINING PROGRAM | Admitting: STUDENT IN AN ORGANIZED HEALTH CARE EDUCATION/TRAINING PROGRAM
Payer: MEDICARE

## 2024-04-13 DIAGNOSIS — K40.21 BILATERAL RECURRENT INGUINAL HERNIA WITHOUT OBSTRUCTION OR GANGRENE: ICD-10-CM

## 2024-04-13 DIAGNOSIS — K29.90 GASTRITIS AND DUODENITIS: Primary | ICD-10-CM

## 2024-04-13 DIAGNOSIS — R33.9 URINARY RETENTION: ICD-10-CM

## 2024-04-13 PROBLEM — I95.9 HYPOTENSION: Status: ACTIVE | Noted: 2024-04-13

## 2024-04-13 LAB
ALBUMIN SERPL-MCNC: 4.2 G/DL (ref 3.5–5.2)
ALBUMIN/GLOB SERPL: 1.2 {RATIO} (ref 1–2.5)
ALP SERPL-CCNC: 101 U/L (ref 40–129)
ALT SERPL-CCNC: 20 U/L (ref 5–41)
AMYLASE SERPL-CCNC: 56 U/L (ref 28–100)
ANION GAP SERPL CALCULATED.3IONS-SCNC: 11 MMOL/L (ref 9–17)
AST SERPL-CCNC: 22 U/L
BACTERIA URNS QL MICRO: ABNORMAL
BASOPHILS # BLD: 0.04 K/UL (ref 0–0.2)
BASOPHILS NFR BLD: 1 % (ref 0–2)
BILIRUB SERPL-MCNC: 0.3 MG/DL (ref 0.3–1.2)
BILIRUB UR QL STRIP: NEGATIVE
BNP SERPL-MCNC: 766 PG/ML
BUN SERPL-MCNC: 25 MG/DL (ref 8–23)
BUN/CREAT SERPL: 14 (ref 9–20)
CALCIUM SERPL-MCNC: 9.2 MG/DL (ref 8.6–10.4)
CHLORIDE SERPL-SCNC: 101 MMOL/L (ref 98–107)
CLARITY UR: CLEAR
CO2 SERPL-SCNC: 21 MMOL/L (ref 20–31)
COLOR UR: YELLOW
CREAT SERPL-MCNC: 1.8 MG/DL (ref 0.7–1.2)
EOSINOPHIL # BLD: 0.39 K/UL (ref 0–0.44)
EOSINOPHILS RELATIVE PERCENT: 5 % (ref 1–4)
EPI CELLS #/AREA URNS HPF: ABNORMAL /HPF (ref 0–5)
ERYTHROCYTE [DISTWIDTH] IN BLOOD BY AUTOMATED COUNT: 13.7 % (ref 11.8–14.4)
GFR SERPL CREATININE-BSD FRML MDRD: 37 ML/MIN/1.73M2
GLUCOSE SERPL-MCNC: 86 MG/DL (ref 70–99)
GLUCOSE UR STRIP-MCNC: NEGATIVE MG/DL
HCT VFR BLD AUTO: 42.7 % (ref 40.7–50.3)
HGB BLD-MCNC: 14.2 G/DL (ref 13–17)
HGB UR QL STRIP.AUTO: NEGATIVE
IMM GRANULOCYTES # BLD AUTO: 0.04 K/UL (ref 0–0.3)
IMM GRANULOCYTES NFR BLD: 1 %
INR PPP: 1
KETONES UR STRIP-MCNC: NEGATIVE MG/DL
LACTATE BLDV-SCNC: 1.7 MMOL/L (ref 0.5–2.2)
LACTATE BLDV-SCNC: 2.6 MMOL/L (ref 0.5–2.2)
LEUKOCYTE ESTERASE UR QL STRIP: NEGATIVE
LIPASE SERPL-CCNC: 54 U/L (ref 13–60)
LYMPHOCYTES NFR BLD: 2.56 K/UL (ref 1.1–3.7)
LYMPHOCYTES RELATIVE PERCENT: 32 % (ref 24–43)
MCH RBC QN AUTO: 31.8 PG (ref 25.2–33.5)
MCHC RBC AUTO-ENTMCNC: 33.3 G/DL (ref 28.4–34.8)
MCV RBC AUTO: 95.7 FL (ref 82.6–102.9)
MONOCYTES NFR BLD: 0.88 K/UL (ref 0.1–1.2)
MONOCYTES NFR BLD: 11 % (ref 3–12)
NEUTROPHILS NFR BLD: 50 % (ref 36–65)
NEUTS SEG NFR BLD: 4.04 K/UL (ref 1.5–8.1)
NITRITE UR QL STRIP: NEGATIVE
NRBC BLD-RTO: 0 PER 100 WBC
PARTIAL THROMBOPLASTIN TIME: 25.3 SEC (ref 26.8–34.8)
PH UR STRIP: 6 [PH] (ref 5–9)
PLATELET # BLD AUTO: 221 K/UL (ref 138–453)
PMV BLD AUTO: 9.9 FL (ref 8.1–13.5)
POTASSIUM SERPL-SCNC: 5.1 MMOL/L (ref 3.7–5.3)
PROT SERPL-MCNC: 7.8 G/DL (ref 6.4–8.3)
PROT UR STRIP-MCNC: NEGATIVE MG/DL
PROTHROMBIN TIME: 12.7 SEC (ref 11.7–14.1)
RBC # BLD AUTO: 4.46 M/UL (ref 4.21–5.77)
RBC #/AREA URNS HPF: ABNORMAL /HPF (ref 0–2)
SODIUM SERPL-SCNC: 133 MMOL/L (ref 135–144)
SP GR UR STRIP: 1.01 (ref 1.01–1.02)
TROPONIN I SERPL HS-MCNC: 41 NG/L (ref 0–22)
TROPONIN I SERPL HS-MCNC: 48 NG/L (ref 0–22)
TSH SERPL DL<=0.05 MIU/L-ACNC: 4.12 UIU/ML (ref 0.3–5)
UROBILINOGEN UR STRIP-ACNC: NORMAL EU/DL (ref 0–1)
WBC #/AREA URNS HPF: ABNORMAL /HPF (ref 0–5)
WBC OTHER # BLD: 8 K/UL (ref 3.5–11.3)

## 2024-04-13 PROCEDURE — 36415 COLL VENOUS BLD VENIPUNCTURE: CPT

## 2024-04-13 PROCEDURE — 74022 RADEX COMPL AQT ABD SERIES: CPT

## 2024-04-13 PROCEDURE — 6360000002 HC RX W HCPCS: Performed by: STUDENT IN AN ORGANIZED HEALTH CARE EDUCATION/TRAINING PROGRAM

## 2024-04-13 PROCEDURE — C9113 INJ PANTOPRAZOLE SODIUM, VIA: HCPCS | Performed by: EMERGENCY MEDICINE

## 2024-04-13 PROCEDURE — A4216 STERILE WATER/SALINE, 10 ML: HCPCS | Performed by: EMERGENCY MEDICINE

## 2024-04-13 PROCEDURE — 87086 URINE CULTURE/COLONY COUNT: CPT

## 2024-04-13 PROCEDURE — 71260 CT THORAX DX C+: CPT

## 2024-04-13 PROCEDURE — 83880 ASSAY OF NATRIURETIC PEPTIDE: CPT

## 2024-04-13 PROCEDURE — 85730 THROMBOPLASTIN TIME PARTIAL: CPT

## 2024-04-13 PROCEDURE — 82306 VITAMIN D 25 HYDROXY: CPT

## 2024-04-13 PROCEDURE — 2580000003 HC RX 258: Performed by: STUDENT IN AN ORGANIZED HEALTH CARE EDUCATION/TRAINING PROGRAM

## 2024-04-13 PROCEDURE — 99285 EMERGENCY DEPT VISIT HI MDM: CPT

## 2024-04-13 PROCEDURE — 83690 ASSAY OF LIPASE: CPT

## 2024-04-13 PROCEDURE — 6360000002 HC RX W HCPCS: Performed by: EMERGENCY MEDICINE

## 2024-04-13 PROCEDURE — 94761 N-INVAS EAR/PLS OXIMETRY MLT: CPT

## 2024-04-13 PROCEDURE — 82150 ASSAY OF AMYLASE: CPT

## 2024-04-13 PROCEDURE — 81001 URINALYSIS AUTO W/SCOPE: CPT

## 2024-04-13 PROCEDURE — 83605 ASSAY OF LACTIC ACID: CPT

## 2024-04-13 PROCEDURE — 1200000000 HC SEMI PRIVATE

## 2024-04-13 PROCEDURE — 84443 ASSAY THYROID STIM HORMONE: CPT

## 2024-04-13 PROCEDURE — 6370000000 HC RX 637 (ALT 250 FOR IP): Performed by: STUDENT IN AN ORGANIZED HEALTH CARE EDUCATION/TRAINING PROGRAM

## 2024-04-13 PROCEDURE — 6360000004 HC RX CONTRAST MEDICATION: Performed by: PHYSICIAN ASSISTANT

## 2024-04-13 PROCEDURE — 85610 PROTHROMBIN TIME: CPT

## 2024-04-13 PROCEDURE — 87040 BLOOD CULTURE FOR BACTERIA: CPT

## 2024-04-13 PROCEDURE — 93005 ELECTROCARDIOGRAM TRACING: CPT | Performed by: STUDENT IN AN ORGANIZED HEALTH CARE EDUCATION/TRAINING PROGRAM

## 2024-04-13 PROCEDURE — 80053 COMPREHEN METABOLIC PANEL: CPT

## 2024-04-13 PROCEDURE — 84484 ASSAY OF TROPONIN QUANT: CPT

## 2024-04-13 PROCEDURE — 2580000003 HC RX 258: Performed by: EMERGENCY MEDICINE

## 2024-04-13 PROCEDURE — 85025 COMPLETE CBC W/AUTO DIFF WBC: CPT

## 2024-04-13 PROCEDURE — 74177 CT ABD & PELVIS W/CONTRAST: CPT

## 2024-04-13 PROCEDURE — 2580000003 HC RX 258: Performed by: PHYSICIAN ASSISTANT

## 2024-04-13 RX ORDER — ALBUTEROL SULFATE 2.5 MG/3ML
2.5 SOLUTION RESPIRATORY (INHALATION) EVERY 6 HOURS PRN
Status: DISCONTINUED | OUTPATIENT
Start: 2024-04-13 | End: 2024-04-13

## 2024-04-13 RX ORDER — MAGNESIUM SULFATE IN WATER 40 MG/ML
2000 INJECTION, SOLUTION INTRAVENOUS PRN
Status: DISCONTINUED | OUTPATIENT
Start: 2024-04-13 | End: 2024-04-16 | Stop reason: HOSPADM

## 2024-04-13 RX ORDER — SODIUM CHLORIDE 0.9 % (FLUSH) 0.9 %
10 SYRINGE (ML) INJECTION EVERY 12 HOURS SCHEDULED
Status: DISCONTINUED | OUTPATIENT
Start: 2024-04-13 | End: 2024-04-16 | Stop reason: HOSPADM

## 2024-04-13 RX ORDER — PANTOPRAZOLE SODIUM 40 MG/1
40 TABLET, DELAYED RELEASE ORAL
Status: DISCONTINUED | OUTPATIENT
Start: 2024-04-16 | End: 2024-04-14

## 2024-04-13 RX ORDER — ATORVASTATIN CALCIUM 40 MG/1
40 TABLET, FILM COATED ORAL DAILY
Status: DISCONTINUED | OUTPATIENT
Start: 2024-04-13 | End: 2024-04-16 | Stop reason: HOSPADM

## 2024-04-13 RX ORDER — ACETAMINOPHEN 325 MG/1
650 TABLET ORAL EVERY 6 HOURS PRN
Status: DISCONTINUED | OUTPATIENT
Start: 2024-04-13 | End: 2024-04-16 | Stop reason: HOSPADM

## 2024-04-13 RX ORDER — ASPIRIN 81 MG/1
81 TABLET ORAL DAILY
Status: DISCONTINUED | OUTPATIENT
Start: 2024-04-13 | End: 2024-04-16 | Stop reason: HOSPADM

## 2024-04-13 RX ORDER — MULTIVITAMIN WITH IRON
1 TABLET ORAL DAILY
Status: DISCONTINUED | OUTPATIENT
Start: 2024-04-13 | End: 2024-04-16 | Stop reason: HOSPADM

## 2024-04-13 RX ORDER — ONDANSETRON 4 MG/1
4 TABLET, ORALLY DISINTEGRATING ORAL EVERY 8 HOURS PRN
Status: DISCONTINUED | OUTPATIENT
Start: 2024-04-13 | End: 2024-04-16 | Stop reason: HOSPADM

## 2024-04-13 RX ORDER — ENOXAPARIN SODIUM 100 MG/ML
40 INJECTION SUBCUTANEOUS DAILY
Status: DISCONTINUED | OUTPATIENT
Start: 2024-04-13 | End: 2024-04-15

## 2024-04-13 RX ORDER — POTASSIUM CHLORIDE 7.45 MG/ML
10 INJECTION INTRAVENOUS PRN
Status: DISCONTINUED | OUTPATIENT
Start: 2024-04-13 | End: 2024-04-16 | Stop reason: HOSPADM

## 2024-04-13 RX ORDER — ALBUTEROL SULFATE 2.5 MG/3ML
2.5 SOLUTION RESPIRATORY (INHALATION) 3 TIMES DAILY PRN
Status: DISCONTINUED | OUTPATIENT
Start: 2024-04-13 | End: 2024-04-16 | Stop reason: HOSPADM

## 2024-04-13 RX ORDER — BUMETANIDE 1 MG/1
1 TABLET ORAL DAILY
Status: DISCONTINUED | OUTPATIENT
Start: 2024-04-13 | End: 2024-04-15

## 2024-04-13 RX ORDER — METOPROLOL SUCCINATE 50 MG/1
50 TABLET, EXTENDED RELEASE ORAL DAILY
Status: DISCONTINUED | OUTPATIENT
Start: 2024-04-13 | End: 2024-04-16 | Stop reason: HOSPADM

## 2024-04-13 RX ORDER — SODIUM CHLORIDE 9 MG/ML
INJECTION, SOLUTION INTRAVENOUS CONTINUOUS
Status: DISCONTINUED | OUTPATIENT
Start: 2024-04-13 | End: 2024-04-15

## 2024-04-13 RX ORDER — ONDANSETRON 2 MG/ML
4 INJECTION INTRAMUSCULAR; INTRAVENOUS EVERY 6 HOURS PRN
Status: DISCONTINUED | OUTPATIENT
Start: 2024-04-13 | End: 2024-04-16 | Stop reason: HOSPADM

## 2024-04-13 RX ORDER — ACETAMINOPHEN 650 MG/1
650 SUPPOSITORY RECTAL EVERY 6 HOURS PRN
Status: DISCONTINUED | OUTPATIENT
Start: 2024-04-13 | End: 2024-04-16 | Stop reason: HOSPADM

## 2024-04-13 RX ORDER — SODIUM CHLORIDE 9 MG/ML
INJECTION, SOLUTION INTRAVENOUS PRN
Status: DISCONTINUED | OUTPATIENT
Start: 2024-04-13 | End: 2024-04-16 | Stop reason: HOSPADM

## 2024-04-13 RX ORDER — POTASSIUM CHLORIDE 20 MEQ/1
40 TABLET, EXTENDED RELEASE ORAL PRN
Status: DISCONTINUED | OUTPATIENT
Start: 2024-04-13 | End: 2024-04-16 | Stop reason: HOSPADM

## 2024-04-13 RX ORDER — MONTELUKAST SODIUM 10 MG/1
10 TABLET ORAL NIGHTLY
Status: DISCONTINUED | OUTPATIENT
Start: 2024-04-13 | End: 2024-04-16 | Stop reason: HOSPADM

## 2024-04-13 RX ORDER — 0.9 % SODIUM CHLORIDE 0.9 %
1000 INTRAVENOUS SOLUTION INTRAVENOUS ONCE
Status: DISCONTINUED | OUTPATIENT
Start: 2024-04-13 | End: 2024-04-13

## 2024-04-13 RX ORDER — TAMSULOSIN HYDROCHLORIDE 0.4 MG/1
0.4 CAPSULE ORAL EVERY MORNING
Status: DISCONTINUED | OUTPATIENT
Start: 2024-04-14 | End: 2024-04-16 | Stop reason: HOSPADM

## 2024-04-13 RX ORDER — 0.9 % SODIUM CHLORIDE 0.9 %
1000 INTRAVENOUS SOLUTION INTRAVENOUS ONCE
Status: COMPLETED | OUTPATIENT
Start: 2024-04-13 | End: 2024-04-13

## 2024-04-13 RX ORDER — SODIUM CHLORIDE 0.9 % (FLUSH) 0.9 %
10 SYRINGE (ML) INJECTION PRN
Status: DISCONTINUED | OUTPATIENT
Start: 2024-04-13 | End: 2024-04-16 | Stop reason: HOSPADM

## 2024-04-13 RX ORDER — POLYETHYLENE GLYCOL 3350 17 G/17G
17 POWDER, FOR SOLUTION ORAL DAILY PRN
Status: DISCONTINUED | OUTPATIENT
Start: 2024-04-13 | End: 2024-04-16 | Stop reason: HOSPADM

## 2024-04-13 RX ADMIN — PANTOPRAZOLE SODIUM 80 MG: 40 INJECTION, POWDER, FOR SOLUTION INTRAVENOUS at 18:10

## 2024-04-13 RX ADMIN — SODIUM CHLORIDE 1000 ML: 9 INJECTION, SOLUTION INTRAVENOUS at 12:59

## 2024-04-13 RX ADMIN — IOPAMIDOL 75 ML: 755 INJECTION, SOLUTION INTRAVENOUS at 14:04

## 2024-04-13 RX ADMIN — ATORVASTATIN CALCIUM 40 MG: 40 TABLET, FILM COATED ORAL at 20:55

## 2024-04-13 RX ADMIN — MUPIROCIN: 20 OINTMENT TOPICAL at 20:55

## 2024-04-13 RX ADMIN — SODIUM CHLORIDE: 9 INJECTION, SOLUTION INTRAVENOUS at 20:57

## 2024-04-13 RX ADMIN — SACUBITRIL AND VALSARTAN 1 TABLET: 24; 26 TABLET, FILM COATED ORAL at 20:55

## 2024-04-13 RX ADMIN — ENOXAPARIN SODIUM 40 MG: 100 INJECTION SUBCUTANEOUS at 20:55

## 2024-04-13 RX ADMIN — MONTELUKAST 10 MG: 10 TABLET, FILM COATED ORAL at 20:55

## 2024-04-13 RX ADMIN — PANTOPRAZOLE SODIUM 8 MG/HR: 40 INJECTION, POWDER, FOR SOLUTION INTRAVENOUS at 18:17

## 2024-04-13 RX ADMIN — SODIUM CHLORIDE 1000 ML: 9 INJECTION, SOLUTION INTRAVENOUS at 13:15

## 2024-04-13 ASSESSMENT — PAIN DESCRIPTION - FREQUENCY: FREQUENCY: CONTINUOUS

## 2024-04-13 ASSESSMENT — PAIN DESCRIPTION - LOCATION: LOCATION: THROAT

## 2024-04-13 ASSESSMENT — PAIN - FUNCTIONAL ASSESSMENT: PAIN_FUNCTIONAL_ASSESSMENT: 0-10

## 2024-04-13 ASSESSMENT — PAIN DESCRIPTION - ORIENTATION: ORIENTATION: POSTERIOR

## 2024-04-13 ASSESSMENT — PAIN DESCRIPTION - DESCRIPTORS: DESCRIPTORS: BURNING

## 2024-04-13 ASSESSMENT — PAIN DESCRIPTION - PAIN TYPE: TYPE: ACUTE PAIN

## 2024-04-13 NOTE — H&P
portion of the urinary bladder on the right and fat on the left. Nonobstructive left renal calculus. Atherosclerosis. Mild gallbladder wall thickening. Correlation with right upper quadrant ultrasound is recommended.     XR ACUTE ABD SERIES CHEST 1 VW    Result Date: 4/13/2024  EXAMINATION: TWO X-RAY VIEWS OF THE ABDOMEN AND SINGLE  X-RAY VIEW OF THE CHEST 4/13/2024 12:48 pm COMPARISON: 06/05/2023 HISTORY: ORDERING SYSTEM PROVIDED HISTORY:  GERD TECHNOLOGIST PROVIDED HISTORY: GERD FINDINGS: A pacemaker/defibrillator device is in place.  The cardiac silhouette is mildly enlarged.  There is no pneumothorax or edema.  Bibasilar atelectasis or scarring is noted.  The bones are demineralized. There is a paucity of small bowel gas which is a nonspecific pattern.  There is no evidence of free intraperitoneal air.  Air is noted in the colon.  No abnormal calcifications are appreciated.     An acute process is not identified.     Cardiac procedure    Result Date: 3/24/2024  - Coronary Angiography Brief Post Operative Note: Severe single vessel coronary artery disease involving a mid 100% stenosis in the left anterior descending coronary artery. Normal left ventricular end diastolic pressure. No significant left to left or right to left collateralization was visualized. Proceed with aggressive maximal medical management as clinically indicated.         ASSESSMENT:       Principal Problem:    Hypotension  Active Problems:    BPH with obstruction/lower urinary tract symptoms    Diabetes mellitus (HCC)    Hypertension    Arterial hypotension    Urinary tract infection associated with catheterization of urinary tract - self cath daily    Urinary retention  Resolved Problems:    * No resolved hospital problems. *      PLAN:     Patient status: Admit the patient    MEDICAL DECISION MAKING:    Primary Problem(s): Hypotension  Condition is improving  Treatment plan: Continue current treatment  Imaging: no further imaging studies  unknown

## 2024-04-13 NOTE — RT PROTOCOL NOTE
deteriorates.  HHN AEROSOL THERAPY with  [physician-ordered bronchodilator(s)]  QID and Albuterol PRN q4 hrs.   Breath-Actuated Neb if BBS Acuity = 4, and pt. can use MP.  Notify physician if condition deteriorates. MDI THERAPY with  2 actuations of [physician-ordered bronchodilator(s)] via spacer TID Albuterol and PRN q4 hrs.   If unable to utilize MDI: HHN [physician-ordered bronchodilator(s)] TID and Albuterol PRN q4 hrs.   Notify physician if condition deteriorates. MDI THERAPY with  [physician-ordered bronchodilator(s)] via spacer TID PRN.   If unable to utilize MDI: HHN [physician-ordered bronchodilator(s)] TID PRN.   Notify physician if condition deteriorates.         If Acuity Level is 2, 3, or 4 in any of the following:    [] COUGH     [] SURGICAL HISTORY (SURG HX)  [x] CHEST XRAY (CXR)    Goal: Improvement in sputum mobilization in patients with ineffective airway clearance. Reverse atelectasis.    [x] Bronchopulmonary Hygiene Protocol      Total Acuity:   14-28  []  Secondary Assessment in 24 hrs Total Acuity:  9-13  []  Secondary Assessment in 24 hrs Total Acuity:  4-8  []  Secondary Assessment in 24 hrs Total Acuity:  0-3  [x]  Secondary Assessment in 48 hrs   METANEB QID with [physician-ordered bronchodilator(s)] if CXR Acuity = 4; otherwise:  PD&P, Oscillatory Therapy, or Vest QID & PRN AND PEP QID & PRN  NT Sxn PRN for ineffective cough  METANEB QID with [physician-ordered bronchodilator(s)] if CXR Acuity = 4; otherwise:  PD&P, Oscillatory Therapy or Vest QID & PRN AND PEP QID & PRN  NT Sxn PRN for ineffective cough  PD&P, Oscillatory Therapy, or Vest TID & PRN AND PEP TID & PRN   Instruct patient to self-perform IS q1hr WA       If Acuity Level is 2 or above in the following:    [x] PULMONARY HISTORY (PULM HX)    Goal: Assist patient in quitting smoking to slow or stop the progression of lung disease.    [] Smoking Cessation Protocol    SMOKING CESSATION EDUCATION provided according to policy

## 2024-04-13 NOTE — PROGRESS NOTES
Patient arrived to [facility], room 310, at this time via cart. Patient was transferred/pulled over to the hospital bed via x4 nurses. Patient is alert and orientated and on room air. Weight was obtained.     Fernandes in at this time. Fernandes was put in ER d/t urinary retention of over 1000ml. Patient does straight cath in the evening at home and already sees Dr. Patton.

## 2024-04-14 LAB
25(OH)D3 SERPL-MCNC: 30.8 NG/ML (ref 30–100)
ALBUMIN SERPL-MCNC: 3.8 G/DL (ref 3.5–5.2)
ALBUMIN/GLOB SERPL: 1.2 {RATIO} (ref 1–2.5)
ALP SERPL-CCNC: 94 U/L (ref 40–129)
ALT SERPL-CCNC: 18 U/L (ref 5–41)
ANION GAP SERPL CALCULATED.3IONS-SCNC: 12 MMOL/L (ref 9–17)
AST SERPL-CCNC: 19 U/L
BASOPHILS # BLD: 0.05 K/UL (ref 0–0.2)
BASOPHILS NFR BLD: 1 % (ref 0–2)
BILIRUB SERPL-MCNC: 0.5 MG/DL (ref 0.3–1.2)
BUN SERPL-MCNC: 20 MG/DL (ref 8–23)
BUN/CREAT SERPL: 13 (ref 9–20)
CALCIUM SERPL-MCNC: 8.9 MG/DL (ref 8.6–10.4)
CHLORIDE SERPL-SCNC: 106 MMOL/L (ref 98–107)
CO2 SERPL-SCNC: 19 MMOL/L (ref 20–31)
CREAT SERPL-MCNC: 1.6 MG/DL (ref 0.7–1.2)
EKG ATRIAL RATE: 64 BPM
EKG P AXIS: 54 DEGREES
EKG P-R INTERVAL: 334 MS
EKG Q-T INTERVAL: 404 MS
EKG QRS DURATION: 134 MS
EKG QTC CALCULATION (BAZETT): 416 MS
EKG R AXIS: -68 DEGREES
EKG T AXIS: 45 DEGREES
EKG VENTRICULAR RATE: 64 BPM
EOSINOPHIL # BLD: 0.45 K/UL (ref 0–0.44)
EOSINOPHILS RELATIVE PERCENT: 7 % (ref 1–4)
ERYTHROCYTE [DISTWIDTH] IN BLOOD BY AUTOMATED COUNT: 13.7 % (ref 11.8–14.4)
FOLATE SERPL-MCNC: >20 NG/ML (ref 4.8–24.2)
GFR SERPL CREATININE-BSD FRML MDRD: 43 ML/MIN/1.73M2
GLUCOSE SERPL-MCNC: 77 MG/DL (ref 70–99)
HCT VFR BLD AUTO: 41.6 % (ref 40.7–50.3)
HGB BLD-MCNC: 13.7 G/DL (ref 13–17)
IMM GRANULOCYTES # BLD AUTO: 0.03 K/UL (ref 0–0.3)
IMM GRANULOCYTES NFR BLD: 1 %
LYMPHOCYTES NFR BLD: 2.13 K/UL (ref 1.1–3.7)
LYMPHOCYTES RELATIVE PERCENT: 33 % (ref 24–43)
MCH RBC QN AUTO: 31.9 PG (ref 25.2–33.5)
MCHC RBC AUTO-ENTMCNC: 32.9 G/DL (ref 28.4–34.8)
MCV RBC AUTO: 97 FL (ref 82.6–102.9)
MONOCYTES NFR BLD: 0.64 K/UL (ref 0.1–1.2)
MONOCYTES NFR BLD: 10 % (ref 3–12)
NEUTROPHILS NFR BLD: 48 % (ref 36–65)
NEUTS SEG NFR BLD: 3.08 K/UL (ref 1.5–8.1)
NRBC BLD-RTO: 0 PER 100 WBC
PLATELET # BLD AUTO: 200 K/UL (ref 138–453)
PMV BLD AUTO: 9.8 FL (ref 8.1–13.5)
POTASSIUM SERPL-SCNC: 5.3 MMOL/L (ref 3.7–5.3)
PROT SERPL-MCNC: 6.9 G/DL (ref 6.4–8.3)
RBC # BLD AUTO: 4.29 M/UL (ref 4.21–5.77)
SODIUM SERPL-SCNC: 137 MMOL/L (ref 135–144)
VIT B12 SERPL-MCNC: 258 PG/ML (ref 232–1245)
WBC OTHER # BLD: 6.4 K/UL (ref 3.5–11.3)

## 2024-04-14 PROCEDURE — 82746 ASSAY OF FOLIC ACID SERUM: CPT

## 2024-04-14 PROCEDURE — A4216 STERILE WATER/SALINE, 10 ML: HCPCS | Performed by: STUDENT IN AN ORGANIZED HEALTH CARE EDUCATION/TRAINING PROGRAM

## 2024-04-14 PROCEDURE — 93010 ELECTROCARDIOGRAM REPORT: CPT | Performed by: FAMILY MEDICINE

## 2024-04-14 PROCEDURE — 94761 N-INVAS EAR/PLS OXIMETRY MLT: CPT

## 2024-04-14 PROCEDURE — 82607 VITAMIN B-12: CPT

## 2024-04-14 PROCEDURE — 6360000002 HC RX W HCPCS: Performed by: STUDENT IN AN ORGANIZED HEALTH CARE EDUCATION/TRAINING PROGRAM

## 2024-04-14 PROCEDURE — 97166 OT EVAL MOD COMPLEX 45 MIN: CPT

## 2024-04-14 PROCEDURE — 97162 PT EVAL MOD COMPLEX 30 MIN: CPT

## 2024-04-14 PROCEDURE — C9113 INJ PANTOPRAZOLE SODIUM, VIA: HCPCS | Performed by: STUDENT IN AN ORGANIZED HEALTH CARE EDUCATION/TRAINING PROGRAM

## 2024-04-14 PROCEDURE — 85025 COMPLETE CBC W/AUTO DIFF WBC: CPT

## 2024-04-14 PROCEDURE — 2580000003 HC RX 258: Performed by: STUDENT IN AN ORGANIZED HEALTH CARE EDUCATION/TRAINING PROGRAM

## 2024-04-14 PROCEDURE — 6370000000 HC RX 637 (ALT 250 FOR IP): Performed by: STUDENT IN AN ORGANIZED HEALTH CARE EDUCATION/TRAINING PROGRAM

## 2024-04-14 PROCEDURE — 1200000000 HC SEMI PRIVATE

## 2024-04-14 PROCEDURE — 80053 COMPREHEN METABOLIC PANEL: CPT

## 2024-04-14 PROCEDURE — 99221 1ST HOSP IP/OBS SF/LOW 40: CPT | Performed by: SPECIALIST

## 2024-04-14 PROCEDURE — 94664 DEMO&/EVAL PT USE INHALER: CPT

## 2024-04-14 PROCEDURE — 36415 COLL VENOUS BLD VENIPUNCTURE: CPT

## 2024-04-14 RX ORDER — PANTOPRAZOLE SODIUM 40 MG/1
40 TABLET, DELAYED RELEASE ORAL
Status: DISCONTINUED | OUTPATIENT
Start: 2024-04-16 | End: 2024-04-16 | Stop reason: HOSPADM

## 2024-04-14 RX ADMIN — ATORVASTATIN CALCIUM 40 MG: 40 TABLET, FILM COATED ORAL at 08:13

## 2024-04-14 RX ADMIN — MONTELUKAST 10 MG: 10 TABLET, FILM COATED ORAL at 20:09

## 2024-04-14 RX ADMIN — TAMSULOSIN HYDROCHLORIDE 0.4 MG: 0.4 CAPSULE ORAL at 08:13

## 2024-04-14 RX ADMIN — METFORMIN HYDROCHLORIDE 850 MG: 850 TABLET ORAL at 08:12

## 2024-04-14 RX ADMIN — PANTOPRAZOLE SODIUM 40 MG: 40 INJECTION, POWDER, FOR SOLUTION INTRAVENOUS at 08:28

## 2024-04-14 RX ADMIN — ENOXAPARIN SODIUM 40 MG: 100 INJECTION SUBCUTANEOUS at 08:13

## 2024-04-14 RX ADMIN — SACUBITRIL AND VALSARTAN 1 TABLET: 24; 26 TABLET, FILM COATED ORAL at 20:09

## 2024-04-14 RX ADMIN — MUPIROCIN: 20 OINTMENT TOPICAL at 08:41

## 2024-04-14 RX ADMIN — SODIUM CHLORIDE: 9 INJECTION, SOLUTION INTRAVENOUS at 08:40

## 2024-04-14 RX ADMIN — MULTIVITAMIN TABLET 1 TABLET: TABLET at 08:13

## 2024-04-14 RX ADMIN — SACUBITRIL AND VALSARTAN 1 TABLET: 24; 26 TABLET, FILM COATED ORAL at 08:13

## 2024-04-14 RX ADMIN — ASPIRIN 81 MG: 81 TABLET, COATED ORAL at 08:13

## 2024-04-14 RX ADMIN — METFORMIN HYDROCHLORIDE 850 MG: 850 TABLET ORAL at 17:02

## 2024-04-14 NOTE — PROGRESS NOTES
Patient has no significant complaints since placement of the Fernandes catheter his nighttime symptoms of eructation have diminished he is in good spirits presently has bilateral lymphedema presses on both lower extremities  Vital signs are stable he is afebrile  Chest clear to auscultation percussion  Cardiovascular regular rate and rhythm  Abdomen is soft without tenderness no guarding or rebound  Neurologically without focal findings  Laboratories are stable    Assessment and plan patient's of irritation have passed however if this is a problem upon discharge I would recommend elevating the head of his bed 6 inches continuing the proton pump inhibitor and you can consider the addition of Reglan for gastric emptying surgery is going to sign off the case at this point

## 2024-04-14 NOTE — PLAN OF CARE
Problem: Discharge Planning  Goal: Discharge to home or other facility with appropriate resources  Outcome: Progressing     Problem: Safety - Adult  Goal: Free from fall injury  Outcome: Progressing     Problem: ABCDS Injury Assessment  Goal: Absence of physical injury  Outcome: Progressing     Problem: Skin/Tissue Integrity  Goal: Absence of new skin breakdown  Description: 1.  Monitor for areas of redness and/or skin breakdown  2.  Assess vascular access sites hourly  3.  Every 4-6 hours minimum:  Change oxygen saturation probe site  4.  Every 4-6 hours:  If on nasal continuous positive airway pressure, respiratory therapy assess nares and determine need for appliance change or resting period.  Outcome: Progressing     Problem: Cardiovascular - Adult  Goal: Maintains optimal cardiac output and hemodynamic stability  Outcome: Progressing     Problem: Gastrointestinal - Adult  Goal: Maintains or returns to baseline bowel function  Outcome: Progressing     Problem: Genitourinary - Adult  Goal: Absence of urinary retention  Outcome: Progressing  Goal: Urinary catheter remains patent  Outcome: Progressing

## 2024-04-14 NOTE — PROGRESS NOTES
Wounds dressed at this time. Photos obtained of wounds. Photos displayed below.       Inner bend of right elbow.      Buttocks      Left heel      Right foor 5th digit

## 2024-04-14 NOTE — PROGRESS NOTES
Spoke with Dr. George at this time about general surgery consult on telephone. Dr. George acknowledged.

## 2024-04-14 NOTE — RT PROTOCOL NOTE
RESPIRATORY ASSESSMENT PROTOCOL                                                                                              Patient Name: Moise Diallo Room#: 0310/0310-01 : 1940     Admitting diagnosis: Urinary retention [R33.9]  Hypotension [I95.9]  Gastritis and duodenitis [K29.90]  Bilateral recurrent inguinal hernia without obstruction or gangrene [K40.21]       Medical History:   Past Medical History:   Diagnosis Date    Acute renal failure superimposed on stage 3 chronic kidney disease (HCC) 2017    AICD (automatic cardioverter/defibrillator) present     Arthritis     CHF (congestive heart failure) (HCC)     Diabetes mellitus (HCC)     Hyperlipidemia     Hypertension     Kidney stone        PATIENT ASSESSMENT    LABORATORY DATA  Hematology:   Lab Results   Component Value Date/Time    WBC 6.4 2024 05:30 AM    RBC 4.29 2024 05:30 AM    HGB 13.7 2024 05:30 AM    HCT 41.6 2024 05:30 AM     2024 05:30 AM     Chemistry:    Lab Results   Component Value Date/Time    PHART 7.343 2023 02:36 PM    KUP4UPO 37.9 2023 02:36 PM    PO2ART 146.0 2023 02:36 PM    Q8XABOUI 98.7 2023 02:36 PM    HRF6LVG 20.0 2023 02:36 PM    NBEA 4.7 2023 02:36 PM       VITALS  Pulse: 87   Respirations: 18  BP: (!) 160/88  SpO2: 97 % O2 Device: None (Room air)  Temp: 97.9 °F (36.6 °C)    SKIN COLOR  [x] Normal  [] Pale  [] Dusky  [] Cyanotic    RESPIRATORY PATTERN  [x] Normal  [] Dyspnea  [] Cheyne-Lowe  [] Kussmaul  [] Biots    AMBULATORY  [] Yes  [] No  [x] With Assistance          Patient Acuity 0 1 2 3 4 Score   Level of Consciousness (LOC) [x]  Alert & Oriented or Pt normal LOC []  Confused;follows directions []  Confused & uncooper-ative []  Obtunded []  Comatose 0   Respiratory Rate  (RR) [x]  Reg. rate & pattern. 12 - 20 bpm  []  Increased RR. Greater than 20 bpm   []  SOB w/ exertion or RR greater than 24 bpm []  Access- ory muscle use at rest.  Abn.  resp. []  SOB at rest.   0   Bilateral Breath Sounds (BBS) [x]  Clear []  Diminish-ed bases  []  Diminish-ed t/o, or rales   []  Sporadic, scattered wheezes or rhonchi []  Persistentwheezes and, or absent BBS 0   Cough [x]  Strong, effective, & non-prod. []  Effective & prod. Less than 25 ml (2 TBSP) over past 24 hrs []  Ineffective & non-prod to less than 25 ML over past 24 hrs []  Ineffective and, or greater than 25 ml sputum prod. past 24 hrs. []  Nonspon- taneous; Requires suctioning 0   Pulmonary History  (PULM HX) []  No smoking and no chronic pulmonary history [x]  Former smoker. Quit over 12 mos. ago []  Current smoker or quit w/ in 12 mos []  Pulm. History and, or 20 pk/yr smoking hx []  Admitted w/ acute pulm. dx and, or has been admitted w/ pulm. dx 2 or more times over past 12 mos 1   Surgical History this Admit  (SURG HX) [x]  No surgery []  General surgery []  Lower abdominal []  Thoracic or upper abdominal   []  Thoracic w/ pulm. disease 0   Chest X-Ray (CXR)/CT Scan []  Clear or not applicable []  Not available [x]  Atelectasis or pleural effusions []  Localized infiltrate or pulm. edema []  Con-solidated Infiltrates, bilateral, or in more than 1 lobe 2   TOTAL ACUITY: 3       CARE PLAN    If Acuity Level is 2, 3, or 4 in any of the following:    [] BILATERAL BREATH SOUNDS (BBS)     [x] PULMONARY HISTORY (PULM HX)  [] Respiratory Rate  (RR)    Goal: Improve respiratory functions in patients with airway disease and decrease WOB    [x] AEROSOL PROTOCOL    Total Acuity:   14-28  []  Secondary Assessment in 24 hrs Total Acuity:  9-13  []  Secondary Assessment in 24 hrs Total Acuity:  4-8  []  Secondary Assessment in 24 hrs Total Acuity:  0-3  [x]  Secondary Assessment in 48 hrs   HHN AEROSOL THERAPY with  [physician-ordered bronchodilator(s)] q 4 & Albuterol PRN q2 hrs.   Breath-Actuated Neb if BBS Acuity = 4, and pt. can use MP. Notify physician if condition deteriorates.  HHN AEROSOL THERAPY

## 2024-04-14 NOTE — PROGRESS NOTES
Physical Therapy  Facility/Department: [facility] MED SURG  Physical Therapy Initial Assessment    Name: Moise Diallo  : 1940  MRN: [id number]  Date of Service: 2024    Discharge Recommendations:  Continue to assess pending progress, Subacute/Skilled Nursing Facility, Long Term Care with PT          Patient Diagnosis(es): The primary encounter diagnosis was Gastritis and duodenitis. Diagnoses of Urinary retention and Bilateral recurrent inguinal hernia without obstruction or gangrene were also pertinent to this visit.  Past Medical History:  has a past medical history of Acute renal failure superimposed on stage 3 chronic kidney disease (HCC), AICD (automatic cardioverter/defibrillator) present, Arthritis, CHF (congestive heart failure) ([facility]), Diabetes mellitus (HCC), Hyperlipidemia, Hypertension, and Kidney stone.  Past Surgical History:  has a past surgical history that includes hernia repair; Cataract removal with implant (Right, 2013); knee surgery (Right); joint replacement; joint replacement (Right, 2014 partial knee); joint replacement (Right, ); TURP (2017); TURP (2015); Lithotripsy (Right, 2017); pr cysto w/ureteroscopy w/lithotripsy (Right, 2017); CYSTOSCOPY INSERTION / REMOVAL STENT / STONE (Right, 2017); cystourethroscopy (2017); CYSTOSCOPY INSERTION / REMOVAL STENT / STONE (N/A, 2017); Lithotripsy (Left, 2018); pr lithotripsy xtrcorp shock wave (Left, 2018); Intracapsular cataract extraction (Left, 2020); vascular surgery (Left, 2023); vascular surgery (Right, 2023); Femoral-tibial Bypass Graft (Left, 2023); femoral bypass (Left, 2023); Cardiac catheterization (Left, 2024); and Cardiac procedure (N/A, 3/22/2024).    Assessment   Assessment: Patient is 84 year old male with dx of urinary retention who presents with decreased B LE strength, decreased functional mobility and endurance and decreased  safety and balance during transfers; inability to ambulate d/t weakness and poor balance. Patient to benefit from physical therapy to address all concerns and decrease fall risk.  Treatment Diagnosis: General weakness  Specific Instructions for Next Treatment: Once daily on weekends  Therapy Prognosis: Good  Decision Making: Medium Complexity  Requires PT Follow-Up: Yes  Activity Tolerance  Activity Tolerance: Patient tolerated evaluation without incident;Patient limited by fatigue;Patient limited by endurance     Plan   Physical Therapy Plan  General Plan: 2 times a day 7 days a week  Specific Instructions for Next Treatment: Once daily on weekends  Current Treatment Recommendations: Strengthening, ROM, Balance training, Functional mobility training, Transfer training, Neuromuscular re-education, Gait training, Home exercise program, Safety education & training, Patient/Caregiver education & training, Manual, Endurance training, Therapeutic activities, Wheelchair mobility training  Safety Devices  Type of Devices: All rui prominences offloaded, Patient at risk for falls, All fall risk precautions in place, Left in bed, Bed alarm in place, Call light within reach, Nurse notified, Gait belt     Restrictions  Restrictions/Precautions  Restrictions/Precautions: General Precautions, Fall Risk, Contact Precautions     Subjective   General  Chart Reviewed: Yes  Patient assessed for rehabilitation services?: Yes  Response To Previous Treatment: Not applicable  Family / Caregiver Present: No  Referring Practitioner: Dr. Matthew MD  Referral Date : 04/13/24  Diagnosis: Urinary retention, R33.9  Follows Commands: Within Functional Limits  Subjective  Subjective: Pt agrees to PT eval         Social/Functional History  Social/Functional History  Lives With: Spouse  Type of Home: House  Home Layout: Two level, Able to Live on Main level with bedroom/bathroom  Home Access: Ramped entrance  Home Equipment: Wheelchair-electric,

## 2024-04-14 NOTE — PROGRESS NOTES
mg Oral Daily Tarun Castro MD        [Held by provider] bumetanide (BUMEX) tablet 1 mg  1 mg Oral Daily Tarun Castro MD        montelukast (SINGULAIR) tablet 10 mg  10 mg Oral Nightly Tarun Castro MD   10 mg at 04/13/24 2055    multivitamin 1 tablet  1 tablet Oral Daily Tarun Castro MD        sacubitril-valsartan (ENTRESTO) 24-26 MG per tablet 1 tablet  1 tablet Oral BID Tarun Castro MD   1 tablet at 04/13/24 2055    tamsulosin (FLOMAX) capsule 0.4 mg  0.4 mg Oral QAM Tarun Castro MD        albuterol (PROVENTIL) (2.5 MG/3ML) 0.083% nebulizer solution 2.5 mg  2.5 mg Nebulization TID PRN Tarun Castro MD        mupirocin (BACTROBAN) 2 % ointment   Topical BID Tarun Castro MD   Given at 04/13/24 2055       ASSESSMENT:     Principal Problem:    Hypotension  Active Problems:    BPH with obstruction/lower urinary tract symptoms    Diabetes mellitus (HCC)    Hypertension    Arterial hypotension    Urinary tract infection associated with catheterization of urinary tract - self cath daily    Urinary retention  Resolved Problems:    * No resolved hospital problems. *      PLAN:     Primary Problem(s): Hypotension  Condition is stable  Treatment plan: Continue current treatment  Imaging: no further imaging studies ordered today  Medications: Continue current medications  Medication Monitoring / High Risk Medications: none   Cardiology consult  GS consult/management appreciated  Urology consult is pending  Monitor UO/renal function, labs ordered  IV protonix for now  Bumex/Toprol held for now, consideration to resume soon  IVF for now  Dispo pending / SW      DVT prophylaxis:  lovenox  GI prophylaxis:  ppi    Above plan discussed with the patient who agreed to the above plan     Discussed care plan with nurse after getting their input.    Please note that this chart was generated using voice recognition Dragon dictation software. Although every effort was made to ensure the accuracy of this automated

## 2024-04-14 NOTE — PROGRESS NOTES
Writer spoke with Dr. Patton's answering service at this time. Gave information for routine consult to be given to Dr. Patton.

## 2024-04-14 NOTE — PROGRESS NOTES
Occupational Therapy  Facility/Department: [facility] MED SURG  Occupational Therapy Initial Assessment    Name: Moise Diallo  : 1940  MRN: [id number]  Date of Service: 2024    Discharge Recommendations:  Subacute/Skilled Nursing Facility          Patient Diagnosis(es): The primary encounter diagnosis was Gastritis and duodenitis. Diagnoses of Urinary retention and Bilateral recurrent inguinal hernia without obstruction or gangrene were also pertinent to this visit.  Past Medical History:  has a past medical history of Acute renal failure superimposed on stage 3 chronic kidney disease (HCC), AICD (automatic cardioverter/defibrillator) present, Arthritis, CHF (congestive heart failure) ([facility]), Diabetes mellitus ([facility]), Hyperlipidemia, Hypertension, and Kidney stone.  Past Surgical History:  has a past surgical history that includes hernia repair; Cataract removal with implant (Right, 2013); knee surgery (Right); joint replacement; joint replacement (Right, 2014 partial knee); joint replacement (Right, ); TURP (2017); TURP (2015); Lithotripsy (Right, 2017); pr cysto w/ureteroscopy w/lithotripsy (Right, 2017); CYSTOSCOPY INSERTION / REMOVAL STENT / STONE (Right, 2017); cystourethroscopy (2017); CYSTOSCOPY INSERTION / REMOVAL STENT / STONE (N/A, 2017); Lithotripsy (Left, 2018); pr lithotripsy xtrcorp shock wave (Left, 2018); Intracapsular cataract extraction (Left, 2020); vascular surgery (Left, 2023); vascular surgery (Right, 2023); Femoral-tibial Bypass Graft (Left, 2023); femoral bypass (Left, 2023); Cardiac catheterization (Left, 2024); and Cardiac procedure (N/A, 3/22/2024).           Assessment   Performance deficits / Impairments: Decreased functional mobility ;Decreased ADL status;Decreased strength;Decreased ROM;Decreased safe awareness;Decreased endurance;Decreased sensation;Decreased balance;Decreased

## 2024-04-14 NOTE — CONSULTS
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 and a recent nuclear medicine stress test which showed a permanent defect associated with this severe stenosis with minimal amount of reversible ischemia according to the patient there is no plans to address this defect in the future patient was recently treated for a dehiscence of a laceration on the right medial aspect of his elbow which subsequently grew out methicillin-resistant Staph aureus    Patient patient presented to the emergency room [facility] because he had severe burning in his throat and a general feeling of Medi-Lice and lethargy patient reportedly had a single episode of hypotension which responded to crystalloid resuscitation a Fernandes catheter was placed and patient was found to have 1800 cc of retained urine I believe that I was asked to see the patient because on CT of the abdomen pelvis the radiologist felt that there was thickening of the first portion of the duodenum consistent with possible duodenitis and radiographic abnormality of the gallbladder    The patient denies any dysphagia substernal chest pain or pressure he states that he is able to tolerate both solids and liquids the burning sensation is usually made better by standing upright throughout the day and becomes more severe at night when he lies down he did not denies any upper abdominal pain no indigestion bloating or fatty food intolerance no history of jaundice lg colored stools or dark-colored urine    Past medical history  1.  Obesity  2.  Diabetes  3.  Hypertension  4.  Atherosclerotic heart disease  5.  Chronic renal failure  6.  Hyperlipidemia  7.  History of kidney stones  8.  Urinary retention requiring nightly self-catheterization  9.  History of methicillin-resistant Staph aureus  10.  Cardiac

## 2024-04-15 PROBLEM — I20.9 ANGINA, CLASS III (HCC): Status: ACTIVE | Noted: 2024-04-15

## 2024-04-15 PROBLEM — I25.10 ASHD (ARTERIOSCLEROTIC HEART DISEASE): Status: ACTIVE | Noted: 2024-04-15

## 2024-04-15 PROBLEM — K29.90 GASTRITIS AND DUODENITIS: Status: ACTIVE | Noted: 2024-04-15

## 2024-04-15 LAB
ALBUMIN SERPL-MCNC: 3.6 G/DL (ref 3.5–5.2)
ALBUMIN/GLOB SERPL: 1.2 {RATIO} (ref 1–2.5)
ALP SERPL-CCNC: 89 U/L (ref 40–129)
ALT SERPL-CCNC: 21 U/L (ref 5–41)
ANION GAP SERPL CALCULATED.3IONS-SCNC: 12 MMOL/L (ref 9–17)
AST SERPL-CCNC: 23 U/L
BASOPHILS # BLD: 0.04 K/UL (ref 0–0.2)
BASOPHILS NFR BLD: 1 % (ref 0–2)
BILIRUB SERPL-MCNC: 0.5 MG/DL (ref 0.3–1.2)
BUN SERPL-MCNC: 24 MG/DL (ref 8–23)
BUN/CREAT SERPL: 13 (ref 9–20)
CALCIUM SERPL-MCNC: 8.8 MG/DL (ref 8.6–10.4)
CHLORIDE SERPL-SCNC: 107 MMOL/L (ref 98–107)
CO2 SERPL-SCNC: 19 MMOL/L (ref 20–31)
CREAT SERPL-MCNC: 1.8 MG/DL (ref 0.7–1.2)
EOSINOPHIL # BLD: 0.59 K/UL (ref 0–0.44)
EOSINOPHILS RELATIVE PERCENT: 8 % (ref 1–4)
ERYTHROCYTE [DISTWIDTH] IN BLOOD BY AUTOMATED COUNT: 13.8 % (ref 11.8–14.4)
GFR SERPL CREATININE-BSD FRML MDRD: 37 ML/MIN/1.73M2
GLUCOSE SERPL-MCNC: 100 MG/DL (ref 70–99)
HCT VFR BLD AUTO: 39.9 % (ref 40.7–50.3)
HGB BLD-MCNC: 13.5 G/DL (ref 13–17)
IMM GRANULOCYTES # BLD AUTO: <0.03 K/UL (ref 0–0.3)
IMM GRANULOCYTES NFR BLD: 0 %
LYMPHOCYTES NFR BLD: 2.51 K/UL (ref 1.1–3.7)
LYMPHOCYTES RELATIVE PERCENT: 33 % (ref 24–43)
MCH RBC QN AUTO: 32.5 PG (ref 25.2–33.5)
MCHC RBC AUTO-ENTMCNC: 33.8 G/DL (ref 28.4–34.8)
MCV RBC AUTO: 95.9 FL (ref 82.6–102.9)
MICROORGANISM SPEC CULT: NO GROWTH
MONOCYTES NFR BLD: 0.76 K/UL (ref 0.1–1.2)
MONOCYTES NFR BLD: 10 % (ref 3–12)
NEUTROPHILS NFR BLD: 48 % (ref 36–65)
NEUTS SEG NFR BLD: 3.71 K/UL (ref 1.5–8.1)
NRBC BLD-RTO: 0 PER 100 WBC
PLATELET # BLD AUTO: 207 K/UL (ref 138–453)
PMV BLD AUTO: 9.6 FL (ref 8.1–13.5)
POTASSIUM SERPL-SCNC: 4.9 MMOL/L (ref 3.7–5.3)
PROT SERPL-MCNC: 6.6 G/DL (ref 6.4–8.3)
RBC # BLD AUTO: 4.16 M/UL (ref 4.21–5.77)
SODIUM SERPL-SCNC: 138 MMOL/L (ref 135–144)
SPECIMEN DESCRIPTION: NORMAL
WBC OTHER # BLD: 7.6 K/UL (ref 3.5–11.3)

## 2024-04-15 PROCEDURE — 6360000002 HC RX W HCPCS: Performed by: STUDENT IN AN ORGANIZED HEALTH CARE EDUCATION/TRAINING PROGRAM

## 2024-04-15 PROCEDURE — 36415 COLL VENOUS BLD VENIPUNCTURE: CPT

## 2024-04-15 PROCEDURE — 85025 COMPLETE CBC W/AUTO DIFF WBC: CPT

## 2024-04-15 PROCEDURE — A4216 STERILE WATER/SALINE, 10 ML: HCPCS | Performed by: STUDENT IN AN ORGANIZED HEALTH CARE EDUCATION/TRAINING PROGRAM

## 2024-04-15 PROCEDURE — 97110 THERAPEUTIC EXERCISES: CPT

## 2024-04-15 PROCEDURE — C9113 INJ PANTOPRAZOLE SODIUM, VIA: HCPCS | Performed by: STUDENT IN AN ORGANIZED HEALTH CARE EDUCATION/TRAINING PROGRAM

## 2024-04-15 PROCEDURE — 99222 1ST HOSP IP/OBS MODERATE 55: CPT | Performed by: FAMILY MEDICINE

## 2024-04-15 PROCEDURE — 80053 COMPREHEN METABOLIC PANEL: CPT

## 2024-04-15 PROCEDURE — 94761 N-INVAS EAR/PLS OXIMETRY MLT: CPT

## 2024-04-15 PROCEDURE — 1200000000 HC SEMI PRIVATE

## 2024-04-15 PROCEDURE — 6370000000 HC RX 637 (ALT 250 FOR IP): Performed by: STUDENT IN AN ORGANIZED HEALTH CARE EDUCATION/TRAINING PROGRAM

## 2024-04-15 PROCEDURE — 97535 SELF CARE MNGMENT TRAINING: CPT

## 2024-04-15 PROCEDURE — 2580000003 HC RX 258: Performed by: STUDENT IN AN ORGANIZED HEALTH CARE EDUCATION/TRAINING PROGRAM

## 2024-04-15 PROCEDURE — 97530 THERAPEUTIC ACTIVITIES: CPT

## 2024-04-15 RX ORDER — WATER 10 ML/10ML
INJECTION INTRAMUSCULAR; INTRAVENOUS; SUBCUTANEOUS
Status: DISCONTINUED
Start: 2024-04-15 | End: 2024-04-15 | Stop reason: WASHOUT

## 2024-04-15 RX ORDER — BUMETANIDE 0.5 MG/1
0.5 TABLET ORAL DAILY
Status: DISCONTINUED | OUTPATIENT
Start: 2024-04-16 | End: 2024-04-16 | Stop reason: HOSPADM

## 2024-04-15 RX ORDER — ENOXAPARIN SODIUM 100 MG/ML
30 INJECTION SUBCUTANEOUS 2 TIMES DAILY
Status: DISCONTINUED | OUTPATIENT
Start: 2024-04-15 | End: 2024-04-16 | Stop reason: HOSPADM

## 2024-04-15 RX ADMIN — ATORVASTATIN CALCIUM 40 MG: 40 TABLET, FILM COATED ORAL at 09:27

## 2024-04-15 RX ADMIN — SACUBITRIL AND VALSARTAN 1 TABLET: 24; 26 TABLET, FILM COATED ORAL at 09:27

## 2024-04-15 RX ADMIN — SODIUM CHLORIDE, PRESERVATIVE FREE 10 ML: 5 INJECTION INTRAVENOUS at 20:35

## 2024-04-15 RX ADMIN — BUMETANIDE 1 MG: 1 TABLET ORAL at 09:27

## 2024-04-15 RX ADMIN — SACUBITRIL AND VALSARTAN 1 TABLET: 24; 26 TABLET, FILM COATED ORAL at 20:32

## 2024-04-15 RX ADMIN — TAMSULOSIN HYDROCHLORIDE 0.4 MG: 0.4 CAPSULE ORAL at 09:27

## 2024-04-15 RX ADMIN — MULTIVITAMIN TABLET 1 TABLET: TABLET at 09:27

## 2024-04-15 RX ADMIN — SODIUM CHLORIDE: 9 INJECTION, SOLUTION INTRAVENOUS at 04:28

## 2024-04-15 RX ADMIN — ENOXAPARIN SODIUM 30 MG: 100 INJECTION SUBCUTANEOUS at 09:27

## 2024-04-15 RX ADMIN — METFORMIN HYDROCHLORIDE 850 MG: 850 TABLET ORAL at 09:27

## 2024-04-15 RX ADMIN — ASPIRIN 81 MG: 81 TABLET, COATED ORAL at 09:27

## 2024-04-15 RX ADMIN — METOPROLOL SUCCINATE 50 MG: 50 TABLET, FILM COATED, EXTENDED RELEASE ORAL at 09:27

## 2024-04-15 RX ADMIN — SODIUM CHLORIDE, PRESERVATIVE FREE 10 ML: 5 INJECTION INTRAVENOUS at 09:32

## 2024-04-15 RX ADMIN — MONTELUKAST 10 MG: 10 TABLET, FILM COATED ORAL at 20:32

## 2024-04-15 RX ADMIN — ENOXAPARIN SODIUM 30 MG: 100 INJECTION SUBCUTANEOUS at 20:32

## 2024-04-15 RX ADMIN — PANTOPRAZOLE SODIUM 40 MG: 40 INJECTION, POWDER, FOR SOLUTION INTRAVENOUS at 09:26

## 2024-04-15 RX ADMIN — METFORMIN HYDROCHLORIDE 850 MG: 850 TABLET ORAL at 17:15

## 2024-04-15 NOTE — PROGRESS NOTES
atorvastatin (LIPITOR) 40 MG tablet Take 1 tablet by mouth daily  Patient taking differently: Take 1 tablet by mouth nightly 11/15/23   Alan Rios MD   tamsulosin (FLOMAX) 0.4 MG capsule TAKE ONE CAPSULE BY MOUTH EVERY MORNING 8/7/23   Patrica Toscano, APRN - CNP   montelukast (SINGULAIR) 10 MG tablet Take 1 tablet by mouth nightly 3/20/23   Sushma Montano PA-C   Microlet Lancets MISC USE 1 LANCET TO CHECK GLUCOSE ONCE DAILY AND AS NEEDED 12/22/22   Elver Sewell MD   sacubitril-valsartan (ENTRESTO) 24-26 MG per tablet Take 1 tablet by mouth 2 times daily 11/3/22   Alan Rios MD   albuterol (ACCUNEB) 1.25 MG/3ML nebulizer solution Inhale 3 mLs into the lungs every 6 hours as needed for Wheezing  Patient not taking: Reported on 4/13/2024    Elver Sewell MD   metFORMIN (GLUCOPHAGE) 850 MG tablet Take 1 tablet by mouth 2 times daily (with meals) 9/12/16   Elver Sewell MD   aspirin 81 MG tablet Take 1 tablet by mouth daily    Elver Sewell MD       ALLERGIES:     Patient has no known allergies.      OBJECTIVE:       Vitals:    04/14/24 0753 04/14/24 1524 04/14/24 1857 04/15/24 0444   BP: (!) 160/88 (!) 101/59 122/82    Pulse: 87  97    Resp: 18  18    Temp: 97.9 °F (36.6 °C) 97.7 °F (36.5 °C) 98.2 °F (36.8 °C)    TempSrc: Temporal Temporal Temporal    SpO2: 97% 98% 98%    Weight:    102.2 kg (225 lb 3.2 oz)   Height:             Intake/Output Summary (Last 24 hours) at 4/15/2024 0557  Last data filed at 4/15/2024 0443  Gross per 24 hour   Intake 2157 ml   Output 1275 ml   Net 882 ml       PHYSICAL EXAM:  General Appearance  Alert , awake , not in acute distress  HEENT - Head is normocephalic, atraumatic.  Lungs - Bilateral equal air entry , no wheezes, rales or rhonchi, aeration good  Cardiovascular - Heart sounds are normal.  Regular rhythm, normal rate without murmur, gallop or rub.  Abdomen - Soft, nontender, nondistended, no masses or organomegaly  Neurologic - There  are no new focal motor or sensory deficits  Skin - No bruising or bleeding on exposed skin area  Extremities - No cyanosis, clubbing, 2+ edema bilaterally with some erythema noted      DIAGNOSTICS:     Laboratory Testing:    See [facility] EMR for lab data    Recent Results (from the past 24 hour(s))   CBC auto differential    Collection Time: 04/15/24  5:33 AM   Result Value Ref Range    WBC 7.6 3.5 - 11.3 k/uL    RBC 4.16 (L) 4.21 - 5.77 m/uL    Hemoglobin 13.5 13.0 - 17.0 g/dL    Hematocrit 39.9 (L) 40.7 - 50.3 %    MCV 95.9 82.6 - 102.9 fL    MCH 32.5 25.2 - 33.5 pg    MCHC 33.8 28.4 - 34.8 g/dL    RDW 13.8 11.8 - 14.4 %    Platelets 207 138 - 453 k/uL    MPV 9.6 8.1 - 13.5 fL    NRBC Automated 0.0 0.0 per 100 WBC    Neutrophils % 48 36 - 65 %    Lymphocytes % 33 24 - 43 %    Monocytes % 10 3 - 12 %    Eosinophils % 8 (H) 1 - 4 %    Basophils % 1 0 - 2 %    Immature Granulocytes % 0 0 %    Neutrophils Absolute 3.71 1.50 - 8.10 k/uL    Lymphocytes Absolute 2.51 1.10 - 3.70 k/uL    Monocytes Absolute 0.76 0.10 - 1.20 k/uL    Eosinophils Absolute 0.59 (H) 0.00 - 0.44 k/uL    Basophils Absolute 0.04 0.00 - 0.20 k/uL    Immature Granulocytes Absolute <0.03 0.00 - 0.30 k/uL         Current Facility-Administered Medications   Medication Dose Route Frequency Provider Last Rate Last Admin    pantoprazole (PROTONIX) 40 mg in sodium chloride (PF) 0.9 % 10 mL injection  40 mg IntraVENous Daily Tarun Castro MD        [START ON 4/16/2024] pantoprazole (PROTONIX) tablet 40 mg  40 mg Oral QAM AC Tarun Castro MD        sodium chloride flush 0.9 % injection 10 mL  10 mL IntraVENous 2 times per day Tarun Castro MD        sodium chloride flush 0.9 % injection 10 mL  10 mL IntraVENous PRN Tarun Castro MD        0.9 % sodium chloride infusion   IntraVENous PRN Tarun Castro MD        potassium chloride (KLOR-CON M) extended release tablet 40 mEq  40 mEq Oral PRN Tarun Castro MD        Or    potassium bicarb-citric acid (EFFER-K)

## 2024-04-15 NOTE — CONSULTS
he said he was having heart burn because it was a burning sensation all the way down his chest. He said he thought it was heartburn because it was in the center of his chest and got worse when he laid down but says that once he relaxed the heart burn would go away. He also said that sometimes moving around did seem make it come on.     He has also been constipated. He got here Saturday and the burning pain was there until about 2:00 and it has not come back since. He is up 3 pounds. He ate and drank everyday he said. But his wife said he had not been drinking enough water.     He did have any chest discomfort prior to his stents in the past. No heart palpitations, lightheaded or dizziness. No cough, fever or chills. No abdominal pain, bleeding problems, bowel issues, problems with medications or any other concerns at this time.     Bleeding Risks: Mr. Diallo denies any current or recent bleeding problems including a history of a GI bleed, ulcers, recent or upcoming surgeries, blood in his stool or black tarry stools or blood in his urine.    PAST MEDICAL HISTORY:        Past Medical History:   Diagnosis Date    Acute renal failure superimposed on stage 3 chronic kidney disease (HCC) 03/01/2017    AICD (automatic cardioverter/defibrillator) present     Arthritis     CHF (congestive heart failure) (HCC)     Diabetes mellitus (HCC)     Hyperlipidemia     Hypertension     Kidney stone      CURRENT ALLERGIES: Patient has no known allergies. REVIEW OF SYSTEMS: 14 systems were reviewed. Pertinent positives and negatives as above, all else negative.     Past Surgical History:   Procedure Laterality Date    CARDIAC CATHETERIZATION Left 03/22/2024    /[facility]/Rt Radial Access    CARDIAC PROCEDURE N/A 3/22/2024    Left heart cath / coronary angiography performed by Alan Rios MD at [facility] CARDIAC CATH/IR LAB    CATARACT REMOVAL WITH IMPLANT Right 08/26/2013    CYSTOSCOPY INSERTION / REMOVAL STENT / STONE    ACE Inibitor/ARB: Continue sacubitril/valsartan (Entresto) 24/26 mg twice daily.   Diuretics: RESTART Bumex 0.5 mg daily.   Heart failure counseling: I told them to continue wearing lower extremity compression stockings and I advised them to try and keep their legs up whenever possible and to limit salt in their diet.    Atherosclerotic Heart Disease: Recent Abnormal Stress on 6/23/2022 - Severe 2 vessel disease. Heart cath done 6/23/22 no stents were placed. Echo showed reduced EF of 20% on 6/28/2022.  echo on 8/9/2022 EF 25-30%.   Antiplatelet Agent: Continue Aspirin 81 mg daily.   Beta Blocker: Continue Metoprolol succinate (Toprol XL) 50 mg daily.   Cholesterol Reduction Therapy: Continue Atorvastatin (Lipitor) 40 mg daily.       Implantable Cardioverter Defibrillator (ICD): Implanted 10/3/2022   Non Ischemic Cardiomyopathy    Essential Hypertension: Controlled and actually a hypotensive on admission but I think that this is due to poor oral intake more than anything else. Mr. Diallo was really unable to tell me why he was not drinking fluids other than he was just not feeling well. Possibly viral illness? Up 3 lbs since admission and holding Bumex and now normotensive.   Beta Blocker: Continue Metoprolol succinate (Toprol XL) 50 mg daily.   ACE Inibitor/ARB: Continue sacubitril/valsartan (Entresto) 24/26 mg twice daily.   Diuretics: RESTART Bumex 0.5 mg daily       Hyperlipidemia: Mixed  Cholesterol Reduction Therapy: Continue Atorvastatin (Lipitor) 40 mg daily.      Stage 3 CKD  Continue follow up with nephrology.  RESTART Bumex to .5 mg daily.     Once again, thank you for allowing me to participate in this patients care. Please do not hesitate to contact me if I could be of any further assistance.     Sincerely,  Alan Rios MD, MS, .A.C..  [facility] Cardiology Specialist    [address] [place] [id number]  Phone: [phone], Fax: [phone]     I believe that the risk of

## 2024-04-15 NOTE — PROGRESS NOTES
Pt laying in bed watching TV when writer entered the room. Pt is A&O x4. Vitals and assessment as charted. Pt denies pain. Pt denies any further needs at this time. Call light within reach. Bed alarm on.

## 2024-04-15 NOTE — PLAN OF CARE
Problem: Discharge Planning  Goal: Discharge to home or other facility with appropriate resources  Outcome: Progressing     Problem: Safety - Adult  Goal: Free from fall injury  4/15/2024 1035 by Margaret Allen RN  Outcome: Progressing  4/14/2024 2114 by Matilda Villegas RN  Outcome: Progressing  Note: Bed in low position. Wheels locked. Bed alarm on. 2/4 side rails are up. Non-skid socks on. Fall band on. Call light within reach.     Problem: ABCDS Injury Assessment  Goal: Absence of physical injury  Outcome: Progressing     Problem: Skin/Tissue Integrity  Goal: Absence of new skin breakdown  Description: 1.  Monitor for areas of redness and/or skin breakdown  2.  Assess vascular access sites hourly  3.  Every 4-6 hours minimum:  Change oxygen saturation probe site  4.  Every 4-6 hours:  If on nasal continuous positive airway pressure, respiratory therapy assess nares and determine need for appliance change or resting period.  Outcome: Progressing     Problem: Cardiovascular - Adult  Goal: Maintains optimal cardiac output and hemodynamic stability  4/15/2024 1035 by Margaret Allen RN  Outcome: Progressing  4/14/2024 2114 by Matilda Villegas RN  Outcome: Progressing  Note: Vitals are stable, will continue to monitor.      Problem: Gastrointestinal - Adult  Goal: Maintains or returns to baseline bowel function  Outcome: Progressing     Problem: Genitourinary - Adult  Goal: Absence of urinary retention  Outcome: Progressing     Problem: Genitourinary - Adult  Goal: Urinary catheter remains patent  4/15/2024 1035 by Margaret Allen RN  Outcome: Progressing  4/14/2024 2114 by Matilda Villegas RN  Outcome: Progressing  Note: Fernandes remains intact and patent.

## 2024-04-15 NOTE — PROGRESS NOTES
Patient shift assessment and vitals completed at this time as charted. Patient is alert and oriented x4 and denies pain. Patient sitting up in chair and was assisted back to bed using the robby lift. Patient also assisted with the bed pan as well. Patient states no further needs, call light is in reach, plan of care is ongoing.

## 2024-04-15 NOTE — DISCHARGE INSTR - COC
Margins Undefined edges 04/14/24 0425   Number of days: 49       Wound 04/14/24 Heel Left (Active)   Wound Image   04/14/24 1809   Wound Etiology Pressure Stage 2 04/15/24 1848   Dressing Status Clean;Dry;Intact 04/15/24 1848   Dressing/Treatment Non adherent;Other (comment) 04/15/24 1848   Wound Assessment Other (Comment) 04/15/24 1848   Drainage Amount None (dry) 04/15/24 1848   Camelia-wound Assessment Other (Comment) 04/15/24 1848   Number of days: 2       Wound 04/14/24 Sacrum (Active)   Wound Image   04/14/24 1809   Wound Etiology Pressure Stage 2 04/15/24 1848   Dressing Status Clean;Dry;Intact 04/15/24 1848   Dressing/Treatment Foam 04/15/24 1848   Wound Assessment Other (Comment) 04/15/24 1848   Drainage Amount None (dry) 04/15/24 1848   Camelia-wound Assessment Other (Comment) 04/15/24 1848   Number of days: 1       Incision 04/27/23 Hip Anterior;Left;Medial;Upper;Lower;Inner (Active)   Number of days: 354       Incision 04/27/23 Leg Anterior;Distal;Left (Active)   Number of days: 354        Elimination:  Continence:   Bowel: No  Bladder: Yes  Urinary Catheter: Insertion Date: 4/13/24    Colostomy/Ileostomy/Ileal Conduit: No       Date of Last BM: 4/15/24    Intake/Output Summary (Last 24 hours) at 4/16/2024 0934  Last data filed at 4/16/2024 0716  Gross per 24 hour   Intake 270 ml   Output 1850 ml   Net -1580 ml     I/O last 3 completed shifts:  In: 887 [P.O.:310; I.V.:577]  Out: 2675 [Urine:2675]    Safety Concerns:     History of Falls (last 30 days) and At Risk for Falls    Impairments/Disabilities:      Vision    Nutrition Therapy:  Current Nutrition Therapy:   - Oral Diet:  Low Sodium (2gm)    Routes of Feeding: Oral  Liquids: No Restrictions  Daily Fluid Restriction: yes - amount 2,000 ml  Last Modified Barium Swallow with Video (Video Swallowing Test): not done    Treatments at the Time of Hospital Discharge:   Respiratory Treatments: n/a    Oxygen Therapy:  is not on home oxygen therapy.  Ventilator:     - No ventilator support    Rehab Therapies: Physical Therapy and Occupational Therapy  Weight Bearing Status/Restrictions: No weight bearing restrictions  Other Medical Equipment (for information only, NOT a DME order):  wheelchair,robby and walker  Other Treatments: n/a      Patient's personal belongings (please select all that are sent with patient):  Glasses    RN SIGNATURE:  Electronically signed by Brenda Adames RN on 4/16/24 at 10:40 AM EDT    CASE MANAGEMENT/SOCIAL WORK SECTION    Inpatient Status Date: 4/13/24    Readmission Risk Assessment Score:  Readmission Risk              Risk of Unplanned Readmission:  24           Discharging to Facility/ Agency   Name: [facility]  Address:[address] [place] [address]  Phone:[phone]  Fax:[phone]    Dialysis Facility (if applicable)   Name:  Address:  Dialysis Schedule:  Phone:  Fax:    / signature: Electronically signed by MELANIE Mane on 4/16/24 at 9:10 AM EDT    PHYSICIAN SECTION    Prognosis: Good    Condition at Discharge: Stable    Rehab Potential (if transferring to Rehab): Fair    Recommended Labs or Other Treatments After Discharge: n/a    Physician Certification: I certify the above information and transfer of Moise Diallo  is necessary for the continuing treatment of the diagnosis listed and that he requires Skilled Nursing Facility for less 30 days.     Update Admission H&P: No change in H&P    PHYSICIAN SIGNATURE:  Electronically signed by Tarun Castro MD on 4/15/24 at 5:33 AM EDT

## 2024-04-15 NOTE — PLAN OF CARE
Problem: Safety - Adult  Goal: Free from fall injury  Outcome: Progressing  Note: Bed in low position. Wheels locked. Bed alarm on. 2/4 side rails are up. Non-skid socks on. Fall band on. Call light within reach.     Problem: Cardiovascular - Adult  Goal: Maintains optimal cardiac output and hemodynamic stability  Outcome: Progressing  Note: Vitals are stable, will continue to monitor.      Problem: Genitourinary - Adult  Goal: Urinary catheter remains patent  Outcome: Progressing  Note: Fernandes remains intact and patent.

## 2024-04-15 NOTE — CONSULTS
Urology Consultation    Patient:  Moise Diallo  MRN: [id number]  YOB: 1940  Consult requested from Dr. Castro  for purpose of evaluation of urinary retention.    CHIEF COMPLAINT:  Generalized Weakness    HISTORY OF PRESENT ILLNESS:   The patient is a 84 y.o. male who presents with generalized weakness.  We have seen the patient for several years in the urology practice.  Last time we have seen the patient was approximately 9 months ago.  Patient has had issues with BPH as well as stones.  Currently patient came in with generalized weakness.  He had a Fernandes catheter placed.  Patient is currently happy with Fernandes catheter in place as he is still having generalized weakness and difficulty with ambulation.  At her last visit we did have him performing intermittent catheterization at bedtime.  Patient was having difficulty performing this due to generalized weakness.    Patient's old records, notes and chart reviewed and summarized above.    Past Medical History:    Past Medical History:   Diagnosis Date    Acute renal failure superimposed on stage 3 chronic kidney disease (HCC) 03/01/2017    AICD (automatic cardioverter/defibrillator) present     Arthritis     CHF (congestive heart failure) (HCC)     Diabetes mellitus (HCC)     Hyperlipidemia     Hypertension     Kidney stone        Past Surgical History:    Past Surgical History:   Procedure Laterality Date    CARDIAC CATHETERIZATION Left 03/22/2024    /[facility]/Rt Radial Access    CARDIAC PROCEDURE N/A 3/22/2024    Left heart cath / coronary angiography performed by Alan Rios MD at [facility] CARDIAC CATH/IR LAB    CATARACT REMOVAL WITH IMPLANT Right 08/26/2013    CYSTOSCOPY INSERTION / REMOVAL STENT / STONE Right 11/01/2017    CYSTOSCOPY STENT INSERTION performed by Tod Patton MD at [facility] OR    CYSTOSCOPY INSERTION / REMOVAL STENT / STONE N/A 11/03/2017    CYSTOSCOPY STENT INSERTION performed by Tod Patton MD at [facility]

## 2024-04-15 NOTE — PROGRESS NOTES
Physical Therapy  Facility/Department: [facility] MED SURG  Daily Treatment Note  NAME: Moise Diallo  : 1940  MRN: [id number]    Date of Service: 4/15/2024    Discharge Recommendations:  Continue to assess pending progress, Subacute/Skilled Nursing Facility, Long Term Care with PT     Patient Diagnosis(es): The primary encounter diagnosis was Gastritis and duodenitis. Diagnoses of Urinary retention and Bilateral recurrent inguinal hernia without obstruction or gangrene were also pertinent to this visit.    Assessment   Assessment: Pt. in chair upon arrival, wife present, agreeable to therapy at this time. Pt. completed reclined and seated therex BLE x10-15 in all available planes of motion. RB needed throughout d/t fatigue. Pt. declined transfer of chair to bed and wanting to stay in the chair at this time.  Activity Tolerance: Patient tolerated treatment well     Plan    Physical Therapy Plan  General Plan: 2 times a day 7 days a week  Specific Instructions for Next Treatment: Once daily on weekends  Current Treatment Recommendations: Strengthening;ROM;Balance training;Functional mobility training;Transfer training;Neuromuscular re-education;Gait training;Home exercise program;Safety education & training;Patient/Caregiver education & training;Manual;Endurance training;Therapeutic activities;Wheelchair mobility training     Restrictions  Restrictions/Precautions  Restrictions/Precautions: General Precautions, Fall Risk, Contact Precautions     Subjective    Subjective  Subjective: Pt. in chair upon arrival, wife present, agreeable to therapy at this time  Pain: denies  Orientation  Overall Orientation Status: Within Functional Limits     Objective   Bed Mobility Training  Bed Mobility Training: No  Overall Level of Assistance: Contact-guard assistance;Assist X1  Interventions: Tactile cues;Verbal cues;Safety awareness training  Rolling: Contact-guard assistance;Assist X1  Supine to Sit: Contact-guard

## 2024-04-15 NOTE — PROGRESS NOTES
Pharmacist Review and Automatic Dose Adjustment of prophylactic enoxaparin      The reviewing pharmacist has made an adjustment to the ordered enoxaparin dose or converted to UFH per the approved [facility] protocol and table as identified below.        Moise Diallo is a 84 y.o. male.     Recent Labs     04/13/24  1210 04/14/24  0530 04/15/24  0533   CREATININE 1.8* 1.6* 1.8*       Estimated Creatinine Clearance: 38 mL/min (A) (based on SCr of 1.8 mg/dL (H)).    Height:   Ht Readings from Last 1 Encounters:   04/13/24 1.778 m (5' 10\")     Weight:  Wt Readings from Last 1 Encounters:   04/15/24 102.2 kg (225 lb 3.2 oz)       Enoxaparin Indication: prophylaxis          Plan: Based upon renal function and weight, the enoxaparin dose has been changed to Lovenox 30 mg sq BID       Thank you,  Valerie Zapata, PharmD 4/15/2024 8:21 AM

## 2024-04-15 NOTE — CARE COORDINATION
Case Management Assessment  Initial Evaluation    Date/Time of Evaluation: 4/15/2024 1:13 PM  Assessment Completed by: MELANIE Lynn    If patient is discharged prior to next notation, then this note serves as note for discharge by case management.    Patient Name: Moise Diallo                   YOB: 1940  Diagnosis: Urinary retention [R33.9]  Hypotension [I95.9]  Gastritis and duodenitis [K29.90]  Bilateral recurrent inguinal hernia without obstruction or gangrene [K40.21]                   Date / Time: 4/13/2024 11:58 AM    Patient Admission Status: Inpatient   Readmission Risk (Low < 19, Mod (19-27), High > 27): Readmission Risk Score: 19.6    Current PCP: Leonardo Coleman MD  PCP verified by CM? Yes    Chart Reviewed: Yes      History Provided by: Patient, Medical Record  Patient Orientation: Alert and Oriented, Person, Place, Situation, Self    Patient Cognition: Alert    Hospitalization in the last 30 days (Readmission):  No    If yes, Readmission Assessment in  Navigator will be completed.    Advance Directives:      Code Status: DNR-CCA   Patient's Primary Decision Maker is: Patient Declined (Legal Next of Kin Remains as Decision Maker)      Discharge Planning:    Patient lives with: Spouse/Significant Other Type of Home: House  Primary Care Giver: Self  Patient Support Systems include: Spouse/Significant Other, Children, Family Members, Friends/Neighbors   Current Financial resources: Medicare  Current community resources: None  Current services prior to admission: Durable Medical Equipment            Current DME: Walker, Wheelchair (motorized scooter;)            Type of Home Care services:  None    ADLS  Prior functional level: Assistance with the following:, Cooking, Housework, Shopping, Mobility  Current functional level: Assistance with the following:, Cooking, Housework, Shopping, Mobility    PT AM-PAC:   /24  OT AM-PAC: 11 /24    Family can provide assistance at DC:  Yes  Would you like Case Management to discuss the discharge plan with any other family members/significant others, and if so, who? No  Plans to Return to Present Housing: Other (see comment) (referral made to the [facility];)  Other Identified Issues/Barriers to RETURNING to current housing: Referral made to the [place] at Patient request;  Potential Assistance needed at discharge: Skilled Nursing Facility            Potential DME:    Patient expects to discharge to: Skilled nursing facility  Plan for transportation at discharge: Wheelchair Van    Financial    Payor: MEDICARE / Plan: MEDICARE PART A AND B / Product Type: *No Product type* /     Does insurance require precert for SNF: No    Potential assistance Purchasing Medications: No  Meds-to-Beds request:        [facility] [id number] [facility] [address] [phone] - F [phone]  [address] [address]  Phone: [phone] Fax: [phone]      Notes:    Factors facilitating achievement of predicted outcomes: Family support, Friend support, Motivated, Cooperative, Pleasant, Has needed Durable Medical Equipment at home, and Home is wheelchair accessible    Barriers to discharge: Pain and Medical complications    Additional Case Management Notes: Met with Patient this a.m. to discuss discharge plans.  Also left telephone voicemail message for daughter re:  same.  Patient requesting rehab stay at the [facility].  Has been at [facility] and this would be his second choice.  Referral made to [facility] for their review.  Patient resides at home with his wife.  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  Uses a scooter, a stand up walker and has a handicap van for his transportation needs.  Wife does the driving.  Patient currently utilizes no outside resources or services.  'DNRCCA' order in place.  Patient has no medical directives currently on file.      The Plan for Transition of Care is related to

## 2024-04-15 NOTE — PROGRESS NOTES
Comprehensive Nutrition Assessment    Type and Reason for Visit:  Initial, Wound    Nutrition Recommendations/Plan:   Continue current diet.   Start Jason BID with breakfast and dinner.   Add 4 oz chocolate ensure enlive at lunch.   Recommend vitamin C and zinc to aid healing needs.   Recommend vitamin D.  Support daily MVI.      Malnutrition Assessment:  Malnutrition Status:  At risk for malnutrition (Comment) (04/15/24 4032)    Context:  Acute Illness     Findings of the 6 clinical characteristics of malnutrition:  Energy Intake:  No significant decrease in energy intake  Weight Loss:  No significant weight loss     Body Fat Loss:  No significant body fat loss     Muscle Mass Loss:  No significant muscle mass loss    Fluid Accumulation:  Moderate to Severe Extremities, Generalized (+ 2 generalized, + 2 pitting RLE, + 3, +4 pitting LLE)   Strength:  Not Performed    Nutrition Assessment:    Increased nutrient needs r/t acute injury/trauma aeb healing needs from stage II L heel, stage II sacrum, and R lower arm area. Encourage protein foods for healing needs. Support daily MVI with minerals, recommend addition of 500 mg vitamin C, 220 mg zinc sulfate. Add Jason BID, ONS at lunch. PPI started, states he avoids spicy foods. Pt with lymphedema, encouraged to limit sodium to < 2,000 mg daily. Pt states \"I'm 84 years old, I am tired of restrictions, I want to eat what I want.\" Pt states he does not add salt to foods, hasn't \"for years.\" He is agreeable to vitamin and supplement recommendations, prefers chocolate ensure. Vitamin D 30.8, recommend addition of vitamin D supplement. Pt states he does not take vitamin D at home. States his hands were swollen at home and made it difficult to grasp silverware.    Nutrition Related Findings:    appears well nourished Wound Type: Stage II (sacrum and L heel stage II, R lower arm area-not specified)       Current Nutrition Intake & Therapies:    Average Meal Intake:

## 2024-04-15 NOTE — PROGRESS NOTES
Physical Therapy  Facility/Department: [facility] MED SURG  Daily Treatment Note  NAME: Moise Diallo  : 1940  MRN: [id number]    Date of Service: 4/15/2024    Discharge Recommendations:  Continue to assess pending progress, Subacute/Skilled Nursing Facility, Long Term Care with PT     Patient Diagnosis(es): The primary encounter diagnosis was Gastritis and duodenitis. Diagnoses of Urinary retention and Bilateral recurrent inguinal hernia without obstruction or gangrene were also pertinent to this visit.    Assessment   Assessment: Bed Mobility:CGA. Transfers:ModA/MaxAx2. x2 attempts were made of stand-pivot. 1st attempt was used with FWW and ModA/Max x2 for safety. Pt. was unable to complete at this time with FWW. 2nd attempt was made without FWW and ModA/MaxAx2 and was able to complete stand-pivot of bed to chair. Completed reclined and seated therex BLE x15 in all available planes of motion.  Activity Tolerance: Patient tolerated treatment well     Plan    Physical Therapy Plan  General Plan: 2 times a day 7 days a week  Specific Instructions for Next Treatment: Once daily on weekends  Current Treatment Recommendations: Strengthening;ROM;Balance training;Functional mobility training;Transfer training;Neuromuscular re-education;Gait training;Home exercise program;Safety education & training;Patient/Caregiver education & training;Manual;Endurance training;Therapeutic activities;Wheelchair mobility training     Restrictions  Restrictions/Precautions  Restrictions/Precautions: General Precautions, Fall Risk, Contact Precautions     Subjective    Subjective  Subjective: Pt. in bed upon arrival, agreeable to therapy at this time  Pain: denies  Orientation  Overall Orientation Status: Within Functional Limits     Objective   Bed Mobility Training  Bed Mobility Training: Yes  Overall Level of Assistance: Contact-guard assistance;Assist X1  Interventions: Tactile cues;Verbal cues;Safety awareness training  Rolling:  Contact-guard assistance;Assist X1  Supine to Sit: Contact-guard assistance;Assist X1  Transfer Training  Transfer Training: Yes  Overall Level of Assistance: Moderate assistance;Maximum assistance;Assist X2  Interventions: Tactile cues;Verbal cues;Safety awareness training  Sit to Stand: Moderate assistance;Maximum assistance;Assist X2  Stand to Sit: Moderate assistance;Maximum assistance;Assist X2  Stand Pivot Transfers: Moderate assistance;Maximum assistance;Assist X2  Bed to Chair: Moderate assistance;Maximum assistance;Assist X2  Gait  Gait Training: No  PT Exercises  Exercise Treatment: Reclined and seated therex x15 in all available planes of motion  Safety Devices  Type of Devices: All fall risk precautions in place;Call light within reach;Chair alarm in place;Left in chair;Nurse notified       Goals  Short Term Goals  Time Frame for Short Term Goals: 20 days  Short Term Goal 1: Patient to complete all transfers with minAx2 and FWW with no LOB to decrease fall risk.  Short Term Goal 2: Patient to have good static sitting balance to decrease fall risk.  Short Term Goal 3: Patient to tolerate 20-30 min of ther ex/act for improved functional strength.    Education  Patient Education  Education Given To: Patient  Education Provided: Role of Therapy;Plan of Care;Transfer Training  Education Method: Verbal  Barriers to Learning: None  Education Outcome: Verbalized understanding;Continued education needed      Therapy Time   Individual Concurrent Group Co-treatment   Time In 1029         Time Out 1057         Minutes 28           Izzy Wellington PTA

## 2024-04-15 NOTE — PROGRESS NOTES
Met with daughter and her wife this p.m. to discuss nursing home placement and answer questions that they have.  Daughter is not thinking that Patient should return home following his rehab stay and shares some of Patient's financial background with writer.  Provided daughter with Medicaid application to complete for when skilled stay is done.  Spoke with Margy in admissions at [facility] in [place] and she states that Patient is approved to come to facility for rehab stay but they have no long term care beds open at this time.  LSW to follow and assist with discharge placement as appropriate.    Evelia Farnsworth, MELANIE  4/15/2024

## 2024-04-15 NOTE — PROGRESS NOTES
Patient resting in bed, assessment and vitals complete, see flow sheet for documentation, patient denies pain at this time, all needs met, call light within reach.

## 2024-04-15 NOTE — PROGRESS NOTES
Occupational Therapy  Facility/Department: [facility] MED SURG  Daily Treatment Note  NAME: Moise Diallo  : 1940  MRN: [id number]    Date of Service: 4/15/2024    Discharge Recommendations:  Subacute/Skilled Nursing Facility         Patient Diagnosis(es): The primary encounter diagnosis was Gastritis and duodenitis. Diagnoses of Urinary retention and Bilateral recurrent inguinal hernia without obstruction or gangrene were also pertinent to this visit.     Assessment    Activity Tolerance: Patient tolerated treatment well  Discharge Recommendations: Subacute/Skilled Nursing Facility      Plan   Occupational Therapy Plan  Times Per Day: Once a day  Days Per Week: 7 Days  Current Treatment Recommendations: Strengthening;Balance training;Functional mobility training;Endurance training;Safety education & training;Patient/Caregiver education & training;Equipment evaluation, education, & procurement;Self-Care / ADL     Restrictions  Restrictions/Precautions  Restrictions/Precautions: General Precautions;Fall Risk;Contact Precautions    Subjective   Subjective  Subjective: Pt lying in bed upon arrival. pt agreed to participate in therapy session.  Pain: Pt had no complaints of pain.             Objective    Vitals          ADL  Additional Comments: CGA to complete bed mob from supine to sit EOB. Mod/Max A x 2 to complete stand pivot transfer from bed to chair.  OT Exercises  Exercise Treatment: Pt completed BUE ther ex x 5 planes x 15 reps x 1 set to increase UE strength and endurance in order to ease completion of ADL tasks. Pt required RBs as needed secondary to fatigue.     Safety Devices  Type of Devices: All fall risk precautions in place;Call light within reach;Chair alarm in place;Left in chair;Nurse notified     Patient Education  Education Given To: Patient  Education Provided: Role of Therapy;Plan of Care  Education Method: Demonstration;Verbal  Barriers to Learning: None  Education Outcome: Verbalized  understanding;Demonstrated understanding;Continued education needed    Goals  Short Term Goals  Time Frame for Short Term Goals: 21 visits  Short Term Goal 1: Patient/caregiver to be educated on d/c folder, AE/DME and EC/WS techniques to ensure safe return home.  Short Term Goal 2: Patient to complete UB ADL routine min A c implemetation of EC/WS techniques to improve ADL tolerance and independence.  Short Term Goal 3: Patient to engage in 15 minutes of BUE ther ex/ther act s significant increased SOB to improve strength and activity tolerance for I/ADL.  Short Term Goal 4: Pt to complete sit pivot tfer during ADLs with no more than CGA to improve safety and indep with functional mobility.    AM-PAC - ADL       Therapy Time   Individual Concurrent Group Co-treatment   Time In 1029         Time Out 1058         Minutes 29                 ALVARADO Juan/TULIO

## 2024-04-16 ENCOUNTER — TELEPHONE (OUTPATIENT)
Dept: PRIMARY CARE CLINIC | Age: 84
End: 2024-04-16

## 2024-04-16 VITALS
RESPIRATION RATE: 18 BRPM | SYSTOLIC BLOOD PRESSURE: 127 MMHG | BODY MASS INDEX: 31.95 KG/M2 | DIASTOLIC BLOOD PRESSURE: 66 MMHG | HEART RATE: 92 BPM | HEIGHT: 70 IN | OXYGEN SATURATION: 97 % | WEIGHT: 223.2 LBS | TEMPERATURE: 96.9 F

## 2024-04-16 LAB
ALBUMIN SERPL-MCNC: 4 G/DL (ref 3.5–5.2)
ALBUMIN/GLOB SERPL: 1.3 {RATIO} (ref 1–2.5)
ALP SERPL-CCNC: 93 U/L (ref 40–129)
ALT SERPL-CCNC: 31 U/L (ref 5–41)
ANION GAP SERPL CALCULATED.3IONS-SCNC: 11 MMOL/L (ref 9–17)
AST SERPL-CCNC: 37 U/L
BASOPHILS # BLD: 0.06 K/UL (ref 0–0.2)
BASOPHILS NFR BLD: 1 % (ref 0–2)
BILIRUB SERPL-MCNC: 0.5 MG/DL (ref 0.3–1.2)
BUN SERPL-MCNC: 28 MG/DL (ref 8–23)
BUN/CREAT SERPL: 19 (ref 9–20)
CALCIUM SERPL-MCNC: 9.1 MG/DL (ref 8.6–10.4)
CHLORIDE SERPL-SCNC: 107 MMOL/L (ref 98–107)
CO2 SERPL-SCNC: 21 MMOL/L (ref 20–31)
CREAT SERPL-MCNC: 1.5 MG/DL (ref 0.7–1.2)
EOSINOPHIL # BLD: 0.69 K/UL (ref 0–0.44)
EOSINOPHILS RELATIVE PERCENT: 8 % (ref 1–4)
ERYTHROCYTE [DISTWIDTH] IN BLOOD BY AUTOMATED COUNT: 13.8 % (ref 11.8–14.4)
GFR SERPL CREATININE-BSD FRML MDRD: 46 ML/MIN/1.73M2
GLUCOSE SERPL-MCNC: 108 MG/DL (ref 70–99)
HCT VFR BLD AUTO: 41.2 % (ref 40.7–50.3)
HGB BLD-MCNC: 13.5 G/DL (ref 13–17)
IMM GRANULOCYTES # BLD AUTO: 0.05 K/UL (ref 0–0.3)
IMM GRANULOCYTES NFR BLD: 1 %
LYMPHOCYTES NFR BLD: 2.91 K/UL (ref 1.1–3.7)
LYMPHOCYTES RELATIVE PERCENT: 32 % (ref 24–43)
MCH RBC QN AUTO: 31.5 PG (ref 25.2–33.5)
MCHC RBC AUTO-ENTMCNC: 32.8 G/DL (ref 28.4–34.8)
MCV RBC AUTO: 96 FL (ref 82.6–102.9)
MONOCYTES NFR BLD: 0.94 K/UL (ref 0.1–1.2)
MONOCYTES NFR BLD: 10 % (ref 3–12)
NEUTROPHILS NFR BLD: 48 % (ref 36–65)
NEUTS SEG NFR BLD: 4.58 K/UL (ref 1.5–8.1)
NRBC BLD-RTO: 0 PER 100 WBC
PLATELET # BLD AUTO: 216 K/UL (ref 138–453)
PMV BLD AUTO: 10.1 FL (ref 8.1–13.5)
POTASSIUM SERPL-SCNC: 4.7 MMOL/L (ref 3.7–5.3)
PROT SERPL-MCNC: 7.2 G/DL (ref 6.4–8.3)
RBC # BLD AUTO: 4.29 M/UL (ref 4.21–5.77)
SODIUM SERPL-SCNC: 139 MMOL/L (ref 135–144)
WBC OTHER # BLD: 9.2 K/UL (ref 3.5–11.3)

## 2024-04-16 PROCEDURE — 97535 SELF CARE MNGMENT TRAINING: CPT

## 2024-04-16 PROCEDURE — 80053 COMPREHEN METABOLIC PANEL: CPT

## 2024-04-16 PROCEDURE — 2580000003 HC RX 258: Performed by: STUDENT IN AN ORGANIZED HEALTH CARE EDUCATION/TRAINING PROGRAM

## 2024-04-16 PROCEDURE — 97530 THERAPEUTIC ACTIVITIES: CPT

## 2024-04-16 PROCEDURE — 94761 N-INVAS EAR/PLS OXIMETRY MLT: CPT

## 2024-04-16 PROCEDURE — 6370000000 HC RX 637 (ALT 250 FOR IP): Performed by: FAMILY MEDICINE

## 2024-04-16 PROCEDURE — 85025 COMPLETE CBC W/AUTO DIFF WBC: CPT

## 2024-04-16 PROCEDURE — 97110 THERAPEUTIC EXERCISES: CPT

## 2024-04-16 PROCEDURE — 6360000002 HC RX W HCPCS: Performed by: STUDENT IN AN ORGANIZED HEALTH CARE EDUCATION/TRAINING PROGRAM

## 2024-04-16 PROCEDURE — 6370000000 HC RX 637 (ALT 250 FOR IP): Performed by: STUDENT IN AN ORGANIZED HEALTH CARE EDUCATION/TRAINING PROGRAM

## 2024-04-16 PROCEDURE — 36415 COLL VENOUS BLD VENIPUNCTURE: CPT

## 2024-04-16 RX ORDER — PANTOPRAZOLE SODIUM 40 MG/1
40 TABLET, DELAYED RELEASE ORAL
Qty: 90 TABLET | Refills: 0 | Status: SHIPPED | OUTPATIENT
Start: 2024-04-16

## 2024-04-16 RX ORDER — BUMETANIDE 0.5 MG/1
0.5 TABLET ORAL DAILY
Qty: 30 TABLET | Refills: 0 | Status: SHIPPED | OUTPATIENT
Start: 2024-04-16

## 2024-04-16 RX ADMIN — SACUBITRIL AND VALSARTAN 1 TABLET: 24; 26 TABLET, FILM COATED ORAL at 09:36

## 2024-04-16 RX ADMIN — BUMETANIDE 0.5 MG: 0.5 TABLET ORAL at 09:37

## 2024-04-16 RX ADMIN — ASPIRIN 81 MG: 81 TABLET, COATED ORAL at 09:36

## 2024-04-16 RX ADMIN — TAMSULOSIN HYDROCHLORIDE 0.4 MG: 0.4 CAPSULE ORAL at 09:37

## 2024-04-16 RX ADMIN — MUPIROCIN: 20 OINTMENT TOPICAL at 09:37

## 2024-04-16 RX ADMIN — ENOXAPARIN SODIUM 30 MG: 100 INJECTION SUBCUTANEOUS at 09:37

## 2024-04-16 RX ADMIN — METFORMIN HYDROCHLORIDE 850 MG: 850 TABLET ORAL at 07:13

## 2024-04-16 RX ADMIN — METOPROLOL SUCCINATE 50 MG: 50 TABLET, FILM COATED, EXTENDED RELEASE ORAL at 09:37

## 2024-04-16 RX ADMIN — PANTOPRAZOLE SODIUM 40 MG: 40 TABLET, DELAYED RELEASE ORAL at 07:13

## 2024-04-16 RX ADMIN — MULTIVITAMIN TABLET 1 TABLET: TABLET at 09:37

## 2024-04-16 RX ADMIN — ATORVASTATIN CALCIUM 40 MG: 40 TABLET, FILM COATED ORAL at 09:37

## 2024-04-16 RX ADMIN — SODIUM CHLORIDE, PRESERVATIVE FREE 10 ML: 5 INJECTION INTRAVENOUS at 09:37

## 2024-04-16 NOTE — TELEPHONE ENCOUNTER
Demetrius called in for clarification on patients Bactroban ointment. It states that he should be using it both BID and TID, how would you like him to use this medication?

## 2024-04-16 NOTE — DISCHARGE SUMMARY
[address] , [place], [place], [address]    Discharge Summary      NAME:  Moise Diallo  :  1940  MRN:  [id number]    Admit date:  2024  Discharge date:  24      Admitting Physician:  Tarun Castro MD    Primary Diagnosis on Admission:   Present on Admission:   Hypotension   Arterial hypotension   BPH with obstruction/lower urinary tract symptoms   Diabetes mellitus (HCC)   Hypertension   Urinary tract infection associated with catheterization of urinary tract - self cath daily   Urinary retention   Angina, class III (HCC)   Gastritis and duodenitis   ASHD (arteriosclerotic heart disease)      Secondary Diagnoses:  has Abnormal cardiovascular stress test; Angina, class III (HCC); Gastritis and duodenitis; and ASHD (arteriosclerotic heart disease) on their pertinent problem list.    Admission Condition:  stable     Discharged Condition: good    Hospital Course:   The patient was admitted for the management of generalized malaise and abdominal pain. In the ED, labs and imaging had been obtained concerning for duodenitis. He was started on IV protonix at the time and GS was consulted. Cardiology was also consulted and he was provided with diuresis. He was also noted to have some urinary retention (pt self-caths) and Urology was consulted which recommended maintaining the follow for about 2 weeks. He also has chronic upper and lower extremity weakness and feels that he cannot take of his things by himself at home. Today on day of discharge pt feels better with no further complaints. Vitals and Labs are stable.  All consultants involved during this admission are agreeable to d/c.    Consults:  Cardiology, Urology, GS    Significant Diagnostic/theraputic interventions: CT imaging, [place], PPI      Disposition:   SNF    Instructions to Patient:      Follow up with Leonarod Coleman MD and Specialists within 1-2 weeks    Discharge Medications:       Medication List        START

## 2024-04-16 NOTE — PROGRESS NOTES
Physical Therapy  Facility/Department: [facility] MED SURG  Daily Treatment Note  NAME: Moise Diallo  : 1940  MRN: [id number]    Date of Service: 2024    Discharge Recommendations:  Continue to assess pending progress, Subacute/Skilled Nursing Facility, Long Term Care with PT     Patient Diagnosis(es): The primary encounter diagnosis was Gastritis and duodenitis. Diagnoses of Urinary retention and Bilateral recurrent inguinal hernia without obstruction or gangrene were also pertinent to this visit.    Assessment   Assessment: Pt. in bed upon arrival, agreeable to therapy at this time. Pt. completed seated EOB therex BLE x10-15 in all available planes of motion. Bed mobility:CGA/MinAx1. D/t a robby pad not being available at this time and RN aware, unable to perform transfer at this time. Will transfer pt. this afternoon when a robby pad is available.  Activity Tolerance: Patient tolerated treatment well     Plan    Physical Therapy Plan  General Plan: 2 times a day 7 days a week  Specific Instructions for Next Treatment: Once daily on weekends  Current Treatment Recommendations: Strengthening;ROM;Balance training;Functional mobility training;Transfer training;Neuromuscular re-education;Gait training;Home exercise program;Safety education & training;Patient/Caregiver education & training;Manual;Endurance training;Therapeutic activities;Wheelchair mobility training     Restrictions  Restrictions/Precautions  Restrictions/Precautions: General Precautions, Fall Risk, Contact Precautions     Subjective    Subjective  Subjective: Pt. in bed upon arrival, agreeable to therapy at this time.  Pain: denies  Orientation  Overall Orientation Status: Within Functional Limits     Objective   Bed Mobility Training  Bed Mobility Training: Yes  Overall Level of Assistance: Contact-guard assistance;Assist X1;Minimum assistance  Interventions: Tactile cues;Verbal cues;Safety awareness training  Rolling: Contact-guard

## 2024-04-16 NOTE — PLAN OF CARE
Problem: Discharge Planning  Goal: Discharge to home or other facility with appropriate resources  4/16/2024 1018 by Brenda Adames RN  Outcome: Progressing     Problem: Safety - Adult  Goal: Free from fall injury  4/16/2024 1018 by Brenda Adames RN  Outcome: Progressing     Problem: ABCDS Injury Assessment  Goal: Absence of physical injury  4/16/2024 1018 by Brenda Adames RN  Outcome: Progressing     Problem: Skin/Tissue Integrity  Goal: Absence of new skin breakdown  Description: 1.  Monitor for areas of redness and/or skin breakdown  2.  Assess vascular access sites hourly  3.  Every 4-6 hours minimum:  Change oxygen saturation probe site  4.  Every 4-6 hours:  If on nasal continuous positive airway pressure, respiratory therapy assess nares and determine need for appliance change or resting period.  4/16/2024 1018 by Brenda Adames RN  Outcome: Progressing     Problem: Cardiovascular - Adult  Goal: Maintains optimal cardiac output and hemodynamic stability  4/16/2024 1018 by Brenda Adames RN  Outcome: Progressing     Problem: Gastrointestinal - Adult  Goal: Maintains or returns to baseline bowel function  4/16/2024 1018 by Brenda Adames RN  Outcome: Progressing     Problem: Genitourinary - Adult  Goal: Absence of urinary retention  4/16/2024 1018 by Brenda Adames RN  Outcome: Progressing     Problem: Genitourinary - Adult  Goal: Urinary catheter remains patent  4/16/2024 1018 by Brenda Adames RN  Outcome: Progressing     Problem: Nutrition Deficit:  Goal: Optimize nutritional status  4/16/2024 1018 by Brenda Adames RN  Outcome: Progressing     Problem: Chronic Conditions and Co-morbidities  Goal: Patient's chronic conditions and co-morbidity symptoms are monitored and maintained or improved  4/16/2024 1018 by Brenda Adames RN  Outcome: Progressing

## 2024-04-16 NOTE — PLAN OF CARE
Problem: Discharge Planning  Goal: Discharge to home or other facility with appropriate resources  4/15/2024 2247 by Tabatha Patel RN  Outcome: Progressing     Problem: Safety - Adult  Goal: Free from fall injury  4/15/2024 2247 by Tabatha Patel RN  Outcome: Progressing     Problem: ABCDS Injury Assessment  Goal: Absence of physical injury  4/15/2024 2247 by Tabatha Patel RN  Outcome: Progressing     Problem: Skin/Tissue Integrity  Goal: Absence of new skin breakdown  Description: 1.  Monitor for areas of redness and/or skin breakdown  2.  Assess vascular access sites hourly  3.  Every 4-6 hours minimum:  Change oxygen saturation probe site  4.  Every 4-6 hours:  If on nasal continuous positive airway pressure, respiratory therapy assess nares and determine need for appliance change or resting period.  4/15/2024 2247 by Tabatha Patel RN  Outcome: Progressing     Problem: Cardiovascular - Adult  Goal: Maintains optimal cardiac output and hemodynamic stability  4/15/2024 2247 by Tabatha Patel RN  Outcome: Progressing     Problem: Gastrointestinal - Adult  Goal: Maintains or returns to baseline bowel function  4/15/2024 2247 by Tabatha Patel RN  Outcome: Progressing     Problem: Genitourinary - Adult  Goal: Absence of urinary retention  4/15/2024 2247 by Tabatha Patel RN  Outcome: Progressing     Problem: Genitourinary - Adult  Goal: Urinary catheter remains patent  4/15/2024 2247 by Tabatha Patel RN  Outcome: Progressing     Problem: Nutrition Deficit:  Goal: Optimize nutritional status  4/15/2024 2247 by Tabatha Patel RN  Outcome: Progressing     Problem: Chronic Conditions and Co-morbidities  Goal: Patient's chronic conditions and co-morbidity symptoms are monitored and maintained or improved  Outcome: Progressing

## 2024-04-16 NOTE — PROGRESS NOTES
Physical Therapy  Facility/Department: [facility] MED SURG  Daily Treatment Note  NAME: Moise Diallo  : 1940  MRN: [id number]    Date of Service: 2024    Discharge Recommendations:  Continue to assess pending progress, Subacute/Skilled Nursing Facility, Long Term Care with PT     Patient Diagnosis(es): The primary encounter diagnosis was Gastritis and duodenitis. Diagnoses of Urinary retention and Bilateral recurrent inguinal hernia without obstruction or gangrene were also pertinent to this visit.    Assessment   Assessment: Pt. in bed upon arrival, RN and nurse aide present performing edith care on pt. Pt. agreeable of transfer from bed to chair for lunch. Bed mobility:CGA/MinAx1. Transfers:Alysha/ModAx2. Pt. was able to move his feet better today when transferring.  Activity Tolerance: Patient tolerated treatment well     Plan    Physical Therapy Plan  General Plan: 2 times a day 7 days a week  Specific Instructions for Next Treatment: Once daily on weekends  Current Treatment Recommendations: Strengthening;ROM;Balance training;Functional mobility training;Transfer training;Neuromuscular re-education;Gait training;Home exercise program;Safety education & training;Patient/Caregiver education & training;Manual;Endurance training;Therapeutic activities;Wheelchair mobility training     Restrictions  Restrictions/Precautions  Restrictions/Precautions: General Precautions, Fall Risk, Contact Precautions     Subjective    Subjective  Subjective: Pt. in bed upon arrival, RN and nurse aide present performing edith care on pt. Pt. agreeable of transfer from bed to chair for lunch  Pain: denies  Orientation  Overall Orientation Status: Within Functional Limits     Objective   Bed Mobility Training  Bed Mobility Training: Yes  Overall Level of Assistance: Contact-guard assistance;Assist X1;Minimum assistance  Interventions: Tactile cues;Verbal cues;Safety awareness training  Rolling: Contact-guard assistance;Assist  X1;Minimum assistance  Supine to Sit: Contact-guard assistance;Assist X1;Minimum assistance  Scooting: Contact-guard assistance;Minimum assistance;Assist X1  Transfer Training  Transfer Training: Yes  Overall Level of Assistance: Moderate assistance;Assist X2;Minimum assistance  Interventions: Tactile cues;Verbal cues;Safety awareness training  Sit to Stand: Minimum assistance;Moderate assistance;Assist X2  Stand to Sit: Minimum assistance;Moderate assistance;Assist X2  Stand Pivot Transfers: Minimum assistance;Moderate assistance;Assist X2  Bed to Chair: Minimum assistance;Moderate assistance;Assist X2  Gait  Gait Training: No  PT Exercises  Exercise Treatment: Seated EOB therex x10-15 in all available planes of motion  Safety Devices  Type of Devices: All fall risk precautions in place;Call light within reach;Nurse notified;Chair alarm in place;Left in chair       Goals  Short Term Goals  Time Frame for Short Term Goals: 20 days  Short Term Goal 1: Patient to complete all transfers with minAx2 and FWW with no LOB to decrease fall risk.  Short Term Goal 2: Patient to have good static sitting balance to decrease fall risk.  Short Term Goal 3: Patient to tolerate 20-30 min of ther ex/act for improved functional strength.    Education  Patient Education  Education Given To: Patient  Education Provided: Role of Therapy;Plan of Care;Transfer Training  Education Method: Verbal  Barriers to Learning: None  Education Outcome: Verbalized understanding;Continued education needed    Therapy Time   Individual Concurrent Group Co-treatment   Time In 1153         Time Out 1208         Minutes 15             Izzy Wellington PTA

## 2024-04-16 NOTE — PROGRESS NOTES
Spoke to nurse at the [facility] and gave report. Answered any questions or concerns at this time.

## 2024-04-16 NOTE — PROGRESS NOTES
[address] , [place], [place], [address]    Progress Note    Date:   4/16/2024  Patient name:  Moise Diallo  Date of admission:  4/13/2024 11:58 AM  MRN:   [id number]  YOB: 1940    SUBJECTIVE/Last 24 hours update:     Patient seen and examined at the bed side , no new acute events overnight and no new complains. He is feeling good at this time. VSS. He continues to work with PT. Leg swelling is unchanged. Notes from nursing staff and Consults had been reviewed, and the overnight progress had been checked with the nursing staff as well.    Discussed Urology consult, to have schulz remain in place for 2 weeks. Patient states that Cardiology/ was concerned about infection and told the patient the schulz should be removed instead. RBA discussed with the patient today of both options and patient indicates that he would like to self-cath instead PRN.    REVIEW OF SYSTEMS:      CONSTITUTIONAL:  no fevers, no headcahes  EYES: negative for blury vision  HEENT: No headaches, No nasal congestion, no difficulty swallowing  RESPIRATORY:negative for dyspnea, no wheezing, no Cough  CARDIOVASCULAR: negative for chest pain, no palpitations  GASTROINTESTINAL: no nausea, no vomiting, no change in bowel habits, no abdominal pain   GENITOURINARY: negative for dysuria, no hematuria   MUSCULOSKELETAL: no joint pains, no muscle aches, no swelling of joints or extremities  NEUROLOGICAL: No  Weakness or numbness, generalized weakness    PAST MEDICAL HISTORY:      has a past medical history of Acute renal failure superimposed on stage 3 chronic kidney disease (HCC), AICD (automatic cardioverter/defibrillator) present, Arthritis, CHF (congestive heart failure) ([facility]), Diabetes mellitus ([facility]), Hyperlipidemia, Hypertension, and Kidney stone.    PAST SURGICAL HISTORY:      has a past surgical history that includes hernia repair; Cataract removal with implant (Right, 08/26/2013); knee surgery

## 2024-04-16 NOTE — TELEPHONE ENCOUNTER
Hello, Rx updated, thank you.  Electronically signed by Tarun Castro MD on 4/16/2024 at 12:52 PM

## 2024-04-16 NOTE — PROGRESS NOTES
Spoke to Dr Castro and made him aware that patients states he will leave schulz catheter in until see by urology in 2 weeks.

## 2024-04-16 NOTE — PROGRESS NOTES
Occupational Therapy  Facility/Department: [facility] MED SURG  Daily Treatment Note  NAME: Moise Diallo  : 1940  MRN: [id number]    Date of Service: 2024    Discharge Recommendations:  Subacute/Skilled Nursing Facility         Patient Diagnosis(es): The primary encounter diagnosis was Gastritis and duodenitis. Diagnoses of Urinary retention and Bilateral recurrent inguinal hernia without obstruction or gangrene were also pertinent to this visit.     Assessment    Activity Tolerance: Patient tolerated treatment well  Discharge Recommendations: Subacute/Skilled Nursing Facility      Plan   Occupational Therapy Plan  Times Per Day: Once a day  Days Per Week: 7 Days  Current Treatment Recommendations: Strengthening;Balance training;Functional mobility training;Endurance training;Safety education & training;Patient/Caregiver education & training;Equipment evaluation, education, & procurement;Self-Care / ADL     Restrictions  Restrictions/Precautions  Restrictions/Precautions: General Precautions;Fall Risk;Contact Precautions    Subjective   Subjective  Subjective: Pt lying in bed upon arrival. pt agreed to participate in therapy session.  Pain: Pt had no complaints of pain.             Objective    Vitals          ADL  Additional Comments: CGA/Min A to complete bed mob from supine to sit EOB. Pt sat EOB for ~20 mins to complete ther ex. Unable to transfer pt to chair at this time d/t hoye pad not available.  OT Exercises  Exercise Treatment: Pt completed BUE ther ex while seated EOB x 5 planes x 15 reps x 1 set to increase UE strength and endurance in order to ease completion of ADL tasks. Pt required RBs as needed secondary to fatigue.     Safety Devices  Type of Devices: All fall risk precautions in place;Call light within reach;Nurse notified;Bed alarm in place;Left in bed     Patient Education  Education Given To: Patient  Education Provided: Role of Therapy;Plan of Care  Education Method:  Verbal  Barriers to Learning: None  Education Outcome: Verbalized understanding;Continued education needed    Goals  Short Term Goals  Time Frame for Short Term Goals: 21 visits  Short Term Goal 1: Patient/caregiver to be educated on d/c folder, AE/DME and EC/WS techniques to ensure safe return home.  Short Term Goal 2: Patient to complete UB ADL routine min A c implemetation of EC/WS techniques to improve ADL tolerance and independence.  Short Term Goal 3: Patient to engage in 15 minutes of BUE ther ex/ther act s significant increased SOB to improve strength and activity tolerance for I/ADL.  Short Term Goal 4: Pt to complete sit pivot tfer during ADLs with no more than CGA to improve safety and indep with functional mobility.    AM-PAC - ADL       Therapy Time   Individual Concurrent Group Co-treatment   Time In 0939         Time Out 1007         Minutes 28                 ALVARADO Juan/TULIO

## 2024-04-16 NOTE — PROGRESS NOTES
Pt is A/O x 4, calm and cooperative. Assessment and vital signs completed, see flow sheet. Pt resting in bed and call light within reach. Pt denies pain.Denies further needs and will continue to monitor.

## 2024-04-18 LAB
MICROORGANISM SPEC CULT: NORMAL
MICROORGANISM SPEC CULT: NORMAL
SERVICE CMNT-IMP: NORMAL
SERVICE CMNT-IMP: NORMAL
SPECIMEN DESCRIPTION: NORMAL
SPECIMEN DESCRIPTION: NORMAL

## 2024-04-22 ENCOUNTER — OFFICE VISIT (OUTPATIENT)
Dept: SURGERY | Age: 84
End: 2024-04-22
Payer: MEDICARE

## 2024-04-22 VITALS — HEART RATE: 72 BPM | SYSTOLIC BLOOD PRESSURE: 114 MMHG | DIASTOLIC BLOOD PRESSURE: 72 MMHG

## 2024-04-22 DIAGNOSIS — S51.011S LACERATION OF RIGHT ELBOW, SEQUELA: Primary | ICD-10-CM

## 2024-04-22 PROCEDURE — 3078F DIAST BP <80 MM HG: CPT | Performed by: SURGERY

## 2024-04-22 PROCEDURE — G8417 CALC BMI ABV UP PARAM F/U: HCPCS | Performed by: SURGERY

## 2024-04-22 PROCEDURE — 1111F DSCHRG MED/CURRENT MED MERGE: CPT | Performed by: SURGERY

## 2024-04-22 PROCEDURE — 1124F ACP DISCUSS-NO DSCNMKR DOCD: CPT | Performed by: SURGERY

## 2024-04-22 PROCEDURE — 99212 OFFICE O/P EST SF 10 MIN: CPT | Performed by: SURGERY

## 2024-04-22 PROCEDURE — 1036F TOBACCO NON-USER: CPT | Performed by: SURGERY

## 2024-04-22 PROCEDURE — 3074F SYST BP LT 130 MM HG: CPT | Performed by: SURGERY

## 2024-04-22 PROCEDURE — G8427 DOCREV CUR MEDS BY ELIG CLIN: HCPCS | Performed by: SURGERY

## 2024-04-22 NOTE — PROGRESS NOTES
Here for a recheck on the laceration of his right elbow.  He reports it is looking good and that there si not much drainage.    Currently using a foam dressing.    /72 (Site: Left Upper Arm, Position: Sitting, Cuff Size: Large Adult)   Pulse 72         The was a small amount of yellow drainage on his bandage, so there is still a sinus tract.  But it looks nearly healed.    IMP/PAN  1) If the drainage is still persisting in 2 week, will recheck the wound.

## 2024-05-01 ENCOUNTER — OFFICE VISIT (OUTPATIENT)
Dept: UROLOGY | Age: 84
End: 2024-05-01
Payer: MEDICARE

## 2024-05-01 VITALS
TEMPERATURE: 97.7 F | HEART RATE: 76 BPM | SYSTOLIC BLOOD PRESSURE: 104 MMHG | DIASTOLIC BLOOD PRESSURE: 72 MMHG | WEIGHT: 225 LBS | BODY MASS INDEX: 32.28 KG/M2

## 2024-05-01 DIAGNOSIS — R33.9 INCOMPLETE BLADDER EMPTYING: Primary | ICD-10-CM

## 2024-05-01 DIAGNOSIS — N40.1 BPH WITH OBSTRUCTION/LOWER URINARY TRACT SYMPTOMS: ICD-10-CM

## 2024-05-01 DIAGNOSIS — N32.0 BNC (BLADDER NECK CONTRACTURE): ICD-10-CM

## 2024-05-01 DIAGNOSIS — N13.8 BPH WITH OBSTRUCTION/LOWER URINARY TRACT SYMPTOMS: ICD-10-CM

## 2024-05-01 PROCEDURE — 1036F TOBACCO NON-USER: CPT | Performed by: PHYSICIAN ASSISTANT

## 2024-05-01 PROCEDURE — 1124F ACP DISCUSS-NO DSCNMKR DOCD: CPT | Performed by: PHYSICIAN ASSISTANT

## 2024-05-01 PROCEDURE — G8427 DOCREV CUR MEDS BY ELIG CLIN: HCPCS | Performed by: PHYSICIAN ASSISTANT

## 2024-05-01 PROCEDURE — G8417 CALC BMI ABV UP PARAM F/U: HCPCS | Performed by: PHYSICIAN ASSISTANT

## 2024-05-01 PROCEDURE — 1111F DSCHRG MED/CURRENT MED MERGE: CPT | Performed by: PHYSICIAN ASSISTANT

## 2024-05-01 PROCEDURE — 3074F SYST BP LT 130 MM HG: CPT | Performed by: PHYSICIAN ASSISTANT

## 2024-05-01 PROCEDURE — 3078F DIAST BP <80 MM HG: CPT | Performed by: PHYSICIAN ASSISTANT

## 2024-05-01 PROCEDURE — 99213 OFFICE O/P EST LOW 20 MIN: CPT | Performed by: PHYSICIAN ASSISTANT

## 2024-05-01 RX ORDER — CICLOPIROX 80 MG/ML
SOLUTION TOPICAL NIGHTLY
COMMUNITY

## 2024-05-01 ASSESSMENT — ENCOUNTER SYMPTOMS
EYE REDNESS: 0
WHEEZING: 0
VOMITING: 0
CONSTIPATION: 0
NAUSEA: 0
BACK PAIN: 0
COUGH: 0
SHORTNESS OF BREATH: 0
ABDOMINAL PAIN: 0
COLOR CHANGE: 0

## 2024-05-01 NOTE — PROGRESS NOTES
HPI:      Patient is a 84 y.o. male in no acute distress.  He is alert and oriented to person, place, and time.       History  1/2015 Greenlight PVP     PVR remained elevated post-op, but was improving: Feb 839 mL, March 600 mL, April 505 mL, July 452 mL, Oct 252 mL.     7/2015 Flomax stopped     10/2016 PVR back up, >850 mL. Does not feel full or uncomfortable. He did self cath previously. He does report intermittent urine stream about half the time, mild straining. Offered option of resuming Flomax or resuming self cath qhs. Prefered to start self cath.     1/2017 Gross hematuria and some difficulty with cathing. Cysto showed small caliber BNC.     1/2017 Greenlight PVP and TUIBNC     11/2017 Right HLL     1/2018 Left ESWL    4/15/2024 -Hospital consult - We have seen the patient for several years in the urology practice. Last time we have seen the patient was approximately 9 months ago. Patient has had issues with BPH as well as stones. Currently patient came in with generalized weakness. He had a Fernandes catheter placed. Patient is currently happy with Fernandes catheter in place as he is still having generalized weakness and difficulty with ambulation. At her last visit we did have him performing intermittent catheterization at bedtime. Patient was having difficulty performing this due to generalized weakness.     Today  Patient is here today for hospital follow-up.  Patient did have a Fernandes catheter placed secondary to recent hospitalization.  Patient continues to have swelling in his hands and legs.  He is having generalized weakness and getting therapy at the [facility].  Patient has longstanding BPH bladder neck contracture and and neurogenic bladder.  Prior to the recent hospitalization he was able to void on his own but did self cath nightly.    Past Medical History:   Diagnosis Date    Acute renal failure superimposed on stage 3 chronic kidney disease (HCC) 03/01/2017    AICD (automatic

## 2024-05-13 NOTE — ED PROVIDER NOTES
the following components:    RBC 4.16 (*)     Hematocrit 39.9 (*)     Eosinophils % 8 (*)     Eosinophils Absolute 0.59 (*)     All other components within normal limits   COMPREHENSIVE METABOLIC PANEL W/ REFLEX TO MG FOR LOW K - Abnormal; Notable for the following components:    CO2 19 (*)     Glucose 100 (*)     BUN 24 (*)     Creatinine 1.8 (*)     Est, Glom Filt Rate 37 (*)     All other components within normal limits   CBC WITH AUTO DIFFERENTIAL - Abnormal; Notable for the following components:    Eosinophils % 8 (*)     Immature Granulocytes % 1 (*)     Eosinophils Absolute 0.69 (*)     All other components within normal limits   COMPREHENSIVE METABOLIC PANEL W/ REFLEX TO MG FOR LOW K - Abnormal; Notable for the following components:    Glucose 108 (*)     BUN 28 (*)     Creatinine 1.5 (*)     Est, Glom Filt Rate 46 (*)     All other components within normal limits   CULTURE, BLOOD 2   CULTURE, BLOOD 1   CULTURE, URINE   LIPASE   AMYLASE   PROTIME-INR   LACTIC ACID   TSH WITH REFLEX   VITAMIN D 25 HYDROXY   VITAMIN B12 & FOLATE       All other labs were within normal range or not returned as of this dictation.    EMERGENCY DEPARTMENT COURSE and DIFFERENTIAL DIAGNOSIS/MDM:   Vitals:    Vitals:    04/15/24 2032 04/16/24 0408 04/16/24 0709 04/16/24 0935   BP: (!) 114/56  130/81 127/66   Pulse:   84 92   Resp:   18    Temp:   96.9 °F (36.1 °C)    TempSrc:   Temporal    SpO2:   97%    Weight:  101.2 kg (223 lb 3.2 oz)     Height:             MDM  Number of Diagnoses or Management Options  This is an 84 y.o. M with history of HTN, ASHD, DM, chronic kidney disease, who presents with multiple nonspecific complaints of just \"not feeling well\". On examination, his vitals show soft BP. He has some abdominal discomfort on examination. With the soft BP and his medical history; will get further workup to r/o some type of cardiac cause vs intra abdominal emergency.     DDx:  ACS, CHF, PNA, gastritis, duodenitis, colitis,

## 2024-05-21 ENCOUNTER — NURSE ONLY (OUTPATIENT)
Dept: CARDIOLOGY | Age: 84
End: 2024-05-21

## 2024-05-21 DIAGNOSIS — I25.5 ISCHEMIC CARDIOMYOPATHY: Primary | ICD-10-CM

## 2024-05-21 DIAGNOSIS — Z95.810 ICD (IMPLANTABLE CARDIOVERTER-DEFIBRILLATOR) IN PLACE: ICD-10-CM

## 2024-05-30 ENCOUNTER — OFFICE VISIT (OUTPATIENT)
Dept: CARDIOLOGY | Age: 84
End: 2024-05-30
Payer: MEDICARE

## 2024-05-30 VITALS
HEIGHT: 70 IN | BODY MASS INDEX: 32.21 KG/M2 | RESPIRATION RATE: 18 BRPM | DIASTOLIC BLOOD PRESSURE: 70 MMHG | HEART RATE: 86 BPM | OXYGEN SATURATION: 97 % | WEIGHT: 225 LBS | SYSTOLIC BLOOD PRESSURE: 106 MMHG

## 2024-05-30 DIAGNOSIS — R06.02 SOB (SHORTNESS OF BREATH): ICD-10-CM

## 2024-05-30 DIAGNOSIS — E78.2 MIXED HYPERLIPIDEMIA: ICD-10-CM

## 2024-05-30 DIAGNOSIS — I25.10 CORONARY ARTERY DISEASE INVOLVING NATIVE CORONARY ARTERY OF NATIVE HEART WITHOUT ANGINA PECTORIS: ICD-10-CM

## 2024-05-30 DIAGNOSIS — I25.5 ISCHEMIC CARDIOMYOPATHY: ICD-10-CM

## 2024-05-30 DIAGNOSIS — I50.42 CHRONIC COMBINED SYSTOLIC AND DIASTOLIC CONGESTIVE HEART FAILURE (HCC): Primary | ICD-10-CM

## 2024-05-30 DIAGNOSIS — I47.29 NSVT (NONSUSTAINED VENTRICULAR TACHYCARDIA) (HCC): ICD-10-CM

## 2024-05-30 DIAGNOSIS — I10 ESSENTIAL HYPERTENSION: ICD-10-CM

## 2024-05-30 DIAGNOSIS — Z95.810 ICD (IMPLANTABLE CARDIOVERTER-DEFIBRILLATOR) IN PLACE: ICD-10-CM

## 2024-05-30 DIAGNOSIS — N18.32 STAGE 3B CHRONIC KIDNEY DISEASE (HCC): ICD-10-CM

## 2024-05-30 PROCEDURE — 3078F DIAST BP <80 MM HG: CPT | Performed by: PHYSICIAN ASSISTANT

## 2024-05-30 PROCEDURE — 3074F SYST BP LT 130 MM HG: CPT | Performed by: PHYSICIAN ASSISTANT

## 2024-05-30 PROCEDURE — 1036F TOBACCO NON-USER: CPT | Performed by: PHYSICIAN ASSISTANT

## 2024-05-30 PROCEDURE — G8427 DOCREV CUR MEDS BY ELIG CLIN: HCPCS | Performed by: PHYSICIAN ASSISTANT

## 2024-05-30 PROCEDURE — 1124F ACP DISCUSS-NO DSCNMKR DOCD: CPT | Performed by: PHYSICIAN ASSISTANT

## 2024-05-30 PROCEDURE — G8417 CALC BMI ABV UP PARAM F/U: HCPCS | Performed by: PHYSICIAN ASSISTANT

## 2024-05-30 PROCEDURE — 99214 OFFICE O/P EST MOD 30 MIN: CPT | Performed by: PHYSICIAN ASSISTANT

## 2024-05-30 PROCEDURE — 99211 OFF/OP EST MAY X REQ PHY/QHP: CPT | Performed by: PHYSICIAN ASSISTANT

## 2024-05-30 NOTE — PROGRESS NOTES
I, Elza Young am scribing for and in the presence of Sushma Montano PA-C    Patient: Moise Diallo  : 1940  Date of Visit: May 30, 2024    REASON FOR VISIT / CONSULTATION: Congestive Heart Failure (HX: CHF. Pt states he is doing ok. C/o: SOB stable. Denies: CP, Palpitations, Lightheaded/ dizziness. )    History of Present Illness:        Dear Leonardo Coleman MD,     I had the pleasure of seeing Moise Diallo in follow up today. He is a 84 y.o. male with a history of severe coronary artery disease, reduced EF and an ICD. He has two brothers with heart disease he is unsure of details or ages when the problems started. One brother has a pacemaker and the other brother has had open heart surgery.  He believes they had heart attacks in the past. Also reports mother and father with CAD, he believes everyone was in their 70's prior to having any issues. He is former smoker quit over 60 years ago. He smoked 1/2 pack per day for 3 years. He was diagnosed with hypertension for only a few years and hyperlipidemia and diabetes. He denies having sleep apnea. He states until he was 72 years old he never seen a provider and never went to the doctor's office. He has urinary retention and has to self cath once daily.     EKG done 2022- Sinus tachycardia with 1st degree AV block. Left anterior fascicular block, right bundle branch block. Heart rate 101 bpm.     Stress test done on 2022- EF 27% Abnormal myocardial perfusion study. There is a moderate/large perfusion defect of severe intensity in the septal, apical, anteroapical and inferoapical region(s) during stress and rest imaging which is most consistent with an old myocardial infarction with edith-infarct ischemia.Overall, these results are most consistent with high risk for significant coronary artery disease.     CAM done on 2022- 6 days and 23 hours recorded. Baseline rhythm is sinus with average heart rate of 80 bpm, ranging between 59

## 2024-06-04 ENCOUNTER — HOSPITAL ENCOUNTER (OUTPATIENT)
Dept: VASCULAR LAB | Age: 84
Discharge: HOME | End: 2024-06-06
Attending: SURGERY
Payer: MEDICARE

## 2024-06-04 DIAGNOSIS — I70.213 ATHEROSCLER OF NATIVE ARTERY OF BOTH LEGS WITH INTERMIT CLAUDICATION (HCC): ICD-10-CM

## 2024-06-04 DIAGNOSIS — Z98.890 S/P FEMORAL-TIBIAL BYPASS: ICD-10-CM

## 2024-06-04 PROCEDURE — 93923 UPR/LXTR ART STDY 3+ LVLS: CPT

## 2024-06-04 PROCEDURE — 93926 LOWER EXTREMITY STUDY: CPT

## 2024-06-05 LAB
VAS LEFT ABI: 1.17
VAS LEFT ARM BP: 98 MMHG
VAS LEFT ARTERIAL PROX ANASTOMOSIS EDV: 0 CM/S
VAS LEFT ARTERIAL PROX ANASTOMOSIS PSV: 45.7 CM/S
VAS LEFT ATA MID PSV: 16.5 CM/S
VAS LEFT CFA PROX PSV: 67.3 CM/S
VAS LEFT DIST OUTFLOW EDV: 1.1 CM/S
VAS LEFT DIST OUTFLOW PSV: 64.8 CM/S
VAS LEFT DORSALIS PEDIS BP: 107 MMHG
VAS LEFT INFLOW ARTERY EDV: 0 CM/S
VAS LEFT INFLOW ARTERY PSV: 54.3 CM/S
VAS LEFT MID OUTFLOW EDV: 0 CM/S
VAS LEFT MID OUTFLOW PSV: 50 CM/S
VAS LEFT PERONEAL MID PSV: 42.9 CM/S
VAS LEFT PFA PROX PSV: 37.2 CM/S
VAS LEFT PROX OUTFLOW EDV: 0 CM/S
VAS LEFT PROX OUTFLOW PSV: 53.5 CM/S
VAS LEFT PTA BP: 120 MMHG
VAS LEFT SFA DIST PSV: 54.3 CM/S
VAS LEFT SFA DIST VEL RATIO: 1.1
VAS LEFT SFA MID PSV: 49.3 CM/S
VAS LEFT SFA MID VEL RATIO: 1.1
VAS LEFT SFA PROX PSV: 45 CM/S
VAS LEFT SFA PROX VEL RATIO: 0.67
VAS LEFT TBI: 0.56
VAS LEFT TOE PRESSURE: 58 MMHG
VAS LEFT VENOUS DIST ANASTOMOSIS EDV: 2.9 CM/S
VAS LEFT VENOUS DIST ANASTOMOSIS PSV: 101.3 CM/S
VAS RIGHT ABI: 1.06
VAS RIGHT ARM BP: 103 MMHG
VAS RIGHT DORSALIS PEDIS BP: 109 MMHG
VAS RIGHT PTA BP: 49 MMHG
VAS RIGHT TBI: 0.83
VAS RIGHT TOE PRESSURE: 85 MMHG

## 2024-06-06 ENCOUNTER — OFFICE VISIT (OUTPATIENT)
Dept: UROLOGY | Age: 84
End: 2024-06-06
Payer: MEDICARE

## 2024-06-06 VITALS
TEMPERATURE: 97.5 F | BODY MASS INDEX: 32.71 KG/M2 | WEIGHT: 228 LBS | HEART RATE: 82 BPM | SYSTOLIC BLOOD PRESSURE: 120 MMHG | DIASTOLIC BLOOD PRESSURE: 70 MMHG

## 2024-06-06 DIAGNOSIS — N13.8 BPH WITH OBSTRUCTION/LOWER URINARY TRACT SYMPTOMS: ICD-10-CM

## 2024-06-06 DIAGNOSIS — N40.1 BPH WITH OBSTRUCTION/LOWER URINARY TRACT SYMPTOMS: ICD-10-CM

## 2024-06-06 DIAGNOSIS — R33.9 INCOMPLETE BLADDER EMPTYING: Primary | ICD-10-CM

## 2024-06-06 DIAGNOSIS — N32.0 BNC (BLADDER NECK CONTRACTURE): ICD-10-CM

## 2024-06-06 PROCEDURE — 51798 US URINE CAPACITY MEASURE: CPT | Performed by: NURSE PRACTITIONER

## 2024-06-06 NOTE — PROGRESS NOTES
History  1/2015 Greenlight PVP     PVR remained elevated post-op, but was improving: Feb 839 mL, March 600 mL, April 505 mL, July 452 mL, Oct 252 mL.     7/2015 Flomax stopped     10/2016 PVR back up, >850 mL. Does not feel full or uncomfortable. He did self cath previously. He does report intermittent urine stream about half the time, mild straining. Offered option of resuming Flomax or resuming self cath qhs. Prefered to start self cath.     1/2017 Gross hematuria and some difficulty with cathing. Cysto showed small caliber BNC.     1/2017 Greenlight PVP and TUIBNC     11/2017 Right HLL     1/2018 Left ESWL    4/2024 Hospital consult. Admitted for generalized weakness and difficulty ambulating. He had a Schulz catheter placed. At her last visit we did have him performing intermittent catheterization at bedtime. Patient was having difficulty performing this due to generalized weakness.     5/2024 schulz removed, resumed cathing QHS    Today  Here today to follow-up for incomplete bladder emptying.  He urinates 3 times per day.  He denies any incontinence.  Nursing staff is performing intermittent catheterization at night and obtaining approximately 1100 mL.  He denies any dysuria or gross hematuria.  Most recent creatinine is 1.8.  Today his PVR is low.    Plan  Continue Flomax    Continue intermittent catheterization at bedtime    We will follow-up with patient during facility rounds in 6 months

## 2024-06-21 ENCOUNTER — HOSPITAL ENCOUNTER (INPATIENT)
Age: 84
LOS: 3 days | Discharge: HOME | DRG: 281 | End: 2024-06-24
Attending: EMERGENCY MEDICINE | Admitting: INTERNAL MEDICINE
Payer: MEDICARE

## 2024-06-21 ENCOUNTER — APPOINTMENT (OUTPATIENT)
Age: 84
DRG: 281 | End: 2024-06-21
Attending: FAMILY MEDICINE
Payer: MEDICARE

## 2024-06-21 ENCOUNTER — APPOINTMENT (OUTPATIENT)
Dept: GENERAL RADIOLOGY | Age: 84
DRG: 281 | End: 2024-06-21
Payer: MEDICARE

## 2024-06-21 DIAGNOSIS — R60.1 ANASARCA: Primary | ICD-10-CM

## 2024-06-21 DIAGNOSIS — I50.21 ACUTE SYSTOLIC CONGESTIVE HEART FAILURE (HCC): ICD-10-CM

## 2024-06-21 PROBLEM — I21.A1 TYPE 2 MYOCARDIAL INFARCTION DUE TO HEART FAILURE (HCC): Status: ACTIVE | Noted: 2024-06-21

## 2024-06-21 PROBLEM — L89.302 PRESSURE INJURY OF BUTTOCK, STAGE 2 (HCC): Status: ACTIVE | Noted: 2024-06-21

## 2024-06-21 PROBLEM — L89.620 PRESSURE INJURY OF LEFT HEEL, UNSTAGEABLE (HCC): Status: ACTIVE | Noted: 2024-06-21

## 2024-06-21 PROBLEM — I50.43 ACUTE ON CHRONIC COMBINED SYSTOLIC AND DIASTOLIC CHF (CONGESTIVE HEART FAILURE) (HCC): Status: ACTIVE | Noted: 2024-06-21

## 2024-06-21 PROBLEM — I50.9 TYPE 2 MYOCARDIAL INFARCTION DUE TO HEART FAILURE (HCC): Status: ACTIVE | Noted: 2024-06-21

## 2024-06-21 PROBLEM — I50.33 ACUTE ON CHRONIC DIASTOLIC CHF (CONGESTIVE HEART FAILURE) (HCC): Status: ACTIVE | Noted: 2024-06-21

## 2024-06-21 LAB
ALBUMIN SERPL-MCNC: 3.7 G/DL (ref 3.5–5.2)
ALBUMIN/GLOB SERPL: 1.1 {RATIO} (ref 1–2.5)
ALP SERPL-CCNC: 101 U/L (ref 40–129)
ALT SERPL-CCNC: 12 U/L (ref 5–41)
ANION GAP SERPL CALCULATED.3IONS-SCNC: 11 MMOL/L (ref 9–17)
ANION GAP SERPL CALCULATED.3IONS-SCNC: 9 MMOL/L (ref 9–17)
AST SERPL-CCNC: 11 U/L
BACTERIA URNS QL MICRO: ABNORMAL
BASOPHILS # BLD: 0.03 K/UL (ref 0–0.2)
BASOPHILS # BLD: 0.03 K/UL (ref 0–0.2)
BASOPHILS NFR BLD: 0 % (ref 0–2)
BASOPHILS NFR BLD: 0 % (ref 0–2)
BILIRUB SERPL-MCNC: 0.4 MG/DL (ref 0.3–1.2)
BILIRUB UR QL STRIP: NEGATIVE
BNP SERPL-MCNC: 609 PG/ML
BUN SERPL-MCNC: 40 MG/DL (ref 8–23)
BUN SERPL-MCNC: 41 MG/DL (ref 8–23)
BUN/CREAT SERPL: 27 (ref 9–20)
BUN/CREAT SERPL: 27 (ref 9–20)
CALCIUM SERPL-MCNC: 9.1 MG/DL (ref 8.6–10.4)
CALCIUM SERPL-MCNC: 9.3 MG/DL (ref 8.6–10.4)
CHLORIDE SERPL-SCNC: 100 MMOL/L (ref 98–107)
CHLORIDE SERPL-SCNC: 103 MMOL/L (ref 98–107)
CLARITY UR: CLEAR
CO2 SERPL-SCNC: 26 MMOL/L (ref 20–31)
CO2 SERPL-SCNC: 27 MMOL/L (ref 20–31)
COLOR UR: YELLOW
CREAT SERPL-MCNC: 1.5 MG/DL (ref 0.7–1.2)
CREAT SERPL-MCNC: 1.5 MG/DL (ref 0.7–1.2)
ECHO AO ROOT DIAM: 2.6 CM
ECHO AO ROOT INDEX: 1.18 CM/M2
ECHO AO SINUS VALSALVA DIAM: 4 CM
ECHO AO SINUS VALSALVA INDEX: 1.82 CM/M2
ECHO AO ST JNCT DIAM: 2.6 CM
ECHO AR MAX VEL PISA: 2.6 M/S
ECHO AV CUSP MM: 2.4 CM
ECHO AV MEAN GRADIENT: 2 MMHG
ECHO AV MEAN VELOCITY: 0.7 M/S
ECHO AV PEAK GRADIENT: 4 MMHG
ECHO AV PEAK VELOCITY: 1 M/S
ECHO AV REGURGITANT PHT: 568 MS
ECHO AV VTI: 23.2 CM
ECHO BSA: 2.26 M2
ECHO EST RA PRESSURE: 3 MMHG
ECHO LA AREA 2C: 20.1 CM2
ECHO LA AREA 4C: 17.4 CM2
ECHO LA MAJOR AXIS: 4.8 CM
ECHO LA MINOR AXIS: 5.1 CM
ECHO LA VOL BP: 58 ML (ref 18–58)
ECHO LA VOL MOD A2C: 65 ML (ref 18–58)
ECHO LA VOL MOD A4C: 49 ML (ref 18–58)
ECHO LA VOL/BSA BIPLANE: 26 ML/M2 (ref 16–34)
ECHO LA VOLUME INDEX MOD A2C: 30 ML/M2 (ref 16–34)
ECHO LA VOLUME INDEX MOD A4C: 22 ML/M2 (ref 16–34)
ECHO LV E' SEPTAL VELOCITY: 5 CM/S
ECHO LV EDV A2C: 107 ML
ECHO LV EDV A4C: 112 ML
ECHO LV EDV INDEX A4C: 51 ML/M2
ECHO LV EDV NDEX A2C: 49 ML/M2
ECHO LV EJECTION FRACTION A2C: 40 %
ECHO LV EJECTION FRACTION A4C: 40 %
ECHO LV EJECTION FRACTION BIPLANE: 40 % (ref 55–100)
ECHO LV ESV A2C: 64 ML
ECHO LV ESV A4C: 67 ML
ECHO LV ESV INDEX A2C: 29 ML/M2
ECHO LV ESV INDEX A4C: 30 ML/M2
ECHO LV FRACTIONAL SHORTENING: 15 % (ref 28–44)
ECHO LV INTERNAL DIMENSION DIASTOLE INDEX: 2.36 CM/M2
ECHO LV INTERNAL DIMENSION DIASTOLIC: 5.2 CM (ref 4.2–5.9)
ECHO LV INTERNAL DIMENSION SYSTOLIC INDEX: 2 CM/M2
ECHO LV INTERNAL DIMENSION SYSTOLIC: 4.4 CM
ECHO LV IVSD: 1.2 CM (ref 0.6–1)
ECHO LV MASS 2D: 234.6 G (ref 88–224)
ECHO LV MASS INDEX 2D: 106.6 G/M2 (ref 49–115)
ECHO LV POSTERIOR WALL DIASTOLIC: 1.1 CM (ref 0.6–1)
ECHO LV RELATIVE WALL THICKNESS RATIO: 0.42
ECHO MV A VELOCITY: 0.73 M/S
ECHO MV E DECELERATION TIME (DT): 246 MS
ECHO MV E VELOCITY: 0.4 M/S
ECHO MV E/A RATIO: 0.55
ECHO MV E/E' SEPTAL: 8
ECHO PV MAX VELOCITY: 0.7 M/S
ECHO PV PEAK GRADIENT: 2 MMHG
ECHO RIGHT VENTRICULAR SYSTOLIC PRESSURE (RVSP): 26 MMHG
ECHO TV REGURGITANT MAX VELOCITY: 2.42 M/S
ECHO TV REGURGITANT PEAK GRADIENT: 23 MMHG
EKG Q-T INTERVAL: 610 MS
EKG QRS DURATION: 136 MS
EKG QTC CALCULATION (BAZETT): 681 MS
EKG R AXIS: -63 DEGREES
EKG T AXIS: 55 DEGREES
EKG VENTRICULAR RATE: 75 BPM
EOSINOPHIL # BLD: 0.2 K/UL (ref 0–0.44)
EOSINOPHIL # BLD: 0.22 K/UL (ref 0–0.44)
EOSINOPHILS RELATIVE PERCENT: 2 % (ref 1–4)
EOSINOPHILS RELATIVE PERCENT: 2 % (ref 1–4)
EPI CELLS #/AREA URNS HPF: ABNORMAL /HPF (ref 0–5)
ERYTHROCYTE [DISTWIDTH] IN BLOOD BY AUTOMATED COUNT: 14.1 % (ref 11.8–14.4)
ERYTHROCYTE [DISTWIDTH] IN BLOOD BY AUTOMATED COUNT: 14.2 % (ref 11.8–14.4)
GFR, ESTIMATED: 46 ML/MIN/1.73M2
GFR, ESTIMATED: 46 ML/MIN/1.73M2
GLUCOSE SERPL-MCNC: 157 MG/DL (ref 70–99)
GLUCOSE SERPL-MCNC: 158 MG/DL (ref 70–99)
GLUCOSE UR STRIP-MCNC: NEGATIVE MG/DL
HCT VFR BLD AUTO: 37.4 % (ref 40.7–50.3)
HCT VFR BLD AUTO: 37.4 % (ref 40.7–50.3)
HGB BLD-MCNC: 12.4 G/DL (ref 13–17)
HGB BLD-MCNC: 12.5 G/DL (ref 13–17)
HGB UR QL STRIP.AUTO: NEGATIVE
IMM GRANULOCYTES # BLD AUTO: 0.07 K/UL (ref 0–0.3)
IMM GRANULOCYTES # BLD AUTO: 0.09 K/UL (ref 0–0.3)
IMM GRANULOCYTES NFR BLD: 1 %
IMM GRANULOCYTES NFR BLD: 1 %
KETONES UR STRIP-MCNC: NEGATIVE MG/DL
LEUKOCYTE ESTERASE UR QL STRIP: ABNORMAL
LYMPHOCYTES NFR BLD: 2.22 K/UL (ref 1.1–3.7)
LYMPHOCYTES NFR BLD: 2.52 K/UL (ref 1.1–3.7)
LYMPHOCYTES RELATIVE PERCENT: 24 % (ref 24–43)
LYMPHOCYTES RELATIVE PERCENT: 27 % (ref 24–43)
MAGNESIUM SERPL-MCNC: 1.6 MG/DL (ref 1.6–2.6)
MAGNESIUM SERPL-MCNC: 1.6 MG/DL (ref 1.6–2.6)
MCH RBC QN AUTO: 31.6 PG (ref 25.2–33.5)
MCH RBC QN AUTO: 31.7 PG (ref 25.2–33.5)
MCHC RBC AUTO-ENTMCNC: 33.2 G/DL (ref 28.4–34.8)
MCHC RBC AUTO-ENTMCNC: 33.4 G/DL (ref 28.4–34.8)
MCV RBC AUTO: 94.9 FL (ref 82.6–102.9)
MCV RBC AUTO: 95.2 FL (ref 82.6–102.9)
MONOCYTES NFR BLD: 0.96 K/UL (ref 0.1–1.2)
MONOCYTES NFR BLD: 1.11 K/UL (ref 0.1–1.2)
MONOCYTES NFR BLD: 10 % (ref 3–12)
MONOCYTES NFR BLD: 12 % (ref 3–12)
NEUTROPHILS NFR BLD: 60 % (ref 36–65)
NEUTROPHILS NFR BLD: 61 % (ref 36–65)
NEUTS SEG NFR BLD: 5.41 K/UL (ref 1.5–8.1)
NEUTS SEG NFR BLD: 5.63 K/UL (ref 1.5–8.1)
NITRITE UR QL STRIP: NEGATIVE
NRBC BLD-RTO: 0 PER 100 WBC
NRBC BLD-RTO: 0 PER 100 WBC
PH UR STRIP: 6 [PH] (ref 5–9)
PLATELET # BLD AUTO: 196 K/UL (ref 138–453)
PLATELET # BLD AUTO: 196 K/UL (ref 138–453)
PMV BLD AUTO: 10.5 FL (ref 8.1–13.5)
PMV BLD AUTO: 9.6 FL (ref 8.1–13.5)
POTASSIUM SERPL-SCNC: 3.8 MMOL/L (ref 3.7–5.3)
POTASSIUM SERPL-SCNC: 4.2 MMOL/L (ref 3.7–5.3)
PROT SERPL-MCNC: 7.1 G/DL (ref 6.4–8.3)
PROT UR STRIP-MCNC: NEGATIVE MG/DL
RBC # BLD AUTO: 3.93 M/UL (ref 4.21–5.77)
RBC # BLD AUTO: 3.94 M/UL (ref 4.21–5.77)
RBC #/AREA URNS HPF: ABNORMAL /HPF (ref 0–2)
SODIUM SERPL-SCNC: 137 MMOL/L (ref 135–144)
SODIUM SERPL-SCNC: 139 MMOL/L (ref 135–144)
SP GR UR STRIP: 1.01 (ref 1.01–1.02)
TROPONIN I SERPL HS-MCNC: 68 NG/L (ref 0–22)
TROPONIN I SERPL HS-MCNC: 68 NG/L (ref 0–22)
TROPONIN I SERPL HS-MCNC: 69 NG/L (ref 0–22)
UROBILINOGEN UR STRIP-ACNC: NORMAL EU/DL (ref 0–1)
WBC #/AREA URNS HPF: ABNORMAL /HPF (ref 0–5)
WBC OTHER # BLD: 9.2 K/UL (ref 3.5–11.3)
WBC OTHER # BLD: 9.3 K/UL (ref 3.5–11.3)

## 2024-06-21 PROCEDURE — 99222 1ST HOSP IP/OBS MODERATE 55: CPT | Performed by: FAMILY MEDICINE

## 2024-06-21 PROCEDURE — 84484 ASSAY OF TROPONIN QUANT: CPT

## 2024-06-21 PROCEDURE — 71045 X-RAY EXAM CHEST 1 VIEW: CPT

## 2024-06-21 PROCEDURE — 96374 THER/PROPH/DIAG INJ IV PUSH: CPT

## 2024-06-21 PROCEDURE — 93306 TTE W/DOPPLER COMPLETE: CPT | Performed by: FAMILY MEDICINE

## 2024-06-21 PROCEDURE — 6360000002 HC RX W HCPCS: Performed by: EMERGENCY MEDICINE

## 2024-06-21 PROCEDURE — 83735 ASSAY OF MAGNESIUM: CPT

## 2024-06-21 PROCEDURE — 51702 INSERT TEMP BLADDER CATH: CPT

## 2024-06-21 PROCEDURE — 99285 EMERGENCY DEPT VISIT HI MDM: CPT

## 2024-06-21 PROCEDURE — 97166 OT EVAL MOD COMPLEX 45 MIN: CPT

## 2024-06-21 PROCEDURE — 80053 COMPREHEN METABOLIC PANEL: CPT

## 2024-06-21 PROCEDURE — 1200000000 HC SEMI PRIVATE

## 2024-06-21 PROCEDURE — 94761 N-INVAS EAR/PLS OXIMETRY MLT: CPT

## 2024-06-21 PROCEDURE — 6370000000 HC RX 637 (ALT 250 FOR IP)

## 2024-06-21 PROCEDURE — 6360000002 HC RX W HCPCS

## 2024-06-21 PROCEDURE — 93010 ELECTROCARDIOGRAM REPORT: CPT | Performed by: INTERNAL MEDICINE

## 2024-06-21 PROCEDURE — 83880 ASSAY OF NATRIURETIC PEPTIDE: CPT

## 2024-06-21 PROCEDURE — 2580000003 HC RX 258

## 2024-06-21 PROCEDURE — 36415 COLL VENOUS BLD VENIPUNCTURE: CPT

## 2024-06-21 PROCEDURE — 80048 BASIC METABOLIC PNL TOTAL CA: CPT

## 2024-06-21 PROCEDURE — 93005 ELECTROCARDIOGRAM TRACING: CPT | Performed by: EMERGENCY MEDICINE

## 2024-06-21 PROCEDURE — 81001 URINALYSIS AUTO W/SCOPE: CPT

## 2024-06-21 PROCEDURE — 85025 COMPLETE CBC W/AUTO DIFF WBC: CPT

## 2024-06-21 PROCEDURE — 87086 URINE CULTURE/COLONY COUNT: CPT

## 2024-06-21 PROCEDURE — 93306 TTE W/DOPPLER COMPLETE: CPT

## 2024-06-21 PROCEDURE — 97162 PT EVAL MOD COMPLEX 30 MIN: CPT

## 2024-06-21 RX ORDER — SODIUM CHLORIDE 0.9 % (FLUSH) 0.9 %
5-40 SYRINGE (ML) INJECTION PRN
Status: DISCONTINUED | OUTPATIENT
Start: 2024-06-21 | End: 2024-06-24 | Stop reason: HOSPADM

## 2024-06-21 RX ORDER — DOCUSATE SODIUM 100 MG/1
100 CAPSULE, LIQUID FILLED ORAL PRN
Status: DISCONTINUED | OUTPATIENT
Start: 2024-06-21 | End: 2024-06-21

## 2024-06-21 RX ORDER — M-VIT,TX,IRON,MINS/CALC/FOLIC 27MG-0.4MG
1 TABLET ORAL DAILY
Status: DISCONTINUED | OUTPATIENT
Start: 2024-06-21 | End: 2024-06-24 | Stop reason: HOSPADM

## 2024-06-21 RX ORDER — MONTELUKAST SODIUM 10 MG/1
10 TABLET ORAL NIGHTLY
Status: DISCONTINUED | OUTPATIENT
Start: 2024-06-21 | End: 2024-06-24 | Stop reason: HOSPADM

## 2024-06-21 RX ORDER — ENOXAPARIN SODIUM 100 MG/ML
30 INJECTION SUBCUTANEOUS 2 TIMES DAILY
Status: DISCONTINUED | OUTPATIENT
Start: 2024-06-21 | End: 2024-06-24 | Stop reason: HOSPADM

## 2024-06-21 RX ORDER — PRAMIPEXOLE DIHYDROCHLORIDE 0.25 MG/1
0.5 TABLET ORAL DAILY
Status: DISCONTINUED | OUTPATIENT
Start: 2024-06-21 | End: 2024-06-24 | Stop reason: HOSPADM

## 2024-06-21 RX ORDER — ONDANSETRON 4 MG/1
4 TABLET, ORALLY DISINTEGRATING ORAL EVERY 8 HOURS PRN
Status: DISCONTINUED | OUTPATIENT
Start: 2024-06-21 | End: 2024-06-24 | Stop reason: HOSPADM

## 2024-06-21 RX ORDER — POTASSIUM CHLORIDE 7.45 MG/ML
10 INJECTION INTRAVENOUS PRN
Status: DISCONTINUED | OUTPATIENT
Start: 2024-06-21 | End: 2024-06-24 | Stop reason: HOSPADM

## 2024-06-21 RX ORDER — ACETAMINOPHEN 325 MG/1
650 TABLET ORAL EVERY 6 HOURS PRN
Status: DISCONTINUED | OUTPATIENT
Start: 2024-06-21 | End: 2024-06-24 | Stop reason: HOSPADM

## 2024-06-21 RX ORDER — POLYETHYLENE GLYCOL 3350 17 G/17G
17 POWDER, FOR SOLUTION ORAL DAILY PRN
Status: ON HOLD | COMMUNITY
End: 2024-06-24 | Stop reason: HOSPADM

## 2024-06-21 RX ORDER — PANTOPRAZOLE SODIUM 40 MG/1
40 TABLET, DELAYED RELEASE ORAL
Status: DISCONTINUED | OUTPATIENT
Start: 2024-06-21 | End: 2024-06-24 | Stop reason: HOSPADM

## 2024-06-21 RX ORDER — MAGNESIUM 30 MG
200 TABLET ORAL 2 TIMES DAILY
COMMUNITY

## 2024-06-21 RX ORDER — LANOLIN ALCOHOL/MO/W.PET/CERES
400 CREAM (GRAM) TOPICAL 2 TIMES DAILY
COMMUNITY

## 2024-06-21 RX ORDER — PRAMIPEXOLE DIHYDROCHLORIDE 0.5 MG/1
0.5 TABLET ORAL DAILY
COMMUNITY

## 2024-06-21 RX ORDER — DOCUSATE SODIUM 100 MG/1
100 CAPSULE, LIQUID FILLED ORAL 2 TIMES DAILY
Status: DISCONTINUED | OUTPATIENT
Start: 2024-06-21 | End: 2024-06-24 | Stop reason: HOSPADM

## 2024-06-21 RX ORDER — POTASSIUM CHLORIDE 20 MEQ/1
40 TABLET, EXTENDED RELEASE ORAL PRN
Status: DISCONTINUED | OUTPATIENT
Start: 2024-06-21 | End: 2024-06-24 | Stop reason: HOSPADM

## 2024-06-21 RX ORDER — POLYETHYLENE GLYCOL 3350 17 G/17G
17 POWDER, FOR SOLUTION ORAL DAILY PRN
Status: DISCONTINUED | OUTPATIENT
Start: 2024-06-21 | End: 2024-06-21

## 2024-06-21 RX ORDER — METOPROLOL SUCCINATE 50 MG/1
50 TABLET, EXTENDED RELEASE ORAL DAILY
Status: DISCONTINUED | OUTPATIENT
Start: 2024-06-21 | End: 2024-06-24 | Stop reason: HOSPADM

## 2024-06-21 RX ORDER — BUMETANIDE 0.25 MG/ML
2 INJECTION INTRAMUSCULAR; INTRAVENOUS ONCE
Status: COMPLETED | OUTPATIENT
Start: 2024-06-21 | End: 2024-06-21

## 2024-06-21 RX ORDER — LANOLIN ALCOHOL/MO/W.PET/CERES
400 CREAM (GRAM) TOPICAL 2 TIMES DAILY
Status: DISCONTINUED | OUTPATIENT
Start: 2024-06-21 | End: 2024-06-24 | Stop reason: HOSPADM

## 2024-06-21 RX ORDER — ATORVASTATIN CALCIUM 40 MG/1
40 TABLET, FILM COATED ORAL NIGHTLY
Status: DISCONTINUED | OUTPATIENT
Start: 2024-06-21 | End: 2024-06-24 | Stop reason: HOSPADM

## 2024-06-21 RX ORDER — DOCUSATE SODIUM 100 MG/1
100 CAPSULE, LIQUID FILLED ORAL PRN
Status: ON HOLD | COMMUNITY
End: 2024-06-24 | Stop reason: HOSPADM

## 2024-06-21 RX ORDER — MAGNESIUM SULFATE IN WATER 40 MG/ML
2000 INJECTION, SOLUTION INTRAVENOUS PRN
Status: DISCONTINUED | OUTPATIENT
Start: 2024-06-21 | End: 2024-06-24 | Stop reason: HOSPADM

## 2024-06-21 RX ORDER — TAMSULOSIN HYDROCHLORIDE 0.4 MG/1
0.4 CAPSULE ORAL EVERY MORNING
Status: DISCONTINUED | OUTPATIENT
Start: 2024-06-21 | End: 2024-06-24 | Stop reason: HOSPADM

## 2024-06-21 RX ORDER — ASPIRIN 81 MG/1
81 TABLET ORAL DAILY
Status: DISCONTINUED | OUTPATIENT
Start: 2024-06-21 | End: 2024-06-24 | Stop reason: HOSPADM

## 2024-06-21 RX ORDER — SODIUM CHLORIDE 9 MG/ML
INJECTION, SOLUTION INTRAVENOUS PRN
Status: DISCONTINUED | OUTPATIENT
Start: 2024-06-21 | End: 2024-06-24 | Stop reason: HOSPADM

## 2024-06-21 RX ORDER — POLYETHYLENE GLYCOL 3350 17 G/17G
17 POWDER, FOR SOLUTION ORAL DAILY
Status: DISCONTINUED | OUTPATIENT
Start: 2024-06-21 | End: 2024-06-24 | Stop reason: HOSPADM

## 2024-06-21 RX ORDER — ACETAMINOPHEN 650 MG/1
650 SUPPOSITORY RECTAL EVERY 6 HOURS PRN
Status: DISCONTINUED | OUTPATIENT
Start: 2024-06-21 | End: 2024-06-24 | Stop reason: HOSPADM

## 2024-06-21 RX ORDER — ONDANSETRON 2 MG/ML
4 INJECTION INTRAMUSCULAR; INTRAVENOUS EVERY 6 HOURS PRN
Status: DISCONTINUED | OUTPATIENT
Start: 2024-06-21 | End: 2024-06-24 | Stop reason: HOSPADM

## 2024-06-21 RX ORDER — BUMETANIDE 0.25 MG/ML
1 INJECTION INTRAMUSCULAR; INTRAVENOUS DAILY
Status: DISCONTINUED | OUTPATIENT
Start: 2024-06-21 | End: 2024-06-24 | Stop reason: HOSPADM

## 2024-06-21 RX ORDER — ENOXAPARIN SODIUM 100 MG/ML
40 INJECTION SUBCUTANEOUS DAILY
Status: DISCONTINUED | OUTPATIENT
Start: 2024-06-21 | End: 2024-06-21

## 2024-06-21 RX ORDER — SODIUM CHLORIDE 0.9 % (FLUSH) 0.9 %
5-40 SYRINGE (ML) INJECTION EVERY 12 HOURS SCHEDULED
Status: DISCONTINUED | OUTPATIENT
Start: 2024-06-21 | End: 2024-06-24 | Stop reason: HOSPADM

## 2024-06-21 RX ADMIN — DOCUSATE SODIUM 100 MG: 100 CAPSULE, LIQUID FILLED ORAL at 09:33

## 2024-06-21 RX ADMIN — TAMSULOSIN HYDROCHLORIDE 0.4 MG: 0.4 CAPSULE ORAL at 09:34

## 2024-06-21 RX ADMIN — Medication 1 TABLET: at 09:34

## 2024-06-21 RX ADMIN — ASPIRIN 81 MG: 81 TABLET, COATED ORAL at 09:33

## 2024-06-21 RX ADMIN — SODIUM CHLORIDE, PRESERVATIVE FREE 10 ML: 5 INJECTION INTRAVENOUS at 09:35

## 2024-06-21 RX ADMIN — METFORMIN HYDROCHLORIDE 500 MG: 500 TABLET ORAL at 09:34

## 2024-06-21 RX ADMIN — METFORMIN HYDROCHLORIDE 500 MG: 500 TABLET ORAL at 17:37

## 2024-06-21 RX ADMIN — SACUBITRIL AND VALSARTAN 1 TABLET: 24; 26 TABLET, FILM COATED ORAL at 20:08

## 2024-06-21 RX ADMIN — PANTOPRAZOLE SODIUM 40 MG: 40 TABLET, DELAYED RELEASE ORAL at 09:34

## 2024-06-21 RX ADMIN — POLYETHYLENE GLYCOL 3350 17 G: 17 POWDER, FOR SOLUTION ORAL at 09:35

## 2024-06-21 RX ADMIN — SACUBITRIL AND VALSARTAN 1 TABLET: 24; 26 TABLET, FILM COATED ORAL at 09:34

## 2024-06-21 RX ADMIN — PRAMIPEXOLE DIHYDROCHLORIDE 0.5 MG: 0.25 TABLET ORAL at 09:34

## 2024-06-21 RX ADMIN — Medication 400 MG: at 20:08

## 2024-06-21 RX ADMIN — BUMETANIDE 1 MG: 0.25 INJECTION INTRAMUSCULAR; INTRAVENOUS at 09:34

## 2024-06-21 RX ADMIN — METOPROLOL SUCCINATE 50 MG: 50 TABLET, EXTENDED RELEASE ORAL at 09:34

## 2024-06-21 RX ADMIN — ENOXAPARIN SODIUM 30 MG: 100 INJECTION SUBCUTANEOUS at 20:08

## 2024-06-21 RX ADMIN — SODIUM CHLORIDE, PRESERVATIVE FREE 10 ML: 5 INJECTION INTRAVENOUS at 20:12

## 2024-06-21 RX ADMIN — MONTELUKAST 10 MG: 10 TABLET, FILM COATED ORAL at 20:08

## 2024-06-21 RX ADMIN — ATORVASTATIN CALCIUM 40 MG: 40 TABLET, FILM COATED ORAL at 20:08

## 2024-06-21 RX ADMIN — BUMETANIDE 2 MG: 0.25 INJECTION INTRAMUSCULAR; INTRAVENOUS at 03:30

## 2024-06-21 RX ADMIN — Medication 400 MG: at 09:34

## 2024-06-21 RX ADMIN — ENOXAPARIN SODIUM 40 MG: 100 INJECTION SUBCUTANEOUS at 09:34

## 2024-06-21 RX ADMIN — DOCUSATE SODIUM 100 MG: 100 CAPSULE, LIQUID FILLED ORAL at 20:08

## 2024-06-21 ASSESSMENT — LIFESTYLE VARIABLES
HOW OFTEN DO YOU HAVE A DRINK CONTAINING ALCOHOL: NEVER
HOW MANY STANDARD DRINKS CONTAINING ALCOHOL DO YOU HAVE ON A TYPICAL DAY: PATIENT DOES NOT DRINK

## 2024-06-21 ASSESSMENT — PAIN - FUNCTIONAL ASSESSMENT: PAIN_FUNCTIONAL_ASSESSMENT: NONE - DENIES PAIN

## 2024-06-21 NOTE — H&P
unstable organ failure, including direct patient contact, management of life support systems, review of data including imaging and labs, discussions with other team members and physicians at least 0 minutes so far today, excluding separately billable procedures.      [facility] Medication Reconciliation documentation:    [x] I have utilized all available immediate resources to obtain, update, or review the patient's current medications (including all prescriptions, over-the-counter products, herbals, cannabinoid products and bitamin/mineral/dietary/nutritional supplements.  [If 'yes\", STOP HERE]     []  The patient is not eligible for medication reconciliation; the patient is in an emergent medical situation where delaying treatment would jeopardize the patient's health    []  I did NOT confirm, update or review the patient's current list of medications today.  [DOES NOT SATISFY MIPS PERFORMANCE]        [facility] Advanced Care Planning documentation:  [x] I have confirmed that the patient's Advance Care Plan is present, Code Status is documented, or surrogate decision maker is listed in the patient's medical record  [If \"yes\", STOP HERE]     [] The patient's Advance Care Plan is NOT present because:    []  I confirmed today that the patient does not wish or was not able to name a   surrogate decision maker or provide and advance care plan.    [] Hospice care is currently being provided or has been provided within the   calendar year.    []  I did NOT confirm today the presence of an Advance Care Plan or surrogate   decision maker documented within the patient's medical record.   [DOES NOT SATISFY MIPS PERFORMANCE]    MIPS Heart failure documentation  [] YES     [x] NO    The patient has a history of heart transplant or Left Ventricular Assist Device (LVAD).  [If yes, STOP HERE] - MIPS PERFORMANCE EXCLUSION    [x] YES     [] NO    The patient has a current or prior documentation of left ventricular ejection fraction (LVEF) less

## 2024-06-21 NOTE — ED PROVIDER NOTES
DISPOSITION/PLAN     DISPOSITION Decision To Admit 06/21/2024 03:25:43 AM      (Please note that portions of this note were completed with a voice recognition program.  Efforts were made to edit the dictations but occasionally words are mis-transcribed.)    Jose Angel Roy MD (electronically signed)  Attending Emergency Physician            Jose Angel Roy MD  06/21/24 1548

## 2024-06-21 NOTE — DISCHARGE INSTR - COC
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 ([facility]) I70.244    Abnormal stress test R94.39    Laceration of right elbow S51.011A    Hypotension I95.9    Urinary retention R33.9    Angina, class III ([facility]) I20.9    Gastritis and duodenitis K29.90    ASHD (arteriosclerotic heart disease) I25.10    Acute on chronic combined systolic and diastolic CHF (congestive heart failure) ([facility]) I50.43    Pressure injury of buttock, stage 2 ([facility]) L89.302    Pressure injury of left heel, unstageable ([facility]) L89.620    Anasarca R60.1    Type 2 myocardial infarction due to heart failure ([facility]) I50.9, I21.A1       Isolation/Infection:   Isolation            Contact          Patient Infection Status       Infection Onset Added Last Indicated Last Indicated By Review Planned Expiration Resolved Resolved By    MRSA 04/01/24 04/04/24 04/01/24 Culture, Tissue                Nurse Assessment:  Last Vital Signs: /72   Pulse 77   Temp 97.1 °F (36.2 °C) (Temporal)   Resp 18   Ht 1.778 m (5' 10\")   Wt 104.2 kg (229 lb 12.8 oz)   SpO2 96%   BMI 32.97 kg/m²     Last documented pain score (0-10 scale):    Last Weight:   Wt Readings from Last 1 Encounters:   06/23/24 104.2 kg (229 lb 12.8 oz)     Mental Status:  oriented and alert    IV Access:  - None    Nursing Mobility/ADLs:  Walking   Dependent  Transfer  Dependent  Bathing  Assisted  Dressing  Assisted  Toileting  Dependent  Feeding  Independent  Med Admin  Independent  Med Delivery   none    Wound Care Documentation and Therapy:  Incision 08/26/13 Eye Right (Active)   Number of days: 3955       Wound 02/26/24 Arm Lower;Posterior;Proximal;Right (Active)   Number of days: 118       Wound 04/14/24 Heel

## 2024-06-21 NOTE — PLAN OF CARE
Problem: Discharge Planning  Goal: Discharge to home or other facility with appropriate resources  Outcome: Progressing  Flowsheets (Taken 6/21/2024 0445 by Maria L Osullivan, RN)  Discharge to home or other facility with appropriate resources:   Identify barriers to discharge with patient and caregiver   Arrange for needed discharge resources and transportation as appropriate   Identify discharge learning needs (meds, wound care, etc)   Refer to discharge planning if patient needs post-hospital services based on physician order or complex needs related to functional status, cognitive ability or social support system     Problem: Safety - Adult  Goal: Free from fall injury  Outcome: Progressing     Problem: Skin/Tissue Integrity  Goal: Absence of new skin breakdown  Description: 1.  Monitor for areas of redness and/or skin breakdown  2.  Assess vascular access sites hourly  3.  Every 4-6 hours minimum:  Change oxygen saturation probe site  4.  Every 4-6 hours:  If on nasal continuous positive airway pressure, respiratory therapy assess nares and determine need for appliance change or resting period.  Outcome: Progressing

## 2024-06-22 LAB
ANION GAP SERPL CALCULATED.3IONS-SCNC: 8 MMOL/L (ref 9–17)
BASOPHILS # BLD: 0.03 K/UL (ref 0–0.2)
BASOPHILS NFR BLD: 0 % (ref 0–2)
BUN SERPL-MCNC: 40 MG/DL (ref 8–23)
BUN/CREAT SERPL: 29 (ref 9–20)
CALCIUM SERPL-MCNC: 9.1 MG/DL (ref 8.6–10.4)
CHLORIDE SERPL-SCNC: 101 MMOL/L (ref 98–107)
CO2 SERPL-SCNC: 29 MMOL/L (ref 20–31)
CREAT SERPL-MCNC: 1.4 MG/DL (ref 0.7–1.2)
EOSINOPHIL # BLD: 0.15 K/UL (ref 0–0.44)
EOSINOPHILS RELATIVE PERCENT: 2 % (ref 1–4)
ERYTHROCYTE [DISTWIDTH] IN BLOOD BY AUTOMATED COUNT: 14.1 % (ref 11.8–14.4)
GFR, ESTIMATED: 50 ML/MIN/1.73M2
GLUCOSE SERPL-MCNC: 154 MG/DL (ref 70–99)
HCT VFR BLD AUTO: 37.2 % (ref 40.7–50.3)
HGB BLD-MCNC: 12.2 G/DL (ref 13–17)
IMM GRANULOCYTES # BLD AUTO: 0.05 K/UL (ref 0–0.3)
IMM GRANULOCYTES NFR BLD: 1 %
LYMPHOCYTES NFR BLD: 2.33 K/UL (ref 1.1–3.7)
LYMPHOCYTES RELATIVE PERCENT: 28 % (ref 24–43)
MAGNESIUM SERPL-MCNC: 1.7 MG/DL (ref 1.6–2.6)
MCH RBC QN AUTO: 31.1 PG (ref 25.2–33.5)
MCHC RBC AUTO-ENTMCNC: 32.8 G/DL (ref 28.4–34.8)
MCV RBC AUTO: 94.9 FL (ref 82.6–102.9)
MICROORGANISM SPEC CULT: NORMAL
MONOCYTES NFR BLD: 0.76 K/UL (ref 0.1–1.2)
MONOCYTES NFR BLD: 9 % (ref 3–12)
NEUTROPHILS NFR BLD: 60 % (ref 36–65)
NEUTS SEG NFR BLD: 4.9 K/UL (ref 1.5–8.1)
NRBC BLD-RTO: 0 PER 100 WBC
PLATELET # BLD AUTO: 207 K/UL (ref 138–453)
PMV BLD AUTO: 10.4 FL (ref 8.1–13.5)
POTASSIUM SERPL-SCNC: 4.1 MMOL/L (ref 3.7–5.3)
RBC # BLD AUTO: 3.92 M/UL (ref 4.21–5.77)
SERVICE CMNT-IMP: NORMAL
SODIUM SERPL-SCNC: 138 MMOL/L (ref 135–144)
SPECIMEN DESCRIPTION: NORMAL
WBC OTHER # BLD: 8.2 K/UL (ref 3.5–11.3)

## 2024-06-22 PROCEDURE — 97110 THERAPEUTIC EXERCISES: CPT

## 2024-06-22 PROCEDURE — 97535 SELF CARE MNGMENT TRAINING: CPT

## 2024-06-22 PROCEDURE — 2580000003 HC RX 258

## 2024-06-22 PROCEDURE — 93005 ELECTROCARDIOGRAM TRACING: CPT

## 2024-06-22 PROCEDURE — 83735 ASSAY OF MAGNESIUM: CPT

## 2024-06-22 PROCEDURE — 6370000000 HC RX 637 (ALT 250 FOR IP)

## 2024-06-22 PROCEDURE — 80048 BASIC METABOLIC PNL TOTAL CA: CPT

## 2024-06-22 PROCEDURE — 6360000002 HC RX W HCPCS

## 2024-06-22 PROCEDURE — 94761 N-INVAS EAR/PLS OXIMETRY MLT: CPT

## 2024-06-22 PROCEDURE — 1200000000 HC SEMI PRIVATE

## 2024-06-22 PROCEDURE — 85025 COMPLETE CBC W/AUTO DIFF WBC: CPT

## 2024-06-22 PROCEDURE — 36415 COLL VENOUS BLD VENIPUNCTURE: CPT

## 2024-06-22 RX ADMIN — ENOXAPARIN SODIUM 30 MG: 100 INJECTION SUBCUTANEOUS at 09:48

## 2024-06-22 RX ADMIN — MONTELUKAST 10 MG: 10 TABLET, FILM COATED ORAL at 20:06

## 2024-06-22 RX ADMIN — SODIUM CHLORIDE, PRESERVATIVE FREE 10 ML: 5 INJECTION INTRAVENOUS at 20:08

## 2024-06-22 RX ADMIN — Medication 1 TABLET: at 09:49

## 2024-06-22 RX ADMIN — SACUBITRIL AND VALSARTAN 1 TABLET: 24; 26 TABLET, FILM COATED ORAL at 20:06

## 2024-06-22 RX ADMIN — SACUBITRIL AND VALSARTAN 1 TABLET: 24; 26 TABLET, FILM COATED ORAL at 09:49

## 2024-06-22 RX ADMIN — PANTOPRAZOLE SODIUM 40 MG: 40 TABLET, DELAYED RELEASE ORAL at 07:27

## 2024-06-22 RX ADMIN — POLYETHYLENE GLYCOL 3350 17 G: 17 POWDER, FOR SOLUTION ORAL at 09:48

## 2024-06-22 RX ADMIN — PRAMIPEXOLE DIHYDROCHLORIDE 0.5 MG: 0.25 TABLET ORAL at 09:49

## 2024-06-22 RX ADMIN — Medication 400 MG: at 20:06

## 2024-06-22 RX ADMIN — SODIUM CHLORIDE, PRESERVATIVE FREE 10 ML: 5 INJECTION INTRAVENOUS at 09:50

## 2024-06-22 RX ADMIN — Medication 400 MG: at 09:50

## 2024-06-22 RX ADMIN — METOPROLOL SUCCINATE 50 MG: 50 TABLET, EXTENDED RELEASE ORAL at 09:49

## 2024-06-22 RX ADMIN — TAMSULOSIN HYDROCHLORIDE 0.4 MG: 0.4 CAPSULE ORAL at 09:49

## 2024-06-22 RX ADMIN — METFORMIN HYDROCHLORIDE 500 MG: 500 TABLET ORAL at 16:25

## 2024-06-22 RX ADMIN — DOCUSATE SODIUM 100 MG: 100 CAPSULE, LIQUID FILLED ORAL at 20:06

## 2024-06-22 RX ADMIN — ASPIRIN 81 MG: 81 TABLET, COATED ORAL at 09:49

## 2024-06-22 RX ADMIN — DOCUSATE SODIUM 100 MG: 100 CAPSULE, LIQUID FILLED ORAL at 09:50

## 2024-06-22 RX ADMIN — BUMETANIDE 1 MG: 0.25 INJECTION INTRAMUSCULAR; INTRAVENOUS at 09:48

## 2024-06-22 RX ADMIN — ATORVASTATIN CALCIUM 40 MG: 40 TABLET, FILM COATED ORAL at 20:06

## 2024-06-22 RX ADMIN — METFORMIN HYDROCHLORIDE 500 MG: 500 TABLET ORAL at 07:27

## 2024-06-22 RX ADMIN — ENOXAPARIN SODIUM 30 MG: 100 INJECTION SUBCUTANEOUS at 20:06

## 2024-06-22 NOTE — CONSULTS
ESWL EXTRACORPEAL SHOCK WAVE LITHOTRIPSY performed by Tod Patton MD at [facility] OR    TURP  2017    [facility] PVP and TUIBNC    TURP  2015    [facility]light PVP    VASCULAR SURGERY Left 2023    Left Lower Angio    VASCULAR SURGERY Right 2023    LOWER EXTERMITY ARTERIOGRAM, performed by Ruddy Trent MD at [facility] CVOR       FAMILY HISTORY    Family History   Problem Relation Age of Onset    Heart Failure Brother        SOCIAL HISTORY    Social History     Tobacco Use    Smoking status: Former     Current packs/day: 0.00     Average packs/day: 0.5 packs/day for 3.0 years (1.5 ttl pk-yrs)     Types: Cigarettes     Start date: 1957     Quit date: 1960     Years since quittin.5    Smokeless tobacco: Former     Quit date: 1960    Tobacco comments:     quit 50 years ago   Vaping Use    Vaping Use: Never used   Substance Use Topics    Alcohol use: No    Drug use: No       ALLERGIES    No Known Allergies    MEDICATIONS    No current facility-administered medications on file prior to encounter.     Current Outpatient Medications on File Prior to Encounter   Medication Sig Dispense Refill    pramipexole (MIRAPEX) 0.5 MG tablet Take 1 tablet by mouth daily      magnesium 30 MG tablet Take 200 mg by mouth 2 times daily      docusate sodium (COLACE) 100 MG capsule Take 1 capsule by mouth as needed for Constipation      magnesium oxide (MAG-OX) 400 (240 Mg) MG tablet Take 1 tablet by mouth 2 times daily      polyethylene glycol (GLYCOLAX) 17 g packet Take 1 packet by mouth daily as needed for Constipation      magnesium hydroxide (MILK OF MAGNESIA) 400 MG/5ML suspension Take by mouth daily as needed for Constipation      tuberculin (APLISOL) 5 UNIT/0.1ML injection 0.1 mLs (Patient not taking: Reported on 2024)      ciclopirox (PENLAC) 8 % solution Apply topically nightly Apply topically nightly. (Patient not taking: Reported on 2024)      bumetanide (BUMEX) 0.5 MG tablet Take 1 
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  Because of this history, I ordered a echocardiogram to better assess the severity of this problem.       Once again, thank you for allowing me to participate in this patients care. Please do not hesitate to contact me if I could be of any further assistance.     Sincerely,  Alan Rios MD, MS, [facility] Cardiology Specialist    [address] [place], [place] [address]  Phone: [phone], Fax: [phone]     I believe that the risk of significant morbidity and mortality related to the patient's current medical conditions are: intermediate-high.        June 21, 2024

## 2024-06-22 NOTE — PLAN OF CARE
Problem: Discharge Planning  Goal: Discharge to home or other facility with appropriate resources  6/21/2024 2117 by Marina Riojas, RN  Outcome: Progressing  Flowsheets (Taken 6/21/2024 2117)  Discharge to home or other facility with appropriate resources:   Identify barriers to discharge with patient and caregiver   Arrange for needed discharge resources and transportation as appropriate   Identify discharge learning needs (meds, wound care, etc)     Problem: Safety - Adult  Goal: Free from fall injury  6/21/2024 2117 by Marina Riojas, RN  Outcome: Progressing  Flowsheets (Taken 6/21/2024 2117)  Free From Fall Injury:   Instruct family/caregiver on patient safety   Based on caregiver fall risk screen, instruct family/caregiver to ask for assistance with transferring infant if caregiver noted to have fall risk factors     Problem: Skin/Tissue Integrity  Goal: Absence of new skin breakdown  Description: 1.  Monitor for areas of redness and/or skin breakdown  2.  Assess vascular access sites hourly  3.  Every 4-6 hours minimum:  Change oxygen saturation probe site  4.  Every 4-6 hours:  If on nasal continuous positive airway pressure, respiratory therapy assess nares and determine need for appliance change or resting period.  6/21/2024 2117 by Marina Riojas, RN  Outcome: Progressing     Problem: Chronic Conditions and Co-morbidities  Goal: Patient's chronic conditions and co-morbidity symptoms are monitored and maintained or improved  Outcome: Progressing  Flowsheets (Taken 6/21/2024 2117)  Care Plan - Patient's Chronic Conditions and Co-Morbidity Symptoms are Monitored and Maintained or Improved:   Monitor and assess patient's chronic conditions and comorbid symptoms for stability, deterioration, or improvement   Collaborate with multidisciplinary team to address chronic and comorbid conditions and prevent exacerbation or deterioration     Problem: Nutrition Deficit:  Goal: Optimize  [de-identified] : no palpable thyroid nodules [Laryngoscopy Performed] : laryngoscopy was performed, see procedure section for findings [Midline] : located in midline position [Normal] : orientation to person, place, and time: normal [de-identified] : indirect  laryngoscopy shows normal vocal cord mobility bilaterally with no lesions noted [de-identified] : 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

## 2024-06-22 NOTE — PLAN OF CARE
Problem: Skin/Tissue Integrity  Goal: Absence of new skin breakdown  Description: 1.  Monitor for areas of redness and/or skin breakdown  2.  Assess vascular access sites hourly  3.  Every 4-6 hours minimum:  Change oxygen saturation probe site  4.  Every 4-6 hours:  If on nasal continuous positive airway pressure, respiratory therapy assess nares and determine need for appliance change or resting period.  Outcome: Progressing     Problem: Nutrition Deficit:  Goal: Optimize nutritional status  Outcome: Progressing  Flowsheets (Taken 6/22/2024 1145)  Nutrient intake appropriate for improving, restoring, or maintaining nutritional needs:   Assess nutritional status and recommend course of action   Monitor oral intake, labs, and treatment plans

## 2024-06-23 LAB
ANION GAP SERPL CALCULATED.3IONS-SCNC: 7 MMOL/L (ref 9–17)
BASOPHILS # BLD: 0.03 K/UL (ref 0–0.2)
BASOPHILS NFR BLD: 0 % (ref 0–2)
BUN SERPL-MCNC: 37 MG/DL (ref 8–23)
BUN/CREAT SERPL: 26 (ref 9–20)
CALCIUM SERPL-MCNC: 9 MG/DL (ref 8.6–10.4)
CHLORIDE SERPL-SCNC: 101 MMOL/L (ref 98–107)
CO2 SERPL-SCNC: 29 MMOL/L (ref 20–31)
CREAT SERPL-MCNC: 1.4 MG/DL (ref 0.7–1.2)
EOSINOPHIL # BLD: 0.12 K/UL (ref 0–0.44)
EOSINOPHILS RELATIVE PERCENT: 1 % (ref 1–4)
ERYTHROCYTE [DISTWIDTH] IN BLOOD BY AUTOMATED COUNT: 14 % (ref 11.8–14.4)
GFR, ESTIMATED: 50 ML/MIN/1.73M2
GLUCOSE SERPL-MCNC: 155 MG/DL (ref 70–99)
HCT VFR BLD AUTO: 35.9 % (ref 40.7–50.3)
HGB BLD-MCNC: 11.9 G/DL (ref 13–17)
IMM GRANULOCYTES # BLD AUTO: 0.05 K/UL (ref 0–0.3)
IMM GRANULOCYTES NFR BLD: 1 %
LYMPHOCYTES NFR BLD: 2.38 K/UL (ref 1.1–3.7)
LYMPHOCYTES RELATIVE PERCENT: 27 % (ref 24–43)
MAGNESIUM SERPL-MCNC: 1.7 MG/DL (ref 1.6–2.6)
MCH RBC QN AUTO: 31.6 PG (ref 25.2–33.5)
MCHC RBC AUTO-ENTMCNC: 33.1 G/DL (ref 28.4–34.8)
MCV RBC AUTO: 95.5 FL (ref 82.6–102.9)
MONOCYTES NFR BLD: 0.96 K/UL (ref 0.1–1.2)
MONOCYTES NFR BLD: 11 % (ref 3–12)
NEUTROPHILS NFR BLD: 60 % (ref 36–65)
NEUTS SEG NFR BLD: 5.39 K/UL (ref 1.5–8.1)
NRBC BLD-RTO: 0 PER 100 WBC
PLATELET # BLD AUTO: 199 K/UL (ref 138–453)
PMV BLD AUTO: 10.3 FL (ref 8.1–13.5)
POTASSIUM SERPL-SCNC: 4.1 MMOL/L (ref 3.7–5.3)
RBC # BLD AUTO: 3.76 M/UL (ref 4.21–5.77)
SODIUM SERPL-SCNC: 137 MMOL/L (ref 135–144)
WBC OTHER # BLD: 8.9 K/UL (ref 3.5–11.3)

## 2024-06-23 PROCEDURE — 6360000002 HC RX W HCPCS

## 2024-06-23 PROCEDURE — 94761 N-INVAS EAR/PLS OXIMETRY MLT: CPT

## 2024-06-23 PROCEDURE — 97535 SELF CARE MNGMENT TRAINING: CPT

## 2024-06-23 PROCEDURE — 97530 THERAPEUTIC ACTIVITIES: CPT

## 2024-06-23 PROCEDURE — 1200000000 HC SEMI PRIVATE

## 2024-06-23 PROCEDURE — 83735 ASSAY OF MAGNESIUM: CPT

## 2024-06-23 PROCEDURE — 2580000003 HC RX 258

## 2024-06-23 PROCEDURE — 80048 BASIC METABOLIC PNL TOTAL CA: CPT

## 2024-06-23 PROCEDURE — 36415 COLL VENOUS BLD VENIPUNCTURE: CPT

## 2024-06-23 PROCEDURE — 6370000000 HC RX 637 (ALT 250 FOR IP)

## 2024-06-23 PROCEDURE — 85025 COMPLETE CBC W/AUTO DIFF WBC: CPT

## 2024-06-23 RX ADMIN — METFORMIN HYDROCHLORIDE 500 MG: 500 TABLET ORAL at 16:43

## 2024-06-23 RX ADMIN — MONTELUKAST 10 MG: 10 TABLET, FILM COATED ORAL at 20:55

## 2024-06-23 RX ADMIN — SODIUM CHLORIDE, PRESERVATIVE FREE 10 ML: 5 INJECTION INTRAVENOUS at 21:00

## 2024-06-23 RX ADMIN — DOCUSATE SODIUM 100 MG: 100 CAPSULE, LIQUID FILLED ORAL at 20:55

## 2024-06-23 RX ADMIN — SODIUM CHLORIDE, PRESERVATIVE FREE 10 ML: 5 INJECTION INTRAVENOUS at 08:30

## 2024-06-23 RX ADMIN — BUMETANIDE 1 MG: 0.25 INJECTION INTRAMUSCULAR; INTRAVENOUS at 08:31

## 2024-06-23 RX ADMIN — POLYETHYLENE GLYCOL 3350 17 G: 17 POWDER, FOR SOLUTION ORAL at 08:31

## 2024-06-23 RX ADMIN — ENOXAPARIN SODIUM 30 MG: 100 INJECTION SUBCUTANEOUS at 08:31

## 2024-06-23 RX ADMIN — ENOXAPARIN SODIUM 30 MG: 100 INJECTION SUBCUTANEOUS at 20:55

## 2024-06-23 RX ADMIN — SACUBITRIL AND VALSARTAN 1 TABLET: 24; 26 TABLET, FILM COATED ORAL at 20:55

## 2024-06-23 RX ADMIN — METOPROLOL SUCCINATE 50 MG: 50 TABLET, EXTENDED RELEASE ORAL at 08:32

## 2024-06-23 RX ADMIN — METFORMIN HYDROCHLORIDE 500 MG: 500 TABLET ORAL at 08:32

## 2024-06-23 RX ADMIN — Medication 400 MG: at 08:32

## 2024-06-23 RX ADMIN — ATORVASTATIN CALCIUM 40 MG: 40 TABLET, FILM COATED ORAL at 20:55

## 2024-06-23 RX ADMIN — SACUBITRIL AND VALSARTAN 1 TABLET: 24; 26 TABLET, FILM COATED ORAL at 08:32

## 2024-06-23 RX ADMIN — TAMSULOSIN HYDROCHLORIDE 0.4 MG: 0.4 CAPSULE ORAL at 08:32

## 2024-06-23 RX ADMIN — PRAMIPEXOLE DIHYDROCHLORIDE 0.5 MG: 0.25 TABLET ORAL at 08:31

## 2024-06-23 RX ADMIN — Medication 1 TABLET: at 08:31

## 2024-06-23 RX ADMIN — Medication 400 MG: at 20:55

## 2024-06-23 RX ADMIN — PANTOPRAZOLE SODIUM 40 MG: 40 TABLET, DELAYED RELEASE ORAL at 08:32

## 2024-06-23 RX ADMIN — DOCUSATE SODIUM 100 MG: 100 CAPSULE, LIQUID FILLED ORAL at 08:32

## 2024-06-23 RX ADMIN — ASPIRIN 81 MG: 81 TABLET, COATED ORAL at 08:32

## 2024-06-23 NOTE — PLAN OF CARE
Problem: Discharge Planning  Goal: Discharge to home or other facility with appropriate resources  Outcome: Progressing  Flowsheets (Taken 6/22/2024 2001)  Discharge to home or other facility with appropriate resources:   Identify barriers to discharge with patient and caregiver   Arrange for needed discharge resources and transportation as appropriate     Problem: Safety - Adult  Goal: Free from fall injury  Outcome: Progressing  Flowsheets (Taken 6/22/2024 2001)  Free From Fall Injury:   Instruct family/caregiver on patient safety   Based on caregiver fall risk screen, instruct family/caregiver to ask for assistance with transferring infant if caregiver noted to have fall risk factors     Problem: Skin/Tissue Integrity  Goal: Absence of new skin breakdown  Description: 1.  Monitor for areas of redness and/or skin breakdown  2.  Assess vascular access sites hourly  3.  Every 4-6 hours minimum:  Change oxygen saturation probe site  4.  Every 4-6 hours:  If on nasal continuous positive airway pressure, respiratory therapy assess nares and determine need for appliance change or resting period.  6/22/2024 2001 by Marina Riojas, RN  Outcome: Progressing     Problem: Chronic Conditions and Co-morbidities  Goal: Patient's chronic conditions and co-morbidity symptoms are monitored and maintained or improved  Outcome: Progressing  Flowsheets (Taken 6/22/2024 2001)  Care Plan - Patient's Chronic Conditions and Co-Morbidity Symptoms are Monitored and Maintained or Improved:   Monitor and assess patient's chronic conditions and comorbid symptoms for stability, deterioration, or improvement   Collaborate with multidisciplinary team to address chronic and comorbid conditions and prevent exacerbation or deterioration     Problem: Nutrition Deficit:  Goal: Optimize nutritional status  6/22/2024 2001 by Marina Riojas, RN  Outcome: Progressing  Flowsheets (Taken 6/22/2024 2001)  Nutrient intake appropriate

## 2024-06-24 VITALS
RESPIRATION RATE: 18 BRPM | HEART RATE: 77 BPM | SYSTOLIC BLOOD PRESSURE: 120 MMHG | WEIGHT: 229.8 LBS | BODY MASS INDEX: 32.9 KG/M2 | HEIGHT: 70 IN | TEMPERATURE: 97.1 F | OXYGEN SATURATION: 96 % | DIASTOLIC BLOOD PRESSURE: 72 MMHG

## 2024-06-24 LAB
ANION GAP SERPL CALCULATED.3IONS-SCNC: 9 MMOL/L (ref 9–17)
BASOPHILS # BLD: 0.03 K/UL (ref 0–0.2)
BASOPHILS NFR BLD: 0 % (ref 0–2)
BNP SERPL-MCNC: 526 PG/ML
BUN SERPL-MCNC: 41 MG/DL (ref 8–23)
BUN/CREAT SERPL: 32 (ref 9–20)
CALCIUM SERPL-MCNC: 9 MG/DL (ref 8.6–10.4)
CHLORIDE SERPL-SCNC: 100 MMOL/L (ref 98–107)
CO2 SERPL-SCNC: 27 MMOL/L (ref 20–31)
CREAT SERPL-MCNC: 1.3 MG/DL (ref 0.7–1.2)
EKG ATRIAL RATE: 80 BPM
EKG P AXIS: 40 DEGREES
EKG P-R INTERVAL: 324 MS
EKG Q-T INTERVAL: 408 MS
EKG QRS DURATION: 150 MS
EKG QTC CALCULATION (BAZETT): 470 MS
EKG R AXIS: -53 DEGREES
EKG T AXIS: 80 DEGREES
EKG VENTRICULAR RATE: 80 BPM
EOSINOPHIL # BLD: 0.17 K/UL (ref 0–0.44)
EOSINOPHILS RELATIVE PERCENT: 2 % (ref 1–4)
ERYTHROCYTE [DISTWIDTH] IN BLOOD BY AUTOMATED COUNT: 14.1 % (ref 11.8–14.4)
GFR, ESTIMATED: 55 ML/MIN/1.73M2
GLUCOSE SERPL-MCNC: 135 MG/DL (ref 70–99)
HCT VFR BLD AUTO: 36.7 % (ref 40.7–50.3)
HGB BLD-MCNC: 12.1 G/DL (ref 13–17)
IMM GRANULOCYTES # BLD AUTO: 0.05 K/UL (ref 0–0.3)
IMM GRANULOCYTES NFR BLD: 1 %
LYMPHOCYTES NFR BLD: 2.43 K/UL (ref 1.1–3.7)
LYMPHOCYTES RELATIVE PERCENT: 31 % (ref 24–43)
MAGNESIUM SERPL-MCNC: 1.7 MG/DL (ref 1.6–2.6)
MCH RBC QN AUTO: 31.6 PG (ref 25.2–33.5)
MCHC RBC AUTO-ENTMCNC: 33 G/DL (ref 28.4–34.8)
MCV RBC AUTO: 95.8 FL (ref 82.6–102.9)
MONOCYTES NFR BLD: 0.87 K/UL (ref 0.1–1.2)
MONOCYTES NFR BLD: 11 % (ref 3–12)
NEUTROPHILS NFR BLD: 55 % (ref 36–65)
NEUTS SEG NFR BLD: 4.22 K/UL (ref 1.5–8.1)
NRBC BLD-RTO: 0 PER 100 WBC
PLATELET # BLD AUTO: 200 K/UL (ref 138–453)
PMV BLD AUTO: 10.5 FL (ref 8.1–13.5)
POTASSIUM SERPL-SCNC: 4.7 MMOL/L (ref 3.7–5.3)
RBC # BLD AUTO: 3.83 M/UL (ref 4.21–5.77)
SODIUM SERPL-SCNC: 136 MMOL/L (ref 135–144)
WBC OTHER # BLD: 7.8 K/UL (ref 3.5–11.3)

## 2024-06-24 PROCEDURE — 2580000003 HC RX 258

## 2024-06-24 PROCEDURE — 93010 ELECTROCARDIOGRAM REPORT: CPT | Performed by: INTERNAL MEDICINE

## 2024-06-24 PROCEDURE — 85025 COMPLETE CBC W/AUTO DIFF WBC: CPT

## 2024-06-24 PROCEDURE — 6370000000 HC RX 637 (ALT 250 FOR IP)

## 2024-06-24 PROCEDURE — 36415 COLL VENOUS BLD VENIPUNCTURE: CPT

## 2024-06-24 PROCEDURE — 83735 ASSAY OF MAGNESIUM: CPT

## 2024-06-24 PROCEDURE — 94761 N-INVAS EAR/PLS OXIMETRY MLT: CPT

## 2024-06-24 PROCEDURE — 80048 BASIC METABOLIC PNL TOTAL CA: CPT

## 2024-06-24 PROCEDURE — 83880 ASSAY OF NATRIURETIC PEPTIDE: CPT

## 2024-06-24 PROCEDURE — 97110 THERAPEUTIC EXERCISES: CPT

## 2024-06-24 PROCEDURE — 6360000002 HC RX W HCPCS

## 2024-06-24 RX ORDER — POLYETHYLENE GLYCOL 3350 17 G/17G
17 POWDER, FOR SOLUTION ORAL DAILY
Qty: 527 G | Refills: 1 | DISCHARGE
Start: 2024-06-25 | End: 2024-07-25

## 2024-06-24 RX ORDER — BUMETANIDE 0.5 MG/1
1 TABLET ORAL DAILY
DISCHARGE
Start: 2024-06-24

## 2024-06-24 RX ORDER — ATORVASTATIN CALCIUM 40 MG/1
40 TABLET, FILM COATED ORAL NIGHTLY
Qty: 30 TABLET | Refills: 3 | DISCHARGE
Start: 2024-06-24

## 2024-06-24 RX ORDER — PSEUDOEPHEDRINE HCL 30 MG
100 TABLET ORAL 2 TIMES DAILY
DISCHARGE
Start: 2024-06-24

## 2024-06-24 RX ADMIN — SODIUM CHLORIDE, PRESERVATIVE FREE 10 ML: 5 INJECTION INTRAVENOUS at 08:58

## 2024-06-24 RX ADMIN — POLYETHYLENE GLYCOL 3350 17 G: 17 POWDER, FOR SOLUTION ORAL at 08:51

## 2024-06-24 RX ADMIN — Medication 1 TABLET: at 08:53

## 2024-06-24 RX ADMIN — METFORMIN HYDROCHLORIDE 500 MG: 500 TABLET ORAL at 07:40

## 2024-06-24 RX ADMIN — BUMETANIDE 1 MG: 0.25 INJECTION INTRAMUSCULAR; INTRAVENOUS at 08:54

## 2024-06-24 RX ADMIN — PRAMIPEXOLE DIHYDROCHLORIDE 0.5 MG: 0.25 TABLET ORAL at 09:36

## 2024-06-24 RX ADMIN — Medication 400 MG: at 08:54

## 2024-06-24 RX ADMIN — ASPIRIN 81 MG: 81 TABLET, COATED ORAL at 08:53

## 2024-06-24 RX ADMIN — DOCUSATE SODIUM 100 MG: 100 CAPSULE, LIQUID FILLED ORAL at 08:54

## 2024-06-24 RX ADMIN — METOPROLOL SUCCINATE 50 MG: 50 TABLET, EXTENDED RELEASE ORAL at 08:57

## 2024-06-24 RX ADMIN — ENOXAPARIN SODIUM 30 MG: 100 INJECTION SUBCUTANEOUS at 08:53

## 2024-06-24 RX ADMIN — SACUBITRIL AND VALSARTAN 1 TABLET: 24; 26 TABLET, FILM COATED ORAL at 08:53

## 2024-06-24 RX ADMIN — PANTOPRAZOLE SODIUM 40 MG: 40 TABLET, DELAYED RELEASE ORAL at 06:41

## 2024-06-24 RX ADMIN — TAMSULOSIN HYDROCHLORIDE 0.4 MG: 0.4 CAPSULE ORAL at 08:53

## 2024-06-24 NOTE — CARE COORDINATION
IMM letter provided to patient.  Patient offered four hours to make informed decision regarding appeal process; patient agreeable to discharge.       06/24/24 1139   IMM Letter   IMM Letter given to Patient/Family/Significant other/Guardian/POA/by: second wife by phone Roseann Diallo / LINETTE NAVARRO   IMM Letter date given: 06/24/24   IMM Letter time given: 1136     Patient is discharge back to the [facility] today.  Wife aware of the discharge.  Discharge paper work done and fax.    MELANIE Mane    
(congestive heart failure) (HCC) [I50.33]    IF APPLICABLE: The Patient and/or patient representative Moise and his family were provided with a choice of provider and agrees with the discharge plan. Freedom of choice list with basic dialogue that supports the patient's individualized plan of care/goals and shares the quality data associated with the providers was provided to:     Patient Representative Name:       The Patient and/or Patient Representative Agree with the Discharge Plan?  Yes    Asad Flynn RN  Case Management Department  Ph: [phone]

## 2024-06-24 NOTE — PLAN OF CARE
Problem: Discharge Planning  Goal: Discharge to home or other facility with appropriate resources  6/24/2024 1227 by Mirta Wright, RN  Outcome: Completed     Problem: Safety - Adult  Goal: Free from fall injury  6/24/2024 1227 by Mirta Wright, RN  Outcome: Completed     Problem: Skin/Tissue Integrity  Goal: Absence of new skin breakdown  Description: 1.  Monitor for areas of redness and/or skin breakdown  2.  Assess vascular access sites hourly  3.  Every 4-6 hours minimum:  Change oxygen saturation probe site  4.  Every 4-6 hours:  If on nasal continuous positive airway pressure, respiratory therapy assess nares and determine need for appliance change or resting period.  6/24/2024 1227 by Mirta Wright, RN  Outcome: Completed     Problem: Chronic Conditions and Co-morbidities  Goal: Patient's chronic conditions and co-morbidity symptoms are monitored and maintained or improved  6/24/2024 1227 by Mirta Wright, RN  Outcome: Completed     Problem: Nutrition Deficit:  Goal: Optimize nutritional status  6/24/2024 1227 by Mirta Wright, RN  Outcome: Completed

## 2024-06-24 NOTE — PLAN OF CARE
Problem: Discharge Planning  Goal: Discharge to home or other facility with appropriate resources  Outcome: Progressing  Flowsheets (Taken 6/23/2024 2231)  Discharge to home or other facility with appropriate resources:   Identify barriers to discharge with patient and caregiver   Arrange for needed discharge resources and transportation as appropriate   Identify discharge learning needs (meds, wound care, etc)   Refer to discharge planning if patient needs post-hospital services based on physician order or complex needs related to functional status, cognitive ability or social support system     Problem: Safety - Adult  Goal: Free from fall injury  Outcome: Progressing  Flowsheets (Taken 6/23/2024 2231)  Free From Fall Injury:   Instruct family/caregiver on patient safety   Based on caregiver fall risk screen, instruct family/caregiver to ask for assistance with transferring infant if caregiver noted to have fall risk factors     Problem: Skin/Tissue Integrity  Goal: Absence of new skin breakdown  Description: 1.  Monitor for areas of redness and/or skin breakdown  2.  Assess vascular access sites hourly  3.  Every 4-6 hours minimum:  Change oxygen saturation probe site  4.  Every 4-6 hours:  If on nasal continuous positive airway pressure, respiratory therapy assess nares and determine need for appliance change or resting period.  Outcome: Progressing     Problem: Chronic Conditions and Co-morbidities  Goal: Patient's chronic conditions and co-morbidity symptoms are monitored and maintained or improved  Outcome: Progressing     Problem: Nutrition Deficit:  Goal: Optimize nutritional status  Outcome: Progressing

## 2024-06-24 NOTE — PROGRESS NOTES
Pharmacist Review and Automatic Dose Adjustment of prophylactic enoxaparin      The reviewing pharmacist has made an adjustment to the ordered enoxaparin dose or converted to UFH per the approved [facility] protocol and table as identified below.        Moise Diallo is a 84 y.o. male.     Recent Labs     06/21/24  0225 06/21/24  0521   CREATININE 1.5* 1.5*       Estimated Creatinine Clearance: 46 mL/min (A) (based on SCr of 1.5 mg/dL (H)).    Height:   Ht Readings from Last 1 Encounters:   06/21/24 1.778 m (5' 10\")     Weight:  Wt Readings from Last 1 Encounters:   06/21/24 103 kg (227 lb)     Enoxaparin Indication: prophylaxis       Latest Reference Range & Units Most Recent   Platelet Count 138 - 453 k/uL 196  6/21/24 05:21       Plan: Based upon renal function and weight, the enoxaparin dose has been   changed to Lovenox 30 mg sq twice daily      Thank you,  Valerie Zapata, PharmD 6/21/2024 3:06 PM      
Comprehensive Nutrition Assessment    Type and Reason for Visit:  Initial, Consult, Patient Education    Nutrition Recommendations/Plan:   Modify diet to CC 4 carbs per meal with 2 gm sodium.   Start 4 oz Glucerna with lunch.   Start Jason BID with breakfast and dinner.   Recommend to add vitamin C (500 mg) and zinc sulfate (220 mg).   Continue daily MVI with minerals.     Malnutrition Assessment:  Malnutrition Status:  At risk for malnutrition (Comment) (06/21/24 1209)    Context:  Chronic Illness     Findings of the 6 clinical characteristics of malnutrition:  Energy Intake:  No significant decrease in energy intake  Weight Loss:  No significant weight loss     Body Fat Loss:  No significant body fat loss     Muscle Mass Loss:  No significant muscle mass loss    Fluid Accumulation:  Severe Extremities, Generalized (+ 2 generalized, R/L UE, + 3 R/L LE edema)   Strength:  Not Performed    Nutrition Assessment:    Increased nutrient needs r/t acute injury/trauma aeb healing needs from wounds. Altered nutrition related labs r/t impaired nutrient utilization aeb vitamin D 30.8, not currently on supplemental vitamin D. Recommend addition of 500mg vitamin C, 220 mg zinc sulfate, Jason BID, and vitamin D supplementation. Pt denied any meal ingestion issues, despite only having \"a few teeeth.\" \"does not add salt at the [place], uses pepper.\" Unsure of diet there. Using adaptive silverware at lunch visit. States he would like to lose weight and get back to 200#, states he gained 10# while at the [place]. Tries to not eat 100% of meal, \"leaves something.\" He reports being \"happy with the food.\"  Pt with Roxana, recommend 2 gm sodium wit CC 4 carbs per meal diet plan at d/c.    Nutrition Related Findings:    edema: + 2 generalized, R/L UE, + 3 R/L LE Wound Type:  (R heel, buttock)       Current Nutrition Intake & Therapies:    Average Meal Intake: %  Average Supplements Intake: None Ordered  ADULT DIET; Regular; 
Dressing change completed on wound on left heel at this time.   
Dressing changed at this time. See flowsheets. Patient tolerated well.  
Occupational Therapy  Facility/Department: [facility] MED SURG  Daily Treatment Note  NAME: Moise Diallo  : 1940  MRN: [id number]    Date of Service: 2024    Discharge Recommendations:  Continue to assess pending progress, Long Term Care with OT         Patient Diagnosis(es): The primary encounter diagnosis was Anasarca. A diagnosis of Acute systolic congestive heart failure (HCC) was also pertinent to this visit.     Assessment    Assessment: Pt discouraged during tx session d/t not being able to complete transfers.  Activity Tolerance: Patient limited by fatigue;Patient limited by endurance  Discharge Recommendations: Continue to assess pending progress;Long Term Care with OT      Plan   Occupational Therapy Plan  Times Per Day: Once a day  Days Per Week: 7 Days  Current Treatment Recommendations: Strengthening;ROM;Balance training;Endurance training;Patient/Caregiver education & training;Safety education & training;Equipment evaluation, education, & procurement;Self-Care / ADL     Restrictions  Restrictions/Precautions  Restrictions/Precautions: Contact Precautions;Fall Risk;General Precautions    Subjective   Subjective  Subjective: Pt lying in bed upon arrival. Pt requesting to get up to chair.  Pain: Pt had no complaints of pain.           Objective    Vitals           ADL  Additional Comments: Max A x 2 to complete bed mob from supine to sit EOB. Attempts made to complete transfer with Courtney Steady with Max A x 3 to complete sit to  order to place Courtney Steady under pt, however pt unable to support self upright in Courtney Steady and not safe to complete transfer. Pt assisted back to bed Max A x 2 at this time d/t requesting to use the bedpan.        Safety Devices  Type of Devices: All fall risk precautions in place;Call light within reach;Nurse notified;Patient at risk for falls;Left in bed;Bed alarm in place     Patient Education  Education Given To: Patient  Education Provided: Role of 
Occupational Therapy  Facility/Department: [facility] MED SURG  Daily Treatment Note  NAME: Moise Diallo  : 1940  MRN: [id number]    Date of Service: 2024    Discharge Recommendations:  Continue to assess pending progress, Long Term Care with OT         Patient Diagnosis(es): The primary encounter diagnosis was Anasarca. A diagnosis of Acute systolic congestive heart failure (HCC) was also pertinent to this visit.     Assessment    Activity Tolerance: Patient limited by fatigue;Patient limited by endurance  Discharge Recommendations: Continue to assess pending progress;Long Term Care with OT      Plan   Occupational Therapy Plan  Times Per Day: Once a day  Days Per Week: 7 Days  Current Treatment Recommendations: Strengthening;ROM;Balance training;Endurance training;Patient/Caregiver education & training;Safety education & training;Equipment evaluation, education, & procurement;Self-Care / ADL     Restrictions  Restrictions/Precautions  Restrictions/Precautions: Contact Precautions;Fall Risk;General Precautions    Subjective   Subjective  Subjective: Pt sitting up in bedside chair upon arrival. Pt agreed to participate in therapy session.  Pain: Pt had no complaints of pain.             Objective    Vitals             OT Exercises  Exercise Treatment: Pt completed BUE ther ex x 5 planes x 15 reps x 1 set to increase UE strength and endurance in order to ease completion of ADL tasks. Pt required RBs needed secondary to fatigue.     Safety Devices  Type of Devices: All fall risk precautions in place;Call light within reach;Nurse notified;Patient at risk for falls;Left in chair;Chair alarm in place     Patient Education  Education Given To: Patient  Education Provided: Role of Therapy;Plan of Care  Education Method: Verbal  Barriers to Learning: None  Education Outcome: Verbalized understanding    Goals  Short Term Goals  Time Frame for Short Term Goals: 21 visits  Short Term Goal 1: Patient to engage daily 
Occupational Therapy  Facility/Department: [facility] MED SURG  Daily Treatment Note  NAME: Moise Diallo  : 1940  MRN: [id number]    Date of Service: 2024    Discharge Recommendations:  Continue to assess pending progress, Long Term Care with OT         Patient Diagnosis(es): The primary encounter diagnosis was Anasarca. A diagnosis of Acute systolic congestive heart failure (HCC) was also pertinent to this visit.     Assessment    Activity Tolerance: Patient limited by fatigue;Patient limited by endurance  Discharge Recommendations: Continue to assess pending progress;Long Term Care with OT      Plan   Occupational Therapy Plan  Times Per Day: Once a day  Days Per Week: 7 Days  Current Treatment Recommendations: Strengthening;ROM;Balance training;Endurance training;Patient/Caregiver education & training;Safety education & training;Equipment evaluation, education, & procurement;Self-Care / ADL     Restrictions  Restrictions/Precautions  Restrictions/Precautions: Contact Precautions;Fall Risk;General Precautions    Subjective   Subjective  Subjective: Pt sitting up in bed upon arrival. Pt agreed to participate in therapy session.  Pain: Pt had no complaints of pain.           Objective    Vitals           ADL  Additional Comments: Dep transfer from bed to chair with robby lift.  Skin Care: Soap and water  OT Exercises  Exercise Treatment: Pt completed BUE ther ex x 4 planes x 15 reps x 1 set to increase UE strength and endurance in order to ease completion of ADL tasks. Pt required RBs needed secondary to fatigue.     Safety Devices  Type of Devices: All fall risk precautions in place;Call light within reach;Nurse notified;Patient at risk for falls;Left in chair     Patient Education  Education Given To: Patient  Education Provided: Role of Therapy;Plan of Care  Education Method: Verbal  Barriers to Learning: None  Education Outcome: Verbalized understanding    Goals  Short Term Goals  Time Frame for Short 
Patient left by scat at this time   
Patient was admitted to the floor at 0445 and is A&O x4. Vitals and assessment are complete. Fernandes is in place, patent and draining. Patient normally straight caths and has for around ten years, per pt. Writer took wound photos of patient's buttocks and left heel, see media. Writer also put a foam dressing on patient's buttocks.   Patient told writer he does not ambulate or stand for any length of time but does use a joey steady. Patient will also be a feed due to swelling in his hands.  Writer walked patient through the doctor's orders and the medications that would be administered tonight. Medications given in ER were also reviewed. Pain is assessed using 0-10 scale and patient was educated on PRN pain medications and nonpharmacological techniques, such as deep breathing, distraction, and repositioning. Writer encouraged patient to ask questions.   Personal belongings are with patient and patient was oriented to the room and call light. Call light and bedside table are within reach. Writer gave a basic explanation of how the patient's day would go, and that a care team would be rounding some time in the morning.    
Patient will be leaving @ 1 PM by MELANIE Durán    
Perfect serve sent to Dr Ford regarding patients wound care orders, informed him that staff could not obtain the Megasorb alginate he ordered. Per Dr Ford he will be in tomorrow evening to see patient, in the mean time, betadine with telfa can be used as replacement.   
Physical Therapy  Facility/Department: [facility] MED SURG  Daily Treatment Note  NAME: Moise Diallo  : 1940  MRN: [id number]    Date of Service: 2024    Discharge Recommendations:  Continue to assess pending progress, Subacute/Skilled Nursing Facility, Long Term Care with PT     Patient Diagnosis(es): The primary encounter diagnosis was Anasarca. A diagnosis of Acute systolic congestive heart failure (HCC) was also pertinent to this visit.    Assessment   Assessment: Bed mobility mod A x2, Transfers: Robby lift. Pt completed seated BLE ther ex x20 reps in all available planes of motion. Rest breaks provided between exercises secondary to fatigue.  Activity Tolerance: Patient limited by fatigue;Patient limited by endurance     Plan    Physical Therapy Plan  General Plan: 2 times a day 7 days a week  Specific Instructions for Next Treatment: Once daily on weekends     Restrictions  Restrictions/Precautions  Restrictions/Precautions: Contact Precautions, Fall Risk, General Precautions     Subjective    Subjective  Subjective: Pt in bed upon arrival and agreeable to therapy at this time  Pain: denies  Orientation  Overall Orientation Status: Within Functional Limits  Cognition  Overall Cognitive Status: WFL     Objective  Bed Mobility Training  Bed Mobility Training: Yes  Overall Level of Assistance: Moderate assistance;Assist X2  Interventions: Tactile cues;Verbal cues;Safety awareness training  Rolling: Minimum assistance;Assist X2  Transfer Training  Transfer Training: Yes  Overall Level of Assistance: Total assistance;Other (comment) (robby)  Interventions: Tactile cues;Verbal cues;Safety awareness training  Bed to Chair: Total assistance;Other (comment) (robby)  PT Exercises  Exercise Treatment: Seated BLE ther ex x20 reps in all available planes of motion  Safety Devices  Type of Devices: All fall risk precautions in place;Call light within reach;Nurse notified;Patient at risk for falls;Left in chair 
Physical Therapy  Facility/Department: [facility] MED SURG  Physical Therapy Initial Assessment    Name: Moise Diallo  : 1940  MRN: [id number]  Date of Service: 2024    Discharge Recommendations:  Continue to assess pending progress, Subacute/Skilled Nursing Facility, Long Term Care with PT          Patient Diagnosis(es): The primary encounter diagnosis was Anasarca. A diagnosis of Acute systolic congestive heart failure (HCC) was also pertinent to this visit.  Past Medical History:  has a past medical history of Acute renal failure superimposed on stage 3 chronic kidney disease (HCC), AICD (automatic cardioverter/defibrillator) present, Arthritis, CHF (congestive heart failure) (HCC), Diabetes mellitus (HCC), Hyperlipidemia, Hypertension, and Kidney stone.  Past Surgical History:  has a past surgical history that includes hernia repair; Cataract removal with implant (Right, 2013); knee surgery (Right); joint replacement; joint replacement (Right, 2014 partial knee); joint replacement (Right, ); TURP (2017); TURP (2015); Lithotripsy (Right, 2017); pr cysto w/ureteroscopy w/lithotripsy (Right, 2017); CYSTOSCOPY INSERTION / REMOVAL STENT / STONE (Right, 2017); cystourethroscopy (2017); CYSTOSCOPY INSERTION / REMOVAL STENT / STONE (N/A, 2017); Lithotripsy (Left, 2018); pr lithotripsy xtrcorp shock wave (Left, 2018); Intracapsular cataract extraction (Left, 2020); vascular surgery (Left, 2023); vascular surgery (Right, 2023); Femoral-tibial Bypass Graft (Left, 2023); femoral bypass (Left, 2023); Cardiac catheterization (Left, 2024); and Cardiac procedure (N/A, 3/22/2024).    Assessment   Assessment: Patient is 84 year old male with dx of anasarca who presents with decreased B LE strength, decreased functional mobility and endurance and decreased safety and balance during transfers and is unable to shift weight 
Physical Therapy  Facility/Department: [facility] MED SURG  Daily Treatment Note  NAME: Moise Diallo  : 1940  MRN: [id number]    Date of Service: 2024    Discharge Recommendations:  Continue to assess pending progress, Subacute/Skilled Nursing Facility, Long Term Care with PT     Patient Diagnosis(es): The primary encounter diagnosis was Anasarca. A diagnosis of Acute systolic congestive heart failure (HCC) was also pertinent to this visit.    Assessment   Assessment: Pt. in bed upon arrival, wanting to get up at this time. Pt. requesting to use Courtney Steady because he wants to be able to stand. MaxAx3 to complete sit to stand with pt. having a hard time standing upright. Pt. is unable to hold himself up on Courtney Steady and needed assistance of MaxAx2-3. D/t pt. not being able to hold himself up, the transfer wasn't complete at this time and pt. was placed back in bed for use of bedpan. Chao lift will be used for transfers at this time for safety.  Activity Tolerance: Patient limited by fatigue;Patient limited by endurance     Plan    Physical Therapy Plan  General Plan: 2 times a day 7 days a week  Specific Instructions for Next Treatment: Once daily on weekends  Current Treatment Recommendations: Strengthening;ROM;Balance training;Functional mobility training;Transfer training;Neuromuscular re-education;Gait training;Home exercise program;Safety education & training;Patient/Caregiver education & training;Manual;Endurance training;Therapeutic activities;Wheelchair mobility training     Restrictions  Restrictions/Precautions  Restrictions/Precautions: Contact Precautions, Fall Risk, General Precautions     Subjective    Subjective  Subjective: Pt. in bed upon arrival, requesting to get up at this time.  Pain: denies  Orientation  Overall Orientation Status: Within Functional Limits     Objective   Bed Mobility Training  Bed Mobility Training: Yes  Overall Level of Assistance: Maximum assistance;Assist 
Physical Therapy  Facility/Department: [facility] MED SURG  Daily Treatment Note  NAME: Moise Diallo  : 1940  MRN: [id number]    Date of Service: 2024    Discharge Recommendations:  Continue to assess pending progress, Subacute/Skilled Nursing Facility, Long Term Care with PT     Patient Diagnosis(es): The primary encounter diagnosis was Anasarca. A diagnosis of Acute systolic congestive heart failure (HCC) was also pertinent to this visit.    Assessment   Assessment: Pt. completed seated BLE ther ex x15 reps in all available planes of motion. AAROM to acheive full ROM as needed d/t fatigue.  Activity Tolerance: Patient limited by fatigue;Patient limited by endurance     Plan    Physical Therapy Plan  General Plan: 2 times a day 7 days a week  Specific Instructions for Next Treatment: Once daily on weekends  Current Treatment Recommendations: Strengthening;ROM;Balance training;Functional mobility training;Transfer training;Neuromuscular re-education;Gait training;Home exercise program;Safety education & training;Patient/Caregiver education & training;Manual;Endurance training;Therapeutic activities;Wheelchair mobility training     Restrictions  Restrictions/Precautions  Restrictions/Precautions: Contact Precautions, Fall Risk, General Precautions     Subjective    Subjective  Subjective: Pt. in chair upon arrival, agreeable to therapy at this time  Pain: denies  Orientation  Overall Orientation Status: Within Functional Limits     Objective   Bed Mobility Training  Bed Mobility Training: No  Transfer Training  Transfer Training: No  Gait  Gait Training: No  PT Exercises  Exercise Treatment: Seated BLE ther ex x15 reps in all available planes of motion. AAROM to acheive full ROM as needed d/t fatigue  Safety Devices  Type of Devices: All fall risk precautions in place;Call light within reach;Nurse notified;Patient at risk for falls;Left in chair;Chair alarm in place       Goals  Short Term Goals  Time 
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  He sitting up in bed no distress. No complaints.      ROS:   Constitutional: negative  for fevers, and negative for chills.  Respiratory: negative for shortness of breath, negative for cough, and negative for wheezing  Cardiovascular: negative for chest pain, and negative for palpitations  Gastrointestinal: negative for abdominal pain, negative for nausea,negative for vomiting, negative for diarrhea, and negative for constipation     All other systems were reviewed with the patient and are negative unless otherwise stated in HPI      OBJECTIVE:      Vitals:   Vitals:    06/24/24 0630   BP: 120/72   Pulse: 77   Resp: 18   Temp: 97.1 °F (36.2 °C)   SpO2: 96%     Weight - Scale: 104.2 kg (229 lb 12.8 oz)   Height: 177.8 cm (5' 10\")     Weight  Wt Readings from Last 3 Encounters:   06/23/24 104.2 kg (229 lb 12.8 oz)   06/06/24 103.4 kg (228 lb)   05/30/24 102.1 kg (225 lb)     Body mass index is 32.97 kg/m².    24HR INTAKE/OUTPUT:      Intake/Output Summary (Last 24 hours) at 6/24/2024 0721  Last data filed at 6/24/2024 0617  Gross per 24 hour   Intake 1320 ml   Output 2020 ml   Net -700 ml     -----------------------------------------------------------------  Exam:    GEN:    Awake, alert and oriented x3.   EYES:  EOMI, pupils equal   NECK: Supple. No lymphadenopathy.  No carotid bruit  CVS:    regular rate and rhythm, no audible murmur  PULM:  CTA, no wheezes, rales or rhonchi, no acute respiratory distress  ABD:    Bowels sounds normal.  Abdomen is soft.  No distention.  no tenderness to palpation.   EXT:   1+ edema  BUE, 1+ BLE  .  No calf tenderness. Bilateral ACE to BLE  NEURO: Moves all extremities.  Motor and sensory are grossly intact  SKIN:  No rashes.  No skin lesions.  Dressing to left foot CDI. See pics    6/21/2024--Left 
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  He sitting up in chair no distress. Feels better. Feels he has less swelling of arms and hands.  No hypoxia     ROS:   Constitutional: negative  for fevers, and negative for chills.  Respiratory: negative for shortness of breath, negative for cough, and negative for wheezing  Cardiovascular: negative for chest pain, and negative for palpitations  Gastrointestinal: negative for abdominal pain, negative for nausea,negative for vomiting, negative for diarrhea, and negative for constipation     All other systems were reviewed with the patient and are negative unless otherwise stated in HPI      OBJECTIVE:      Vitals:   Vitals:    06/21/24 0750   BP: 135/72   Pulse: 81   Resp: 18   Temp: 97.3 °F (36.3 °C)   SpO2: 97%     Weight - Scale: 103 kg (227 lb)         Weight  Wt Readings from Last 3 Encounters:   06/21/24 103 kg (227 lb)   06/06/24 103.4 kg (228 lb)   05/30/24 102.1 kg (225 lb)     Body mass index is 32.57 kg/m².    24HR INTAKE/OUTPUT:      Intake/Output Summary (Last 24 hours) at 6/21/2024 0853  Last data filed at 6/21/2024 0810  Gross per 24 hour   Intake --   Output 1350 ml   Net -1350 ml     -----------------------------------------------------------------  Exam:    GEN:    Awake, alert and oriented x3.   EYES:  EOMI, pupils equal   NECK: Supple. No lymphadenopathy.  No carotid bruit  CVS:    regular rate and rhythm, no audible murmur  PULM:  CTA, no wheezes, rales or rhonchi, no acute respiratory distress  ABD:    Bowels sounds normal.  Abdomen is soft.  No distention.  no tenderness to palpation.   EXT:   1+ edema  BUE, 2+ BLE  .  No calf tenderness.   NEURO: Moves all extremities.  Motor and sensory are grossly intact  SKIN:  No rashes.  No skin lesions.  Dressing to left foot CDI. See pics    6/21/2024--Left 
Pt resting in bed, denies needs at this time vitals and assessment completed. Pt assisted to bathroom and then chair with joey love. Call light and belongings in reach.  
Pt resting in chair denies needs at this time, call light in reach. Ace wraps applied to bilateral legs  
RN at bedside, vital signs and assessment completed see chart, patient alert and oriented x4, denies pain, in bed, will get up for breakfast via Chao lift. No concerns at this time, care ongoing.  
RN at bedside, vital signs and assessment completed, see documentation, patient alert and oriented, denies pain, wants to get up to chair, schulz catheter draining properly. Fall precautions in place, care ongoing.   
Report given to Cynthia at the [facility]   
Terrell Chapin M.D.  Internal Medicine Progress Note    Patient: Moise Diallo  Date of Admission: 6/21/2024  2:14 AM  Date of Evaluation: 6/22/2024      SUBJECTIVE:    Moise Diallo is a 84 y.o. 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  He is not feeling much better today.  He denies any SOB but states he is \"disgusted\" by how weak he feels and is unable to do what he used to be able to do.  He denies any chest pain.  His legs are still swollen.  His arms and hands are still swollen but improving    ROS:   Constitutional: negative  for fevers, and negative for chills.  Respiratory: negative for shortness of breath, negative for cough, and negative for wheezing  Cardiovascular: negative for chest pain, and negative for palpitations  Gastrointestinal: negative for abdominal pain, negative for nausea,negative for vomiting, negative for diarrhea, and negative for constipation     All other systems were reviewed with the patient and are negative unless otherwise stated in HPI    -----------------------------------------------------------------  OBJECTIVE:  Vitals:   Temp: 97.6 °F (36.4 °C)  BP: 121/68  Respirations: 18  Pulse: 79  SpO2: 98 % on room air    Weight  Wt Readings from Last 3 Encounters:   06/22/24 104.1 kg (229 lb 9.6 oz)   06/06/24 103.4 kg (228 lb)   05/30/24 102.1 kg (225 lb)     Body mass index is 32.94 kg/m².    24HR INTAKE/OUTPUT:      Intake/Output Summary (Last 24 hours) at 6/22/2024 1254  Last data filed at 6/22/2024 0956  Gross per 24 hour   Intake 2070 ml   Output 2200 ml   Net -130 ml       Exam:  GEN:    Awake, alert and oriented x 3.   EYES:  EOMI, pupils equal   NECK: Supple. No lymphadenopathy.  No carotid bruit  CVS:    regular rate and rhythm, 2/6 systolic murmur  PULM:  diminished but clear without wheezing, rales or rhonchi, no acute respiratory distress  ABD:    Bowels sounds normal.  Abdomen 
Terrell Chapin M.D.  Internal Medicine Progress Note    Patient: Moise Diallo  Date of Admission: 6/21/2024  2:14 AM  Date of Evaluation: 6/23/2024      SUBJECTIVE:    Moise Diallo is a 84 y.o. 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  He is not feeling much better today.  He denies any SOB or chest pain.   His legs are still swollen.   He states he feels like he's less swollen but his bed weight is 2 lbs UP from admission.      ROS:   Constitutional: negative  for fevers, and negative for chills.  Respiratory: negative for shortness of breath, negative for cough, and negative for wheezing  Cardiovascular: negative for chest pain, and negative for palpitations  Gastrointestinal: negative for abdominal pain, negative for nausea,negative for vomiting, negative for diarrhea, and negative for constipation     All other systems were reviewed with the patient and are negative unless otherwise stated in HPI    -----------------------------------------------------------------  OBJECTIVE:  Vitals:   Temp: 97.1 °F (36.2 °C)  BP: (!) 119/56  Respirations: 18  Pulse: 80  SpO2: 97 % on room air    Weight  Wt Readings from Last 3 Encounters:   06/23/24 104.2 kg (229 lb 12.8 oz)   06/06/24 103.4 kg (228 lb)   05/30/24 102.1 kg (225 lb)     Weights(Current Encounter) (last 50 days)         Date/Time Weight Weight Method     06/23/24 0400 104.2 kg (229 lb 12.8 oz) Actual;Bed scale     06/22/24 0400 104.1 kg (229 lb 9.6 oz) Actual;Bed scale     06/21/24 1522 103 kg (227 lb 1.2 oz) --     06/21/24 0445 103 kg (227 lb) Actual;Bed scale       Body mass index is 32.97 kg/m².    24HR INTAKE/OUTPUT:      Intake/Output Summary (Last 24 hours) at 6/23/2024 1243  Last data filed at 6/23/2024 0839  Gross per 24 hour   Intake 1450 ml   Output 1550 ml   Net -100 ml       Exam:  GEN:    Awake, alert and oriented x 3.   EYES:  EOMI, pupils equal 
There isn't wound care orders in avelino's paperwork.   
Vitals and assessment completed at this time as charted. Patient resting in bed with family at bedside. Patient denies any pain at this time. Pillow placed under heels at this time. Call light remains within reach and bed alarm engaged for safety.   
Vitals and assessment completed at this time as charted. Patient was moved to bed with robby lift and tolerated well. Patient denies any pain at this time. Patient denies any further needs. Call light remains within reach and bed alarm engaged at this time.   
Wound care provided to patients left heel, malodorous purulent drainage noted on old dressing, wound was cleansed with sterile water, non adherent dressing and gauze wrap followed with ace wrap applied. Patient tolerated well.   
Wound care to left heel provided at this time, see chart, patient tolerated well.   
Writer at bedside at this time to perform shift assessment. Patient is lying in bed at this time. Vital signs taken and assessment completed at this time. Patient is alert and oriented and has no complaint of pain at this time. Patient denies any further needs at this time. Call light within reach, bed alarm on for safety, care ongoing.     
assistance  Toileting: Maximum assistance  Functional Mobility Skilled Clinical Factors: unable  Additional Comments: patient max A x 2 transfers sit <> stand  Skin Care: Bath wipes              Vision  Vision: Within Functional Limits  Hearing  Hearing: Within functional limits  Cognition  Overall Cognitive Status: WFL  Orientation  Overall Orientation Status: Within Functional Limits                  Education Given To: Patient  Education Provided: Role of Therapy;Plan of Care  Education Method: Verbal  Barriers to Learning: None  Education Outcome: Verbalized understanding                        AM-PAC - ADL  AM-PAC Daily Activity - Inpatient   How much help is needed for putting on and taking off regular lower body clothing?: A Lot  How much help is needed for bathing (which includes washing, rinsing, drying)?: A Lot  How much help is needed for toileting (which includes using toilet, bedpan, or urinal)?: A Lot  How much help is needed for putting on and taking off regular upper body clothing?: A Lot  How much help is needed for taking care of personal grooming?: A Lot  How much help for eating meals?: A Little  AM-[facility] Inpatient Daily Activity Raw Score: 13  AM-PAC Inpatient ADL T-Scale Score : 32.03  ADL Inpatient CMS 0-100% Score: 63.03  ADL Inpatient CMS G-Code Modifier : CL       Goals  Short Term Goals  Time Frame for Short Term Goals: 21 visits  Short Term Goal 1: Patient to engage daily in BUE strengthening and ROM to improve range and strength for functional use.  Short Term Goal 2: Patient to complete simple UB self care with min A to improve ADL independence and engagement.  Short Term Goal 3: Patient to complete sit <> stand ADL transfers with min A x 2 to improve ADL safety.       Therapy Time   Individual Concurrent Group Co-treatment   Time In 0935         Time Out 0950         Minutes 15                 Humera Frias OTR/L

## 2024-06-26 ENCOUNTER — OFFICE VISIT (OUTPATIENT)
Dept: VASCULAR SURGERY | Age: 84
End: 2024-06-26
Payer: MEDICARE

## 2024-06-26 VITALS
BODY MASS INDEX: 33.21 KG/M2 | RESPIRATION RATE: 16 BRPM | SYSTOLIC BLOOD PRESSURE: 93 MMHG | HEIGHT: 70 IN | WEIGHT: 232 LBS | TEMPERATURE: 97.4 F | HEART RATE: 84 BPM | DIASTOLIC BLOOD PRESSURE: 55 MMHG

## 2024-06-26 DIAGNOSIS — I70.262 ATHEROSCLEROSIS OF NATIVE ARTERY OF LEFT LOWER EXTREMITY WITH GANGRENE (HCC): Primary | ICD-10-CM

## 2024-06-26 PROCEDURE — 3074F SYST BP LT 130 MM HG: CPT | Performed by: SURGERY

## 2024-06-26 PROCEDURE — 99214 OFFICE O/P EST MOD 30 MIN: CPT | Performed by: SURGERY

## 2024-06-26 PROCEDURE — G8428 CUR MEDS NOT DOCUMENT: HCPCS | Performed by: SURGERY

## 2024-06-26 PROCEDURE — 3078F DIAST BP <80 MM HG: CPT | Performed by: SURGERY

## 2024-06-26 PROCEDURE — 1123F ACP DISCUSS/DSCN MKR DOCD: CPT | Performed by: SURGERY

## 2024-06-26 PROCEDURE — 1036F TOBACCO NON-USER: CPT | Performed by: SURGERY

## 2024-06-26 PROCEDURE — 1111F DSCHRG MED/CURRENT MED MERGE: CPT | Performed by: SURGERY

## 2024-06-26 PROCEDURE — 99213 OFFICE O/P EST LOW 20 MIN: CPT | Performed by: SURGERY

## 2024-06-26 PROCEDURE — G8417 CALC BMI ABV UP PARAM F/U: HCPCS | Performed by: SURGERY

## 2024-06-26 NOTE — PROGRESS NOTES
[facility], [facility] VASCULAR PART OF [address]  [facility] [address]  Dept: [phone]     Patient: Moise Diallo  : 1940  MRN: [id number]  DOS: 2024            HPI:  Moise Diallo is a 84 y.o. male who returns to the office regarding his left lower extremity.  Recall that he had a bypass in the left lower extremity which remains patent.  He now has a heel ulceration on the left due to pressure.  It is necrotic and measures about the size of a silver dollar.  He has significant CHF and was recently admitted for this.  Overall he is doing more poorly than he has been in the past mainly because of his CHF.  He is confined to a wheelchair currently and is unable to walk very far.  The only way that he can walk is with a walker and only approximately 10-12 steps.    Review of Systems    Vitals:    24 1026 24 1031   BP: (!) 99/52 (!) 93/55   Pulse: 85 84   Resp: 16    Temp: 97.4 °F (36.3 °C)    Weight: 105.2 kg (232 lb)    Height: 1.778 m (5' 10\")           Physical Exam  On examination he has a robust and palpable posterior tibial pulse on the bypass extremity which is the left.  The heel ulcer is as described above.  He has significant edema in both lower extremities.  Assessment:  1. 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  I would not debride the heel at this time and I would watch and wait.  We can see him in a month.  He knows that he has to stay off of his heel with all types of pressure.  I would use the necrotic skin as a biologic dressing as when debrided usually they diabetic even more.  We will see him back in a month.    Electronically signed by:  Ruddy Trent MD

## 2024-07-01 ENCOUNTER — APPOINTMENT (OUTPATIENT)
Dept: CT IMAGING | Age: 84
DRG: 698 | End: 2024-07-01
Payer: MEDICARE

## 2024-07-01 ENCOUNTER — HOSPITAL ENCOUNTER (EMERGENCY)
Age: 84
Discharge: HOME | DRG: 698 | End: 2024-07-01
Attending: EMERGENCY MEDICINE
Payer: MEDICARE

## 2024-07-01 ENCOUNTER — APPOINTMENT (OUTPATIENT)
Dept: GENERAL RADIOLOGY | Age: 84
DRG: 698 | End: 2024-07-01
Payer: MEDICARE

## 2024-07-01 VITALS
HEART RATE: 86 BPM | SYSTOLIC BLOOD PRESSURE: 121 MMHG | OXYGEN SATURATION: 98 % | DIASTOLIC BLOOD PRESSURE: 67 MMHG | TEMPERATURE: 97.7 F | RESPIRATION RATE: 16 BRPM

## 2024-07-01 DIAGNOSIS — I50.9 CONGESTIVE HEART FAILURE, UNSPECIFIED HF CHRONICITY, UNSPECIFIED HEART FAILURE TYPE (HCC): Primary | ICD-10-CM

## 2024-07-01 LAB
ALBUMIN SERPL-MCNC: 3.9 G/DL (ref 3.5–5.2)
ALBUMIN/GLOB SERPL: 1.1 {RATIO} (ref 1–2.5)
ALP SERPL-CCNC: 119 U/L (ref 40–129)
ALT SERPL-CCNC: 18 U/L (ref 5–41)
ANION GAP SERPL CALCULATED.3IONS-SCNC: 12 MMOL/L (ref 9–17)
AST SERPL-CCNC: 15 U/L
BASOPHILS # BLD: 0.03 K/UL (ref 0–0.2)
BASOPHILS NFR BLD: 0 % (ref 0–2)
BILIRUB SERPL-MCNC: 0.4 MG/DL (ref 0.3–1.2)
BNP SERPL-MCNC: 877 PG/ML
BUN SERPL-MCNC: 41 MG/DL (ref 8–23)
BUN/CREAT SERPL: 27 (ref 9–20)
CALCIUM SERPL-MCNC: 9.2 MG/DL (ref 8.6–10.4)
CHLORIDE SERPL-SCNC: 101 MMOL/L (ref 98–107)
CO2 SERPL-SCNC: 26 MMOL/L (ref 20–31)
CREAT SERPL-MCNC: 1.5 MG/DL (ref 0.7–1.2)
EOSINOPHIL # BLD: 0.1 K/UL (ref 0–0.44)
EOSINOPHILS RELATIVE PERCENT: 1 % (ref 1–4)
ERYTHROCYTE [DISTWIDTH] IN BLOOD BY AUTOMATED COUNT: 13.9 % (ref 11.8–14.4)
GFR, ESTIMATED: 46 ML/MIN/1.73M2
GLUCOSE SERPL-MCNC: 200 MG/DL (ref 70–99)
HCT VFR BLD AUTO: 39.7 % (ref 40.7–50.3)
HGB BLD-MCNC: 13.1 G/DL (ref 13–17)
IMM GRANULOCYTES # BLD AUTO: 0.07 K/UL (ref 0–0.3)
IMM GRANULOCYTES NFR BLD: 1 %
LYMPHOCYTES NFR BLD: 1.66 K/UL (ref 1.1–3.7)
LYMPHOCYTES RELATIVE PERCENT: 18 % (ref 24–43)
MCH RBC QN AUTO: 31.7 PG (ref 25.2–33.5)
MCHC RBC AUTO-ENTMCNC: 33 G/DL (ref 28.4–34.8)
MCV RBC AUTO: 96.1 FL (ref 82.6–102.9)
MONOCYTES NFR BLD: 0.9 K/UL (ref 0.1–1.2)
MONOCYTES NFR BLD: 10 % (ref 3–12)
NEUTROPHILS NFR BLD: 70 % (ref 36–65)
NEUTS SEG NFR BLD: 6.57 K/UL (ref 1.5–8.1)
NRBC BLD-RTO: 0 PER 100 WBC
PLATELET # BLD AUTO: 244 K/UL (ref 138–453)
PMV BLD AUTO: 10 FL (ref 8.1–13.5)
POTASSIUM SERPL-SCNC: 4.7 MMOL/L (ref 3.7–5.3)
PROT SERPL-MCNC: 7.5 G/DL (ref 6.4–8.3)
RBC # BLD AUTO: 4.13 M/UL (ref 4.21–5.77)
SODIUM SERPL-SCNC: 139 MMOL/L (ref 135–144)
TROPONIN I SERPL HS-MCNC: 74 NG/L (ref 0–22)
TROPONIN I SERPL HS-MCNC: 74 NG/L (ref 0–22)
WBC OTHER # BLD: 9.3 K/UL (ref 3.5–11.3)

## 2024-07-01 PROCEDURE — 84484 ASSAY OF TROPONIN QUANT: CPT

## 2024-07-01 PROCEDURE — 93005 ELECTROCARDIOGRAM TRACING: CPT | Performed by: EMERGENCY MEDICINE

## 2024-07-01 PROCEDURE — 70450 CT HEAD/BRAIN W/O DYE: CPT

## 2024-07-01 PROCEDURE — 80053 COMPREHEN METABOLIC PANEL: CPT

## 2024-07-01 PROCEDURE — 85025 COMPLETE CBC W/AUTO DIFF WBC: CPT

## 2024-07-01 PROCEDURE — 71045 X-RAY EXAM CHEST 1 VIEW: CPT

## 2024-07-01 PROCEDURE — 6360000002 HC RX W HCPCS: Performed by: EMERGENCY MEDICINE

## 2024-07-01 PROCEDURE — 83880 ASSAY OF NATRIURETIC PEPTIDE: CPT

## 2024-07-01 RX ORDER — FUROSEMIDE 10 MG/ML
20 INJECTION INTRAMUSCULAR; INTRAVENOUS ONCE
Status: COMPLETED | OUTPATIENT
Start: 2024-07-01 | End: 2024-07-01

## 2024-07-01 RX ADMIN — FUROSEMIDE 20 MG: 10 INJECTION, SOLUTION INTRAMUSCULAR; INTRAVENOUS at 21:05

## 2024-07-01 ASSESSMENT — PAIN - FUNCTIONAL ASSESSMENT: PAIN_FUNCTIONAL_ASSESSMENT: NONE - DENIES PAIN

## 2024-07-01 NOTE — ED PROVIDER NOTES
[facility] ED  EMERGENCY DEPARTMENT ENCOUNTER      Pt Name: Moise Diallo  MRN: [id number]  Birthdate 1940  Date of evaluation: 7/1/2024  Provider: Margy Bean MD    CHIEF COMPLAINT       Chief Complaint   Patient presents with    Extremity Weakness     Patient complains of numbness and increased weakness more on the right than the left. Patient recently hospitalized for fluid retention. Patient states balance issues but denies lightheadedness or dizziness.         HISTORY OF PRESENT ILLNESS   (Location/Symptom, Timing/Onset, Context/Setting, Quality, Duration, Modifying Factors, Severity)  Note limiting factors.   Moise Diallo is a 84 y.o. male who presents to the emergency department ***     HPI    Nursing Notes were reviewed.    REVIEW OF SYSTEMS    (2-9 systems for level 4, 10 or more for level 5)     Review of Systems    Except as noted above the remainder of the review of systems was reviewed and negative.       PAST MEDICAL HISTORY     Past Medical History:   Diagnosis Date    Acute renal failure superimposed on stage 3 chronic kidney disease (HCC) 03/01/2017    AICD (automatic cardioverter/defibrillator) present     Arthritis     CHF (congestive heart failure) (HCC)     Diabetes mellitus (HCC)     Hyperlipidemia     Hypertension     Kidney stone          SURGICAL HISTORY       Past Surgical History:   Procedure Laterality Date    CARDIAC CATHETERIZATION Left 03/22/2024    /[facility]/Rt Radial Access    CARDIAC PROCEDURE N/A 3/22/2024    Left heart cath / coronary angiography performed by Alan Rios MD at [facility] CARDIAC CATH/IR LAB    CATARACT REMOVAL WITH IMPLANT Right 08/26/2013    CYSTOSCOPY INSERTION / REMOVAL STENT / STONE Right 11/01/2017    CYSTOSCOPY STENT INSERTION performed by oTd Patton MD at [facility] OR    CYSTOSCOPY INSERTION / REMOVAL STENT / STONE N/A 11/03/2017    CYSTOSCOPY STENT INSERTION performed by Tod Patton MD at [facility] OR

## 2024-07-02 LAB
EKG ATRIAL RATE: 83 BPM
EKG P AXIS: 118 DEGREES
EKG P-R INTERVAL: 322 MS
EKG Q-T INTERVAL: 442 MS
EKG QRS DURATION: 128 MS
EKG QTC CALCULATION (BAZETT): 519 MS
EKG R AXIS: -63 DEGREES
EKG T AXIS: 70 DEGREES
EKG VENTRICULAR RATE: 83 BPM

## 2024-07-02 PROCEDURE — 93010 ELECTROCARDIOGRAM REPORT: CPT | Performed by: FAMILY MEDICINE

## 2024-07-02 NOTE — DISCHARGE INSTRUCTIONS
Continue current medications as prescribed.  Have blood work drawn tomorrow and chest x-ray repeated tomorrow please seek medical attention immediately for any acute concerns.  Follow-up with your primary care provider at the [facility] Wednesday as scheduled

## 2024-07-04 ENCOUNTER — APPOINTMENT (OUTPATIENT)
Dept: CT IMAGING | Age: 84
DRG: 698 | End: 2024-07-04
Payer: MEDICARE

## 2024-07-04 ENCOUNTER — HOSPITAL ENCOUNTER (INPATIENT)
Age: 84
LOS: 2 days | Discharge: NURSING, REHAB OR LONG-TERM CARE | DRG: 698 | End: 2024-07-06
Attending: INTERNAL MEDICINE | Admitting: INTERNAL MEDICINE
Payer: MEDICARE

## 2024-07-04 ENCOUNTER — APPOINTMENT (OUTPATIENT)
Dept: GENERAL RADIOLOGY | Age: 84
DRG: 698 | End: 2024-07-04
Payer: MEDICARE

## 2024-07-04 DIAGNOSIS — A41.9 SEPTICEMIA (HCC): Primary | ICD-10-CM

## 2024-07-04 DIAGNOSIS — L89.152 PRESSURE INJURY OF SACRAL REGION, STAGE 2 (HCC): ICD-10-CM

## 2024-07-04 DIAGNOSIS — N39.0 UTI (URINARY TRACT INFECTION), BACTERIAL: ICD-10-CM

## 2024-07-04 DIAGNOSIS — L89.622 PRESSURE INJURY OF LEFT HEEL, STAGE 2 (HCC): ICD-10-CM

## 2024-07-04 DIAGNOSIS — A49.9 UTI (URINARY TRACT INFECTION), BACTERIAL: ICD-10-CM

## 2024-07-04 LAB
ALBUMIN SERPL-MCNC: 3.6 G/DL (ref 3.5–5.2)
ALBUMIN/GLOB SERPL: 1 {RATIO} (ref 1–2.5)
ALP SERPL-CCNC: 111 U/L (ref 40–129)
ALT SERPL-CCNC: 14 U/L (ref 5–41)
ANION GAP SERPL CALCULATED.3IONS-SCNC: 14 MMOL/L (ref 9–17)
ARTERIAL PATENCY WRIST A: ABNORMAL
AST SERPL-CCNC: 12 U/L
BACTERIA URNS QL MICRO: ABNORMAL
BASOPHILS # BLD: 0.05 K/UL (ref 0–0.2)
BASOPHILS NFR BLD: 0 % (ref 0–2)
BILIRUB SERPL-MCNC: 0.5 MG/DL (ref 0.3–1.2)
BILIRUB UR QL STRIP: NEGATIVE
BNP SERPL-MCNC: 771 PG/ML
BODY TEMPERATURE: 37
BUN SERPL-MCNC: 42 MG/DL (ref 8–23)
BUN/CREAT SERPL: 26 (ref 9–20)
CALCIUM SERPL-MCNC: 9.1 MG/DL (ref 8.6–10.4)
CHLORIDE SERPL-SCNC: 99 MMOL/L (ref 98–107)
CLARITY UR: CLEAR
CO2 SERPL-SCNC: 22 MMOL/L (ref 20–31)
COLOR UR: YELLOW
CREAT SERPL-MCNC: 1.6 MG/DL (ref 0.7–1.2)
CRP SERPL HS-MCNC: 34.3 MG/L (ref 0–5)
D DIMER PPP FEU-MCNC: 3.62 UG/ML FEU (ref 0–0.59)
EKG ATRIAL RATE: 86 BPM
EKG P AXIS: 20 DEGREES
EKG P-R INTERVAL: 314 MS
EKG Q-T INTERVAL: 506 MS
EKG QRS DURATION: 134 MS
EKG QTC CALCULATION (BAZETT): 605 MS
EKG R AXIS: -65 DEGREES
EKG T AXIS: 52 DEGREES
EKG VENTRICULAR RATE: 86 BPM
EOSINOPHIL # BLD: 0.06 K/UL (ref 0–0.44)
EOSINOPHILS RELATIVE PERCENT: 1 % (ref 1–4)
EPI CELLS #/AREA URNS HPF: ABNORMAL /HPF (ref 0–5)
ERYTHROCYTE [DISTWIDTH] IN BLOOD BY AUTOMATED COUNT: 13.9 % (ref 11.8–14.4)
FIO2 ON VENT: 21 %
GAS FLOW.O2 O2 DELIVERY SYS: ABNORMAL L/MIN
GFR, ESTIMATED: 43 ML/MIN/1.73M2
GLUCOSE BLD-MCNC: 217 MG/DL (ref 74–100)
GLUCOSE SERPL-MCNC: 206 MG/DL (ref 70–99)
GLUCOSE UR STRIP-MCNC: NEGATIVE MG/DL
HCO3 VENOUS: 24.9 MMOL/L (ref 24–30)
HCT VFR BLD AUTO: 38.1 % (ref 40.7–50.3)
HGB BLD-MCNC: 12.7 G/DL (ref 13–17)
HGB UR QL STRIP.AUTO: ABNORMAL
IMM GRANULOCYTES # BLD AUTO: 0.11 K/UL (ref 0–0.3)
IMM GRANULOCYTES NFR BLD: 1 %
INR PPP: 1
KETONES UR STRIP-MCNC: NEGATIVE MG/DL
LACTATE BLDV-SCNC: 2.3 MMOL/L (ref 0.5–2.2)
LACTATE BLDV-SCNC: 3.5 MMOL/L (ref 0.5–2.2)
LEUKOCYTE ESTERASE UR QL STRIP: ABNORMAL
LYMPHOCYTES NFR BLD: 1.88 K/UL (ref 1.1–3.7)
LYMPHOCYTES RELATIVE PERCENT: 16 % (ref 24–43)
MAGNESIUM SERPL-MCNC: 2.1 MG/DL (ref 1.6–2.6)
MCH RBC QN AUTO: 31.6 PG (ref 25.2–33.5)
MCHC RBC AUTO-ENTMCNC: 33.3 G/DL (ref 28.4–34.8)
MCV RBC AUTO: 94.8 FL (ref 82.6–102.9)
MONOCYTES NFR BLD: 0.96 K/UL (ref 0.1–1.2)
MONOCYTES NFR BLD: 8 % (ref 3–12)
MUCOUS THREADS URNS QL MICRO: ABNORMAL
NEUTROPHILS NFR BLD: 74 % (ref 36–65)
NEUTS SEG NFR BLD: 8.79 K/UL (ref 1.5–8.1)
NITRITE UR QL STRIP: NEGATIVE
NRBC BLD-RTO: 0 PER 100 WBC
O2 SAT, VEN: 57 % (ref 60–85)
PARTIAL THROMBOPLASTIN TIME: 26.4 SEC (ref 26.8–34.8)
PCO2 VENOUS: 39.5 MM HG (ref 39–55)
PCO2, VEN, TEMP ADJ: 39.5 MMHG (ref 39–55)
PH UR STRIP: 6 [PH] (ref 5–9)
PH VENOUS: 7.42 (ref 7.32–7.42)
PH, VEN, TEMP ADJ: 7.42 (ref 7.32–7.42)
PLATELET # BLD AUTO: 275 K/UL (ref 138–453)
PMV BLD AUTO: 9.8 FL (ref 8.1–13.5)
PO2 VENOUS: 29.4 MM HG (ref 30–50)
PO2, VEN, TEMP ADJ: 29.4 MMHG (ref 30–50)
POSITIVE BASE EXCESS, VEN: 0.4 MMOL/L (ref 0–2)
POTASSIUM SERPL-SCNC: 4.6 MMOL/L (ref 3.7–5.3)
PROT SERPL-MCNC: 7.3 G/DL (ref 6.4–8.3)
PROT UR STRIP-MCNC: ABNORMAL MG/DL
PROTHROMBIN TIME: 13.2 SEC (ref 11.7–14.1)
RBC # BLD AUTO: 4.02 M/UL (ref 4.21–5.77)
RBC #/AREA URNS HPF: ABNORMAL /HPF (ref 0–2)
SODIUM SERPL-SCNC: 135 MMOL/L (ref 135–144)
SP GR UR STRIP: 1.01 (ref 1.01–1.02)
TROPONIN I SERPL HS-MCNC: 71 NG/L (ref 0–22)
TROPONIN I SERPL HS-MCNC: 73 NG/L (ref 0–22)
UROBILINOGEN UR STRIP-ACNC: NORMAL EU/DL (ref 0–1)
WBC #/AREA URNS HPF: ABNORMAL /HPF (ref 0–5)
WBC OTHER # BLD: 11.9 K/UL (ref 3.5–11.3)

## 2024-07-04 PROCEDURE — 87086 URINE CULTURE/COLONY COUNT: CPT

## 2024-07-04 PROCEDURE — 2580000003 HC RX 258: Performed by: INTERNAL MEDICINE

## 2024-07-04 PROCEDURE — 87040 BLOOD CULTURE FOR BACTERIA: CPT

## 2024-07-04 PROCEDURE — 96365 THER/PROPH/DIAG IV INF INIT: CPT

## 2024-07-04 PROCEDURE — 6360000002 HC RX W HCPCS: Performed by: NURSE PRACTITIONER

## 2024-07-04 PROCEDURE — 87070 CULTURE OTHR SPECIMN AEROBIC: CPT

## 2024-07-04 PROCEDURE — 85025 COMPLETE CBC W/AUTO DIFF WBC: CPT

## 2024-07-04 PROCEDURE — 86140 C-REACTIVE PROTEIN: CPT

## 2024-07-04 PROCEDURE — 87205 SMEAR GRAM STAIN: CPT

## 2024-07-04 PROCEDURE — 71045 X-RAY EXAM CHEST 1 VIEW: CPT

## 2024-07-04 PROCEDURE — 93005 ELECTROCARDIOGRAM TRACING: CPT | Performed by: INTERNAL MEDICINE

## 2024-07-04 PROCEDURE — 85730 THROMBOPLASTIN TIME PARTIAL: CPT

## 2024-07-04 PROCEDURE — 85379 FIBRIN DEGRADATION QUANT: CPT

## 2024-07-04 PROCEDURE — 6370000000 HC RX 637 (ALT 250 FOR IP): Performed by: INTERNAL MEDICINE

## 2024-07-04 PROCEDURE — 96366 THER/PROPH/DIAG IV INF ADDON: CPT

## 2024-07-04 PROCEDURE — 84484 ASSAY OF TROPONIN QUANT: CPT

## 2024-07-04 PROCEDURE — 82805 BLOOD GASES W/O2 SATURATION: CPT

## 2024-07-04 PROCEDURE — 70450 CT HEAD/BRAIN W/O DYE: CPT

## 2024-07-04 PROCEDURE — 81001 URINALYSIS AUTO W/SCOPE: CPT

## 2024-07-04 PROCEDURE — 96375 TX/PRO/DX INJ NEW DRUG ADDON: CPT

## 2024-07-04 PROCEDURE — 87186 SC STD MICRODIL/AGAR DIL: CPT

## 2024-07-04 PROCEDURE — 86403 PARTICLE AGGLUT ANTBDY SCRN: CPT

## 2024-07-04 PROCEDURE — 83880 ASSAY OF NATRIURETIC PEPTIDE: CPT

## 2024-07-04 PROCEDURE — 6360000004 HC RX CONTRAST MEDICATION: Performed by: NURSE PRACTITIONER

## 2024-07-04 PROCEDURE — 2580000003 HC RX 258: Performed by: NURSE PRACTITIONER

## 2024-07-04 PROCEDURE — 94761 N-INVAS EAR/PLS OXIMETRY MLT: CPT

## 2024-07-04 PROCEDURE — 83735 ASSAY OF MAGNESIUM: CPT

## 2024-07-04 PROCEDURE — 82947 ASSAY GLUCOSE BLOOD QUANT: CPT

## 2024-07-04 PROCEDURE — 80053 COMPREHEN METABOLIC PANEL: CPT

## 2024-07-04 PROCEDURE — 87077 CULTURE AEROBIC IDENTIFY: CPT

## 2024-07-04 PROCEDURE — 72125 CT NECK SPINE W/O DYE: CPT

## 2024-07-04 PROCEDURE — 1200000000 HC SEMI PRIVATE

## 2024-07-04 PROCEDURE — 93010 ELECTROCARDIOGRAM REPORT: CPT | Performed by: FAMILY MEDICINE

## 2024-07-04 PROCEDURE — 36415 COLL VENOUS BLD VENIPUNCTURE: CPT

## 2024-07-04 PROCEDURE — 85610 PROTHROMBIN TIME: CPT

## 2024-07-04 PROCEDURE — 6360000002 HC RX W HCPCS: Performed by: INTERNAL MEDICINE

## 2024-07-04 PROCEDURE — 71260 CT THORAX DX C+: CPT

## 2024-07-04 PROCEDURE — 83605 ASSAY OF LACTIC ACID: CPT

## 2024-07-04 PROCEDURE — 99285 EMERGENCY DEPT VISIT HI MDM: CPT

## 2024-07-04 RX ORDER — ACETAMINOPHEN 325 MG/1
650 TABLET ORAL EVERY 6 HOURS PRN
Status: DISCONTINUED | OUTPATIENT
Start: 2024-07-04 | End: 2024-07-06 | Stop reason: HOSPADM

## 2024-07-04 RX ORDER — ONDANSETRON 4 MG/1
4 TABLET, ORALLY DISINTEGRATING ORAL EVERY 8 HOURS PRN
Status: DISCONTINUED | OUTPATIENT
Start: 2024-07-04 | End: 2024-07-06 | Stop reason: HOSPADM

## 2024-07-04 RX ORDER — MULTIVITAMIN WITH IRON
1 TABLET ORAL DAILY
Status: DISCONTINUED | OUTPATIENT
Start: 2024-07-05 | End: 2024-07-06 | Stop reason: HOSPADM

## 2024-07-04 RX ORDER — SODIUM CHLORIDE 9 MG/ML
INJECTION, SOLUTION INTRAVENOUS CONTINUOUS
Status: DISCONTINUED | OUTPATIENT
Start: 2024-07-04 | End: 2024-07-05

## 2024-07-04 RX ORDER — BUMETANIDE 1 MG/1
1 TABLET ORAL DAILY
Status: DISCONTINUED | OUTPATIENT
Start: 2024-07-05 | End: 2024-07-06 | Stop reason: HOSPADM

## 2024-07-04 RX ORDER — POTASSIUM CHLORIDE 20 MEQ/1
40 TABLET, EXTENDED RELEASE ORAL PRN
Status: DISCONTINUED | OUTPATIENT
Start: 2024-07-04 | End: 2024-07-06 | Stop reason: HOSPADM

## 2024-07-04 RX ORDER — LANOLIN ALCOHOL/MO/W.PET/CERES
400 CREAM (GRAM) TOPICAL 2 TIMES DAILY
Status: DISCONTINUED | OUTPATIENT
Start: 2024-07-04 | End: 2024-07-06 | Stop reason: HOSPADM

## 2024-07-04 RX ORDER — ACETAMINOPHEN 650 MG/1
650 SUPPOSITORY RECTAL EVERY 6 HOURS PRN
Status: DISCONTINUED | OUTPATIENT
Start: 2024-07-04 | End: 2024-07-06 | Stop reason: HOSPADM

## 2024-07-04 RX ORDER — METOPROLOL SUCCINATE 50 MG/1
50 TABLET, EXTENDED RELEASE ORAL DAILY
Status: DISCONTINUED | OUTPATIENT
Start: 2024-07-05 | End: 2024-07-06 | Stop reason: HOSPADM

## 2024-07-04 RX ORDER — BUMETANIDE 0.5 MG/1
0.5 TABLET ORAL SEE ADMIN INSTRUCTIONS
Status: ON HOLD | COMMUNITY
End: 2024-07-06 | Stop reason: HOSPADM

## 2024-07-04 RX ORDER — MAGNESIUM SULFATE IN WATER 40 MG/ML
2000 INJECTION, SOLUTION INTRAVENOUS PRN
Status: DISCONTINUED | OUTPATIENT
Start: 2024-07-04 | End: 2024-07-06 | Stop reason: HOSPADM

## 2024-07-04 RX ORDER — PANTOPRAZOLE SODIUM 40 MG/1
40 TABLET, DELAYED RELEASE ORAL
Status: DISCONTINUED | OUTPATIENT
Start: 2024-07-05 | End: 2024-07-06 | Stop reason: HOSPADM

## 2024-07-04 RX ORDER — MONTELUKAST SODIUM 10 MG/1
10 TABLET ORAL NIGHTLY
Status: DISCONTINUED | OUTPATIENT
Start: 2024-07-04 | End: 2024-07-06 | Stop reason: HOSPADM

## 2024-07-04 RX ORDER — ATORVASTATIN CALCIUM 40 MG/1
40 TABLET, FILM COATED ORAL NIGHTLY
Status: DISCONTINUED | OUTPATIENT
Start: 2024-07-04 | End: 2024-07-06 | Stop reason: HOSPADM

## 2024-07-04 RX ORDER — SODIUM CHLORIDE 0.9 % (FLUSH) 0.9 %
10 SYRINGE (ML) INJECTION PRN
Status: DISCONTINUED | OUTPATIENT
Start: 2024-07-04 | End: 2024-07-06 | Stop reason: HOSPADM

## 2024-07-04 RX ORDER — ENOXAPARIN SODIUM 100 MG/ML
40 INJECTION SUBCUTANEOUS DAILY
Status: DISCONTINUED | OUTPATIENT
Start: 2024-07-04 | End: 2024-07-06 | Stop reason: HOSPADM

## 2024-07-04 RX ORDER — POLYETHYLENE GLYCOL 3350 17 G/17G
17 POWDER, FOR SOLUTION ORAL DAILY
Status: DISCONTINUED | OUTPATIENT
Start: 2024-07-05 | End: 2024-07-06 | Stop reason: HOSPADM

## 2024-07-04 RX ORDER — SODIUM CHLORIDE 0.9 % (FLUSH) 0.9 %
5-40 SYRINGE (ML) INJECTION EVERY 12 HOURS SCHEDULED
Status: DISCONTINUED | OUTPATIENT
Start: 2024-07-04 | End: 2024-07-06 | Stop reason: HOSPADM

## 2024-07-04 RX ORDER — DOCUSATE SODIUM 100 MG/1
100 CAPSULE, LIQUID FILLED ORAL 2 TIMES DAILY
Status: DISCONTINUED | OUTPATIENT
Start: 2024-07-04 | End: 2024-07-06 | Stop reason: HOSPADM

## 2024-07-04 RX ORDER — POTASSIUM CHLORIDE 7.45 MG/ML
10 INJECTION INTRAVENOUS PRN
Status: DISCONTINUED | OUTPATIENT
Start: 2024-07-04 | End: 2024-07-06 | Stop reason: HOSPADM

## 2024-07-04 RX ORDER — POLYETHYLENE GLYCOL 3350 17 G/17G
17 POWDER, FOR SOLUTION ORAL DAILY PRN
Status: DISCONTINUED | OUTPATIENT
Start: 2024-07-04 | End: 2024-07-06 | Stop reason: HOSPADM

## 2024-07-04 RX ORDER — ASPIRIN 81 MG/1
81 TABLET ORAL DAILY
Status: DISCONTINUED | OUTPATIENT
Start: 2024-07-05 | End: 2024-07-06 | Stop reason: HOSPADM

## 2024-07-04 RX ORDER — SODIUM CHLORIDE 9 MG/ML
INJECTION, SOLUTION INTRAVENOUS PRN
Status: DISCONTINUED | OUTPATIENT
Start: 2024-07-04 | End: 2024-07-06 | Stop reason: HOSPADM

## 2024-07-04 RX ORDER — TAMSULOSIN HYDROCHLORIDE 0.4 MG/1
0.4 CAPSULE ORAL EVERY MORNING
Status: DISCONTINUED | OUTPATIENT
Start: 2024-07-05 | End: 2024-07-06 | Stop reason: HOSPADM

## 2024-07-04 RX ORDER — ONDANSETRON 2 MG/ML
4 INJECTION INTRAMUSCULAR; INTRAVENOUS EVERY 6 HOURS PRN
Status: DISCONTINUED | OUTPATIENT
Start: 2024-07-04 | End: 2024-07-06 | Stop reason: HOSPADM

## 2024-07-04 RX ORDER — SODIUM CHLORIDE 9 MG/ML
INJECTION, SOLUTION INTRAVENOUS CONTINUOUS
Status: DISCONTINUED | OUTPATIENT
Start: 2024-07-04 | End: 2024-07-06

## 2024-07-04 RX ORDER — BUMETANIDE 0.5 MG/1
0.5 TABLET ORAL
Status: DISCONTINUED | OUTPATIENT
Start: 2024-07-05 | End: 2024-07-06 | Stop reason: HOSPADM

## 2024-07-04 RX ADMIN — DOCUSATE SODIUM 100 MG: 100 CAPSULE, LIQUID FILLED ORAL at 20:09

## 2024-07-04 RX ADMIN — ENOXAPARIN SODIUM 40 MG: 100 INJECTION SUBCUTANEOUS at 17:35

## 2024-07-04 RX ADMIN — CEFEPIME 1000 MG: 1 INJECTION, POWDER, FOR SOLUTION INTRAMUSCULAR; INTRAVENOUS at 12:26

## 2024-07-04 RX ADMIN — SODIUM CHLORIDE: 9 INJECTION, SOLUTION INTRAVENOUS at 12:26

## 2024-07-04 RX ADMIN — MONTELUKAST 10 MG: 10 TABLET, FILM COATED ORAL at 20:09

## 2024-07-04 RX ADMIN — SACUBITRIL AND VALSARTAN 1 TABLET: 24; 26 TABLET, FILM COATED ORAL at 20:12

## 2024-07-04 RX ADMIN — Medication 400 MG: at 20:09

## 2024-07-04 RX ADMIN — SODIUM CHLORIDE: 9 INJECTION, SOLUTION INTRAVENOUS at 17:23

## 2024-07-04 RX ADMIN — VANCOMYCIN HYDROCHLORIDE 1250 MG: 500 INJECTION, POWDER, LYOPHILIZED, FOR SOLUTION INTRAVENOUS at 13:24

## 2024-07-04 RX ADMIN — SODIUM CHLORIDE, PRESERVATIVE FREE 10 ML: 5 INJECTION INTRAVENOUS at 20:09

## 2024-07-04 RX ADMIN — ATORVASTATIN CALCIUM 40 MG: 40 TABLET, FILM COATED ORAL at 20:09

## 2024-07-04 RX ADMIN — SODIUM CHLORIDE: 9 INJECTION, SOLUTION INTRAVENOUS at 16:09

## 2024-07-04 RX ADMIN — IOPAMIDOL 75 ML: 755 INJECTION, SOLUTION INTRAVENOUS at 12:09

## 2024-07-04 RX ADMIN — ACETAMINOPHEN 650 MG: 325 TABLET ORAL at 20:12

## 2024-07-04 RX ADMIN — METFORMIN HYDROCHLORIDE 500 MG: 500 TABLET ORAL at 17:35

## 2024-07-04 ASSESSMENT — ENCOUNTER SYMPTOMS: SHORTNESS OF BREATH: 1

## 2024-07-04 ASSESSMENT — PAIN SCALES - GENERAL
PAINLEVEL_OUTOF10: 0

## 2024-07-04 NOTE — PROGRESS NOTES
Patient arrives to room 301 [facility] from ER via stretcher, transferred to bed in room by staff, report from Mary Anne HEATH. Patients wife at bedside, Patient alert and oriented x4, vital signs and assessment completed, Navigator questions answered. Wounds to left heel and buttocks changed by ER staff and documented, not assessed by writer at this time. Fernandes catheter in place, secured to leg and draining properly. Patient denies pain. Fall precautions in place, room and call light orientation provided, care ongoing.

## 2024-07-04 NOTE — ED NOTES
Due to weakness,two RNs assisted patient in rolling to side to assess buttock wound. Dressing changed. Patient incontinent of soft brown stool. Camelia care provided and new brief applied. Fernandes also cleansed at this time. Patient repositioned and tolerated well. Call light within reach, family at bedside.

## 2024-07-04 NOTE — ED PROVIDER NOTES
[facility] ED  EMERGENCY DEPARTMENT ENCOUNTER      Pt Name: Moise Diallo  MRN: [id number]  Birthdate 1940  Date of evaluation: 7/4/2024  Provider: CHILANGO Anglin CNP  2:00 PM    CHIEF COMPLAINT       Chief Complaint   Patient presents with    Shortness of Breath     Onset this AM upon waking up. Feeling weaker than usual. Tightness to fingers bilaterally. Recently in ER for fluid overload and stroke-like symptoms.          HISTORY OF PRESENT ILLNESS        Moise Diallo 83 yo male present from [facility] per EMS to ED with c/o weakness, hypoxia, sob 3 1/2 hour after awaening.  Patient reports he was his normal self when he initially woke up he got up with assistance with a shower dressing changes is in his motorized wheelchair about 10:30 AM he could not he just weakness can move his arms as much is normal he he just felt short of breath fatigued and weak.  He denies chest pain at that time.  He denies mental status change at that time he did have a CT 3 days ago and was supposed to have that repeated in 1 week for comparison.  Patient denies any known new medications.  He does report he is using the Fernandes catheter for the diuresing they are doing for his newfound congestive heart failure and that could be a source of infection he also has a decubitus on his left heel and buttocks.  Patient denies known ill contacts.  He does reside at [facility]. Wife and oldest daughter at bedside.    PMH: Acute renal failure superimposed on stage III chronic kidney disease, AICD, arthritis, CHF , (type 2 )diabetes mellitus with neuropathy, hyperlipidemia, hypertension,     The history is provided by the patient and medical records. No  was used.       Nursing Notes were reviewed.    REVIEW OF SYSTEMS       Review of Systems   Constitutional:  Positive for activity change and fatigue. Negative for fever.   Respiratory:  Positive for shortness of breath.         Hypoxia

## 2024-07-04 NOTE — ED NOTES
Writer called and spoke with nurse Cordon at [facility]. She is aware of plan for admission.

## 2024-07-04 NOTE — PROGRESS NOTES
Pharmacy Note     Renal Dose Adjustment    Moise Diallo is a 84 y.o. male. Pharmacist assessment of renally cleared medications.    Recent Labs     07/04/24  1117   BUN 42*       Recent Labs     07/04/24  1117   CREATININE 1.6*       Estimated Creatinine Clearance: 43 mL/min (A) (based on SCr of 1.6 mg/dL (H)).      Height:   Ht Readings from Last 1 Encounters:   07/04/24 1.778 m (5' 10\")     Weight:  Wt Readings from Last 1 Encounters:   07/04/24 103.9 kg (229 lb)       The following medication dose has been adjusted based upon renal function per P&T Guidelines:             Cefepime adjusted to 2g q24 for renal function.    -Darnell Loredo PharmD, BCPS 7/4/2024 5:19 PM

## 2024-07-05 LAB
ALBUMIN SERPL-MCNC: 3 G/DL (ref 3.5–5.2)
ALBUMIN/GLOB SERPL: 0.9 {RATIO} (ref 1–2.5)
ALP SERPL-CCNC: 87 U/L (ref 40–129)
ALT SERPL-CCNC: 10 U/L (ref 5–41)
ANION GAP SERPL CALCULATED.3IONS-SCNC: 9 MMOL/L (ref 9–17)
AST SERPL-CCNC: 11 U/L
BASOPHILS # BLD: 0.04 K/UL (ref 0–0.2)
BASOPHILS NFR BLD: 1 % (ref 0–2)
BILIRUB SERPL-MCNC: 0.4 MG/DL (ref 0.3–1.2)
BUN SERPL-MCNC: 32 MG/DL (ref 8–23)
BUN/CREAT SERPL: 25 (ref 9–20)
CALCIUM SERPL-MCNC: 8.2 MG/DL (ref 8.6–10.4)
CHLORIDE SERPL-SCNC: 104 MMOL/L (ref 98–107)
CO2 SERPL-SCNC: 24 MMOL/L (ref 20–31)
CREAT SERPL-MCNC: 1.3 MG/DL (ref 0.7–1.2)
EOSINOPHIL # BLD: 0.17 K/UL (ref 0–0.44)
EOSINOPHILS RELATIVE PERCENT: 2 % (ref 1–4)
ERYTHROCYTE [DISTWIDTH] IN BLOOD BY AUTOMATED COUNT: 14 % (ref 11.8–14.4)
EST. AVERAGE GLUCOSE BLD GHB EST-MCNC: 169 MG/DL
GFR, ESTIMATED: 55 ML/MIN/1.73M2
GLUCOSE BLD-MCNC: 118 MG/DL (ref 74–100)
GLUCOSE BLD-MCNC: 182 MG/DL (ref 74–100)
GLUCOSE BLD-MCNC: 184 MG/DL (ref 74–100)
GLUCOSE BLD-MCNC: 224 MG/DL (ref 74–100)
GLUCOSE SERPL-MCNC: 110 MG/DL (ref 70–99)
HBA1C MFR BLD: 7.5 % (ref 4–6)
HCT VFR BLD AUTO: 32.7 % (ref 40.7–50.3)
HGB BLD-MCNC: 10.8 G/DL (ref 13–17)
IMM GRANULOCYTES # BLD AUTO: 0.11 K/UL (ref 0–0.3)
IMM GRANULOCYTES NFR BLD: 1 %
LYMPHOCYTES NFR BLD: 2.04 K/UL (ref 1.1–3.7)
LYMPHOCYTES RELATIVE PERCENT: 24 % (ref 24–43)
MCH RBC QN AUTO: 31.5 PG (ref 25.2–33.5)
MCHC RBC AUTO-ENTMCNC: 33 G/DL (ref 28.4–34.8)
MCV RBC AUTO: 95.3 FL (ref 82.6–102.9)
MICROORGANISM SPEC CULT: NORMAL
MONOCYTES NFR BLD: 0.91 K/UL (ref 0.1–1.2)
MONOCYTES NFR BLD: 11 % (ref 3–12)
NEUTROPHILS NFR BLD: 61 % (ref 36–65)
NEUTS SEG NFR BLD: 5.27 K/UL (ref 1.5–8.1)
NRBC BLD-RTO: 0 PER 100 WBC
PLATELET # BLD AUTO: 241 K/UL (ref 138–453)
PMV BLD AUTO: 9.9 FL (ref 8.1–13.5)
POTASSIUM SERPL-SCNC: 4.3 MMOL/L (ref 3.7–5.3)
PROT SERPL-MCNC: 6.2 G/DL (ref 6.4–8.3)
RBC # BLD AUTO: 3.43 M/UL (ref 4.21–5.77)
SODIUM SERPL-SCNC: 137 MMOL/L (ref 135–144)
SPECIMEN DESCRIPTION: NORMAL
WBC OTHER # BLD: 8.5 K/UL (ref 3.5–11.3)

## 2024-07-05 PROCEDURE — 80053 COMPREHEN METABOLIC PANEL: CPT

## 2024-07-05 PROCEDURE — 83036 HEMOGLOBIN GLYCOSYLATED A1C: CPT

## 2024-07-05 PROCEDURE — 2580000003 HC RX 258: Performed by: NURSE PRACTITIONER

## 2024-07-05 PROCEDURE — 94761 N-INVAS EAR/PLS OXIMETRY MLT: CPT

## 2024-07-05 PROCEDURE — 36415 COLL VENOUS BLD VENIPUNCTURE: CPT

## 2024-07-05 PROCEDURE — 97166 OT EVAL MOD COMPLEX 45 MIN: CPT

## 2024-07-05 PROCEDURE — 97162 PT EVAL MOD COMPLEX 30 MIN: CPT

## 2024-07-05 PROCEDURE — 6360000002 HC RX W HCPCS: Performed by: NURSE PRACTITIONER

## 2024-07-05 PROCEDURE — 1200000000 HC SEMI PRIVATE

## 2024-07-05 PROCEDURE — 85025 COMPLETE CBC W/AUTO DIFF WBC: CPT

## 2024-07-05 PROCEDURE — 6360000002 HC RX W HCPCS: Performed by: INTERNAL MEDICINE

## 2024-07-05 PROCEDURE — 97535 SELF CARE MNGMENT TRAINING: CPT

## 2024-07-05 PROCEDURE — 6370000000 HC RX 637 (ALT 250 FOR IP): Performed by: INTERNAL MEDICINE

## 2024-07-05 PROCEDURE — 2580000003 HC RX 258: Performed by: INTERNAL MEDICINE

## 2024-07-05 PROCEDURE — 82947 ASSAY GLUCOSE BLOOD QUANT: CPT

## 2024-07-05 RX ORDER — PRAMIPEXOLE DIHYDROCHLORIDE 0.25 MG/1
0.5 TABLET ORAL NIGHTLY PRN
Status: DISCONTINUED | OUTPATIENT
Start: 2024-07-05 | End: 2024-07-06 | Stop reason: HOSPADM

## 2024-07-05 RX ORDER — GLUCAGON 1 MG/ML
1 KIT INJECTION PRN
Status: DISCONTINUED | OUTPATIENT
Start: 2024-07-05 | End: 2024-07-06 | Stop reason: HOSPADM

## 2024-07-05 RX ORDER — INSULIN LISPRO 100 [IU]/ML
0-4 INJECTION, SOLUTION INTRAVENOUS; SUBCUTANEOUS
Status: DISCONTINUED | OUTPATIENT
Start: 2024-07-05 | End: 2024-07-06 | Stop reason: HOSPADM

## 2024-07-05 RX ORDER — DEXTROSE MONOHYDRATE 100 MG/ML
INJECTION, SOLUTION INTRAVENOUS CONTINUOUS PRN
Status: DISCONTINUED | OUTPATIENT
Start: 2024-07-05 | End: 2024-07-06 | Stop reason: HOSPADM

## 2024-07-05 RX ORDER — INSULIN LISPRO 100 [IU]/ML
0-4 INJECTION, SOLUTION INTRAVENOUS; SUBCUTANEOUS NIGHTLY
Status: DISCONTINUED | OUTPATIENT
Start: 2024-07-05 | End: 2024-07-06 | Stop reason: HOSPADM

## 2024-07-05 RX ADMIN — ASPIRIN 81 MG: 81 TABLET, COATED ORAL at 08:04

## 2024-07-05 RX ADMIN — MULTIVITAMIN TABLET 1 TABLET: TABLET at 08:04

## 2024-07-05 RX ADMIN — SACUBITRIL AND VALSARTAN 1 TABLET: 24; 26 TABLET, FILM COATED ORAL at 08:03

## 2024-07-05 RX ADMIN — Medication 400 MG: at 08:04

## 2024-07-05 RX ADMIN — ENOXAPARIN SODIUM 40 MG: 100 INJECTION SUBCUTANEOUS at 08:03

## 2024-07-05 RX ADMIN — MONTELUKAST 10 MG: 10 TABLET, FILM COATED ORAL at 20:08

## 2024-07-05 RX ADMIN — TAMSULOSIN HYDROCHLORIDE 0.4 MG: 0.4 CAPSULE ORAL at 08:04

## 2024-07-05 RX ADMIN — BUMETANIDE 0.5 MG: 0.5 TABLET ORAL at 17:02

## 2024-07-05 RX ADMIN — PANTOPRAZOLE SODIUM 40 MG: 40 TABLET, DELAYED RELEASE ORAL at 08:04

## 2024-07-05 RX ADMIN — DOCUSATE SODIUM 100 MG: 100 CAPSULE, LIQUID FILLED ORAL at 08:04

## 2024-07-05 RX ADMIN — METFORMIN HYDROCHLORIDE 500 MG: 500 TABLET ORAL at 08:04

## 2024-07-05 RX ADMIN — ATORVASTATIN CALCIUM 40 MG: 40 TABLET, FILM COATED ORAL at 20:08

## 2024-07-05 RX ADMIN — SODIUM CHLORIDE: 9 INJECTION, SOLUTION INTRAVENOUS at 00:02

## 2024-07-05 RX ADMIN — CEFEPIME 2000 MG: 2 INJECTION, POWDER, FOR SOLUTION INTRAVENOUS at 09:06

## 2024-07-05 RX ADMIN — METOPROLOL SUCCINATE 50 MG: 50 TABLET, EXTENDED RELEASE ORAL at 08:04

## 2024-07-05 RX ADMIN — POLYETHYLENE GLYCOL 3350 17 G: 17 POWDER, FOR SOLUTION ORAL at 08:07

## 2024-07-05 RX ADMIN — SODIUM CHLORIDE: 9 INJECTION, SOLUTION INTRAVENOUS at 08:21

## 2024-07-05 RX ADMIN — SODIUM CHLORIDE: 9 INJECTION, SOLUTION INTRAVENOUS at 17:07

## 2024-07-05 RX ADMIN — METFORMIN HYDROCHLORIDE 500 MG: 500 TABLET ORAL at 17:02

## 2024-07-05 RX ADMIN — SACUBITRIL AND VALSARTAN 1 TABLET: 24; 26 TABLET, FILM COATED ORAL at 20:08

## 2024-07-05 RX ADMIN — BUMETANIDE 1 MG: 1 TABLET ORAL at 08:03

## 2024-07-05 NOTE — DISCHARGE INSTR - COC
Continuity of Care Form    Patient Name: Moise Diallo   :  1940  MRN:  [id number]    Admit date:  2024  Discharge date:  2024    Code Status Order: DNR-CCA   Advance Directives:     Admitting Physician:  Bruce Allen MD  PCP: Terrell Chapin MD    Discharging Nurse: Katlyn Ro RN  Discharging Hospital Unit/Room#: 0301/0301-01  Discharging Unit Phone Number: [phone]    Emergency Contact:   Extended Emergency Contact Information  Primary Emergency Contact: Roseann Diallo  Address: [address] [place] [address]  Home Phone: [phone]  Relation: Spouse  Hearing or visual needs: None  Other needs: None  Preferred language: English   needed? No  Secondary Emergency Contact: Sophia Diallo   [facility]  Home Phone: [phone]  Relation: Child  Hearing or visual needs: None  Other needs: None  Preferred language: English   needed? No    Past Surgical History:  Past Surgical History:   Procedure Laterality Date    CARDIAC CATHETERIZATION Left 2024    /[facility]/ Radial Access    CARDIAC PROCEDURE N/A 3/22/2024    Left heart cath / coronary angiography performed by Alan Rios MD at [facility] CARDIAC CATH/IR LAB    CATARACT REMOVAL WITH IMPLANT Right 2013    CYSTOSCOPY INSERTION / REMOVAL STENT / STONE Right 2017    CYSTOSCOPY STENT INSERTION performed by Tod Patton MD at [facility] OR    CYSTOSCOPY INSERTION / REMOVAL STENT / STONE N/A 2017    CYSTOSCOPY STENT INSERTION performed by Tod Patton MD at [facility] OR    CYSTOURETHROSCOPY  2017    with stent placement on right side. Changed schulz catheter    FEMORAL BYPASS Left 2023    LEFT DISTAL SUPERFICIAL FEMORAL ARTERY TO POSTERIOR TIBIAL ARTERY BYPASS performed by Ruddy Trent MD at [facility] CVOR    FEMORAL-TIBIAL BYPASS GRAFT Left 2023    HERNIA REPAIR      umbilical 1997    INTRACAPSULAR CATARACT

## 2024-07-05 NOTE — PROGRESS NOTES
Writer to bedside to administer medications. Writer assisted patient with breakfast at this time.

## 2024-07-05 NOTE — PLAN OF CARE
Problem: Discharge Planning  Goal: Discharge to home or other facility with appropriate resources  Outcome: Progressing  Flowsheets (Taken 7/4/2024 2114)  Discharge to home or other facility with appropriate resources: Identify barriers to discharge with patient and caregiver     Problem: Safety - Adult  Goal: Free from fall injury  Outcome: Progressing  Flowsheets (Taken 7/4/2024 2114)  Free From Fall Injury: Instruct family/caregiver on patient safety     Problem: Skin/Tissue Integrity  Goal: Absence of new skin breakdown  Description: 1.  Monitor for areas of redness and/or skin breakdown  2.  Assess vascular access sites hourly  3.  Every 4-6 hours minimum:  Change oxygen saturation probe site  4.  Every 4-6 hours:  If on nasal continuous positive airway pressure, respiratory therapy assess nares and determine need for appliance change or resting period.  Outcome: Progressing  Note: See flow sheet for existing skin details.     Problem: Pain  Goal: Verbalizes/displays adequate comfort level or baseline comfort level  Outcome: Progressing  Flowsheets (Taken 7/4/2024 2114)  Verbalizes/displays adequate comfort level or baseline comfort level:   Encourage patient to monitor pain and request assistance   Administer analgesics based on type and severity of pain and evaluate response   Consider cultural and social influences on pain and pain management   Assess pain using appropriate pain scale   Implement non-pharmacological measures as appropriate and evaluate response   Notify Licensed Independent Practitioner if interventions unsuccessful or patient reports new pain

## 2024-07-05 NOTE — PROGRESS NOTES
entrance  Bathroom Shower/Tub: Walk-in shower  Bathroom Toilet: Handicap height  Bathroom Equipment: Grab bars in shower, Grab bars around toilet, Shower chair  Bathroom Accessibility: Accessible  Home Equipment: Wheelchair - Electric, Grab bars, Mechanical lift  Has the patient had two or more falls in the past year or any fall with injury in the past year?: Yes  Receives Help From: Personal care attendant  ADL Assistance: Needs assistance  Bath: Maximum assistance  Dressing: Maximum assistance  Grooming: Independent  Feeding: Independent  Toileting: Needs assistance  Homemaking Assistance: Needs assistance  Homemaking Responsibilities: No  Ambulation Assistance: Non-ambulatory  Transfer Assistance: Needs assistance  Active : No  Patient's  Info: wife drives to/from appointments or [place] provides  Mode of Transportation: Van  Occupation: Retired     Objective   Safety Devices  Type of Devices: All fall risk precautions in place;Call light within reach;Patient at risk for falls;Nurse notified;Left in bed  Restraints  Restraints Initially in Place: No    AROM: Grossly decreased, non-functional  PROM: Generally decreased, functional  Strength: Grossly decreased, non-functional  Coordination: Generally decreased, functional  Tone: Normal  Sensation: Intact  ADL  Feeding: Maximum assistance  Feeding Skilled Clinical Factors: Pt demo weak BUE strength and decreased ROM, requiring Max(A) to bring food to mouth.  Grooming: Maximum assistance  Grooming Skilled Clinical Factors: Pt demo weak BUE strength and decreased ROM.  UE Bathing: Maximum assistance  UE Bathing Skilled Clinical Factors: Pt demo weak BUE strength and decreased ROM.  LE Bathing: Maximum assistance  LE Bathing Skilled Clinical Factors: Pt Max(A)x2 to complete hygiene of perineal area during brief change.  UE Dressing: Maximum assistance  UE Dressing Skilled Clinical Factors: Pt demo weak BUE strength and decreased ROM.  LE Dressing: Maximum  tasks.  Short Term Goal 2: Pt will safely complete UB ADLs with Mod(A) during ADL tasks.  Short Term Goal 3: Pt will safely complete LB ADLs with Max(A) during ADL tasks.  Short Term Goal 4: Pt will safely demo 2 EC/WS strategies during functional mobility/transfers and ADL tasks.  Additional Goals?: No       Therapy Time   Individual Concurrent Group Co-treatment   Time In 1025         Time Out 1055         Minutes 30                 Mio Reveles, ABDULLAHI, OTR/L

## 2024-07-05 NOTE — PROGRESS NOTES
Spiritual Services Interventions  0301/0301-01   7/5/2024  Olu MCDONALD Young  84 y.o. year old male    Encounter Summary  Encounter Overview/Reason: (P) Initial Encounter  Service Provided For: (P) Patient  Referral/Consult From: (P) Rounding  Last Encounter : (P) 07/05/24  Complexity of Encounter: (P) Moderate  Begin Time: (P) 1400  End Time : (P) 1415  Total Time Calculated: (P) 15 min     Spiritual/Emotional needs  Type: (P) Spiritual Support                    Assessment/Intervention/Outcome  Assessment: (P) Calm  Intervention: (P) Discussed belief system/Alevism practices/linda, Discussed illness injury and it’s impact  Outcome: (P) Encouraged, Engaged in conversation

## 2024-07-05 NOTE — PROGRESS NOTES
Patient in bed, awake. Reassessment completed at this time. Vitals taken and documented. Patient encouraged to ask questions. Patient denies pain or complaints. Call light and bed side table within reach. Side rails up times two.

## 2024-07-05 NOTE — CARE COORDINATION
07/05/24 0951   Readmission Assessment   Number of Days since last admission? 8-30 days   Previous Disposition SNF   Who is being Interviewed Patient   What was the patient's/caregiver's perception as to why they think they needed to return back to the hospital? Other (Comment)  (New DX)   Did you visit your Primary Care Physician after you left the hospital, before you returned this time? Yes   Did you see a specialist, such as Cardiac, Pulmonary, Orthopedic Physician, etc. after you left the hospital? Yes   Who advised the patient to return to the hospital? Skilled Unit   Does the patient report anything that got in the way of taking their medications? No   In our efforts to provide the best possible care to you and others like you, can you think of anything that we could have done to help you after you left the hospital the first time, so that you might not have needed to return so soon? Other (Comment)  (nothing)     First admission was on 6/21 for CHF & wounds. Went back to the [facility] nursing home.    Second admission on 7/4 with septicemia.  Will return to the [facility] on discharge.    MELANIE Mane    
Housework, Shopping, Mobility  Current functional level: Assistance with the following:, Bathing, Dressing, Mobility, Shopping, Housework, Cooking    PT AM-PAC:   /24  OT AM-PAC:   /24    Family can provide assistance at DC: No  Would you like Case Management to discuss the discharge plan with any other family members/significant others, and if so, who? Yes  Plans to Return to Present Housing: Yes  Other Identified Issues/Barriers to RETURNING to current housing: none  Potential Assistance needed at discharge: Durable Medical Equipment, Extended Care Facility, Transportation            Potential DME: Walker, Wheelchair  Patient expects to discharge to: Skilled nursing facility  Plan for transportation at discharge: Wheelchair Van    Financial    Payor: MEDICARE / Plan: MEDICARE PART A AND B / Product Type: *No Product type* /     Does insurance require precert for SNF: No    Potential assistance Purchasing Medications: No  Meds-to-Beds request:  NA    No Pharmacies Listed    Notes:    Factors facilitating achievement of predicted outcomes: Caregiver support, Cooperative, Pleasant, Has needed Durable Medical Equipment at home, and Home is wheelchair accessible    Barriers to discharge: Upper extremity weakness, Lower extremity weakness, Long standing deficits, and Wound Care    Additional Case Management Notes: Patient was just here about 10 days ago with CHF.  He lives at the [facility] nursing home.  Patient uses a walker , wheelchair/ scooter.  He requires assistance with his ADL's.  The nursing home manages his medications and transportation.  The discharge plan is to return to the [facility].    The Plan for Transition of Care is related to the following treatment goals of Septicemia (HCC) [A41.9]  UTI (urinary tract infection), bacterial [N39.0, A49.9]  Pressure injury of left heel, stage 2 (HCC) [L89.622]  Pressure injury of sacral region, stage 2 (HCC) [L89.152]  Sepsis due to urinary tract infection (HCC) [A41.9,

## 2024-07-05 NOTE — PROGRESS NOTES
Patient is leaving the floor at this time by wheelchair with Linda HEATH. [facility] is transporting him to [facility]. Patient discharged.

## 2024-07-05 NOTE — H&P
HERNIA REPAIR      umbilical 1997    INTRACAPSULAR CATARACT EXTRACTION Left 09/28/2020    EYE CATARACT EMULSIFICATION IOL IMPLANT performed by Jorge Luis East DO at [facility] OR    JOINT REPLACEMENT      right knee march 22, 2012    JOINT REPLACEMENT Right junu 2014 partial knee    JOINT REPLACEMENT Right 2014    complete removal    KNEE SURGERY Right     x 2    LITHOTRIPSY Right 11/01/2017    with stent placement - Dr. Patton     LITHOTRIPSY Left 01/16/2018    MA CYSTO W/URETEROSCOPY W/LITHOTRIPSY Right 11/01/2017    CYSTOSCOPY URETEROSCOPY LASER-WITH HLL performed by Tod Patton MD at [facility] OR    MA LITHOTRIPSY XTRCORP SHOCK WAVE Left 01/16/2018    ESWL EXTRACORPEAL SHOCK WAVE LITHOTRIPSY performed by Tod Patton MD at [facility] OR    TURP  01/2017    Greenlight PVP and TUIBNC    TURP  01/2015    Greenlight PVP    VASCULAR SURGERY Left 04/13/2023    Left Lower Angio    VASCULAR SURGERY Right 04/13/2023    LOWER EXTERMITY ARTERIOGRAM, performed by Ruddy Trent MD at [facility]       Medications Prior to Admission:    Prior to Admission medications    Medication Sig Start Date End Date Taking? Authorizing Provider   bumetanide (BUMEX) 0.5 MG tablet Take 1 tablet by mouth See Admin Instructions Monday, Wednesday, Friday  Take with daily 1mg dose   Yes ProviderElver MD   metFORMIN (GLUCOPHAGE) 500 MG tablet Take 1 tablet by mouth 2 times daily (with meals)   Yes ProviderElver MD   atorvastatin (LIPITOR) 40 MG tablet Take 1 tablet by mouth nightly 6/24/24   Nora Gotti APRN - CNP   bumetanide (BUMEX) 0.5 MG tablet Take 2 tablets by mouth daily He is taking 1 mg daily with an additional 0.5 mg on Mon, Wed and Fri. 6/24/24   Nora Gotti APRN - CNP   polyethylene glycol (GLYCOLAX) 17 g packet Take 1 packet by mouth daily 6/25/24 7/25/24  Nora Gotti APRN - CNP   docusate sodium (COLACE, DULCOLAX) 100 MG CAPS Take 100 mg by mouth 2 times daily 6/24/24    infection 11/03/2017    Hyponatremia 11/03/2017    Sepsis associated hypotension (HCC) 11/03/2017    Incomplete bladder emptying 11/03/2017    Hydronephrosis of right kidney 11/03/2017    Ureteral calculus 11/01/2017    Sepsis (HCC) 03/02/2017    Diabetes mellitus (HCC)     Hypertension     Gross hematuria 01/11/2017    BNC (bladder neck contracture) 01/11/2017    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/2013       Plan:     MEDICAL DECISION MAKING:    Primary Problem(s): Sepsis due to urinary tract infection (HCC)  Differential diagnoses: UTI  Condition is an undiagnosed new problem with uncertain prognosis  Condition is stable  Treatment plan:   UC-pending  BC x 2-pending  Monitor labs and replace electrolytes  Has Follow up with Urology as outpatient  Fernandes exchanged 7/4/2024  Imaging: no further imaging studies ordered today  Medications:   IVF  IV Cefepime  Medication Monitoring / High Risk Medications: none      Nutrition status:   Well developed, well nourished with no malnutrition  Dietician consult initiated    Hospital Prophylaxis:   DVT: Lovenox   Stress Ulcer: PPI     MDM Data:   Test interpretation:  My independent EKG interpretation: normal sinus rhythm, RBBB, 1st degree AV block  My independent X-ray interpretation:   reviewed  Management and/or test interpretation discussed with ER MD at time of admission  Consults and Nursing notes were personally reviewed, all current labs and imaging were personally reviewed, tests ordered: CBC, BMP, and history obtained by independent historian       Disposition:  Shared decision making: All test results, treatment options and disposition options were discussed with the patient today  Social determinants of health that may impact management:  resident at [facility]  Code status: DNR-CCA   Disposition: Discharge plan is [facility]        Critical Care Time:  Total critical care time caring for this patient

## 2024-07-05 NOTE — PROGRESS NOTES
Vitals and assessment complete at this time. See flowsheets for details. Vitals are WNL. Patient denies pain. Patient is resting in bed. Patient is A&O x4. Breathing is regular and unlabored. Lung sounds are clear/diminished. Generalized edema noted. Dressing to left heel is CDI. Dressing to sacrum is CDI. Patient reports feeling a little better than yesterday. Patient denies further needs at this time. Call light is within reach. Care ongoing.

## 2024-07-05 NOTE — PROGRESS NOTES
gm/meal); Low Sodium (2 gm)    Anthropometric Measures:  Height: 177.8 cm (5' 10\")  Ideal Body Weight (IBW): 166 lbs (75 kg)    Admission Body Weight: 98 kg (216 lb)  Current Body Weight: 103.9 kg (229 lb), 138 % IBW. Weight Source: Bed Scale  Current BMI (kg/m2): 32.9  Usual Body Weight:  (221-232# with fluid shifts)     Weight Adjustment For: No Adjustment                 BMI Categories: Obese Class 1 (BMI 30.0-34.9)    Estimated Daily Nutrient Needs:  Energy Requirements Based On: Kcal/kg  Weight Used for Energy Requirements: Current  Energy (kcal/day): 0271-0252 (20-25)  Weight Used for Protein Requirements: Ideal  Protein (g/day): 113-135 (1.5-1.8)  Method Used for Fluid Requirements: 1 ml/kcal  Fluid (ml/day): 2500    Nutrition Diagnosis:   Increased nutrient needs related to acute injury/trauma as evidenced by wounds    Lab Results   Component Value Date     07/05/2024    K 4.3 07/05/2024     07/05/2024    CO2 24 07/05/2024    BUN 32 (H) 07/05/2024    CREATININE 1.3 (H) 07/05/2024    GLUCOSE 110 (H) 07/05/2024    CALCIUM 8.2 (L) 07/05/2024    BILITOT 0.4 07/05/2024    ALKPHOS 87 07/05/2024    AST 11 07/05/2024    ALT 10 07/05/2024    LABGLOM 55 (L) 07/05/2024    GFRAA 56 (L) 07/07/2022    GLOB NOT REPORTED 11/09/2017     No results found for: \"LABA1C\"  Lab Results   Component Value Date    VITD25 30.8 04/13/2024     Nutrition Interventions:   Food and/or Nutrient Delivery: Modify Current Diet, Start Oral Nutrition Supplement  Nutrition Education/Counseling: Education initiated  Coordination of Nutrition Care: Continue to monitor while inpatient  Plan of Care discussed with: patient    Goals:     Goals: Meet at least 75% of estimated needs       Nutrition Monitoring and Evaluation:   Behavioral-Environmental Outcomes: Other (Comment) (EC[facility] environment)  Food/Nutrient Intake Outcomes: Food and Nutrient Intake, Supplement Intake, Vitamin/Mineral Intake  Physical Signs/Symptoms Outcomes: Biochemical  Data, Weight, Fluid Status or Edema    Discharge Planning:    Continue Oral Nutrition Supplement     Sam Parrish RD, LD  Contact: [phone]

## 2024-07-05 NOTE — PROGRESS NOTES
Writer in to check on patient. Patient noted to be sweating. Patient asked to have temperature turned down in room. Writer placed fan in room. Water provided to patient. Denies any further needs or concerns. Call light and over bed table within reach. Side rails up times two.

## 2024-07-05 NOTE — PROGRESS NOTES
Evening medications given at this time per orders.  Patient requested for PRN tylenol for restless legs. Patient took medications with no issues noted. Denies HS snack. Denies any further needs or concerns at this time. Call light and over bed table within reach. Side rails up times two.

## 2024-07-05 NOTE — PROGRESS NOTES
Patient in bed, watching television. Patient educated on medication to be given tonight and physician's orders to be completed. Patient stated understanding. Patient encouraged to ask questions. Patient denies of any questions at this time.    Vitals taken and documented. See flow sheet for details. Assessment completed and documented. Patient alert, oriented x4. Calm, pleasant. Speech clear. Patient complains of chronic numbness and tingling in bilateral upper and lower extremities. Wheezing noted in bilateral upper lobes of lungs and right middle lobe of lung. Diminished lung sounds noted in bilateral lower lobes of lungs. No cough noted. Patient denies of shortness of breath except for when he is up and moving. Abdomen round, taut, non tender to palpation. Bowel sounds active in all four quadrants. Generalized non-pitting edema noted. Non-pitting edema noted in bilateral upper extremities and right lower extremity. +3 pitting edema noted in left lower extremity. Scattered ecchymosis and abrasions noted. Dressing noted to left heel CDI. Wound noted to buttocks. No dressing present but otherwise dry/flaky and warm. Fernandes catheter draining clear yellow urine. Patient denies of pain and chest pain. Patient denies needs or concerns at this time. Call light and over bed table in reach. Side rails up times two.

## 2024-07-05 NOTE — PROGRESS NOTES
Physical Therapy  Facility/Department: [facility] MED SURG  Physical Therapy Initial Assessment    Name: Moise Diallo  : 1940  MRN: [id number]  Date of Service: 2024    Discharge Recommendations:  Continue to assess pending progress, Long Term Care with PT, Subacute/Skilled Nursing Facility          Patient Diagnosis(es): The primary encounter diagnosis was Septicemia (HCC). Diagnoses of UTI (urinary tract infection), bacterial, Pressure injury of left heel, stage 2 (HCC), and Pressure injury of sacral region, stage 2 (HCC) were also pertinent to this visit.  Past Medical History:  has a past medical history of Acute renal failure superimposed on stage 3 chronic kidney disease (HCC), AICD (automatic cardioverter/defibrillator) present, Arthritis, CHF (congestive heart failure) (HCC), Diabetes mellitus (HCC), Hyperlipidemia, Hypertension, and Kidney stone.  Past Surgical History:  has a past surgical history that includes hernia repair; Cataract removal with implant (Right, 2013); knee surgery (Right); joint replacement; joint replacement (Right, 2014 partial knee); joint replacement (Right, ); TURP (2017); TURP (2015); Lithotripsy (Right, 2017); pr cysto w/ureteroscopy w/lithotripsy (Right, 2017); CYSTOSCOPY INSERTION / REMOVAL STENT / STONE (Right, 2017); cystourethroscopy (2017); CYSTOSCOPY INSERTION / REMOVAL STENT / STONE (N/A, 2017); Lithotripsy (Left, 2018); pr lithotripsy xtrcorp shock wave (Left, 2018); Intracapsular cataract extraction (Left, 2020); vascular surgery (Left, 2023); vascular surgery (Right, 2023); Femoral-tibial Bypass Graft (Left, 2023); femoral bypass (Left, 2023); Cardiac catheterization (Left, 2024); and Cardiac procedure (N/A, 3/22/2024).    Assessment   Assessment: Patient is 84 year old male with dx of septicemia who presents with decreased B LE strength, decreased functional

## 2024-07-05 NOTE — PROGRESS NOTES
Cefepime Extended Interval Interchange    The following ordered dose of Cefepime has been changed to optimize its pharmacodynamic profile as approved by the Patient Care Review Committee.    Ordered Dose    _x_ 1 gm IV every 8-12 hrs  (30 minute infusion)      __ 2 gm  IV every 8-12 hrs (30 minute infusion)      Recent Labs     07/04/24  1117 07/05/24  0530   CREATININE 1.6* 1.3*     Estimated Creatinine Clearance: 53 mL/min (A) (based on SCr of 1.3 mg/dL (H)).      CrCl Dose   >60 1-2 gm q8-12hrs over 4 hours   30-59 or CRRT 1-2gm [id number] over 4 hours   <30 500mg-2gm q24hrs over 4 hours or 1gm q12hrs over 4 hours   <10 or HD 500mg-1gm q24hrs over 4 hours       New Dose    Cefepime   2 gm  IV q 24 hrs  over 4 hours.    Pharmacists should be contacted for issues concerning drug compatibility with multiple IV medications.  All doses will be prepared using 100 ml bag to be infused over 4-hours at a rate of 25 ml/hr.    Thank You,  Brooklyn Caceres, PharmD, 7/5/2024 8:33 AM

## 2024-07-06 VITALS
OXYGEN SATURATION: 97 % | RESPIRATION RATE: 17 BRPM | BODY MASS INDEX: 33.07 KG/M2 | HEIGHT: 70 IN | WEIGHT: 231 LBS | DIASTOLIC BLOOD PRESSURE: 75 MMHG | SYSTOLIC BLOOD PRESSURE: 150 MMHG | TEMPERATURE: 97 F | HEART RATE: 78 BPM

## 2024-07-06 PROBLEM — N39.0 URINARY TRACT INFECTION ASSOCIATED WITH INDWELLING URETHRAL CATHETER (HCC): Status: ACTIVE | Noted: 2024-07-06

## 2024-07-06 PROBLEM — T83.511A URINARY TRACT INFECTION ASSOCIATED WITH INDWELLING URETHRAL CATHETER (HCC): Status: ACTIVE | Noted: 2024-07-06

## 2024-07-06 LAB
ALBUMIN SERPL-MCNC: 3.1 G/DL (ref 3.5–5.2)
ALBUMIN/GLOB SERPL: 0.9 {RATIO} (ref 1–2.5)
ALP SERPL-CCNC: 89 U/L (ref 40–129)
ALT SERPL-CCNC: 10 U/L (ref 5–41)
ANION GAP SERPL CALCULATED.3IONS-SCNC: 9 MMOL/L (ref 9–17)
AST SERPL-CCNC: 10 U/L
BASOPHILS # BLD: 0.05 K/UL (ref 0–0.2)
BASOPHILS NFR BLD: 1 % (ref 0–2)
BILIRUB SERPL-MCNC: 0.4 MG/DL (ref 0.3–1.2)
BUN SERPL-MCNC: 32 MG/DL (ref 8–23)
BUN/CREAT SERPL: 27 (ref 9–20)
CALCIUM SERPL-MCNC: 8.4 MG/DL (ref 8.6–10.4)
CHLORIDE SERPL-SCNC: 104 MMOL/L (ref 98–107)
CO2 SERPL-SCNC: 24 MMOL/L (ref 20–31)
CREAT SERPL-MCNC: 1.2 MG/DL (ref 0.7–1.2)
EOSINOPHIL # BLD: 0.24 K/UL (ref 0–0.44)
EOSINOPHILS RELATIVE PERCENT: 3 % (ref 1–4)
ERYTHROCYTE [DISTWIDTH] IN BLOOD BY AUTOMATED COUNT: 13.7 % (ref 11.8–14.4)
GFR, ESTIMATED: 60 ML/MIN/1.73M2
GLUCOSE BLD-MCNC: 107 MG/DL (ref 74–100)
GLUCOSE BLD-MCNC: 175 MG/DL (ref 74–100)
GLUCOSE SERPL-MCNC: 122 MG/DL (ref 70–99)
HCT VFR BLD AUTO: 33.8 % (ref 40.7–50.3)
HGB BLD-MCNC: 11.2 G/DL (ref 13–17)
IMM GRANULOCYTES # BLD AUTO: 0.12 K/UL (ref 0–0.3)
IMM GRANULOCYTES NFR BLD: 1 %
LYMPHOCYTES NFR BLD: 1.71 K/UL (ref 1.1–3.7)
LYMPHOCYTES RELATIVE PERCENT: 20 % (ref 24–43)
MCH RBC QN AUTO: 31.5 PG (ref 25.2–33.5)
MCHC RBC AUTO-ENTMCNC: 33.1 G/DL (ref 28.4–34.8)
MCV RBC AUTO: 95.2 FL (ref 82.6–102.9)
MICROORGANISM SPEC CULT: ABNORMAL
MICROORGANISM/AGENT SPEC: ABNORMAL
MICROORGANISM/AGENT SPEC: ABNORMAL
MONOCYTES NFR BLD: 0.89 K/UL (ref 0.1–1.2)
MONOCYTES NFR BLD: 10 % (ref 3–12)
NEUTROPHILS NFR BLD: 65 % (ref 36–65)
NEUTS SEG NFR BLD: 5.7 K/UL (ref 1.5–8.1)
NRBC BLD-RTO: 0 PER 100 WBC
PLATELET # BLD AUTO: 282 K/UL (ref 138–453)
PMV BLD AUTO: 9.9 FL (ref 8.1–13.5)
POTASSIUM SERPL-SCNC: 4.2 MMOL/L (ref 3.7–5.3)
PROT SERPL-MCNC: 6.4 G/DL (ref 6.4–8.3)
RBC # BLD AUTO: 3.55 M/UL (ref 4.21–5.77)
SODIUM SERPL-SCNC: 137 MMOL/L (ref 135–144)
SPECIMEN DESCRIPTION: ABNORMAL
WBC OTHER # BLD: 8.7 K/UL (ref 3.5–11.3)

## 2024-07-06 PROCEDURE — 97116 GAIT TRAINING THERAPY: CPT

## 2024-07-06 PROCEDURE — 80053 COMPREHEN METABOLIC PANEL: CPT

## 2024-07-06 PROCEDURE — 2580000003 HC RX 258: Performed by: NURSE PRACTITIONER

## 2024-07-06 PROCEDURE — 6370000000 HC RX 637 (ALT 250 FOR IP): Performed by: INTERNAL MEDICINE

## 2024-07-06 PROCEDURE — 2580000003 HC RX 258: Performed by: INTERNAL MEDICINE

## 2024-07-06 PROCEDURE — 6360000002 HC RX W HCPCS: Performed by: INTERNAL MEDICINE

## 2024-07-06 PROCEDURE — 85025 COMPLETE CBC W/AUTO DIFF WBC: CPT

## 2024-07-06 PROCEDURE — 97110 THERAPEUTIC EXERCISES: CPT

## 2024-07-06 PROCEDURE — 94761 N-INVAS EAR/PLS OXIMETRY MLT: CPT

## 2024-07-06 PROCEDURE — 82947 ASSAY GLUCOSE BLOOD QUANT: CPT

## 2024-07-06 PROCEDURE — 97535 SELF CARE MNGMENT TRAINING: CPT

## 2024-07-06 PROCEDURE — 6360000002 HC RX W HCPCS: Performed by: NURSE PRACTITIONER

## 2024-07-06 RX ORDER — PRAMIPEXOLE DIHYDROCHLORIDE 0.5 MG/1
0.5 TABLET ORAL NIGHTLY PRN
Qty: 90 TABLET | Refills: 3 | Status: SHIPPED | OUTPATIENT
Start: 2024-07-06

## 2024-07-06 RX ORDER — CEPHALEXIN 500 MG/1
500 CAPSULE ORAL 3 TIMES DAILY
Qty: 21 CAPSULE | Refills: 0 | Status: ON HOLD | OUTPATIENT
Start: 2024-07-06 | End: 2024-07-10 | Stop reason: HOSPADM

## 2024-07-06 RX ADMIN — ASPIRIN 81 MG: 81 TABLET, COATED ORAL at 09:05

## 2024-07-06 RX ADMIN — SODIUM CHLORIDE: 9 INJECTION, SOLUTION INTRAVENOUS at 01:37

## 2024-07-06 RX ADMIN — CEFEPIME 2000 MG: 2 INJECTION, POWDER, FOR SOLUTION INTRAVENOUS at 09:07

## 2024-07-06 RX ADMIN — SODIUM CHLORIDE, PRESERVATIVE FREE 10 ML: 5 INJECTION INTRAVENOUS at 09:11

## 2024-07-06 RX ADMIN — SACUBITRIL AND VALSARTAN 1 TABLET: 24; 26 TABLET, FILM COATED ORAL at 09:05

## 2024-07-06 RX ADMIN — ENOXAPARIN SODIUM 40 MG: 100 INJECTION SUBCUTANEOUS at 09:05

## 2024-07-06 RX ADMIN — MULTIVITAMIN TABLET 1 TABLET: TABLET at 09:05

## 2024-07-06 RX ADMIN — Medication 400 MG: at 09:05

## 2024-07-06 RX ADMIN — METFORMIN HYDROCHLORIDE 500 MG: 500 TABLET ORAL at 09:05

## 2024-07-06 RX ADMIN — BUMETANIDE 1 MG: 1 TABLET ORAL at 09:05

## 2024-07-06 RX ADMIN — PRAMIPEXOLE DIHYDROCHLORIDE 0.5 MG: 0.25 TABLET ORAL at 00:04

## 2024-07-06 RX ADMIN — PANTOPRAZOLE SODIUM 40 MG: 40 TABLET, DELAYED RELEASE ORAL at 07:44

## 2024-07-06 RX ADMIN — METOPROLOL SUCCINATE 50 MG: 50 TABLET, EXTENDED RELEASE ORAL at 09:05

## 2024-07-06 RX ADMIN — TAMSULOSIN HYDROCHLORIDE 0.4 MG: 0.4 CAPSULE ORAL at 09:05

## 2024-07-06 ASSESSMENT — PAIN SCALES - GENERAL: PAINLEVEL_OUTOF10: 0

## 2024-07-06 NOTE — PROGRESS NOTES
Physical Therapy  Facility/Department: [facility] MED SURG  Daily Treatment Note  NAME: Moise Diallo  : 1940  MRN: [id number]    Date of Service: 2024    Discharge Recommendations:  Continue to assess pending progress, Long Term Care with PT, Subacute/Skilled Nursing Facility     Patient Diagnosis(es): The primary encounter diagnosis was Septicemia (HCC). Diagnoses of UTI (urinary tract infection), bacterial, Pressure injury of left heel, stage 2 (HCC), and Pressure injury of sacral region, stage 2 (HCC) were also pertinent to this visit.    Assessment   Assessment: Transfers:Chao lift. Bed mobility:MaxAx2. Pt. performed rolling x4 for breif change, edith care and placement of chao pad with MaxAx2. Seated BLE therex PROM x15 in all available planes of motion.  Activity Tolerance: Patient tolerated treatment well     Plan    Physical Therapy Plan  General Plan: 1 time a day 7 days a week  Specific Instructions for Next Treatment: Once daily on weekends  Current Treatment Recommendations: Strengthening;Balance training;ROM;Functional mobility training;Transfer training;Neuromuscular re-education;Home exercise program;Safety education & training;Patient/Caregiver education & training;Manual;Endurance training;Wheelchair mobility training;Therapeutic activities     Restrictions  Restrictions/Precautions  Restrictions/Precautions: Fall Risk, General Precautions, Contact Precautions, Skin  Required Braces or Orthoses?: No     Subjective    Subjective  Subjective: Pt. in bed upon arrival, agreeable of transfer to chair at this time  Pain: didn't report  Orientation  Overall Orientation Status: Within Functional Limits     Objective   Bed Mobility Training  Bed Mobility Training: Yes  Overall Level of Assistance: Maximum assistance;Assist X2  Interventions: Tactile cues;Verbal cues;Safety awareness training  Rolling: Maximum assistance;Assist X2  Transfer Training  Transfer Training: Yes  Overall Level of

## 2024-07-06 NOTE — PLAN OF CARE
Problem: Discharge Planning  Goal: Discharge to home or other facility with appropriate resources  7/6/2024 1006 by Katlyn Rogers RN  Outcome: Completed  7/6/2024 0622 by Maria L Osullivan RN  Outcome: Progressing  Flowsheets (Taken 7/5/2024 1830)  Discharge to home or other facility with appropriate resources:   Identify barriers to discharge with patient and caregiver   Identify discharge learning needs (meds, wound care, etc)   Arrange for needed discharge resources and transportation as appropriate   Refer to discharge planning if patient needs post-hospital services based on physician order or complex needs related to functional status, cognitive ability or social support system     Problem: Safety - Adult  Goal: Free from fall injury  7/6/2024 1006 by Katlyn Rogers RN  Outcome: Completed  7/6/2024 0622 by Maria L Osullivan RN  Outcome: Progressing  Flowsheets (Taken 7/6/2024 0622)  Free From Fall Injury: Instruct family/caregiver on patient safety     Problem: Skin/Tissue Integrity  Goal: Absence of new skin breakdown  Description: 1.  Monitor for areas of redness and/or skin breakdown  2.  Assess vascular access sites hourly  3.  Every 4-6 hours minimum:  Change oxygen saturation probe site  4.  Every 4-6 hours:  If on nasal continuous positive airway pressure, respiratory therapy assess nares and determine need for appliance change or resting period.  7/6/2024 1006 by Katlyn Rogers RN  Outcome: Completed  7/6/2024 0622 by Maria L Osullivan RN  Outcome: Progressing     Problem: Pain  Goal: Verbalizes/displays adequate comfort level or baseline comfort level  7/6/2024 1006 by Katlyn Rogers RN  Outcome: Completed  7/6/2024 0622 by Maria L Osullivan RN  Outcome: Progressing  Flowsheets (Taken 7/6/2024 0622)  Verbalizes/displays adequate comfort level or baseline comfort level:   Encourage patient to monitor pain and request assistance   Assess pain using appropriate pain scale   Administer analgesics based  on type and severity of pain and evaluate response   Implement non-pharmacological measures as appropriate and evaluate response     Problem: Nutrition Deficit:  Goal: Optimize nutritional status  7/6/2024 1006 by Katlyn Rogers RN  Outcome: Completed  7/6/2024 0622 by Maria L Osullivan, RN  Outcome: Progressing  Flowsheets (Taken 7/6/2024 0622)  Nutrient intake appropriate for improving, restoring, or maintaining nutritional needs: Monitor oral intake, labs, and treatment plans     Problem: Chronic Conditions and Co-morbidities  Goal: Patient's chronic conditions and co-morbidity symptoms are monitored and maintained or improved  7/6/2024 1006 by Katlyn Rogers RN  Outcome: Completed  7/6/2024 0622 by Maria L Osullivan RN  Outcome: Progressing  Flowsheets (Taken 7/6/2024 0622)  Care Plan - Patient's Chronic Conditions and Co-Morbidity Symptoms are Monitored and Maintained or Improved:   Monitor and assess patient's chronic conditions and comorbid symptoms for stability, deterioration, or improvement   Collaborate with multidisciplinary team to address chronic and comorbid conditions and prevent exacerbation or deterioration   Update acute care plan with appropriate goals if chronic or comorbid symptoms are exacerbated and prevent overall improvement and discharge

## 2024-07-06 NOTE — PROGRESS NOTES
Writer called report to [facility] at this time to nursing staff.

## 2024-07-06 NOTE — PROGRESS NOTES
Occupational Therapy  Facility/Department: [facility] MED SURG  Daily Treatment Note  NAME: Moise Diallo  : 1940  MRN: [id number]    Date of Service: 2024    Discharge Recommendations:  Continue to assess pending progress, Subacute/Skilled Nursing Facility         Patient Diagnosis(es): The primary encounter diagnosis was Septicemia (HCC). Diagnoses of UTI (urinary tract infection), bacterial, Pressure injury of left heel, stage 2 (HCC), and Pressure injury of sacral region, stage 2 (HCC) were also pertinent to this visit.     Assessment    Activity Tolerance: Patient tolerated treatment well  Discharge Recommendations: Continue to assess pending progress;Subacute/Skilled Nursing Facility      Plan   Occupational Therapy Plan  Times Per Day: Once a day  Days Per Week: 7 Days  Current Treatment Recommendations: Strengthening;ROM;Endurance training;Safety education & training;Patient/Caregiver education & training;Positioning;Self-Care / ADL     Restrictions  Restrictions/Precautions  Restrictions/Precautions: Fall Risk;General Precautions;Contact Precautions;Skin    Subjective   Subjective  Subjective: Pt lying in bed upon arrival. Pt agreed to participate in therapy session.  Pain: Pt had no complaints of pain.           Objective    Vitals           ADL  Toileting: Dependent/Total  Toileting Skilled Clinical Factors: Dep x 2 to complete brief change d/t incontinence.  Functional Mobility: Dependent/Total  Functional Mobility Skilled Clinical Factors: Dep to complete transfer from bed to chair with use of robby lift. Max A x 2 to complete rolling in bed.    OT Exercises  Exercise Treatment: Pt completed BUE AAROM x 3 planes x 15 reps x 1 set to increase UE strength and endurance in order to ease completion of ADL tasks. Pt required RBs as needed secondary to fatigue.     Safety Devices  Type of Devices: All fall risk precautions in place;Call light within reach;Patient at risk for falls;Nurse notified;Left

## 2024-07-06 NOTE — PROGRESS NOTES
Vitals and assessment were completed. Writer walked patient through the medications that would be administered tonight prior to administration and encouraged patient to ask questions about the medications and therapies. Patient did ask if he could have his mirapex reordered due to his restless legs giving him constant issue. Writer will reach out to the on call physician.   Writer and second nurse assisted patient back to bed with the robby lift. Patient was changed, a new brief was put on, a foam dressing placed over patient's coccyx, and zinc paste on patient's scrotum and groin, and schulz care provided.  Call light and bedside table remain within reach. Patient denies further needs at this time. Care ongoing.

## 2024-07-06 NOTE — PROGRESS NOTES
Progress Note    SUBJECTIVE:  FU related to denies fevers or chills.  No shortness of breath.    OBJECTIVE:    Vitals:   TEMPERATURE:  Current - Temp: 97 °F (36.1 °C); Max - Temp  Av.2 °F (36.2 °C)  Min: 97 °F (36.1 °C)  Max: 97.4 °F (36.3 °C)  RESPIRATIONS RANGE: Resp  Av.5  Min: 17  Max: 18  PULSE RANGE: Pulse  Av.5  Min: 75  Max: 78  BLOOD PRESSURE RANGE:  Systolic (24hrs), Av , Min:146 , Max:150   ; Diastolic (24hrs), Av, Min:71, Max:75    PULSE OXIMETRY RANGE: SpO2  Av.5 %  Min: 96 %  Max: 97 %  24HR INTAKE/OUTPUT:    Intake/Output Summary (Last 24 hours) at 2024 0832  Last data filed at 2024 0657  Gross per 24 hour   Intake 4592.63 ml   Output 4050 ml   Net 542.63 ml     -----------------------------------------------------------------  Exam:  General: A & O x3 and alert  HEENT: Supple neck & negative  Heart: Regular  Lungs: clear to auscultation bilaterally & no retractions  Abdomen: Normal & soft, No tenderness and BS normal, Fernandes catheter.  Extremities:  1+ and wound noted on heel with bandage.  Neuro: NonFocal     -----------------------------------------------------------------  Diagnostic Data:  Lab Results   Component Value Date    WBC 8.7 2024    HGB 11.2 (L) 2024     2024       Lab Results   Component Value Date    BUN 32 (H) 2024    CREATININE 1.2 2024     2024    K 4.2 2024    CALCIUM 8.4 (L) 2024     2024    CO2 24 2024    LABGLOM 60 (L) 2024       Lab Results   Component Value Date    WBCUA 10 TO 20 2024    RBCUA 2 TO 5 2024    LEUKOCYTESUR MODERATE (A) 2024    GLUCOSEU NEGATIVE 2024    KETUA NEGATIVE 2024    PROTEINU TRACE (A) 2024    HGBUR 2+ (A) 2024    CASTUA NOT REPORTED 2021    BACTERIA 2+ (A) 2024    YEAST NOT REPORTED 2021       Lab Results   Component Value Date    TROPONINT <0.03 2017    PROBNP 771 (H)  11/01/2017    Sepsis (HCC) 03/02/2017    Diabetes mellitus (HCC)     Hypertension     Gross hematuria 01/11/2017    BNC (bladder neck contracture) 01/11/2017    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  Lock IV  Cultures negative  Continue antibiotics  Critical Care Time: 0  Urology appointment this week as an outpatient.      [facility] Advanced Care Planning documentation:  [x] I have confirmed that the patient's Advance Care Plan is present, Code Status is documented, or surrogate decision maker is listed in the patient's medical record  [If \"yes\", STOP HERE]     [] The patient's Advance Care Plan is NOT present because:    []  I confirmed today that the patient does not wish or was not able to name a   surrogate decision maker or provide and advance care plan.    [] Hospice care is currently being provided or has been provided within the   calendar year.    []  I did NOT confirm today the presence of an Advance Care Plan or surrogate   decision maker documented within the patient's medical record.   [DOES NOT SATISFY [facility] PERFORMANCE]    Bruce Allen MD , M.HARRY.

## 2024-07-06 NOTE — PROGRESS NOTES
Writer attempted to call [facility] about possible discharge for pt today and no answer after being on hold for 10 mins.

## 2024-07-06 NOTE — PROGRESS NOTES
Writer to bedside to complete morning assessment. Upon entry to room, pt in bed awake, respirations even and unlabored while on room air. Vitals obtained and assessment completed, see flow sheet for details. Pt is A&Ox4. Pt denies any pain. Lung sounds are clear, diminished throughout. Pt's Bilateral upper extremities have +1 pitting edema. Left lower extremity has +3 pitting edema and Right lower extremity has +1 pitting edema. Right heel has dressing in place with heel boot on. Pt updated on plan of care and whiteboard updated. Pt denies further needs from writer at this time. Call light in reach. Care ongoing.

## 2024-07-06 NOTE — PLAN OF CARE
Problem: Discharge Planning  Goal: Discharge to home or other facility with appropriate resources  Outcome: Progressing  Flowsheets (Taken 7/5/2024 1830)  Discharge to home or other facility with appropriate resources:   Identify barriers to discharge with patient and caregiver   Identify discharge learning needs (meds, wound care, etc)   Arrange for needed discharge resources and transportation as appropriate   Refer to discharge planning if patient needs post-hospital services based on physician order or complex needs related to functional status, cognitive ability or social support system     Problem: Safety - Adult  Goal: Free from fall injury  Outcome: Progressing  Flowsheets (Taken 7/6/2024 0622)  Free From Fall Injury: Instruct family/caregiver on patient safety     Problem: Skin/Tissue Integrity  Goal: Absence of new skin breakdown  Description: 1.  Monitor for areas of redness and/or skin breakdown  2.  Assess vascular access sites hourly  3.  Every 4-6 hours minimum:  Change oxygen saturation probe site  4.  Every 4-6 hours:  If on nasal continuous positive airway pressure, respiratory therapy assess nares and determine need for appliance change or resting period.  Outcome: Progressing     Problem: Pain  Goal: Verbalizes/displays adequate comfort level or baseline comfort level  Outcome: Progressing  Flowsheets (Taken 7/6/2024 0622)  Verbalizes/displays adequate comfort level or baseline comfort level:   Encourage patient to monitor pain and request assistance   Assess pain using appropriate pain scale   Administer analgesics based on type and severity of pain and evaluate response   Implement non-pharmacological measures as appropriate and evaluate response     Problem: Nutrition Deficit:  Goal: Optimize nutritional status  Outcome: Progressing  Flowsheets (Taken 7/6/2024 0622)  Nutrient intake appropriate for improving, restoring, or maintaining nutritional needs: Monitor oral intake, labs, and  treatment plans     Problem: Chronic Conditions and Co-morbidities  Goal: Patient's chronic conditions and co-morbidity symptoms are monitored and maintained or improved  Outcome: Progressing  Flowsheets (Taken 7/6/2024 3222)  Care Plan - Patient's Chronic Conditions and Co-Morbidity Symptoms are Monitored and Maintained or Improved:   Monitor and assess patient's chronic conditions and comorbid symptoms for stability, deterioration, or improvement   Collaborate with multidisciplinary team to address chronic and comorbid conditions and prevent exacerbation or deterioration   Update acute care plan with appropriate goals if chronic or comorbid symptoms are exacerbated and prevent overall improvement and discharge

## 2024-07-08 ENCOUNTER — HOSPITAL ENCOUNTER (INPATIENT)
Age: 84
LOS: 2 days | Discharge: NURSING, REHAB OR LONG-TERM CARE | DRG: 884 | End: 2024-07-10
Attending: EMERGENCY MEDICINE | Admitting: INTERNAL MEDICINE
Payer: MEDICARE

## 2024-07-08 ENCOUNTER — APPOINTMENT (OUTPATIENT)
Dept: CT IMAGING | Age: 84
DRG: 884 | End: 2024-07-08
Payer: MEDICARE

## 2024-07-08 ENCOUNTER — APPOINTMENT (OUTPATIENT)
Dept: GENERAL RADIOLOGY | Age: 84
DRG: 884 | End: 2024-07-08
Attending: EMERGENCY MEDICINE
Payer: MEDICARE

## 2024-07-08 DIAGNOSIS — L89.92 PRESSURE INJURY, STAGE 2, WITH INFECTION (HCC): ICD-10-CM

## 2024-07-08 DIAGNOSIS — L08.9 PRESSURE INJURY, STAGE 2, WITH INFECTION (HCC): ICD-10-CM

## 2024-07-08 DIAGNOSIS — A41.9 SEVERE SEPSIS (HCC): Primary | ICD-10-CM

## 2024-07-08 DIAGNOSIS — R60.9 EDEMA, UNSPECIFIED TYPE: ICD-10-CM

## 2024-07-08 DIAGNOSIS — R79.89 ELEVATED TROPONIN: ICD-10-CM

## 2024-07-08 DIAGNOSIS — M25.50 ARTHRALGIA OF MULTIPLE JOINTS: ICD-10-CM

## 2024-07-08 DIAGNOSIS — R65.20 SEVERE SEPSIS (HCC): Primary | ICD-10-CM

## 2024-07-08 PROBLEM — R06.02 SHORTNESS OF BREATH: Status: ACTIVE | Noted: 2024-07-08

## 2024-07-08 PROBLEM — I50.42 CHRONIC COMBINED SYSTOLIC AND DIASTOLIC CHF, NYHA CLASS 3 (HCC): Status: ACTIVE | Noted: 2024-07-08

## 2024-07-08 LAB
ALBUMIN SERPL-MCNC: 3.3 G/DL (ref 3.5–5.2)
ALBUMIN/GLOB SERPL: 0.9 {RATIO} (ref 1–2.5)
ALP SERPL-CCNC: 105 U/L (ref 40–129)
ALT SERPL-CCNC: 14 U/L (ref 5–41)
ANION GAP SERPL CALCULATED.3IONS-SCNC: 13 MMOL/L (ref 9–17)
AST SERPL-CCNC: 14 U/L
BACTERIA URNS QL MICRO: ABNORMAL
BASOPHILS # BLD: 0.04 K/UL (ref 0–0.2)
BASOPHILS NFR BLD: 0 % (ref 0–2)
BILIRUB SERPL-MCNC: 0.4 MG/DL (ref 0.3–1.2)
BILIRUB UR QL STRIP: NEGATIVE
BNP SERPL-MCNC: 1147 PG/ML
BUN SERPL-MCNC: 36 MG/DL (ref 8–23)
BUN/CREAT SERPL: 23 (ref 9–20)
CALCIUM SERPL-MCNC: 8.9 MG/DL (ref 8.6–10.4)
CASTS #/AREA URNS LPF: ABNORMAL /LPF
CHLORIDE SERPL-SCNC: 102 MMOL/L (ref 98–107)
CLARITY UR: CLEAR
CO2 SERPL-SCNC: 24 MMOL/L (ref 20–31)
COLOR UR: YELLOW
CREAT SERPL-MCNC: 1.6 MG/DL (ref 0.7–1.2)
EKG Q-T INTERVAL: 392 MS
EKG QRS DURATION: 134 MS
EKG QTC CALCULATION (BAZETT): 533 MS
EKG R AXIS: -66 DEGREES
EKG T AXIS: 60 DEGREES
EKG VENTRICULAR RATE: 111 BPM
EOSINOPHIL # BLD: 0.12 K/UL (ref 0–0.44)
EOSINOPHILS RELATIVE PERCENT: 1 % (ref 1–4)
EPI CELLS #/AREA URNS HPF: ABNORMAL /HPF (ref 0–5)
ERYTHROCYTE [DISTWIDTH] IN BLOOD BY AUTOMATED COUNT: 14.2 % (ref 11.8–14.4)
GFR, ESTIMATED: 43 ML/MIN/1.73M2
GLUCOSE SERPL-MCNC: 228 MG/DL (ref 70–99)
GLUCOSE UR STRIP-MCNC: NEGATIVE MG/DL
HCT VFR BLD AUTO: 37.2 % (ref 40.7–50.3)
HGB BLD-MCNC: 12.4 G/DL (ref 13–17)
HGB UR QL STRIP.AUTO: NEGATIVE
IMM GRANULOCYTES # BLD AUTO: 0.11 K/UL (ref 0–0.3)
IMM GRANULOCYTES NFR BLD: 1 %
KETONES UR STRIP-MCNC: NEGATIVE MG/DL
LACTATE BLDV-SCNC: 2.9 MMOL/L (ref 0.5–1.9)
LACTATE BLDV-SCNC: 3.3 MMOL/L (ref 0.5–1.9)
LEUKOCYTE ESTERASE UR QL STRIP: NEGATIVE
LYMPHOCYTES NFR BLD: 1.6 K/UL (ref 1.1–3.7)
LYMPHOCYTES RELATIVE PERCENT: 16 % (ref 24–43)
MAGNESIUM SERPL-MCNC: 2.1 MG/DL (ref 1.6–2.6)
MCH RBC QN AUTO: 31.9 PG (ref 25.2–33.5)
MCHC RBC AUTO-ENTMCNC: 33.3 G/DL (ref 28.4–34.8)
MCV RBC AUTO: 95.6 FL (ref 82.6–102.9)
MONOCYTES NFR BLD: 0.93 K/UL (ref 0.1–1.2)
MONOCYTES NFR BLD: 9 % (ref 3–12)
MUCOUS THREADS URNS QL MICRO: ABNORMAL
NEUTROPHILS NFR BLD: 73 % (ref 36–65)
NEUTS SEG NFR BLD: 7.54 K/UL (ref 1.5–8.1)
NITRITE UR QL STRIP: NEGATIVE
NRBC BLD-RTO: 0 PER 100 WBC
PH UR STRIP: 6 [PH] (ref 5–9)
PLATELET # BLD AUTO: 291 K/UL (ref 138–453)
PMV BLD AUTO: 9.7 FL (ref 8.1–13.5)
POTASSIUM SERPL-SCNC: 4.2 MMOL/L (ref 3.7–5.3)
PROT SERPL-MCNC: 6.9 G/DL (ref 6.4–8.3)
PROT UR STRIP-MCNC: NEGATIVE MG/DL
RBC # BLD AUTO: 3.89 M/UL (ref 4.21–5.77)
RBC #/AREA URNS HPF: ABNORMAL /HPF (ref 0–2)
SARS-COV-2 RDRP RESP QL NAA+PROBE: NOT DETECTED
SODIUM SERPL-SCNC: 139 MMOL/L (ref 135–144)
SP GR UR STRIP: 1.01 (ref 1.01–1.02)
SPECIMEN DESCRIPTION: NORMAL
TROPONIN I SERPL HS-MCNC: 104 NG/L (ref 0–22)
TROPONIN I SERPL HS-MCNC: 108 NG/L (ref 0–22)
TROPONIN I SERPL HS-MCNC: 111 NG/L (ref 0–22)
TROPONIN I SERPL HS-MCNC: 112 NG/L (ref 0–22)
UROBILINOGEN UR STRIP-ACNC: NORMAL EU/DL (ref 0–1)
WBC #/AREA URNS HPF: ABNORMAL /HPF (ref 0–5)
WBC OTHER # BLD: 10.3 K/UL (ref 3.5–11.3)

## 2024-07-08 PROCEDURE — 83735 ASSAY OF MAGNESIUM: CPT

## 2024-07-08 PROCEDURE — 84484 ASSAY OF TROPONIN QUANT: CPT

## 2024-07-08 PROCEDURE — 93010 ELECTROCARDIOGRAM REPORT: CPT | Performed by: INTERNAL MEDICINE

## 2024-07-08 PROCEDURE — 87635 SARS-COV-2 COVID-19 AMP PRB: CPT

## 2024-07-08 PROCEDURE — 99285 EMERGENCY DEPT VISIT HI MDM: CPT

## 2024-07-08 PROCEDURE — 87086 URINE CULTURE/COLONY COUNT: CPT

## 2024-07-08 PROCEDURE — 85025 COMPLETE CBC W/AUTO DIFF WBC: CPT

## 2024-07-08 PROCEDURE — 36415 COLL VENOUS BLD VENIPUNCTURE: CPT

## 2024-07-08 PROCEDURE — 80053 COMPREHEN METABOLIC PANEL: CPT

## 2024-07-08 PROCEDURE — 83605 ASSAY OF LACTIC ACID: CPT

## 2024-07-08 PROCEDURE — 87040 BLOOD CULTURE FOR BACTERIA: CPT

## 2024-07-08 PROCEDURE — 83880 ASSAY OF NATRIURETIC PEPTIDE: CPT

## 2024-07-08 PROCEDURE — 6370000000 HC RX 637 (ALT 250 FOR IP): Performed by: EMERGENCY MEDICINE

## 2024-07-08 PROCEDURE — 51702 INSERT TEMP BLADDER CATH: CPT

## 2024-07-08 PROCEDURE — 2580000003 HC RX 258: Performed by: EMERGENCY MEDICINE

## 2024-07-08 PROCEDURE — 94761 N-INVAS EAR/PLS OXIMETRY MLT: CPT

## 2024-07-08 PROCEDURE — 1200000000 HC SEMI PRIVATE

## 2024-07-08 PROCEDURE — 6370000000 HC RX 637 (ALT 250 FOR IP): Performed by: INTERNAL MEDICINE

## 2024-07-08 PROCEDURE — 99222 1ST HOSP IP/OBS MODERATE 55: CPT | Performed by: FAMILY MEDICINE

## 2024-07-08 PROCEDURE — 6360000002 HC RX W HCPCS: Performed by: INTERNAL MEDICINE

## 2024-07-08 PROCEDURE — 96366 THER/PROPH/DIAG IV INF ADDON: CPT

## 2024-07-08 PROCEDURE — 81001 URINALYSIS AUTO W/SCOPE: CPT

## 2024-07-08 PROCEDURE — 6360000002 HC RX W HCPCS: Performed by: EMERGENCY MEDICINE

## 2024-07-08 PROCEDURE — 71045 X-RAY EXAM CHEST 1 VIEW: CPT

## 2024-07-08 PROCEDURE — 2580000003 HC RX 258: Performed by: INTERNAL MEDICINE

## 2024-07-08 PROCEDURE — 97166 OT EVAL MOD COMPLEX 45 MIN: CPT

## 2024-07-08 PROCEDURE — 96367 TX/PROPH/DG ADDL SEQ IV INF: CPT

## 2024-07-08 PROCEDURE — 97162 PT EVAL MOD COMPLEX 30 MIN: CPT

## 2024-07-08 PROCEDURE — 96365 THER/PROPH/DIAG IV INF INIT: CPT

## 2024-07-08 PROCEDURE — 93005 ELECTROCARDIOGRAM TRACING: CPT | Performed by: EMERGENCY MEDICINE

## 2024-07-08 RX ORDER — ACETAMINOPHEN 650 MG/1
650 SUPPOSITORY RECTAL EVERY 6 HOURS PRN
Status: DISCONTINUED | OUTPATIENT
Start: 2024-07-08 | End: 2024-07-10 | Stop reason: HOSPADM

## 2024-07-08 RX ORDER — SODIUM CHLORIDE 0.9 % (FLUSH) 0.9 %
5-40 SYRINGE (ML) INJECTION EVERY 12 HOURS SCHEDULED
Status: DISCONTINUED | OUTPATIENT
Start: 2024-07-08 | End: 2024-07-10 | Stop reason: HOSPADM

## 2024-07-08 RX ORDER — BUMETANIDE 1 MG/1
1 TABLET ORAL DAILY
Status: DISCONTINUED | OUTPATIENT
Start: 2024-07-09 | End: 2024-07-10

## 2024-07-08 RX ORDER — 0.9 % SODIUM CHLORIDE 0.9 %
500 INTRAVENOUS SOLUTION INTRAVENOUS ONCE
Status: COMPLETED | OUTPATIENT
Start: 2024-07-08 | End: 2024-07-08

## 2024-07-08 RX ORDER — ASPIRIN 325 MG
325 TABLET ORAL ONCE
Status: COMPLETED | OUTPATIENT
Start: 2024-07-08 | End: 2024-07-08

## 2024-07-08 RX ORDER — ATORVASTATIN CALCIUM 40 MG/1
40 TABLET, FILM COATED ORAL NIGHTLY
Status: DISCONTINUED | OUTPATIENT
Start: 2024-07-08 | End: 2024-07-10 | Stop reason: HOSPADM

## 2024-07-08 RX ORDER — ENOXAPARIN SODIUM 100 MG/ML
40 INJECTION SUBCUTANEOUS DAILY
Status: DISCONTINUED | OUTPATIENT
Start: 2024-07-08 | End: 2024-07-08 | Stop reason: DRUGHIGH

## 2024-07-08 RX ORDER — SODIUM CHLORIDE 9 MG/ML
INJECTION, SOLUTION INTRAVENOUS CONTINUOUS
Status: DISCONTINUED | OUTPATIENT
Start: 2024-07-08 | End: 2024-07-08

## 2024-07-08 RX ORDER — POTASSIUM CHLORIDE 20 MEQ/1
40 TABLET, EXTENDED RELEASE ORAL PRN
Status: DISCONTINUED | OUTPATIENT
Start: 2024-07-08 | End: 2024-07-10 | Stop reason: HOSPADM

## 2024-07-08 RX ORDER — TAMSULOSIN HYDROCHLORIDE 0.4 MG/1
0.4 CAPSULE ORAL EVERY MORNING
Status: DISCONTINUED | OUTPATIENT
Start: 2024-07-09 | End: 2024-07-10 | Stop reason: HOSPADM

## 2024-07-08 RX ORDER — MONTELUKAST SODIUM 10 MG/1
10 TABLET ORAL NIGHTLY
Status: DISCONTINUED | OUTPATIENT
Start: 2024-07-08 | End: 2024-07-10 | Stop reason: HOSPADM

## 2024-07-08 RX ORDER — FAMOTIDINE 20 MG/1
20 TABLET, FILM COATED ORAL 2 TIMES DAILY
Status: DISCONTINUED | OUTPATIENT
Start: 2024-07-08 | End: 2024-07-08 | Stop reason: DRUGHIGH

## 2024-07-08 RX ORDER — POTASSIUM CHLORIDE 7.45 MG/ML
10 INJECTION INTRAVENOUS PRN
Status: DISCONTINUED | OUTPATIENT
Start: 2024-07-08 | End: 2024-07-10 | Stop reason: HOSPADM

## 2024-07-08 RX ORDER — LANOLIN ALCOHOL/MO/W.PET/CERES
400 CREAM (GRAM) TOPICAL 2 TIMES DAILY
Status: DISCONTINUED | OUTPATIENT
Start: 2024-07-08 | End: 2024-07-10 | Stop reason: HOSPADM

## 2024-07-08 RX ORDER — SODIUM CHLORIDE 9 MG/ML
INJECTION, SOLUTION INTRAVENOUS PRN
Status: DISCONTINUED | OUTPATIENT
Start: 2024-07-08 | End: 2024-07-10 | Stop reason: HOSPADM

## 2024-07-08 RX ORDER — ONDANSETRON 4 MG/1
4 TABLET, ORALLY DISINTEGRATING ORAL EVERY 8 HOURS PRN
Status: DISCONTINUED | OUTPATIENT
Start: 2024-07-08 | End: 2024-07-09

## 2024-07-08 RX ORDER — FAMOTIDINE 20 MG/1
20 TABLET, FILM COATED ORAL DAILY
Status: DISCONTINUED | OUTPATIENT
Start: 2024-07-08 | End: 2024-07-10 | Stop reason: HOSPADM

## 2024-07-08 RX ORDER — SODIUM CHLORIDE 0.9 % (FLUSH) 0.9 %
5-40 SYRINGE (ML) INJECTION PRN
Status: DISCONTINUED | OUTPATIENT
Start: 2024-07-08 | End: 2024-07-10 | Stop reason: HOSPADM

## 2024-07-08 RX ORDER — ASPIRIN 81 MG/1
81 TABLET, CHEWABLE ORAL DAILY
Status: DISCONTINUED | OUTPATIENT
Start: 2024-07-09 | End: 2024-07-10 | Stop reason: HOSPADM

## 2024-07-08 RX ORDER — ACETAMINOPHEN 325 MG/1
650 TABLET ORAL EVERY 6 HOURS PRN
Status: DISCONTINUED | OUTPATIENT
Start: 2024-07-08 | End: 2024-07-10 | Stop reason: HOSPADM

## 2024-07-08 RX ORDER — PANTOPRAZOLE SODIUM 40 MG/1
40 TABLET, DELAYED RELEASE ORAL
Status: DISCONTINUED | OUTPATIENT
Start: 2024-07-09 | End: 2024-07-10 | Stop reason: HOSPADM

## 2024-07-08 RX ORDER — DOCUSATE SODIUM 100 MG/1
100 CAPSULE, LIQUID FILLED ORAL 2 TIMES DAILY
Status: DISCONTINUED | OUTPATIENT
Start: 2024-07-08 | End: 2024-07-10 | Stop reason: HOSPADM

## 2024-07-08 RX ORDER — PRAMIPEXOLE DIHYDROCHLORIDE 0.25 MG/1
0.5 TABLET ORAL NIGHTLY PRN
Status: DISCONTINUED | OUTPATIENT
Start: 2024-07-08 | End: 2024-07-10 | Stop reason: HOSPADM

## 2024-07-08 RX ORDER — MAGNESIUM SULFATE IN WATER 40 MG/ML
2000 INJECTION, SOLUTION INTRAVENOUS PRN
Status: DISCONTINUED | OUTPATIENT
Start: 2024-07-08 | End: 2024-07-10 | Stop reason: HOSPADM

## 2024-07-08 RX ORDER — ONDANSETRON 2 MG/ML
4 INJECTION INTRAMUSCULAR; INTRAVENOUS EVERY 6 HOURS PRN
Status: DISCONTINUED | OUTPATIENT
Start: 2024-07-08 | End: 2024-07-09

## 2024-07-08 RX ORDER — METOPROLOL SUCCINATE 50 MG/1
50 TABLET, EXTENDED RELEASE ORAL DAILY
Status: DISCONTINUED | OUTPATIENT
Start: 2024-07-08 | End: 2024-07-10 | Stop reason: HOSPADM

## 2024-07-08 RX ORDER — ENOXAPARIN SODIUM 100 MG/ML
30 INJECTION SUBCUTANEOUS 2 TIMES DAILY
Status: DISCONTINUED | OUTPATIENT
Start: 2024-07-08 | End: 2024-07-10 | Stop reason: HOSPADM

## 2024-07-08 RX ADMIN — SODIUM CHLORIDE, PRESERVATIVE FREE 10 ML: 5 INJECTION INTRAVENOUS at 20:15

## 2024-07-08 RX ADMIN — PRAMIPEXOLE DIHYDROCHLORIDE 0.5 MG: 0.25 TABLET ORAL at 23:03

## 2024-07-08 RX ADMIN — SODIUM CHLORIDE: 9 INJECTION, SOLUTION INTRAVENOUS at 13:41

## 2024-07-08 RX ADMIN — ENOXAPARIN SODIUM 30 MG: 100 INJECTION SUBCUTANEOUS at 20:15

## 2024-07-08 RX ADMIN — METOPROLOL SUCCINATE 50 MG: 50 TABLET, EXTENDED RELEASE ORAL at 17:18

## 2024-07-08 RX ADMIN — VANCOMYCIN HYDROCHLORIDE 2000 MG: 1 INJECTION, POWDER, LYOPHILIZED, FOR SOLUTION INTRAVENOUS at 12:07

## 2024-07-08 RX ADMIN — Medication 400 MG: at 20:15

## 2024-07-08 RX ADMIN — ATORVASTATIN CALCIUM 40 MG: 40 TABLET, FILM COATED ORAL at 20:15

## 2024-07-08 RX ADMIN — SODIUM CHLORIDE 500 ML: 9 INJECTION, SOLUTION INTRAVENOUS at 11:20

## 2024-07-08 RX ADMIN — METFORMIN HYDROCHLORIDE 500 MG: 500 TABLET ORAL at 17:18

## 2024-07-08 RX ADMIN — FAMOTIDINE 20 MG: 20 TABLET, FILM COATED ORAL at 17:18

## 2024-07-08 RX ADMIN — ASPIRIN 325 MG: 325 TABLET ORAL at 12:04

## 2024-07-08 RX ADMIN — MONTELUKAST 10 MG: 10 TABLET, FILM COATED ORAL at 20:15

## 2024-07-08 RX ADMIN — SACUBITRIL AND VALSARTAN 1 TABLET: 24; 26 TABLET, FILM COATED ORAL at 20:15

## 2024-07-08 RX ADMIN — CEFEPIME 2000 MG: 2 INJECTION, POWDER, FOR SOLUTION INTRAVENOUS at 11:34

## 2024-07-08 ASSESSMENT — PAIN - FUNCTIONAL ASSESSMENT: PAIN_FUNCTIONAL_ASSESSMENT: NONE - DENIES PAIN

## 2024-07-08 NOTE — PROGRESS NOTES
Pt arrived to unit from ER to room 328 for CHF. Pt moved to bed 3 assist from ER cot. Pt is on RA, respirations even and unlabored at this time, noted to get short of breath with exertion and uses accessory muscles at times. Patient noted to have pressure  area to coccyx, area cleansed and dressing changed, ace wrap noted to left leg, ER nurse states she redressed today, noted to have pressure boot on foot as well. Vitals obtained. Admission assessment completed. Admission navigator completed. Pt oriented to room. Reviewed orders. Pt denies further needs at this time. Call light in reach. Care ongoing.

## 2024-07-08 NOTE — CARE COORDINATION
No changes since last admission on Saturday.  MELANIE Mane        Case Management Assessment  Initial Evaluation    Date/Time of Evaluation: 7/8/2024 2:37 PM  Assessment Completed by: MELANIE Mane    If patient is discharged prior to next notation, then this note serves as note for discharge by case management.    Patient Name: Moise Diallo                   YOB: 1940  Diagnosis: Shortness of breath [R06.02]                   Date / Time: 7/8/2024 10:50 AM    Patient Admission Status: Inpatient   Readmission Risk (Low < 19, Mod (19-27), High > 27): Readmission Risk Score: 22.2    Current PCP: Terrell Chapin MD  PCP verified by CM? Yes    Chart Reviewed: Yes      History Provided by: Medical Record  Patient Orientation: Alert and Oriented    Patient Cognition: Alert    Hospitalization in the last 30 days (Readmission):  Yes    If yes, Readmission Assessment in  Navigator will be completed.    Advance Directives:      Code Status: DNR-CCA   Patient's Primary Decision Maker is: Legal Next of Kin    Primary Decision Maker: Roseann Diallo - Spouse - [phone]    Discharge Planning:    Patient lives with: Other (Comment) Type of Home: Long-Term Care  Primary Care Giver: Other (Comment) (ecf)  Patient Support Systems include: Spouse/Significant Other, Children, Family Members, Other (Comment) (ecf)   Current Financial resources: Medicare  Current community resources: ECF/Home Care, Transportation  Current services prior to admission: Durable Medical Equipment, Extended Care Facility            Current DME: Wheelchair, Walker            Type of Home Care services:  Aide Services, Nursing Services, OT, PT    ADLS  Prior functional level: Assistance with the following:, Bathing, Dressing, Cooking, Housework, Shopping, Mobility  Current functional level: Assistance with the following:, Bathing, Dressing, Mobility, Shopping, Housework, Cooking    PT AM-PAC:   /24  OT AM-PAC:

## 2024-07-08 NOTE — CONSULTS
I, Xena Louis am scribing for and in the presence of Alan Rios MD, MS, F.A.C.C.    Patient: Moise Diallo  : 1940  Date of Visit: 2024    REASON FOR VISIT / CONSULTATION: Shortness of Breath (Patient complains of increased shortness of breath, patient has increased swelling and fluid retention in extremities.)    History of Present Illness:        I had the pleasure of seeing Moise Diallo in hospital consultation today. He is a 84 y.o. male with a history of severe coronary artery disease, reduced EF and an ICD. He has two brothers with heart disease he is unsure of details or ages when the problems started. One brother has a pacemaker and the other brother has had open heart surgery.  He believes they had heart attacks in the past. Also reports mother and father with CAD, he believes everyone was in their 70's prior to having any issues. He is former smoker quit over 60 years ago. He smoked 1/2 pack per day for 3 years. He was diagnosed with hypertension for only a few years and hyperlipidemia and diabetes. He denies having sleep apnea. He states until he was 72 years old he never seen a provider and never went to the doctor's office. He has urinary retention and has to self cath once daily. EKG done 2022: Sinus tachycardia with 1st degree AV block. Left anterior fascicular block, right bundle branch block. Heart rate 101 bpm. Stress test done on 2022- EF 27% Abnormal myocardial perfusion study. There is a moderate/large perfusion defect of severe intensity in the septal, apical, anteroapical and inferoapical region(s) during stress and rest imaging which is most consistent with an old myocardial infarction with edith-infarct ischemia.Overall, these results are most consistent with high risk for significant coronary artery disease. CAM done on 2022- 6 days and 23 hours recorded. Baseline rhythm is sinus with average heart rate of 80 bpm, ranging between 59 and 128 bpm.  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 I think that this exacerbation may be secondary to urinary obstruction as the patient reports significant retention. Agree with urinary catheter at least for now. Consider consultation with Urology for long term management.  Beta Blocker: Continue Metoprolol succinate (Toprol XL) 50 mg daily.   ACE Inibitor/ARB: Continue sacubitril/valsartan (Entresto) 24/26 mg twice daily.  Diuretics: Continue bumetinide (Bumex) 1 mg every morning.    Heart failure counseling: I advised them to change their compression stockings to compression wraps and I advised them to try and keep their legs up whenever possible and to limit salt in their diet.  Additional Testing List: None  I personally spoke with Dr. Chapin about his abdominal distention and shortness of breath and we discussed repeating a abdominal CT scan to assess for the source of his abdominal distension and possible ascites.      History of Non Sustained Ventricular Tachycardia: Seen on his ICD check from 2/12/2024. No further episodes at this time.   Beta Blocker: Continue Metoprolol succinate (Toprol XL) 50 mg daily.     Ischemic cardiomyopathy : No stenting options available on 4/2024 heart catheterization with 100% occluded RCA. EF 20% on echo, repeat echo on 8/9/2022 EF 25-30%. No change EF on echo 9/23/22. Leg swelling today upon physical exam however this was with a Lymphedema component.   Beta Blocker: Continue Metoprolol succinate (Toprol XL) 50 mg daily.   ACE Inibitor/ARB: Continue sacubitril/valsartan (Entresto) 24/26 mg twice daily.   Diuretics: Continue bumetinide (Bumex) 1 mg every morning.    Heart failure counseling: I told them to continue wearing lower extremity compression stockings and I advised them to try and keep their

## 2024-07-08 NOTE — CONSULTS
Vancomycin Dosing by Pharmacy - Initial Note   TODAY'S DATE:  7/8/2024  Patient name, age:  Moise Diallo, 84 y.o.    Indication: SSTI, MRSA suspected.  Additional antimicrobials:  Cefepime    Allergies:  Patient has no known allergies.   Actual Weight:    Wt Readings from Last 1 Encounters:   07/06/24 104.8 kg (231 lb)     Labs/Ancillary Data  Estimated Creatinine Clearance: 58 mL/min (based on SCr of 1.2 mg/dL).  Recent Labs     07/06/24  0515   CREATININE 1.2   BUN 32*   WBC 8.7     Procalcitonin   Date Value Ref Range Status   03/01/2017 3.17 (H) <0.50 ng/mL Final     Comment:           Risk of progression to severe sepsis or septic shock:        On first day of ICU admission:   >2.00          HIGH RISK   <0.50          LOW RISK   0.50-2.00      REVIEW CAREFULLY        Procalcitonin values from samples collected within the first 6 hours of systemic   infection may still be low. Retesting may be indicated.        Values from day 1 and day 4 can be entered into the Change in Procalcitonin   Calculator ([email or web address]) to determine the patient's Mortality   Risk Prognosis        [facility] [address] [address] ([phone]       No intake or output data in the 24 hours ending 07/08/24 1119  Temp: 97.6 F    Recent vancomycin administrations        No vancomycin IV orders with administrations found.            Orders not given:            vancomycin (VANCOCIN) 2,000 mg in sodium chloride 0.9 % 500 mL IVPB                  Culture Date / Source  /  Results  7/4/24            blood       no growth  7/4/24            urine        no growth  7/4/24            wound      Pseudomonas + MRSA    MRSA Nasal Swab: N/A. Non-respiratory infection..    PLAN   Initial dose Vancomycin 2000 mg IVPB x 1 in ER      Vancomycin Target Concentration Parameters  Treatment  Population Target AUC/BLADE Target Trough   Invasive MRSA Infection (bacteremia, pneumonia, meningitis, endocarditis,

## 2024-07-08 NOTE — PROGRESS NOTES
Pharmacist Review and Automatic Dose Adjustment of Prophylactic Enoxaparin         The reviewing pharmacist has made an adjustment to the ordered enoxaparin dose or converted to UFH per the approved [facility] protocol and table as identified below.        Moise Diallo is a 84 y.o. male.     Recent Labs     07/06/24  0515 07/08/24  1117   CREATININE 1.2 1.6*       Estimated Creatinine Clearance: 43 mL/min (A) (based on SCr of 1.6 mg/dL (H)).    Recent Labs     07/06/24  0515 07/08/24  1117   HGB 11.2* 12.4*   HCT 33.8* 37.2*    291     No results for input(s): \"INR\" in the last 72 hours.    Height:   Ht Readings from Last 1 Encounters:   07/08/24 1.778 m (5' 10\")     Weight:  Wt Readings from Last 1 Encounters:   07/08/24 105 kg (231 lb 7.7 oz)               Plan: Based upon the patient's weight and renal function    Ordered: Enoxaparin 40mg SUBQ Daily    Changed/converted to    New Order: Enoxaparin 30mg SUBQ BID      Thank you,  Yinka Swan [facility]  7/8/2024, 3:43 PM

## 2024-07-08 NOTE — PROGRESS NOTES
Pharmacy Note - Renal dose adjustment made per P/T protocol    Original order:  Pepcid 20 mg bid    Estimated Creatinine Clearance: 43 mL/min (A) (based on SCr of 1.6 mg/dL (H)).    Recent Labs     07/06/24  0515 07/08/24  1117   BUN 32* 36*   CREATININE 1.2 1.6*       Renally adjusted order:  Pepcid 20 mg daily    Please call pharmacy with any questions.    Thank you,  Yinka Swan [facility]  7/8/2024 3:41 PM

## 2024-07-08 NOTE — PROGRESS NOTES
Occupational Therapy  Facility/Department: [facility] MED SURG  Occupational Therapy Initial Assessment    Name: Moise Diallo  : 1940  MRN: [id number]  Date of Service: 2024    Discharge Recommendations:  Continue to assess pending progress          Patient Diagnosis(es): The primary encounter diagnosis was Severe sepsis (HCC). Diagnoses of Pressure injury, stage 2, with infection (HCC) and Elevated troponin were also pertinent to this visit.  Past Medical History:  has a past medical history of Acute renal failure superimposed on stage 3 chronic kidney disease (HCC), AICD (automatic cardioverter/defibrillator) present, Arthritis, CHF (congestive heart failure) (HCC), Chronic combined systolic and diastolic CHF, NYHA class 3 (HCC), Diabetes mellitus (HCC), Hyperlipidemia, Hypertension, and Kidney stone.  Past Surgical History:  has a past surgical history that includes hernia repair; Cataract removal with implant (Right, 2013); knee surgery (Right); joint replacement; joint replacement (Right, 2014 partial knee); joint replacement (Right, ); TURP (2017); TURP (2015); Lithotripsy (Right, 2017); pr cysto w/ureteroscopy w/lithotripsy (Right, 2017); CYSTOSCOPY INSERTION / REMOVAL STENT / STONE (Right, 2017); cystourethroscopy (2017); CYSTOSCOPY INSERTION / REMOVAL STENT / STONE (N/A, 2017); Lithotripsy (Left, 2018); pr lithotripsy xtrcorp shock wave (Left, 2018); Intracapsular cataract extraction (Left, 2020); vascular surgery (Left, 2023); vascular surgery (Right, 2023); Femoral-tibial Bypass Graft (Left, 2023); femoral bypass (Left, 2023); Cardiac catheterization (Left, 2024); and Cardiac procedure (N/A, 3/22/2024).    Treatment Diagnosis: Weakness      Assessment   Performance deficits / Impairments: Decreased functional mobility ;Decreased endurance;Decreased balance;Decreased ADL status;Decreased  (Significantly decreased ROM in B shoulders, elbows, wrists and digits)  PROM: Grossly decreased, non-functional  Strength: Grossly decreased, non-functional  Coordination: Grossly decreased, non-functional  Tone: Abnormal  Sensation: Intact  ADL  Feeding: Maximum assistance  Grooming: Maximum assistance  UE Bathing: Maximum assistance  LE Bathing: Maximum assistance  UE Dressing: Maximum assistance  LE Dressing: Maximum assistance  Toileting: Maximum assistance  Functional Mobility Skilled Clinical Factors: dependent  Additional Comments: TD ADL transfers              Vision  Vision: Within Functional Limits  Hearing  Hearing: Within functional limits  Cognition  Overall Cognitive Status: WFL  Orientation  Overall Orientation Status: Within Functional Limits                  Education Given To: Patient  Education Provided: Plan of Care;Role of Therapy  Education Provided Comments: bed mobility  Education Method: Verbal  Barriers to Learning: None  Education Outcome: Verbalized understanding;Demonstrated understanding                          AM-PAC - ADL  AM-PAC Daily Activity - Inpatient   How much help is needed for putting on and taking off regular lower body clothing?: A Lot  How much help is needed for bathing (which includes washing, rinsing, drying)?: A Lot  How much help is needed for toileting (which includes using toilet, bedpan, or urinal)?: A Lot  How much help is needed for putting on and taking off regular upper body clothing?: A Lot  How much help is needed for taking care of personal grooming?: A Lot  How much help for eating meals?: A Lot  AM-[facility] Inpatient Daily Activity Raw Score: 12  AM-PAC Inpatient ADL T-Scale Score : 30.6  ADL Inpatient CMS 0-100% Score: 66.57  ADL Inpatient CMS G-Code Modifier : CL      Goals  Short Term Goals  Time Frame for Short Term Goals: 21 visits  Short Term Goal 1: Patient to engage daily in BUE strengthening and ROM to improve range and strength for functional

## 2024-07-08 NOTE — PROGRESS NOTES
Physical Therapy  Facility/Department: [facility] MED SURG  Physical Therapy Initial Assessment    Name: Moise Diallo  : 1940  MRN: [id number]  Date of Service: 2024    Discharge Recommendations:  Long Term Care with PT, Long Term Care without PT, Continue to assess pending progress   PT Equipment Recommendations  Equipment Needed: No      Patient Diagnosis(es): The primary encounter diagnosis was Severe sepsis (HCC). Diagnoses of Pressure injury, stage 2, with infection (HCC) and Elevated troponin were also pertinent to this visit.  Past Medical History:  has a past medical history of Acute renal failure superimposed on stage 3 chronic kidney disease (HCC), AICD (automatic cardioverter/defibrillator) present, Arthritis, CHF (congestive heart failure) (HCC), Chronic combined systolic and diastolic CHF, NYHA class 3 (HCC), Diabetes mellitus (HCC), Hyperlipidemia, Hypertension, and Kidney stone.  Past Surgical History:  has a past surgical history that includes hernia repair; Cataract removal with implant (Right, 2013); knee surgery (Right); joint replacement; joint replacement (Right, 2014 partial knee); joint replacement (Right, ); TURP (2017); TURP (2015); Lithotripsy (Right, 2017); pr cysto w/ureteroscopy w/lithotripsy (Right, 2017); CYSTOSCOPY INSERTION / REMOVAL STENT / STONE (Right, 2017); cystourethroscopy (2017); CYSTOSCOPY INSERTION / REMOVAL STENT / STONE (N/A, 2017); Lithotripsy (Left, 2018); pr lithotripsy xtrcorp shock wave (Left, 2018); Intracapsular cataract extraction (Left, 2020); vascular surgery (Left, 2023); vascular surgery (Right, 2023); Femoral-tibial Bypass Graft (Left, 2023); femoral bypass (Left, 2023); Cardiac catheterization (Left, 2024); and Cardiac procedure (N/A, 3/22/2024).    Assessment   Body Structures, Functions, Activity Limitations Requiring Skilled Therapeutic  shower  Bathroom Equipment: Grab bars in shower, Shower chair  Bathroom Accessibility: Accessible  Has the patient had two or more falls in the past year or any fall with injury in the past year?: Yes  ADL Assistance: Needs assistance  Homemaking Assistance: Needs assistance  Ambulation Assistance: Non-ambulatory  Transfer Assistance: Needs assistance  Additional Comments: Per patient, lives in LTC facility where he receives assist with ADL and transfers. patient states that he currently uses joey steady, working with therapy to transfer and walk again.  Vision/Hearing  Vision  Vision: Within Functional Limits  Hearing  Hearing: Within functional limits    Cognition   Orientation  Overall Orientation Status: Within Functional Limits  Cognition  Overall Cognitive Status: WFL     Objective   Temp: 97.6 °F (36.4 °C)  Pulse: 89  Heart Rate Source: Apical  Respirations: 16  SpO2: 97 %  O2 Device: None (Room air)  BP: 105/62  MAP (Calculated): 76  BP Location: Left upper arm  BP Method: Automatic  Patient Position: Semi fowlers     Observation/Palpation  Posture: Poor  Observation: Boot on L foot        AROM RLE (degrees)  RLE General AROM: R knee extension -5*  AROM LLE (degrees)  LLE General AROM: L knee extension -10*  Strength RLE  Comment: Grossly 2+/5  Strength LLE  Comment: Grossly 2+/5           Bed mobility  Supine to Sit: Maximum assistance;2 Person assistance  Sit to Supine: 2 Person assistance;Maximum assistance  Scootin Person assistance;Maximal assistance  Transfers  Sit to Stand: Unable to assess  Stand to Sit: Unable to assess        Balance  Posture: Poor  Sitting - Static: Poor  Sitting - Dynamic: Poor      AM-PAC - Mobility    AM-PAC Mobility without Stair Climbing Inpatient   How much difficulty turning over in bed?: A Lot  How much difficulty sitting down on / standing up from a chair with arms?: Unable  How much difficulty moving from lying on back to sitting on side of bed?: A Lot  How much help

## 2024-07-08 NOTE — ED PROVIDER NOTES
EMERGENCY DEPARTMENT ENCOUNTER    Pt Name: Moise Diallo  MRN: [id number]  Birthdate 1940  Date of evaluation: 7/8/24  CHIEF COMPLAINT       Chief Complaint   Patient presents with    Shortness of Breath     Patient complains of increased shortness of breath, patient has increased swelling and fluid retention in extremities.     HISTORY OF PRESENT ILLNESS   HPI  Shortness of breath.  Left the hospital on Saturday after being admitted on Thursday.  He states he wanted to leave the hospital to take care of some things and he thinks he left the hospital too early.  He is being treated for UTI and a foot infection.  He has a pacemaker with congestive heart failure, compliant medications.  He is in a long-term care facility currently.  Chronic indwelling Fernandes catheter.  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  Complaint of dry cough with dyspnea.  Denies any pain anywhere.  No nausea or vomiting.  No fever chill sweats.  Nonproductive cough.  He does confirm he is DO NOT RESUSCITATE and he does not want to be intubated or put on the ventilator      REVIEW OF SYSTEMS     Review of Systems   All other systems reviewed and are negative.    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

## 2024-07-08 NOTE — DISCHARGE SUMMARY
Discharge Summary    Moise Diallo  :  1940  MRN:  [id number]    Admit date:  2024      Discharge date:  2024     Admitting Physician:  Bruce Allen MD    Discharge Diagnoses:      Principal Problem:    Urinary tract infection associated with indwelling urethral catheter (HCC)  Active Problems:    Sepsis due to urinary tract infection (HCC)  Resolved Problems:    * No resolved hospital problems. *      Active Hospital Problems    Diagnosis Date Noted    Urinary tract infection associated with indwelling urethral catheter (HCC) [T83.511A, N39.0] 2024    Sepsis due to urinary tract infection (HCC) [A41.9, N39.0] 2024       Discharge Medications:         Medication List        START taking these medications      cephALEXin 500 MG capsule  Commonly known as: KEFLEX  Take 1 capsule by mouth 3 times daily for 7 days            CHANGE how you take these medications      bumetanide 0.5 MG tablet  Commonly known as: BUMEX  Take 2 tablets by mouth daily He is taking 1 mg daily with an additional 0.5 mg on Mon, Wed and Fri.  What changed: Another medication with the same name was removed. Continue taking this medication, and follow the directions you see here.     pramipexole 0.5 MG tablet  Commonly known as: MIRAPEX  Take 1 tablet by mouth nightly as needed (for restless leg)  What changed:   when to take this  reasons to take this            CONTINUE taking these medications      aspirin 81 MG tablet     atorvastatin 40 MG tablet  Commonly known as: LIPITOR  Take 1 tablet by mouth nightly     Contour Test strip  Generic drug: blood glucose test strips     docusate 100 MG Caps  Commonly known as: COLACE, DULCOLAX  Take 100 mg by mouth 2 times daily     magnesium hydroxide 400 MG/5ML suspension  Commonly known as: MILK OF MAGNESIA     magnesium oxide 400 (240 Mg) MG tablet  Commonly known as: MAG-OX     metFORMIN 500 MG tablet  Commonly known as: GLUCOPHAGE     metoprolol succinate 50 MG

## 2024-07-09 ENCOUNTER — APPOINTMENT (OUTPATIENT)
Dept: GENERAL RADIOLOGY | Age: 84
DRG: 884 | End: 2024-07-09
Payer: MEDICARE

## 2024-07-09 ENCOUNTER — APPOINTMENT (OUTPATIENT)
Dept: CT IMAGING | Age: 84
DRG: 884 | End: 2024-07-09
Payer: MEDICARE

## 2024-07-09 LAB
ANION GAP SERPL CALCULATED.3IONS-SCNC: 9 MMOL/L (ref 9–17)
BASOPHILS # BLD: 0.06 K/UL (ref 0–0.2)
BASOPHILS NFR BLD: 1 % (ref 0–2)
BUN SERPL-MCNC: 32 MG/DL (ref 8–23)
BUN/CREAT SERPL: 23 (ref 9–20)
CALCIUM SERPL-MCNC: 8.7 MG/DL (ref 8.6–10.4)
CHLORIDE SERPL-SCNC: 104 MMOL/L (ref 98–107)
CHOLEST SERPL-MCNC: 92 MG/DL (ref 0–199)
CHOLESTEROL/HDL RATIO: 3
CO2 SERPL-SCNC: 24 MMOL/L (ref 20–31)
CREAT SERPL-MCNC: 1.4 MG/DL (ref 0.7–1.2)
CRP SERPL HS-MCNC: 32.9 MG/L (ref 0–5)
EKG Q-T INTERVAL: 514 MS
EKG QRS DURATION: 138 MS
EKG QTC CALCULATION (BAZETT): 607 MS
EKG R AXIS: -64 DEGREES
EKG T AXIS: 46 DEGREES
EKG VENTRICULAR RATE: 84 BPM
EOSINOPHIL # BLD: 0.23 K/UL (ref 0–0.44)
EOSINOPHILS RELATIVE PERCENT: 2 % (ref 1–4)
ERYTHROCYTE [DISTWIDTH] IN BLOOD BY AUTOMATED COUNT: 14 % (ref 11.8–14.4)
ERYTHROCYTE [SEDIMENTATION RATE] IN BLOOD BY PHOTOMETRIC METHOD: 44 MM/HR (ref 0–20)
GFR, ESTIMATED: 50 ML/MIN/1.73M2
GLUCOSE SERPL-MCNC: 130 MG/DL (ref 70–99)
HCT VFR BLD AUTO: 34.1 % (ref 40.7–50.3)
HDLC SERPL-MCNC: 35 MG/DL
HGB BLD-MCNC: 11.4 G/DL (ref 13–17)
IMM GRANULOCYTES # BLD AUTO: 0.14 K/UL (ref 0–0.3)
IMM GRANULOCYTES NFR BLD: 1 %
LDLC SERPL CALC-MCNC: 36 MG/DL (ref 0–100)
LYMPHOCYTES NFR BLD: 1.56 K/UL (ref 1.1–3.7)
LYMPHOCYTES RELATIVE PERCENT: 16 % (ref 24–43)
MAGNESIUM SERPL-MCNC: 1.9 MG/DL (ref 1.6–2.6)
MCH RBC QN AUTO: 31.8 PG (ref 25.2–33.5)
MCHC RBC AUTO-ENTMCNC: 33.4 G/DL (ref 28.4–34.8)
MCV RBC AUTO: 95.3 FL (ref 82.6–102.9)
MICROORGANISM SPEC CULT: NO GROWTH
MICROORGANISM SPEC CULT: NORMAL
MICROORGANISM SPEC CULT: NORMAL
MONOCYTES NFR BLD: 1.13 K/UL (ref 0.1–1.2)
MONOCYTES NFR BLD: 12 % (ref 3–12)
NEUTROPHILS NFR BLD: 68 % (ref 36–65)
NEUTS SEG NFR BLD: 6.62 K/UL (ref 1.5–8.1)
NRBC BLD-RTO: 0 PER 100 WBC
PLATELET # BLD AUTO: 245 K/UL (ref 138–453)
PMV BLD AUTO: 9.7 FL (ref 8.1–13.5)
POTASSIUM SERPL-SCNC: 4.2 MMOL/L (ref 3.7–5.3)
RBC # BLD AUTO: 3.58 M/UL (ref 4.21–5.77)
SERVICE CMNT-IMP: NORMAL
SERVICE CMNT-IMP: NORMAL
SODIUM SERPL-SCNC: 137 MMOL/L (ref 135–144)
SPECIMEN DESCRIPTION: NORMAL
TRIGL SERPL-MCNC: 105 MG/DL
TROPONIN I SERPL HS-MCNC: 73 NG/L (ref 0–22)
VLDLC SERPL CALC-MCNC: 21 MG/DL
WBC OTHER # BLD: 9.7 K/UL (ref 3.5–11.3)

## 2024-07-09 PROCEDURE — 84484 ASSAY OF TROPONIN QUANT: CPT

## 2024-07-09 PROCEDURE — 6370000000 HC RX 637 (ALT 250 FOR IP): Performed by: NURSE PRACTITIONER

## 2024-07-09 PROCEDURE — 6360000002 HC RX W HCPCS: Performed by: INTERNAL MEDICINE

## 2024-07-09 PROCEDURE — 73080 X-RAY EXAM OF ELBOW: CPT

## 2024-07-09 PROCEDURE — 97535 SELF CARE MNGMENT TRAINING: CPT

## 2024-07-09 PROCEDURE — 73030 X-RAY EXAM OF SHOULDER: CPT

## 2024-07-09 PROCEDURE — 6370000000 HC RX 637 (ALT 250 FOR IP): Performed by: INTERNAL MEDICINE

## 2024-07-09 PROCEDURE — 86140 C-REACTIVE PROTEIN: CPT

## 2024-07-09 PROCEDURE — 86038 ANTINUCLEAR ANTIBODIES: CPT

## 2024-07-09 PROCEDURE — 1200000000 HC SEMI PRIVATE

## 2024-07-09 PROCEDURE — 85652 RBC SED RATE AUTOMATED: CPT

## 2024-07-09 PROCEDURE — 85025 COMPLETE CBC W/AUTO DIFF WBC: CPT

## 2024-07-09 PROCEDURE — 93005 ELECTROCARDIOGRAM TRACING: CPT | Performed by: INTERNAL MEDICINE

## 2024-07-09 PROCEDURE — 36415 COLL VENOUS BLD VENIPUNCTURE: CPT

## 2024-07-09 PROCEDURE — 86225 DNA ANTIBODY NATIVE: CPT

## 2024-07-09 PROCEDURE — 97530 THERAPEUTIC ACTIVITIES: CPT

## 2024-07-09 PROCEDURE — 80061 LIPID PANEL: CPT

## 2024-07-09 PROCEDURE — 74178 CT ABD&PLV WO CNTR FLWD CNTR: CPT

## 2024-07-09 PROCEDURE — 97110 THERAPEUTIC EXERCISES: CPT

## 2024-07-09 PROCEDURE — 2580000003 HC RX 258: Performed by: INTERNAL MEDICINE

## 2024-07-09 PROCEDURE — 80048 BASIC METABOLIC PNL TOTAL CA: CPT

## 2024-07-09 PROCEDURE — 93010 ELECTROCARDIOGRAM REPORT: CPT | Performed by: INTERNAL MEDICINE

## 2024-07-09 PROCEDURE — 83735 ASSAY OF MAGNESIUM: CPT

## 2024-07-09 PROCEDURE — 6360000004 HC RX CONTRAST MEDICATION: Performed by: INTERNAL MEDICINE

## 2024-07-09 RX ORDER — BISACODYL 5 MG/1
10 TABLET, DELAYED RELEASE ORAL ONCE
Status: COMPLETED | OUTPATIENT
Start: 2024-07-09 | End: 2024-07-09

## 2024-07-09 RX ADMIN — PANTOPRAZOLE SODIUM 40 MG: 40 TABLET, DELAYED RELEASE ORAL at 09:11

## 2024-07-09 RX ADMIN — MONTELUKAST 10 MG: 10 TABLET, FILM COATED ORAL at 20:49

## 2024-07-09 RX ADMIN — FAMOTIDINE 20 MG: 20 TABLET, FILM COATED ORAL at 09:11

## 2024-07-09 RX ADMIN — PRAMIPEXOLE DIHYDROCHLORIDE 0.5 MG: 0.25 TABLET ORAL at 21:57

## 2024-07-09 RX ADMIN — TAMSULOSIN HYDROCHLORIDE 0.4 MG: 0.4 CAPSULE ORAL at 09:11

## 2024-07-09 RX ADMIN — IOPAMIDOL 75 ML: 755 INJECTION, SOLUTION INTRAVENOUS at 06:24

## 2024-07-09 RX ADMIN — BUMETANIDE 1 MG: 1 TABLET ORAL at 09:11

## 2024-07-09 RX ADMIN — ATORVASTATIN CALCIUM 40 MG: 40 TABLET, FILM COATED ORAL at 20:49

## 2024-07-09 RX ADMIN — ENOXAPARIN SODIUM 30 MG: 100 INJECTION SUBCUTANEOUS at 20:49

## 2024-07-09 RX ADMIN — Medication 400 MG: at 09:11

## 2024-07-09 RX ADMIN — Medication 400 MG: at 20:49

## 2024-07-09 RX ADMIN — METFORMIN HYDROCHLORIDE 500 MG: 500 TABLET ORAL at 17:08

## 2024-07-09 RX ADMIN — ENOXAPARIN SODIUM 30 MG: 100 INJECTION SUBCUTANEOUS at 09:12

## 2024-07-09 RX ADMIN — ASPIRIN 81 MG: 81 TABLET, CHEWABLE ORAL at 09:11

## 2024-07-09 RX ADMIN — SODIUM CHLORIDE, PRESERVATIVE FREE 10 ML: 5 INJECTION INTRAVENOUS at 09:12

## 2024-07-09 RX ADMIN — SACUBITRIL AND VALSARTAN 1 TABLET: 24; 26 TABLET, FILM COATED ORAL at 09:11

## 2024-07-09 RX ADMIN — METFORMIN HYDROCHLORIDE 500 MG: 500 TABLET ORAL at 09:11

## 2024-07-09 RX ADMIN — BISACODYL 10 MG: 5 TABLET, COATED ORAL at 14:46

## 2024-07-09 RX ADMIN — SACUBITRIL AND VALSARTAN 1 TABLET: 24; 26 TABLET, FILM COATED ORAL at 20:49

## 2024-07-09 RX ADMIN — SODIUM CHLORIDE, PRESERVATIVE FREE 10 ML: 5 INJECTION INTRAVENOUS at 20:51

## 2024-07-09 RX ADMIN — METOPROLOL SUCCINATE 50 MG: 50 TABLET, EXTENDED RELEASE ORAL at 09:11

## 2024-07-09 RX ADMIN — DOCUSATE SODIUM 100 MG: 100 CAPSULE, LIQUID FILLED ORAL at 20:49

## 2024-07-09 NOTE — PROGRESS NOTES
Physical Therapy  Facility/Department: [facility] MED SURG  Daily Treatment Note  NAME: Moise Diallo  : 1940  MRN: [id number]    Date of Service: 2024    Discharge Recommendations:  Long Term Care with PT, Long Term Care without PT, Continue to assess pending progress        Patient Diagnosis(es): The primary encounter diagnosis was Severe sepsis (HCC). Diagnoses of Pressure injury, stage 2, with infection (HCC) and Elevated troponin were also pertinent to this visit.    Assessment   Assessment: Reclined and seated BLE therex PROM-AAROM x15 in all available planes of motion. L knee pain with movement. pt able to initiate majority of the motions and then PTA would assist with achieving full available ROM. pt fatigues quickly. Extensive education on optimal breathing technique with fair carryover noted. sp02 remained 96% and above on room air. Will continue to progress as tolerated.  Activity Tolerance: Patient limited by fatigue     Plan    Physical Therapy Plan  General Plan: 2 times a day 7 days a week (1x per day on weekends and holidays.)  Current Treatment Recommendations: Strengthening;ROM;Balance training;Functional mobility training;ADL/Self-care training;IADL training;Endurance training;Neuromuscular re-education;Manual;Home exercise program;Safety education & training;Patient/Caregiver education & training;Positioning;Therapeutic activities     Restrictions  Restrictions/Precautions  Restrictions/Precautions: Fall Risk, General Precautions, Contact Precautions     Subjective    Subjective  Subjective: pt in chair upon arrival, pleasant and agreeable to therapy  Pain: denied  Orientation  Overall Orientation Status: Within Functional Limits        PT Exercises  Exercise Treatment: Reclined and seated BLE therex PROM-AAROM x15 in all available planes of motion. L knee pain with movement.     Safety Devices  Type of Devices: All fall risk precautions in place;Call light within reach;Chair alarm in

## 2024-07-09 NOTE — CONSULTS
medications.  Anxiety:  none  Dyspnea:  acute dyspnea and chronic dyspnea  Fatigue:  exercise intolerance and has difficulty with ROM of arms  Other: n/a    Palliative Performance Scale:     [] 100% Full ambulation; normal activity and work; no evidence of disease; able to do own self care; normal intake; fully conscious  [] 90% Full ambulation; normal activity and work; some evidence of disease; able to do own self care; normal intake; fully conscious  [] 80% Full ambulation; normal activity with effort; some evidence of disease; able to do own self care; normal or reduced intake; fully conscious  [] 70% Ambulation reduced; unable to perform normal job/work; significant disease; able to do own self care; normal or reduced intake; fully conscious  [x] 60%  Ambulation reduced; cannot do hobbies/housework; significant disease; occasional assist; intake normal or reduced; fully conscious/  [] 50%  Mainly sit/lie; can't do any work; extensive disease; considerable assist; intake normal or reduced; fully conscious/some confusion  [] 40%  Mainly in bed; extensive disease; mainly assist; intake normal or reduced; fully conscious/ some confusion   [] 30%  Bed bound; extensive disease; total care; intake reduced; fully conscious/some confusion  [] 20%  Bed bound; extensive disease; total care; intake minimal; drowsy/coma  [] 10%  Bed bound; extensive disease; total care; mouth care only; drowsy/coma  [] 0       Death     Readmission Risk Score: 22%     Plan      Palliative Interaction: I arrive to his room and introduce myself and explain my role in his care.  Pt is currently sitting up in a chair and is alert and oriented for our conversation.  He appears uncomfortable and is having difficulty with ROM of his arms which he tells me is not new for him.  I confirm his DNR-CCA status with him and he shares that he lost his first wife to a car accident when they were in their 30's and she was connected to \"everything\" and he  would never want that for him.  He confirms his DNR-CCA status with me and tells me that he does not want to be intubated.  He tells me that he is living at [facility] now and plans to stay there.  He tells me that he has trouble doing everything and can no longer be independent.  He has difficulty feeding himself related to the decreased ROM in his arms.  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  We talk about quality of life and he tells me that he doesn't feel like he has any.  I ask him if he has talked with his children and wife about this and he says they don't want to talk about it.  I talk about hospice and how when our quality of life is not what we want it can be very hard for the people that love us to listen.  I tell him that I can speak to his family about it if he would like and he would like that.  He denies any further questions at this time.    Education/support to patient  Continue with current plan of care  Code status clarified: DNRCCA    Principle Problem/Diagnosis:  Shortness of breath    Goals of care evaluation   The patient goals of care are provide comfort care/support/palliation/relieve suffering and preserve independence/autonomy/control   Goals of care discussed with:    [x] Patient independently    [] Patient and Family    [] Family or Healthcare DPOA independently    [] Unable to discuss with patient, family/DPOA not present    Code Status  DNR-CCA     Palliative Care will continue to follow Mr. Diallo's care as needed.      Thank you for allowing Palliative Care to participate in the care of Mr. Diallo .     Electronically signed by   Clara Valladares RN  Palliative Care Team  on 7/9/2024 at 10:45 AM    Palliative care office: [phone]

## 2024-07-09 NOTE — PROGRESS NOTES
Comprehensive Nutrition Assessment    Type and Reason for Visit:  Initial, Consult, Patient Education    Nutrition Recommendations/Plan:   Low sodium education   Encourage more strict low sodium at ECF  Jason bid  Glucerna at lunch     Malnutrition Assessment:  Malnutrition Status:  At risk for malnutrition (Comment) (07/09/24 7427)    Context:  Acute Illness     Findings of the 6 clinical characteristics of malnutrition:  Energy Intake:  No significant decrease in energy intake  Weight Loss:  No significant weight loss     Body Fat Loss:  No significant body fat loss     Muscle Mass Loss:  No significant muscle mass loss    Fluid Accumulation:  Moderate to Severe Extremities, Generalized   Strength:  Not Performed    Nutrition Assessment:    Increased nutrient needs r/t acute injury/trauma aeb healing needs from wounds at coccyx and left heel. Recurrent admission for CHF with weight at ECF appearing stable on lower end of usual values at 224.2# 7/8. Remained on a low sodium and f/r 2000 ml at ECF to control fluid retention, but he reports getting holloway and cheese on egg sandwiches so I do question how strict their \"low sodium\" diet really is. Provided written literature about low sodium that he and ECF can utilize.  Has been on a mvi to aid wound healing and may require additional vit D supplementation for known lower Vit D values. Jason and Glucerna will be resumed to aid with wound healing process and increasing protein intakes (on Prostat at ECF).    Nutrition Related Findings:    +1 BUE pitting, non pitting BLE and generalized edema. Wound Type: Stage II (coccyx and heel)       Current Nutrition Intake & Therapies:    Average Meal Intake: Unable to assess (no PO records)  Average Supplements Intake: None Ordered  ADULT DIET; Regular; Low Sodium (2 gm)  ADULT ORAL NUTRITION SUPPLEMENT; Breakfast, Dinner; Wound Healing Oral Supplement  ADULT ORAL NUTRITION SUPPLEMENT; Lunch; Diabetic Oral

## 2024-07-09 NOTE — PROGRESS NOTES
Vitals and assessment done at this time. See flow sheet for more details. Pt resting in bed at this time. Pt denied any dizziness/light headedness and pain. Pts lungs are clear and diminished. PT stated he still gets short of breath at times. Pt swollen in extremities. Pt stated he always has numbness and tingling in his hands and feet. PT denied any further needs at this time. Call light within reach, will continue to monitor.

## 2024-07-09 NOTE — PROGRESS NOTES
Progress Note    SUBJECTIVE:    Patient seen for f/u of Shortness of breath.  He resting in bed no distress.  No oxygen.   Continues to have upper arm edema and stiffening with decrease ROM    ROS:   Constitutional: negative  for fevers, and negative for chills.  Respiratory: negative for shortness of breath, negative for cough, and negative for wheezing  Cardiovascular: negative for chest pain, and negative for palpitations  Gastrointestinal: negative for abdominal pain, negative for nausea,negative for vomiting, negative for diarrhea, and negative for constipation     All other systems were reviewed with the patient and are negative unless otherwise stated in HPI      OBJECTIVE:      Vitals:   Vitals:    07/09/24 0654   BP: 116/71   Pulse: 82   Resp: 20   Temp: 97.1 °F (36.2 °C)   SpO2: 96%     Weight - Scale: 106.1 kg (233 lb 14.5 oz)   Height: 177.8 cm (5' 10\")     Weight  Wt Readings from Last 3 Encounters:   07/09/24 106.1 kg (233 lb 14.5 oz)   07/06/24 104.8 kg (231 lb)   07/03/24 100.2 kg (221 lb)     Body mass index is 33.56 kg/m².    24HR INTAKE/OUTPUT:      Intake/Output Summary (Last 24 hours) at 7/9/2024 0917  Last data filed at 7/9/2024 0826  Gross per 24 hour   Intake 940 ml   Output 825 ml   Net 115 ml     -----------------------------------------------------------------  Exam:    GEN:    Awake, alert and oriented x3.   EYES:   EOMI, pupils equal   NECK: Supple. No lymphadenopathy.  No carotid bruit  CVS:     regular but tachycardic, no audible murmur  PULM:  CTA, no wheezes, rales or rhonchi, no acute respiratory distress  ABD:     Bowels sounds normal.  Abdomen is soft.  No distention.  no tenderness to palpation.   EXT:      Non-pitting  edema in the LLE .  No calf tenderness. ACE wrap to LLE  less bilateral hands edematous but no improvement in bilateral forearm edema  NEURO: Moves all extremities.  Motor and sensory are grossly intact  SKIN:    No rashes.  No skin lesions.     -----------------------------------------------------------------    Diagnostic Data:      Complete Blood Count:   Recent Labs     07/08/24 1117 07/09/24  0726   WBC 10.3 9.7   RBC 3.89* 3.58*   HGB 12.4* 11.4*   HCT 37.2* 34.1*   MCV 95.6 95.3   MCH 31.9 31.8   MCHC 33.3 33.4   RDW 14.2 14.0    245   MPV 9.7 9.7        Last 3 Blood Glucose:   Recent Labs     07/08/24 1117 07/09/24  0726   GLUCOSE 228* 130*        Comprehensive Metabolic Profile:   Recent Labs     07/08/24 1117 07/09/24  0726    137   K 4.2 4.2    104   CO2 24 24   BUN 36* 32*   CREATININE 1.6* 1.4*   GLUCOSE 228* 130*   CALCIUM 8.9 8.7   BILITOT 0.4  --    ALKPHOS 105  --    AST 14  --    ALT 14  --         Urinalysis:   Lab Results   Component Value Date/Time    NITRU NEGATIVE 07/08/2024 11:35 AM    COLORU Yellow 07/08/2024 11:35 AM    PHUR 6.0 07/08/2024 11:35 AM    PHUR 6.0 04/13/2024 04:28 PM    WBCUA 0 TO 2 07/08/2024 11:35 AM    RBCUA None 07/08/2024 11:35 AM    MUCUS TRACE 07/08/2024 11:35 AM    TRICHOMONAS NOT REPORTED 06/08/2021 10:30 AM    YEAST NOT REPORTED 06/08/2021 10:30 AM    BACTERIA TRACE 07/08/2024 11:35 AM    LEUKOCYTESUR NEGATIVE 07/08/2024 11:35 AM    UROBILINOGEN Normal 07/08/2024 11:35 AM    BILIRUBINUR NEGATIVE 07/08/2024 11:35 AM    GLUCOSEU NEGATIVE 07/08/2024 11:35 AM    KETUA NEGATIVE 07/08/2024 11:35 AM    AMORPHOUS NOT REPORTED 06/08/2021 10:30 AM       HgBA1c:    Lab Results   Component Value Date/Time    LABA1C 7.5 07/05/2024 05:30 AM       Lactic Acid:   Lab Results   Component Value Date/Time    LACTA 2.3 07/04/2024 02:02 PM    LACTA 3.5 07/04/2024 11:17 AM    LACTA 1.7 04/13/2024 12:53 PM        Troponin:    Latest Reference Range & Units 07/08/24 11:17 07/08/24 12:58 07/08/24 17:05 07/08/24 19:05 07/09/24 07:26   Troponin, High Sensitivity 0 - 22 ng/L 111 (HH) 104 (HH) 112 (HH) 108 (HH) 73 ()   (): Data is critically high        Radiology/Imaging:  XR CHEST PORTABLE   Final Result   No

## 2024-07-09 NOTE — PROGRESS NOTES
Dr. Siddiqi called about reason for consult, ordered STAT bilateral XRAY of shoulder/elbow. Stated that he will be in to see patient tonight. Orders put in at this time.

## 2024-07-09 NOTE — PROGRESS NOTES
Pt laying in the bed awake when writer entered the room. Pt is A&O x4. Vitals and assessment as charted. Pt denies pain. Pt denies any further needs at this time. Call light within reach. Bed alarm on.

## 2024-07-09 NOTE — ACP (ADVANCE CARE PLANNING)
Advance Care Planning     Palliative Team Advance Care Planning (ACP) Conversation    Date of Conversation: 07/09/24    Individuals present for the conversation: Patient with decision making capacity     ACP documents on file prior to discussion:  -Portable DNR        Healthcare Decision Maker:    Primary Decision Maker: Roseann Diallo - Spouse - [phone]     Conversation Summary:  Pt wishes to remain a DNR-CCA with no intubation    Resuscitation Status:   Code Status: DNR-CCA     Documentation Completed:  -No new documents completed.    I spent 25 minutes with the patient and/or surrogate decision maker discussing the patient's wishes and goals.      Clara Valladares RN

## 2024-07-09 NOTE — PROGRESS NOTES
Occupational Therapy  Facility/Department: [facility] MED SURG  Daily Treatment Note  NAME: Moise Diallo  : 1940  MRN: [id number]    Date of Service: 2024    Discharge Recommendations:  Continue to assess pending progress         Patient Diagnosis(es): The primary encounter diagnosis was Severe sepsis (HCC). Diagnoses of Pressure injury, stage 2, with infection (HCC) and Elevated troponin were also pertinent to this visit.     Assessment    Activity Tolerance: Patient limited by fatigue  Discharge Recommendations: Continue to assess pending progress      Plan   Occupational Therapy Plan  Times Per Day: Once a day  Days Per Week: 7 Days  Current Treatment Recommendations: Strengthening;Balance training;Patient/Caregiver education & training;Functional mobility training;Safety education & training;Equipment evaluation, education, & procurement;Self-Care / ADL;ROM     Restrictions  Restrictions/Precautions  Restrictions/Precautions: Fall Risk;General Precautions;Contact Precautions    Subjective   Subjective  Subjective: Pt lying in bed upon arrival. Pt agreed to participate in therapy session.  Pain: Pt had no complaints of pain.             Objective    Vitals       ADL  Additional Comments: Max A x 2 to complete bed mob of rolling in bed. Dep transfer with robby lift.  OT Exercises  Exercise Treatment: Pt completed BUE AAROM x 4 planes x 15 reps x 1 set to increase UE strength and endurance in order to ease completion of ADL tasks. Pt required RBs as needed secondary to fatigue.     Safety Devices  Type of Devices: All fall risk precautions in place;Call light within reach;Chair alarm in place;Left in chair;Patient at risk for falls     Patient Education  Education Given To: Patient  Education Provided: Plan of Care;Role of Therapy  Education Method: Verbal  Barriers to Learning: None  Education Outcome: Verbalized understanding;Demonstrated understanding    Goals  Short Term Goals  Time Frame for Short  Term Goals: 21 visits  Short Term Goal 1: Patient to engage daily in BUE strengthening and ROM to improve range and strength for functional use.  Short Term Goal 2: Patient to complete simple UB self care with mod A to improve ADL independence and engagement.  Short Term Goal 3: Patient to tolerate sitting EOB x 10 minutes with fair balance to improve core strength and independence during bedside ADL.    AM-PAC - ADL       Therapy Time   Individual Concurrent Group Co-treatment   Time In 0835         Time Out 0859         Minutes 24                 ALVARADO Juan/TULIO

## 2024-07-09 NOTE — PLAN OF CARE
Problem: Safety - Adult  Goal: Free from fall injury  7/8/2024 2121 by Matilda Villegas RN  Outcome: Progressing  Note: Bed in low position. Wheels locked. Bed alarm on. 2/4 side rails are up. Non-skid socks on. Fall band on. Call light within reach.     Problem: Respiratory - Adult  Goal: Achieves optimal ventilation and oxygenation  Outcome: Progressing  Note: Lungs are clear and diminished, will continue to monitor.      Problem: Cardiovascular - Adult  Goal: Maintains optimal cardiac output and hemodynamic stability  Outcome: Progressing  Note: Vitals are stable, will continue to monitor.

## 2024-07-09 NOTE — PROGRESS NOTES
Physical Therapy  Facility/Department: [facility] MED SURG  Daily Treatment Note  NAME: Moise Diallo  : 1940  MRN: [id number]    Date of Service: 2024    Discharge Recommendations:  Long Term Care with PT, Long Term Care without PT, Continue to assess pending progress        Patient Diagnosis(es): The primary encounter diagnosis was Severe sepsis (HCC). Diagnoses of Pressure injury, stage 2, with infection (HCC) and Elevated troponin were also pertinent to this visit.    Assessment   Assessment: Transfers:Chao lift to assist RN. Bed mobility:MaxAx2. Supine B LE therex 15x in all available planes of motion with PROM-AAROM. pt with painful L knee with crepitus noted. Will continue to progress as tolerated.  Activity Tolerance: Patient tolerated treatment well     Plan    Physical Therapy Plan  General Plan: 2 times a day 7 days a week  Current Treatment Recommendations: Strengthening;ROM;Balance training;Functional mobility training;ADL/Self-care training;IADL training;Endurance training;Neuromuscular re-education;Manual;Home exercise program;Safety education & training;Patient/Caregiver education & training;Positioning;Therapeutic activities     Restrictions  Restrictions/Precautions  Restrictions/Precautions: Fall Risk, General Precautions, Contact Precautions     Subjective    Subjective  Subjective: Pt. in bed upon arrival, agreeable of transfer to chair at this time  Pain: pt reports his buttock is sore from laying down, no other compliants  Orientation  Overall Orientation Status: Within Functional Limits     Objective   Bed Mobility Training  Bed Mobility Training: Yes  Overall Level of Assistance: Assist X2;Maximum assistance  Interventions: Tactile cues;Verbal cues;Safety awareness training  Rolling: Maximum assistance;Assist X2  Transfer Training  Transfer Training: Yes  Overall Level of Assistance: Adaptive equipment;Other (comment) (Chao lift)     PT Exercises  Exercise Treatment: Supine BLE

## 2024-07-10 ENCOUNTER — APPOINTMENT (OUTPATIENT)
Dept: VASCULAR LAB | Age: 84
DRG: 884 | End: 2024-07-10
Payer: MEDICARE

## 2024-07-10 VITALS
HEART RATE: 82 BPM | TEMPERATURE: 97 F | WEIGHT: 231.48 LBS | SYSTOLIC BLOOD PRESSURE: 131 MMHG | RESPIRATION RATE: 18 BRPM | DIASTOLIC BLOOD PRESSURE: 68 MMHG | HEIGHT: 70 IN | OXYGEN SATURATION: 96 % | BODY MASS INDEX: 33.14 KG/M2

## 2024-07-10 LAB
ANION GAP SERPL CALCULATED.3IONS-SCNC: 10 MMOL/L (ref 9–17)
BASOPHILS # BLD: 0.06 K/UL (ref 0–0.2)
BASOPHILS NFR BLD: 1 % (ref 0–2)
BUN SERPL-MCNC: 34 MG/DL (ref 8–23)
BUN/CREAT SERPL: 26 (ref 9–20)
CALCIUM SERPL-MCNC: 8.8 MG/DL (ref 8.6–10.4)
CHLORIDE SERPL-SCNC: 101 MMOL/L (ref 98–107)
CO2 SERPL-SCNC: 24 MMOL/L (ref 20–31)
CREAT SERPL-MCNC: 1.3 MG/DL (ref 0.7–1.2)
ECHO BSA: 2.29 M2
ECHO BSA: 2.29 M2
EOSINOPHIL # BLD: 0.2 K/UL (ref 0–0.44)
EOSINOPHILS RELATIVE PERCENT: 2 % (ref 1–4)
ERYTHROCYTE [DISTWIDTH] IN BLOOD BY AUTOMATED COUNT: 13.9 % (ref 11.8–14.4)
GFR, ESTIMATED: 55 ML/MIN/1.73M2
GLUCOSE BLD-MCNC: 183 MG/DL (ref 74–100)
GLUCOSE SERPL-MCNC: 160 MG/DL (ref 70–99)
HCT VFR BLD AUTO: 33.1 % (ref 40.7–50.3)
HGB BLD-MCNC: 11 G/DL (ref 13–17)
IMM GRANULOCYTES # BLD AUTO: 0.15 K/UL (ref 0–0.3)
IMM GRANULOCYTES NFR BLD: 2 %
LYMPHOCYTES NFR BLD: 1.76 K/UL (ref 1.1–3.7)
LYMPHOCYTES RELATIVE PERCENT: 18 % (ref 24–43)
MAGNESIUM SERPL-MCNC: 1.9 MG/DL (ref 1.6–2.6)
MCH RBC QN AUTO: 31.5 PG (ref 25.2–33.5)
MCHC RBC AUTO-ENTMCNC: 33.2 G/DL (ref 28.4–34.8)
MCV RBC AUTO: 94.8 FL (ref 82.6–102.9)
MONOCYTES NFR BLD: 0.95 K/UL (ref 0.1–1.2)
MONOCYTES NFR BLD: 10 % (ref 3–12)
NEUTROPHILS NFR BLD: 67 % (ref 36–65)
NEUTS SEG NFR BLD: 6.55 K/UL (ref 1.5–8.1)
NRBC BLD-RTO: 0 PER 100 WBC
PLATELET # BLD AUTO: 277 K/UL (ref 138–453)
PMV BLD AUTO: 9.7 FL (ref 8.1–13.5)
POTASSIUM SERPL-SCNC: 4.4 MMOL/L (ref 3.7–5.3)
RBC # BLD AUTO: 3.49 M/UL (ref 4.21–5.77)
SODIUM SERPL-SCNC: 135 MMOL/L (ref 135–144)
WBC OTHER # BLD: 9.7 K/UL (ref 3.5–11.3)

## 2024-07-10 PROCEDURE — 6370000000 HC RX 637 (ALT 250 FOR IP): Performed by: INTERNAL MEDICINE

## 2024-07-10 PROCEDURE — 80048 BASIC METABOLIC PNL TOTAL CA: CPT

## 2024-07-10 PROCEDURE — 83735 ASSAY OF MAGNESIUM: CPT

## 2024-07-10 PROCEDURE — 97110 THERAPEUTIC EXERCISES: CPT

## 2024-07-10 PROCEDURE — 85025 COMPLETE CBC W/AUTO DIFF WBC: CPT

## 2024-07-10 PROCEDURE — 6370000000 HC RX 637 (ALT 250 FOR IP): Performed by: FAMILY MEDICINE

## 2024-07-10 PROCEDURE — 82947 ASSAY GLUCOSE BLOOD QUANT: CPT

## 2024-07-10 PROCEDURE — 93970 EXTREMITY STUDY: CPT | Performed by: INTERNAL MEDICINE

## 2024-07-10 PROCEDURE — 93970 EXTREMITY STUDY: CPT

## 2024-07-10 PROCEDURE — 6360000002 HC RX W HCPCS: Performed by: INTERNAL MEDICINE

## 2024-07-10 PROCEDURE — 2580000003 HC RX 258

## 2024-07-10 PROCEDURE — 36415 COLL VENOUS BLD VENIPUNCTURE: CPT

## 2024-07-10 PROCEDURE — 6370000000 HC RX 637 (ALT 250 FOR IP): Performed by: NURSE PRACTITIONER

## 2024-07-10 PROCEDURE — 2580000003 HC RX 258: Performed by: INTERNAL MEDICINE

## 2024-07-10 PROCEDURE — 94761 N-INVAS EAR/PLS OXIMETRY MLT: CPT

## 2024-07-10 PROCEDURE — 97530 THERAPEUTIC ACTIVITIES: CPT

## 2024-07-10 RX ORDER — FAMOTIDINE 20 MG/1
20 TABLET, FILM COATED ORAL DAILY
Qty: 60 TABLET | Refills: 3 | DISCHARGE
Start: 2024-07-11

## 2024-07-10 RX ORDER — GLUCAGON 1 MG/ML
1 KIT INJECTION PRN
Status: DISCONTINUED | OUTPATIENT
Start: 2024-07-10 | End: 2024-07-10 | Stop reason: HOSPADM

## 2024-07-10 RX ORDER — PREDNISONE 10 MG/1
TABLET ORAL
Qty: 41 TABLET | Refills: 0 | DISCHARGE
Start: 2024-07-10 | End: 2024-07-24

## 2024-07-10 RX ORDER — INSULIN LISPRO 100 [IU]/ML
0-8 INJECTION, SOLUTION INTRAVENOUS; SUBCUTANEOUS
Status: DISCONTINUED | OUTPATIENT
Start: 2024-07-10 | End: 2024-07-10 | Stop reason: HOSPADM

## 2024-07-10 RX ORDER — ENEMA 19; 7 G/133ML; G/133ML
1 ENEMA RECTAL ONCE
Status: COMPLETED | OUTPATIENT
Start: 2024-07-10 | End: 2024-07-10

## 2024-07-10 RX ORDER — WATER 10 ML/10ML
INJECTION INTRAMUSCULAR; INTRAVENOUS; SUBCUTANEOUS
Status: COMPLETED
Start: 2024-07-10 | End: 2024-07-10

## 2024-07-10 RX ORDER — BUMETANIDE 1 MG/1
2 TABLET ORAL DAILY
Status: DISCONTINUED | OUTPATIENT
Start: 2024-07-11 | End: 2024-07-10 | Stop reason: HOSPADM

## 2024-07-10 RX ORDER — BUMETANIDE 1 MG/1
1 TABLET ORAL ONCE
Status: COMPLETED | OUTPATIENT
Start: 2024-07-10 | End: 2024-07-10

## 2024-07-10 RX ORDER — DEXTROSE MONOHYDRATE 100 MG/ML
INJECTION, SOLUTION INTRAVENOUS CONTINUOUS PRN
Status: DISCONTINUED | OUTPATIENT
Start: 2024-07-10 | End: 2024-07-10 | Stop reason: HOSPADM

## 2024-07-10 RX ORDER — INSULIN LISPRO 100 [IU]/ML
0-4 INJECTION, SOLUTION INTRAVENOUS; SUBCUTANEOUS NIGHTLY
Status: DISCONTINUED | OUTPATIENT
Start: 2024-07-10 | End: 2024-07-10 | Stop reason: HOSPADM

## 2024-07-10 RX ORDER — METHYLPREDNISOLONE SODIUM SUCCINATE 125 MG/2ML
60 INJECTION, POWDER, LYOPHILIZED, FOR SOLUTION INTRAMUSCULAR; INTRAVENOUS EVERY 12 HOURS
Status: DISCONTINUED | OUTPATIENT
Start: 2024-07-10 | End: 2024-07-10 | Stop reason: HOSPADM

## 2024-07-10 RX ORDER — BUMETANIDE 2 MG/1
2 TABLET ORAL DAILY
Qty: 30 TABLET | Refills: 3 | DISCHARGE
Start: 2024-07-11

## 2024-07-10 RX ORDER — INSULIN LISPRO 100 [IU]/ML
INJECTION, SOLUTION INTRAVENOUS; SUBCUTANEOUS
DISCHARGE
Start: 2024-07-10

## 2024-07-10 RX ADMIN — BUMETANIDE 1 MG: 1 TABLET ORAL at 08:46

## 2024-07-10 RX ADMIN — TAMSULOSIN HYDROCHLORIDE 0.4 MG: 0.4 CAPSULE ORAL at 08:46

## 2024-07-10 RX ADMIN — SODIUM CHLORIDE, PRESERVATIVE FREE 10 ML: 5 INJECTION INTRAVENOUS at 08:46

## 2024-07-10 RX ADMIN — METFORMIN HYDROCHLORIDE 500 MG: 500 TABLET ORAL at 08:46

## 2024-07-10 RX ADMIN — PANTOPRAZOLE SODIUM 40 MG: 40 TABLET, DELAYED RELEASE ORAL at 08:46

## 2024-07-10 RX ADMIN — SODIUM PHOSPHATE, DIBASIC AND SODIUM PHOSPHATE, MONOBASIC 1 ENEMA: 7; 19 ENEMA RECTAL at 12:51

## 2024-07-10 RX ADMIN — Medication 400 MG: at 08:46

## 2024-07-10 RX ADMIN — WATER 10 ML: 1 INJECTION INTRAMUSCULAR; INTRAVENOUS; SUBCUTANEOUS at 08:46

## 2024-07-10 RX ADMIN — ASPIRIN 81 MG: 81 TABLET, CHEWABLE ORAL at 08:46

## 2024-07-10 RX ADMIN — FAMOTIDINE 20 MG: 20 TABLET, FILM COATED ORAL at 08:46

## 2024-07-10 RX ADMIN — SACUBITRIL AND VALSARTAN 1 TABLET: 24; 26 TABLET, FILM COATED ORAL at 08:46

## 2024-07-10 RX ADMIN — BUMETANIDE 1 MG: 1 TABLET ORAL at 12:51

## 2024-07-10 RX ADMIN — METHYLPREDNISOLONE SODIUM SUCCINATE 60 MG: 125 INJECTION INTRAMUSCULAR; INTRAVENOUS at 08:46

## 2024-07-10 RX ADMIN — DOCUSATE SODIUM 100 MG: 100 CAPSULE, LIQUID FILLED ORAL at 08:46

## 2024-07-10 RX ADMIN — ENOXAPARIN SODIUM 30 MG: 100 INJECTION SUBCUTANEOUS at 08:46

## 2024-07-10 RX ADMIN — METOPROLOL SUCCINATE 50 MG: 50 TABLET, EXTENDED RELEASE ORAL at 08:46

## 2024-07-10 NOTE — PLAN OF CARE
Problem: Safety - Adult  Goal: Free from fall injury  7/10/2024 0941 by Yair Aly RN  Outcome: Progressing  Flowsheets (Taken 7/10/2024 0940)  Free From Fall Injury: Instruct family/caregiver on patient safety  7/10/2024 0116 by Gaviota Turner RN  Outcome: Progressing  Flowsheets (Taken 7/10/2024 0116)  Free From Fall Injury: Instruct family/caregiver on patient safety     Problem: Discharge Planning  Goal: Discharge to home or other facility with appropriate resources  7/10/2024 0941 by Yair Aly RN  Outcome: Progressing  Flowsheets (Taken 7/10/2024 0703)  Discharge to home or other facility with appropriate resources: Identify barriers to discharge with patient and caregiver  7/10/2024 0116 by Gaviota Turner RN  Outcome: Progressing  Flowsheets (Taken 7/10/2024 0116)  Discharge to home or other facility with appropriate resources: Identify barriers to discharge with patient and caregiver     Problem: Skin/Tissue Integrity  Goal: Absence of new skin breakdown  Description: 1.  Monitor for areas of redness and/or skin breakdown  2.  Assess vascular access sites hourly  3.  Every 4-6 hours minimum:  Change oxygen saturation probe site  4.  Every 4-6 hours:  If on nasal continuous positive airway pressure, respiratory therapy assess nares and determine need for appliance change or resting period.  7/10/2024 0941 by Yair Aly RN  Outcome: Progressing  7/10/2024 0116 by Gaviota Turner RN  Outcome: Progressing     Problem: Respiratory - Adult  Goal: Achieves optimal ventilation and oxygenation  7/10/2024 0941 by Yair Aly RN  Outcome: Progressing  Flowsheets (Taken 7/10/2024 0703)  Achieves optimal ventilation and oxygenation: Assess for changes in respiratory status  7/10/2024 0116 by Gaviota Turner RN  Outcome: Progressing  Flowsheets (Taken 7/10/2024 0116)  Achieves optimal ventilation and oxygenation: Assess for changes in respiratory status     Problem:

## 2024-07-10 NOTE — PROGRESS NOTES
Occupational Therapy  Facility/Department: [facility] MED SURG  Daily Treatment Note  NAME: Moise Diallo  : 1940  MRN: [id number]    Date of Service: 7/10/2024    Discharge Recommendations:  Continue to assess pending progress         Patient Diagnosis(es): The primary encounter diagnosis was Severe sepsis (HCC). Diagnoses of Pressure injury, stage 2, with infection (HCC), Elevated troponin, and Edema, unspecified type were also pertinent to this visit.     Assessment    Activity Tolerance: Patient limited by fatigue  Discharge Recommendations: Continue to assess pending progress      Plan   Occupational Therapy Plan  Times Per Day: Once a day  Days Per Week: 7 Days  Current Treatment Recommendations: Strengthening;Balance training;Patient/Caregiver education & training;Functional mobility training;Safety education & training;Equipment evaluation, education, & procurement;Self-Care / ADL;ROM     Restrictions   General Fall Risk. Robby lift for transfers.    Subjective   Subjective  Subjective: Pt lying in bedside chair upon arrival. Pt agreed to participate in therapy session. PTA in room, nursing coming into room stating that patient needed to be back to bedfor a dopller to be completed. Session short due to transitioning to bed for testing to be completed.  Pain: Pt had no complaints of pain.  Orientation  Overall Orientation Status: Within Functional Limits  Pain: denied  Cognition  Overall Cognitive Status: WFL        Objective    Vitals     Bed Mobility Training  Bed Mobility Training: Yes  Overall Level of Assistance: Assist X2;Maximum assistance  Interventions: Tactile cues;Verbal cues;Safety awareness training  Rolling: Maximum assistance;Assist X2  Transfer Training  Transfer Training: No  Overall Level of Assistance: Adaptive equipment;Other (comment) (robby lift for transfer from bedside chair to bed.)  Gait  Gait Training: No              Safety Devices  Type of Devices: All fall risk precautions  in place;Call light within reach;Patient at risk for falls;Nurse notified;Left in bed (robby lift for transfer.)     Patient Education  Education Given To: Patient  Education Provided: Plan of Care;Role of Therapy  Education Provided Comments: bed mobility  Education Method: Verbal  Barriers to Learning: None  Education Outcome: Verbalized understanding;Demonstrated understanding    Goals  Short Term Goals  Time Frame for Short Term Goals: 21 visits  Short Term Goal 1: Patient to engage daily in BUE strengthening and ROM to improve range and strength for functional use.  Short Term Goal 2: Patient to complete simple UB self care with mod A to improve ADL independence and engagement.  Short Term Goal 3: Patient to tolerate sitting EOB x 10 minutes with fair balance to improve core strength and independence during bedside ADL.    AM-PAC - ADL       Therapy Time   Individual Concurrent Group Co-treatment   Time In 0918         Time Out 0930         Minutes GINO Sifuentes

## 2024-07-10 NOTE — DISCHARGE SUMMARY
tablet  famotidine 20 MG tablet  insulin lispro (1 Unit Dial) 100 UNIT/ML Sopn  predniSONE 10 MG tablet         Patient Instructions:   Activity: activity as tolerated  Diet: cardiac diet  Wound Care: none needed  Other: None    Disposition:   DC to [facility]    Follow up:  Patient will be followed by Terrell Chapin MD in 1-2 weeks    CORE MEASURES on Discharge (if applicable)  ACE/ARB in CHF: Yes  Statin in MI: NA  ASA in MI: NA  Statin in CVA: NA  Antiplatelet in CVA: NA    Total time spent on discharge services: 40 minutes    Including the following activities:  Evaluation and Management of patient  Discussion with patient and/or surrogate about current care plan  Coordination with Case Management and/or   Coordination of care with Consultants (if applicable)   Coordination of care with Receiving Facility Physician (if applicable)  Completion of DME forms (if applicable)  Preparation of Discharge Summary  Preparation of Medication Reconciliation  Preparation of Discharge Prescriptions    Signed:  CHILANGO Durand - CNP, APRN, NP-C  7/10/2024, 1:09 PM      [facility] Heart failure documentation  [] The patient has a history of heart transplant or Left Ventricular Assist Device (LVAD).  [If yes, STOP HERE] - [facility] PERFORMANCE EXCLUSION    [x] The patient has a current or prior documentation of left ventricular ejection fraction (LVEF) less than or equal to 40%, or moderate or severely depressed left ventricular systolic function [if \"no\", STOP HERE]    ACE / ARB:  [x] The patient was prescribed or is already taking and ACE or ARB  [] Reason why ACE or ARB was not prescirbed / taking:  na    BETA-BLOCKERS:  [x] The patient was prescribed or is already taking a Beta-blocker  [] Reason why Beta-blocker not prescribed / taking:  na

## 2024-07-10 NOTE — PROGRESS NOTES
Patient is discharge back to the [facility].  They will provide the transportation.  Discharge paper work fax to the SNF.  Wife aware of the discharge.  MELANIE Mane

## 2024-07-10 NOTE — PROGRESS NOTES
Progress Note    SUBJECTIVE:    Patient seen for f/u of Shortness of breath.  He resting in bed no distress.  No oxygen.   Continues to have upper arm edema and stiffening with decrease ROM. Still has SOB but improved     ROS:   Constitutional: negative  for fevers, and negative for chills.  Respiratory: positive for shortness of breath, negative for cough, and negative for wheezing  Cardiovascular: negative for chest pain, and negative for palpitations  Gastrointestinal: negative for abdominal pain, negative for nausea,negative for vomiting, negative for diarrhea, and negative for constipation     All other systems were reviewed with the patient and are negative unless otherwise stated in HPI      OBJECTIVE:      Vitals:   Vitals:    07/10/24 0703   BP: 122/61   Pulse: 79   Resp:    Temp: 97.2 °F (36.2 °C)   SpO2: 100%     Weight - Scale: 105 kg (231 lb 7.7 oz)   Height: 177.8 cm (5' 10\")     Weight  Wt Readings from Last 3 Encounters:   07/10/24 105 kg (231 lb 7.7 oz)   07/06/24 104.8 kg (231 lb)   07/03/24 100.2 kg (221 lb)     Body mass index is 33.21 kg/m².    24HR INTAKE/OUTPUT:      Intake/Output Summary (Last 24 hours) at 7/10/2024 0934  Last data filed at 7/10/2024 0822  Gross per 24 hour   Intake 960 ml   Output 1025 ml   Net -65 ml     -----------------------------------------------------------------  Exam:    GEN:    Awake, alert and oriented x3.   EYES:   EOMI, pupils equal   NECK: Supple. No lymphadenopathy.  No carotid bruit  CVS:     regular but tachycardic, no audible murmur  PULM:  CTA, no wheezes, rales or rhonchi, no acute respiratory distress  ABD:     Bowels sounds normal.  Abdomen is soft.  No distention.  no tenderness to palpation.   EXT:      Non-pitting  edema in the LLE .  No calf tenderness. ACE wrap to LLE  less bilateral hands edematous but no improvement in bilateral forearm edema  NEURO: Moves all extremities but significant limited ROM in bilateral shoulders.  Flexion of elbows  about 45 degrees.  Motor and sensory are grossly intact  SKIN:    No rashes.  No skin lesions.    -----------------------------------------------------------------    Diagnostic Data:      Complete Blood Count:   Recent Labs     07/08/24  1117 07/09/24  0726 07/10/24  0600   WBC 10.3 9.7 9.7   RBC 3.89* 3.58* 3.49*   HGB 12.4* 11.4* 11.0*   HCT 37.2* 34.1* 33.1*   MCV 95.6 95.3 94.8   MCH 31.9 31.8 31.5   MCHC 33.3 33.4 33.2   RDW 14.2 14.0 13.9    245 277   MPV 9.7 9.7 9.7        Last 3 Blood Glucose:   Recent Labs     07/08/24  1117 07/09/24  0726 07/10/24  0600   GLUCOSE 228* 130* 160*        Comprehensive Metabolic Profile:   Recent Labs     07/08/24  1117 07/09/24  0726 07/10/24  0600    137 135   K 4.2 4.2 4.4    104 101   CO2 24 24 24   BUN 36* 32* 34*   CREATININE 1.6* 1.4* 1.3*   GLUCOSE 228* 130* 160*   CALCIUM 8.9 8.7 8.8   BILITOT 0.4  --   --    ALKPHOS 105  --   --    AST 14  --   --    ALT 14  --   --         Urinalysis:   Lab Results   Component Value Date/Time    NITRU NEGATIVE 07/08/2024 11:35 AM    COLORU Yellow 07/08/2024 11:35 AM    PHUR 6.0 07/08/2024 11:35 AM    WBCUA 0 TO 2 07/08/2024 11:35 AM    RBCUA None 07/08/2024 11:35 AM    MUCUS TRACE 07/08/2024 11:35 AM    TRICHOMONAS NOT REPORTED 06/08/2021 10:30 AM    YEAST NOT REPORTED 06/08/2021 10:30 AM    BACTERIA TRACE 07/08/2024 11:35 AM    LEUKOCYTESUR NEGATIVE 07/08/2024 11:35 AM    UROBILINOGEN Normal 07/08/2024 11:35 AM    BILIRUBINUR NEGATIVE 07/08/2024 11:35 AM    GLUCOSEU NEGATIVE 07/08/2024 11:35 AM    KETUA NEGATIVE 07/08/2024 11:35 AM    AMORPHOUS NOT REPORTED 06/08/2021 10:30 AM       HgBA1c:    Lab Results   Component Value Date/Time    LABA1C 7.5 07/05/2024 05:30 AM       Lactic Acid:   Lab Results   Component Value Date/Time    LACTA 2.3 07/04/2024 02:02 PM    LACTA 3.5 07/04/2024 11:17 AM    LACTA 1.7 04/13/2024 12:53 PM        Troponin:    Latest Reference Range & Units 07/08/24 11:17 07/08/24 12:58 07/08/24

## 2024-07-10 NOTE — PROGRESS NOTES
Physical Therapy  Facility/Department: [facility] MED SURG  Daily Treatment Note  NAME: Moise Diallo  : 1940  MRN: [id number]    Date of Service: 7/10/2024    Discharge Recommendations:  Long Term Care with PT, Long Term Care without PT, Continue to assess pending progress     Patient Diagnosis(es): The primary encounter diagnosis was Severe sepsis (HCC). Diagnoses of Pressure injury, stage 2, with infection (HCC), Elevated troponin, and Edema, unspecified type were also pertinent to this visit.    Assessment   Assessment: Transfers:Chao lift to assist RN. Bed mobility:MaxAx2. Reclined seated BLE therex PROM-AAROM x15 in all available planes of motion. Pt. able to activate muscles for performance of therex with PTA assistance to achieve full ROM. VC's throughout treatment of pursed lip breathing.  Activity Tolerance: Patient limited by fatigue     Plan    Physical Therapy Plan  General Plan: 2 times a day 7 days a week  Specific Instructions for Next Treatment: 1x/ daily on weekends and holidays  Current Treatment Recommendations: Strengthening;ROM;Balance training;Functional mobility training;ADL/Self-care training;IADL training;Endurance training;Neuromuscular re-education;Manual;Home exercise program;Safety education & training;Patient/Caregiver education & training;Positioning;Therapeutic activities     Restrictions  Restrictions/Precautions  Restrictions/Precautions: Fall Risk, General Precautions, Contact Precautions     Subjective    Subjective  Subjective: pt in chair upon arrival, pleasant and agreeable to therapy  Pain: denied  Orientation  Overall Orientation Status: Within Functional Limits  Cognition  Overall Cognitive Status: WFL     Objective   Bed Mobility Training  Bed Mobility Training: Yes  Overall Level of Assistance: Assist X2;Maximum assistance  Interventions: Tactile cues;Verbal cues;Safety awareness training  Rolling: Maximum assistance;Assist X2  Transfer Training  Transfer Training:  Yes  Overall Level of Assistance: Adaptive equipment;Other (comment) (robby lift)  Gait  Gait Training: No  PT Exercises  Exercise Treatment: Reclined and seated BLE therex PROM-AAROM x15 in all available planes of motion.  Safety Devices  Type of Devices: All fall risk precautions in place;Call light within reach;Patient at risk for falls;Nurse notified;Left in bed       Goals  Short Term Goals  Time Frame for Short Term Goals: 10 days  Short Term Goal 1: Patient will tolerate 20-30 minutes of therex/act to improve LE strength and ROM.  Short Term Goal 2: Patient will be able to tolerate supine to sit and sit to supine.  Patient Goals   Patient Goals : Improve mobility    Education  Patient Education  Education Given To: Patient  Education Provided: Home Exercise Program;Role of Therapy  Education Provided Comments: Education on importance and benefits of completing therapy  Education Method: Verbal;Demonstration  Barriers to Learning: None  Education Outcome: Verbalized understanding;Demonstrated understanding    Therapy Time   Individual Concurrent Group Co-treatment   Time In 0858         Time Out 0926         Minutes 28           Izzy Wellington, JACQUELYN

## 2024-07-10 NOTE — PROGRESS NOTES
Patient moved back to bed via robby lift and brief changed. Vitals and assessment completed at this time, see flowsheet for details. Patient denies any pain at this time. Lung sounds clear and diminished throughout. 2+ pitting edema noted in RUE, LUE, RLE, and non-pitting edema in LLE. Patient has numbness and tingling in extremities but states that it is normal. Patient denies any further needs at this time. Call light in reach and bed alarm on.

## 2024-07-10 NOTE — PROGRESS NOTES
Fleets enema given at this time. Pt on the bed pan. Pts schulz pulled. No further needs at this time. Call light within reach, will continue to monitor.

## 2024-07-10 NOTE — PROGRESS NOTES
Physical Therapy  Facility/Department: [facility] MED SURG  Daily Treatment Note  NAME: Moise Diallo  : 1940  MRN: [id number]    Date of Service: 7/10/2024    Discharge Recommendations:  Long Term Care with PT, Long Term Care without PT, Continue to assess pending progress     Patient Diagnosis(es): The primary encounter diagnosis was Severe sepsis (HCC). Diagnoses of Pressure injury, stage 2, with infection (HCC), Elevated troponin, Edema, unspecified type, and Arthralgia of multiple joints were also pertinent to this visit.    Assessment   Assessment: Supine BLE therex PROM-AAROM x15 in all available planes of motion. Once PTA initiated movement, pt. was able to complete full ROM with some assistance from PTA as well. Pt. fatigues quickly with RB throuhgout treatment.  Activity Tolerance: Patient limited by fatigue     Plan    Physical Therapy Plan  General Plan: 2 times a day 7 days a week  Specific Instructions for Next Treatment: 1x/ daily on weekends and holidays  Current Treatment Recommendations: Strengthening;ROM;Balance training;Functional mobility training;ADL/Self-care training;IADL training;Endurance training;Neuromuscular re-education;Manual;Home exercise program;Safety education & training;Patient/Caregiver education & training;Positioning;Therapeutic activities     Restrictions  Restrictions/Precautions  Restrictions/Precautions: Fall Risk, General Precautions, Contact Precautions     Subjective    Subjective  Subjective: Pt. in bed upon arrival, agreeable to therapy at this time  Pain: denies  Orientation  Overall Orientation Status: Within Functional Limits  Cognition  Overall Cognitive Status: WFL     Objective   Bed Mobility Training  Bed Mobility Training: No  Overall Level of Assistance: Assist X2;Maximum assistance  Interventions: Tactile cues;Verbal cues;Safety awareness training  Rolling: Maximum assistance;Assist X2  Transfer Training  Transfer Training: No  Overall Level of

## 2024-07-10 NOTE — PROGRESS NOTES
Vitals and assessment done at this time. See flow sheet for more details. Pt resting in bed. Pt denied any shortness of breath, dizziness/light headedness and pain. Pts lungs clear and diminished. Pt has not yet had a BM. Wound dressings changed at this time and both cleansed with saline. Pt agreed to getting up to chair. Pts SYLVIA hose placed. Pt denied any further needs at this time. Call light within reach, will continue to monitor.

## 2024-07-10 NOTE — DISCHARGE INSTR - COC
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    Abnormal stress test R94.39    Laceration of right elbow S51.011A    Hypotension I95.9    Urinary retention R33.9    Angina, class III ([facility]) I20.9    Gastritis and duodenitis K29.90    ASHD (arteriosclerotic heart disease) I25.10    Acute on chronic combined systolic and diastolic CHF (congestive heart failure) ([facility]) I50.43    Pressure injury of buttock, stage 2 ([facility]) L89.302    Pressure injury of left heel, unstageable ([facility]) L89.620    Anasarca R60.1    Type 2 myocardial infarction due to heart failure ([facility]) I50.9, I21.A1    Sepsis due to urinary tract infection ([facility]) A41.9, N39.0    Urinary tract infection associated with indwelling urethral catheter ([facility]) T83.511A, N39.0    Shortness of breath R06.02    Chronic combined systolic and diastolic CHF, NYHA class 3 ([facility]) I50.42       Isolation/Infection:   Isolation            Contact          Patient Infection Status       Infection Onset Added Last Indicated Last Indicated By Review Planned Expiration Resolved Resolved By    MRSA 04/01/24 04/04/24 07/04/24 Culture, Wound        Foot 7/2024                       Nurse Assessment:  Last Vital Signs: /61   Pulse 79   Temp 97.2 °F (36.2 °C) (Temporal)   Resp 20   Ht 1.778 m (5' 10\")   Wt 105 kg (231 lb 7.7 oz)   SpO2 100%   BMI 33.21 kg/m²     Last documented pain score (0-10 scale):    Last Weight:   Wt Readings from Last 1 Encounters:   07/10/24 105 kg (231 lb 7.7 oz)     Mental Status:  oriented, alert, coherent, and logical    IV Access:  - None    Nursing Mobility/ADLs:  Walking   Dependent  Transfer  Dependent  Bathing  Dependent  Dressing       Update Admission H&P: No change in H&P    PHYSICIAN SIGNATURE:  Electronically signed by CHILANGO Durand CNP on 7/10/24 at 11:42 AM EDT

## 2024-07-10 NOTE — PROGRESS NOTES
Report given to the [facility] at this time. All questions and concerns addressed. No further needs needed. [facility] transportation completed transport for pt

## 2024-07-10 NOTE — PLAN OF CARE
Problem: Safety - Adult  Goal: Free from fall injury  Outcome: Progressing  Flowsheets (Taken 7/10/2024 0116)  Free From Fall Injury: Instruct family/caregiver on patient safety     Problem: Discharge Planning  Goal: Discharge to home or other facility with appropriate resources  Outcome: Progressing  Flowsheets (Taken 7/10/2024 0116)  Discharge to home or other facility with appropriate resources: Identify barriers to discharge with patient and caregiver     Problem: Skin/Tissue Integrity  Goal: Absence of new skin breakdown  Description: 1.  Monitor for areas of redness and/or skin breakdown  2.  Assess vascular access sites hourly  3.  Every 4-6 hours minimum:  Change oxygen saturation probe site  4.  Every 4-6 hours:  If on nasal continuous positive airway pressure, respiratory therapy assess nares and determine need for appliance change or resting period.  Outcome: Progressing     Problem: Respiratory - Adult  Goal: Achieves optimal ventilation and oxygenation  Outcome: Progressing  Flowsheets (Taken 7/10/2024 0116)  Achieves optimal ventilation and oxygenation: Assess for changes in respiratory status     Problem: Cardiovascular - Adult  Goal: Maintains optimal cardiac output and hemodynamic stability  Outcome: Progressing  Flowsheets (Taken 7/10/2024 0116)  Maintains optimal cardiac output and hemodynamic stability: Monitor blood pressure and heart rate     Problem: Chronic Conditions and Co-morbidities  Goal: Patient's chronic conditions and co-morbidity symptoms are monitored and maintained or improved  Outcome: Progressing  Flowsheets (Taken 7/10/2024 0116)  Care Plan - Patient's Chronic Conditions and Co-Morbidity Symptoms are Monitored and Maintained or Improved: Monitor and assess patient's chronic conditions and comorbid symptoms for stability, deterioration, or improvement

## 2024-07-10 NOTE — CONSULTS
identified    Patient with several week history of progressive swelling and stiffness in his extremities.  He notices it most in his upper extremities.  Patient lives in nursing home and is wheelchair dependent.  At this point in time patient has significant upper and lower extremity edema and would benefit from SYLVIA hose as well as stocking gloves on his bilateral upper extremities as well as Occupational Therapy for range of motion and swelling reduction of fingers  Also, due to patient's elevated inflammatory markers rheumatology consult should be obtained due to the fact the patient has more proximal weakness than distal weakness which can be consistent with polymyositis and cannot be ruled out.  Also, would recommend ultrasound bilateral upper extremities and lower extremities to rule out DVT in this patient who is wheelchair dependent and at risk for DVT though swelling is most likely secondary to his congestive heart failure and renal disease..  Also, patient could be worked up with bilateral upper extremity EMG and nerve conduction studies/ imaging of spine as outpatient if  his symptoms do not improve with swelling reduction to evaluate for neurological conditions that can cause upper extremity weakness such as [place]-Moseley syndrome    Please contact me anytime if orthopedic surgical problem identified.  Recommendations are for guidance and ultimate decision is up to the care of primary service    Thank you for this consultation in this most pleasant gentleman who is looking forward to getting better.  Patient is advised that at this time no orthopedic surgical problem is identified but I provided him my cell phone and I can be reached if surgical problem arises.

## 2024-07-11 LAB
ANA SER QL IA: NEGATIVE
DSDNA IGG SER QL IA: 8.6 IU/ML
NUCLEAR IGG SER IA-RTO: 0.2 U/ML

## 2024-07-11 NOTE — PROGRESS NOTES
Physician Progress Note      PATIENT:               LINDA SANTOS  CSN #:                  [id number]  :                       1940  ADMIT DATE:       2024 10:50 AM  DISCH DATE:        7/10/2024 3:33 PM  RESPONDING  PROVIDER #:        Terrell Ahuja MD          QUERY TEXT:    Pt admitted with SOB.   Cardiology note states, \"Chronic combined heart   failure; (Asymptomatic only at rest). I think that this exacerbation may be   secondary to urinary obstruction as the patient reports significant   retention...Leg swelling today upon physical exam however this was with a   Lymphedema component.\"   Ortho note states, \"swelling is most likely   secondary to his congestive heart failure and renal disease.\"  Pt noted to   have pro-BNP 1147 and CXR showed no acute process.  If possible, please   document in progress notes and discharge summary the cause of the SOB    The medical record reflects the following:  Risk Factors: chronic combined CHF, CKD 3b, HTN, ICMP, lymphedema, age 84  Clinical Indicators:   Cardiology note states, \"Chronic combined heart   failure; (Asymptomatic only at rest). I think that this exacerbation may be   secondary to urinary obstruction as the patient reports significant   retention...Leg swelling today upon physical exam however this was with a   Lymphedema component.\"   Ortho note states, \"swelling is most likely   secondary to his congestive heart failure and renal disease.\"  Pt noted to   have pro-BNP 1147 and CXR showed no acute process  Treatment: po Bumex, I&Os, daily weights, SYLVIA hose, schulz catheter, low sodium   diet, tele, labs, imaging    Thank you!    Avelina Loving, RN, BSN, RHIT, CCDS  Clinical   Options provided:  -- SOB due to acute on chronic combined CHF  -- SOB due to, Please specify  -- Other - I will add my own diagnosis  -- Disagree - Not applicable / Not valid  -- Disagree - Clinically unable to determine /  Unknown  -- Refer to Clinical Documentation Reviewer    PROVIDER RESPONSE TEXT:    This patient has SOB due to deconditioning    Query created by: Avelina Loving on 7/10/2024 2:22 PM      Electronically signed by:  Terrell Ahuja MD 7/11/2024 8:33   AM

## 2024-07-12 NOTE — PROGRESS NOTES
Physician Progress Note      PATIENT:               LINDA SANTOS  CSN #:                  [id number]  :                       1940  ADMIT DATE:       2024 10:50 AM  DISCH DATE:        7/10/2024 3:33 PM  RESPONDING  PROVIDER #:        Terrell Ahuja MD          QUERY TEXT:    Pt admitted with SOB due to \"deconditioning.\"  Discharge summary states,   \"Stated he is not able to walk far...He has a chronic decubitus ulcer and   chronic ulcer to left heel...He complained of not being able to get back on   his feet. He is unable to feed himself as he is not able to get his arms to   his mouth. He has limited ROM.\"  If possible, please document in the progress   notes and discharge summary if you are evaluating and / or treating any of the   following:    The medical record reflects the following:  Risk Factors: age 84  Clinical Indicators: admitted with SOB due to \"deconditioning.\"  Discharge   summary states, \"Stated he is not able to walk far...He has a chronic   decubitus ulcer and chronic ulcer to left heel...He complained of not being   able to get back on his feet. He is unable to feed himself as he is not able   to get his arms to his mouth. He has limited ROM.\"  Treatment: discharge to SNF, PT/OT, I&Os, daily weights, dietitian consult,   Jason bid, lift equipment, pad/offload medical devices, maintain heels off of   bed at all times, skin assessment, turn q2h, IVF, labs    Thank you!    Avelina Loving, RN, BSN, RHIT, CCDS  Clinical   Options provided:  -- Deconditioning due to Age Related Physical Debility  -- Deconditioning due to Frailty  -- Other - I will add my own diagnosis  -- Disagree - Not applicable / Not valid  -- Disagree - Clinically unable to determine / Unknown  -- Refer to Clinical Documentation Reviewer    PROVIDER RESPONSE TEXT:    This patient has deconditioning due to age related physical debility.    Query created by: Avelina Loving on

## 2024-08-07 ENCOUNTER — TELEPHONE (OUTPATIENT)
Dept: CARDIOLOGY | Age: 84
End: 2024-08-07

## 2024-08-07 DIAGNOSIS — I50.42 CHRONIC COMBINED SYSTOLIC AND DIASTOLIC CONGESTIVE HEART FAILURE (HCC): Primary | ICD-10-CM

## 2024-08-07 NOTE — TELEPHONE ENCOUNTER
weight on 8/5/24 was 205     Weight on 8/6/24 was 215 in the am and 213.2 in PM    Weight today is 214.4    Pt has upper extremity edema and left lower edema. Denies any symptoms.    BP today is

## 2024-08-13 ENCOUNTER — TELEPHONE (OUTPATIENT)
Dept: UROLOGY | Age: 84
End: 2024-08-13

## 2024-08-13 NOTE — TELEPHONE ENCOUNTER
We will address this tomorrow at his appointment, cloudy malodorous urine is not an indication to order urine culture.  If patient had worsening urinary symptoms including worsening incontinence nocturia dysuria hematuria urgency frequency-those would be indications to check a urine.

## 2024-08-13 NOTE — TELEPHONE ENCOUNTER
Called the [facility] and spoke with another nurse.  She was advised of provider response and that we will address this tomorrow at patient's office visit.

## 2024-08-13 NOTE — TELEPHONE ENCOUNTER
Received a call from Katlyn nurse from the [place] [facility].  She advised that Moise was on Keflex and now switched to Macrobid due to urinalysis and culture results.  Writer questioned why urine was being checked.  Nurse advised he was having cloudy and foul smelling urine with sediment. (Urinalysis and culture was done through facility lab)   The patient is on the schedule to be seen here tomorrow.

## 2024-08-14 ENCOUNTER — OFFICE VISIT (OUTPATIENT)
Dept: UROLOGY | Age: 84
End: 2024-08-14

## 2024-08-14 VITALS — SYSTOLIC BLOOD PRESSURE: 110 MMHG | TEMPERATURE: 98.2 F | DIASTOLIC BLOOD PRESSURE: 70 MMHG

## 2024-08-14 DIAGNOSIS — G20.A1 PARKINSON'S DISEASE, UNSPECIFIED WHETHER DYSKINESIA PRESENT, UNSPECIFIED WHETHER MANIFESTATIONS FLUCTUATE (HCC): ICD-10-CM

## 2024-08-14 DIAGNOSIS — N40.1 BPH WITH OBSTRUCTION/LOWER URINARY TRACT SYMPTOMS: Primary | ICD-10-CM

## 2024-08-14 DIAGNOSIS — N13.8 BPH WITH OBSTRUCTION/LOWER URINARY TRACT SYMPTOMS: Primary | ICD-10-CM

## 2024-08-14 DIAGNOSIS — R33.9 URINARY RETENTION: ICD-10-CM

## 2024-08-14 ASSESSMENT — ENCOUNTER SYMPTOMS
SHORTNESS OF BREATH: 0
VOMITING: 0
BACK PAIN: 0
ABDOMINAL PAIN: 0
CONSTIPATION: 0
WHEEZING: 0
NAUSEA: 0
COLOR CHANGE: 0
EYE REDNESS: 0
COUGH: 0

## 2024-08-14 NOTE — PROGRESS NOTES
HPI:      Patient is a 84 y.o. male in no acute distress.  He is alert and oriented to person, place, and time.       History  1/2015 Greenlight PVP     PVR remained elevated post-op, but was improving: Feb 839 mL, March 600 mL, April 505 mL, July 452 mL, Oct 252 mL.     7/2015 Flomax stopped     10/2016 PVR back up, >850 mL. Does not feel full or uncomfortable. He did self cath previously. He does report intermittent urine stream about half the time, mild straining. Offered option of resuming Flomax or resuming self cath qhs. Prefered to start self cath.     1/2017 Gross hematuria and some difficulty with cathing. Cysto showed small caliber BNC.     1/2017 Greenlight PVP and TUIBNC     11/2017 Right HLL     1/2018 Left ESWL    4/2024 Hospital consult. Admitted for generalized weakness and difficulty ambulating. He had a Schulz catheter placed. At her last visit we did have him performing intermittent catheterization at bedtime. Patient was having difficulty performing this due to generalized weakness.     5/2024 schulz removed, resumed cathing QHS    Today  Patient is here today for hospital follow-up.  Patient has had multiple hospitalizations since the beginning of the year.  Patient with a hospitalization from 6/21/2024 through 6/24/2024 for heart failure.  Patient did have an additional hospitalization from 7/4/2024 through 7/6/2024 for urinary tract infection and decubitus ulcers.  We were not notified of this admission.  Patient had additional hospitalization from 7/8/2024 through 7/10/2024 for shortness of breath and CHF.  Patient was last seen 6/6/2024.  At that time Schulz catheter was out and we resumed intermittent catheterizations.  Patient currently has Schulz catheter in place.  Patient does not typically have a Schulz catheter in place.  Facility was performing nightly cath send patient was able to spontaneously urinate throughout the day.  Last Saturday facility checked his urine because it was cloudy

## 2024-08-15 ENCOUNTER — HOSPITAL ENCOUNTER (OUTPATIENT)
Dept: GENERAL RADIOLOGY | Age: 84
Discharge: HOME | End: 2024-08-17
Payer: MEDICARE

## 2024-08-15 ENCOUNTER — HOSPITAL ENCOUNTER (OUTPATIENT)
Dept: WOUND CARE | Age: 84
Discharge: HOME | End: 2024-08-15
Payer: MEDICARE

## 2024-08-15 ENCOUNTER — HOSPITAL ENCOUNTER (OUTPATIENT)
Age: 84
Discharge: HOME | End: 2024-08-17
Payer: MEDICARE

## 2024-08-15 DIAGNOSIS — L89.620 DECUBITUS ULCER OF HEEL, LEFT, UNSTAGEABLE (HCC): ICD-10-CM

## 2024-08-15 DIAGNOSIS — L89.620 DECUBITUS ULCER OF HEEL, LEFT, UNSTAGEABLE (HCC): Primary | ICD-10-CM

## 2024-08-15 PROCEDURE — 73650 X-RAY EXAM OF HEEL: CPT

## 2024-08-15 PROCEDURE — 99203 OFFICE O/P NEW LOW 30 MIN: CPT | Performed by: PODIATRIST

## 2024-08-15 PROCEDURE — 99204 OFFICE O/P NEW MOD 45 MIN: CPT

## 2024-08-15 NOTE — DISCHARGE INSTRUCTIONS
Wound Management Patient Discharge Instructions    CALL [phone] for questions regarding care of your wounds.  USUAL OFFICE HOURS ARE TUESDAYS MORNINGS AND THURSDAYS(9-2:30) and subject to change without notice     PLEASE ARRIVE PRIOR TO APPOINTMENT TIME TO COMPLETE REGISTRATION PROCESS        Discharge instructions for the patient's plan of care.    Wound care:Left heel   Cleanse with mild soap and water pat dry at dressing change time.  Apply opsite to left heel, 4x4, heel cup, and kerlex change daily.  Dr. Ashton to call wife regarding appointment at [facility].    Next appointment:  Future Appointments   Date Time Provider Department Center   8/27/2024  3:55 AM SCHEDULE, [facility]FF[facility] CAR [facility] TIFF CARD TPP   8/30/2024 10:40 AM Alan Rios MD [place] CARD TPP   9/10/2024  9:00 AM [facility] HEARTFAILURE CLINIC [facility] MED MGMT [place]   9/25/2024  1:45 PM [facility]   10/1/2024 11:45 AM Patrica Toscano APRN - CNP [place] UROLOGY TPP   10/3/2024  9:40 AM [facility] -   11/13/2024  2:00 PM [facility]   1/7/2025  9:30 AM Patrica Toscano APRN - C[facility]         SURVEY:    You may be receiving a survey from [facility] regarding your visit today.    Please complete the survey to enable us to provide the highest quality of care to you and your family.    If you cannot score us a very good on any question, please call the office to discuss how we could have made your experience a very good one.    Thank you. Harrison Rai,IVANA, Margy Mora,IVANA and Shari Stewart RN      REMINDER:  You will receive 2 bills for each visit.  One is a professional fee charge for the physician and the other is a facility fee for the hospital.

## 2024-08-15 NOTE — PROGRESS NOTES
Subjective      Moise Diallo is a 84 y.o. male who presents for initial evaluation & treatment.  He  has  Wound(s) which are/is located on the  L heel.            PAST MEDICAL HISTORY     has a past medical history of Acute renal failure superimposed on stage 3 chronic kidney disease (HCC), AICD (automatic cardioverter/defibrillator) present, Arthritis, CHF (congestive heart failure) ([facility]), Chronic combined systolic and diastolic CHF, NYHA class 3 ([facility]), Diabetes mellitus ([facility]), Hyperlipidemia, Hypertension, and Kidney stone.    MEDICATIONS    Current Outpatient Medications   Medication Sig Dispense Refill    bumetanide (BUMEX) 2 MG tablet Take 1 tablet by mouth daily 30 tablet 3    famotidine (PEPCID) 20 MG tablet Take 1 tablet by mouth daily 60 tablet 3    insulin lispro, 1 Unit Dial, (HUMALOG KWIKPEN) 100 UNIT/ML SOPN Sliding Scale AC and HS- 150-200-3U; 201-250-6U; 251-300-9U; 301-350 12U; 315-400-15U; Call if Greater than 400      pramipexole (MIRAPEX) 0.5 MG tablet Take 1 tablet by mouth nightly as needed (for restless leg) 90 tablet 3    metFORMIN (GLUCOPHAGE) 500 MG tablet Take 1 tablet by mouth 2 times daily (with meals)      atorvastatin (LIPITOR) 40 MG tablet Take 1 tablet by mouth nightly 30 tablet 3    docusate sodium (COLACE, DULCOLAX) 100 MG CAPS Take 100 mg by mouth 2 times daily      magnesium oxide (MAG-OX) 400 (240 Mg) MG tablet Take 1 tablet by mouth 2 times daily      pantoprazole (PROTONIX) 40 MG tablet Take 1 tablet by mouth every morning (before breakfast) 90 tablet 0    Multiple Vitamin (MULTIVITAMIN ADULT PO) Take 1 tablet by mouth daily      CONTOUR TEST strip USE 1 STRIP TO CHECK GLUCOSE ONCE DAILY      metoprolol succinate (TOPROL XL) 50 MG extended release tablet Take 1 tablet by mouth daily 90 tablet 3    tamsulosin (FLOMAX) 0.4 MG capsule TAKE ONE CAPSULE BY MOUTH EVERY MORNING 90 capsule 3    montelukast (SINGULAIR) 10 MG tablet Take 1 tablet by mouth nightly 30 tablet 3

## 2024-08-15 NOTE — PROGRESS NOTES
Diabetic Quality Measure Benchmarks:    FSBS:  RESULT 168        0600   Hemoglobin A1c Controlled and is < 8%= YES  Lab Results   Component Value Date    LABA1C 7.5 (H) 07/05/2024         LDL is <100         Yes  Blood Pressure is under < 140/90               Yes  Tobacco Non use                                         Yes  Aspirin Use/blood thinner                                                    Yes    Information sent to PCP                                Yes    Instructions given to Patient regarding follow up and or education      Yes

## 2024-08-15 NOTE — PLAN OF CARE
Problem: Chronic Conditions and Co-morbidities  Goal: Patient's chronic conditions and co-morbidity symptoms are monitored and maintained or improved  8/15/2024 1100 by Margy Mora RN  Outcome: Completed  8/15/2024 1058 by Margy Mora RN  Outcome: Not Progressing     Problem: Cognitive:  Goal: Knowledge of wound care  Description: Knowledge of wound care  8/15/2024 1100 by Margy Mora RN  Outcome: Completed  8/15/2024 1058 by Margy Mora RN  Outcome: Not Progressing  Goal: Understands risk factors for wounds  Description: Understands risk factors for wounds  8/15/2024 1100 by Margy Mora RN  Outcome: Completed  8/15/2024 1058 by Margy Mora RN  Outcome: Not Progressing     Problem: Wound:  Goal: Will show signs of wound healing; wound closure and no evidence of infection  Description: Will show signs of wound healing; wound closure and no evidence of infection  8/15/2024 1100 by Margy Mora RN  Outcome: Completed  8/15/2024 1058 by Margy Mora RN  Outcome: Not Progressing     Problem: Pressure Ulcer:  Goal: Signs of wound healing will improve  Description: Signs of wound healing will improve  8/15/2024 1100 by Margy Mora RN  Outcome: Completed  8/15/2024 1058 by Margy Mora RN  Outcome: Not Progressing  Goal: Absence of new pressure ulcer  Description: Absence of new pressure ulcer  Outcome: Completed  Goal: Will show no infection signs and symptoms  Description: Will show no infection signs and symptoms  Outcome: Completed     Problem: Falls - Risk of:  Goal: Will remain free from falls  Description: Will remain free from falls  8/15/2024 1100 by Margy Mora RN  Outcome: Completed  8/15/2024 1058 by Margy Mora RN  Outcome: Progressing     Problem: Blood Glucose:  Goal: Ability to maintain appropriate glucose levels will improve  Description: Ability to maintain appropriate glucose levels will improve  8/15/2024 1100 by Morgan 
Patient's chronic conditions and co-morbidity symptoms are monitored and maintained or improved  Outcome: Not Progressing     Problem: Cognitive:  Goal: Knowledge of wound care  Description: Knowledge of wound care  Outcome: Not Progressing  Goal: Understands risk factors for wounds  Description: Understands risk factors for wounds  Outcome: Not Progressing     Problem: Wound:  Goal: Will show signs of wound healing; wound closure and no evidence of infection  Description: Will show signs of wound healing; wound closure and no evidence of infection  Outcome: Not Progressing     Problem: Pressure Ulcer:  Goal: Signs of wound healing will improve  Description: Signs of wound healing will improve  Outcome: Not Progressing     Problem: Chronic Conditions and Co-morbidities  Goal: Patient's chronic conditions and co-morbidity symptoms are monitored and maintained or improved  Outcome: Not Progressing     Problem: Cognitive:  Goal: Knowledge of wound care  Description: Knowledge of wound care  Outcome: Not Progressing  Goal: Understands risk factors for wounds  Description: Understands risk factors for wounds  Outcome: Not Progressing     Problem: Wound:  Goal: Will show signs of wound healing; wound closure and no evidence of infection  Description: Will show signs of wound healing; wound closure and no evidence of infection  Outcome: Not Progressing     Problem: Pressure Ulcer:  Goal: Signs of wound healing will improve  Description: Signs of wound healing will improve  Outcome: Not Progressing

## 2024-08-27 ENCOUNTER — NURSE ONLY (OUTPATIENT)
Dept: CARDIOLOGY | Age: 84
End: 2024-08-27
Payer: MEDICARE

## 2024-08-27 DIAGNOSIS — Z95.810 ICD (IMPLANTABLE CARDIOVERTER-DEFIBRILLATOR) IN PLACE: ICD-10-CM

## 2024-08-27 DIAGNOSIS — I25.5 ISCHEMIC CARDIOMYOPATHY: Primary | ICD-10-CM

## 2024-08-27 PROCEDURE — 93295 DEV INTERROG REMOTE 1/2/MLT: CPT | Performed by: FAMILY MEDICINE

## 2024-08-30 ENCOUNTER — OFFICE VISIT (OUTPATIENT)
Dept: CARDIOLOGY | Age: 84
End: 2024-08-30
Payer: MEDICARE

## 2024-08-30 ENCOUNTER — HOSPITAL ENCOUNTER (INPATIENT)
Age: 84
LOS: 4 days | Discharge: NURSING, REHAB OR LONG-TERM CARE | DRG: 551 | End: 2024-09-03
Attending: PSYCHIATRY & NEUROLOGY | Admitting: PSYCHIATRY & NEUROLOGY
Payer: MEDICARE

## 2024-08-30 ENCOUNTER — HOSPITAL ENCOUNTER (EMERGENCY)
Age: 84
Discharge: ANOTHER HOSPITAL | End: 2024-08-30
Attending: EMERGENCY MEDICINE
Payer: MEDICARE

## 2024-08-30 ENCOUNTER — APPOINTMENT (OUTPATIENT)
Dept: CT IMAGING | Age: 84
End: 2024-08-30
Payer: MEDICARE

## 2024-08-30 VITALS
WEIGHT: 229 LBS | SYSTOLIC BLOOD PRESSURE: 112 MMHG | OXYGEN SATURATION: 95 % | DIASTOLIC BLOOD PRESSURE: 65 MMHG | HEIGHT: 70 IN | HEART RATE: 73 BPM | BODY MASS INDEX: 32.78 KG/M2 | RESPIRATION RATE: 16 BRPM

## 2024-08-30 VITALS
HEART RATE: 66 BPM | DIASTOLIC BLOOD PRESSURE: 53 MMHG | RESPIRATION RATE: 18 BRPM | OXYGEN SATURATION: 97 % | SYSTOLIC BLOOD PRESSURE: 103 MMHG

## 2024-08-30 DIAGNOSIS — E78.2 MIXED HYPERLIPIDEMIA: ICD-10-CM

## 2024-08-30 DIAGNOSIS — I50.9 CONGESTIVE HEART FAILURE, UNSPECIFIED HF CHRONICITY, UNSPECIFIED HEART FAILURE TYPE (HCC): Primary | ICD-10-CM

## 2024-08-30 DIAGNOSIS — G61.0 GUILLAIN-BARRE (HCC): ICD-10-CM

## 2024-08-30 DIAGNOSIS — D64.9 IDIOPATHIC ANEMIA: ICD-10-CM

## 2024-08-30 DIAGNOSIS — Z95.810 ICD (IMPLANTABLE CARDIOVERTER-DEFIBRILLATOR) IN PLACE: ICD-10-CM

## 2024-08-30 DIAGNOSIS — I47.29 NSVT (NONSUSTAINED VENTRICULAR TACHYCARDIA) (HCC): ICD-10-CM

## 2024-08-30 DIAGNOSIS — I42.8 NICM (NONISCHEMIC CARDIOMYOPATHY) (HCC): ICD-10-CM

## 2024-08-30 DIAGNOSIS — I50.42 CHRONIC COMBINED SYSTOLIC (CONGESTIVE) AND DIASTOLIC (CONGESTIVE) HEART FAILURE (HCC): Primary | ICD-10-CM

## 2024-08-30 DIAGNOSIS — R53.1 GENERALIZED WEAKNESS: Primary | ICD-10-CM

## 2024-08-30 DIAGNOSIS — I25.10 ASHD (ARTERIOSCLEROTIC HEART DISEASE): ICD-10-CM

## 2024-08-30 LAB
ALBUMIN SERPL-MCNC: 3.2 G/DL (ref 3.5–5.2)
ALBUMIN/GLOB SERPL: 0.9 {RATIO} (ref 1–2.5)
ALP SERPL-CCNC: 86 U/L (ref 40–129)
ALT SERPL-CCNC: 22 U/L (ref 10–50)
ANION GAP SERPL CALCULATED.3IONS-SCNC: 11 MMOL/L (ref 9–16)
AST SERPL-CCNC: 24 U/L (ref 10–50)
BASOPHILS # BLD: 0 K/UL (ref 0–0.2)
BASOPHILS NFR BLD: 0 % (ref 0–2)
BILIRUB SERPL-MCNC: <0.2 MG/DL (ref 0–1.2)
BUN SERPL-MCNC: 52 MG/DL (ref 8–23)
BUN/CREAT SERPL: 26 (ref 9–20)
CALCIUM SERPL-MCNC: 8.7 MG/DL (ref 8.6–10.4)
CHLORIDE SERPL-SCNC: 98 MMOL/L (ref 98–107)
CO2 SERPL-SCNC: 25 MMOL/L (ref 20–31)
CREAT SERPL-MCNC: 2 MG/DL (ref 0.7–1.2)
CRP SERPL HS-MCNC: 55.8 MG/L (ref 0–5)
EOSINOPHIL # BLD: 0.22 K/UL (ref 0–0.44)
EOSINOPHILS RELATIVE PERCENT: 2 % (ref 1–4)
ERYTHROCYTE [DISTWIDTH] IN BLOOD BY AUTOMATED COUNT: 14.9 % (ref 11.8–14.4)
ERYTHROCYTE [SEDIMENTATION RATE] IN BLOOD BY PHOTOMETRIC METHOD: 21 MM/HR (ref 0–20)
GFR, ESTIMATED: 32 ML/MIN/1.73M2
GLUCOSE SERPL-MCNC: 156 MG/DL (ref 74–99)
HCT VFR BLD AUTO: 30.5 % (ref 40.7–50.3)
HGB BLD-MCNC: 9.9 G/DL (ref 13–17)
IMM GRANULOCYTES # BLD AUTO: 0.11 K/UL (ref 0–0.3)
IMM GRANULOCYTES NFR BLD: 1 %
LYMPHOCYTES NFR BLD: 2.27 K/UL (ref 1.1–3.7)
LYMPHOCYTES RELATIVE PERCENT: 21 % (ref 24–43)
MCH RBC QN AUTO: 31.6 PG (ref 25.2–33.5)
MCHC RBC AUTO-ENTMCNC: 32.5 G/DL (ref 28.4–34.8)
MCV RBC AUTO: 97.4 FL (ref 82.6–102.9)
MONOCYTES NFR BLD: 0.86 K/UL (ref 0.1–1.2)
MONOCYTES NFR BLD: 8 % (ref 3–12)
MORPHOLOGY: ABNORMAL
MORPHOLOGY: ABNORMAL
NEUTROPHILS NFR BLD: 68 % (ref 36–65)
NEUTS SEG NFR BLD: 7.34 K/UL (ref 1.5–8.1)
NRBC BLD-RTO: 0 PER 100 WBC
PLATELET # BLD AUTO: 315 K/UL (ref 138–453)
PMV BLD AUTO: 9.5 FL (ref 8.1–13.5)
POTASSIUM SERPL-SCNC: 5.4 MMOL/L (ref 3.7–5.3)
PROT SERPL-MCNC: 6.8 G/DL (ref 6.6–8.7)
RBC # BLD AUTO: 3.13 M/UL (ref 4.21–5.77)
SODIUM SERPL-SCNC: 134 MMOL/L (ref 136–145)
WBC OTHER # BLD: 10.8 K/UL (ref 3.5–11.3)

## 2024-08-30 PROCEDURE — 85652 RBC SED RATE AUTOMATED: CPT

## 2024-08-30 PROCEDURE — 6360000002 HC RX W HCPCS

## 2024-08-30 PROCEDURE — 85025 COMPLETE CBC W/AUTO DIFF WBC: CPT

## 2024-08-30 PROCEDURE — 83036 HEMOGLOBIN GLYCOSYLATED A1C: CPT

## 2024-08-30 PROCEDURE — 80053 COMPREHEN METABOLIC PANEL: CPT

## 2024-08-30 PROCEDURE — 99285 EMERGENCY DEPT VISIT HI MDM: CPT

## 2024-08-30 PROCEDURE — 86140 C-REACTIVE PROTEIN: CPT

## 2024-08-30 PROCEDURE — 2580000003 HC RX 258

## 2024-08-30 PROCEDURE — 99222 1ST HOSP IP/OBS MODERATE 55: CPT | Performed by: PSYCHIATRY & NEUROLOGY

## 2024-08-30 PROCEDURE — 2060000000 HC ICU INTERMEDIATE R&B

## 2024-08-30 PROCEDURE — 99214 OFFICE O/P EST MOD 30 MIN: CPT | Performed by: FAMILY MEDICINE

## 2024-08-30 PROCEDURE — 6370000000 HC RX 637 (ALT 250 FOR IP)

## 2024-08-30 RX ORDER — TAMSULOSIN HYDROCHLORIDE 0.4 MG/1
0.4 CAPSULE ORAL EVERY MORNING
Status: DISCONTINUED | OUTPATIENT
Start: 2024-08-31 | End: 2024-09-03 | Stop reason: HOSPADM

## 2024-08-30 RX ORDER — SODIUM CHLORIDE 0.9 % (FLUSH) 0.9 %
5-40 SYRINGE (ML) INJECTION EVERY 12 HOURS SCHEDULED
Status: DISCONTINUED | OUTPATIENT
Start: 2024-08-30 | End: 2024-09-03 | Stop reason: HOSPADM

## 2024-08-30 RX ORDER — MONTELUKAST SODIUM 10 MG/1
10 TABLET ORAL NIGHTLY
Status: DISCONTINUED | OUTPATIENT
Start: 2024-08-30 | End: 2024-09-03 | Stop reason: HOSPADM

## 2024-08-30 RX ORDER — BUMETANIDE 1 MG/1
2 TABLET ORAL DAILY
Status: DISCONTINUED | OUTPATIENT
Start: 2024-08-30 | End: 2024-09-03 | Stop reason: HOSPADM

## 2024-08-30 RX ORDER — TIZANIDINE 2 MG/1
2 TABLET ORAL EVERY 8 HOURS PRN
Status: ON HOLD | COMMUNITY

## 2024-08-30 RX ORDER — ACETAMINOPHEN 650 MG/1
650 SUPPOSITORY RECTAL EVERY 6 HOURS PRN
Status: DISCONTINUED | OUTPATIENT
Start: 2024-08-30 | End: 2024-09-03 | Stop reason: HOSPADM

## 2024-08-30 RX ORDER — ENOXAPARIN SODIUM 100 MG/ML
30 INJECTION SUBCUTANEOUS 2 TIMES DAILY
Status: DISCONTINUED | OUTPATIENT
Start: 2024-08-30 | End: 2024-09-03 | Stop reason: HOSPADM

## 2024-08-30 RX ORDER — ACETAMINOPHEN 325 MG/1
650 TABLET ORAL EVERY 6 HOURS PRN
Status: DISCONTINUED | OUTPATIENT
Start: 2024-08-30 | End: 2024-09-03 | Stop reason: HOSPADM

## 2024-08-30 RX ORDER — PRAMIPEXOLE DIHYDROCHLORIDE 0.25 MG/1
0.5 TABLET ORAL NIGHTLY PRN
Status: DISCONTINUED | OUTPATIENT
Start: 2024-08-30 | End: 2024-09-03 | Stop reason: HOSPADM

## 2024-08-30 RX ORDER — SODIUM CHLORIDE 0.9 % (FLUSH) 0.9 %
5-40 SYRINGE (ML) INJECTION PRN
Status: DISCONTINUED | OUTPATIENT
Start: 2024-08-30 | End: 2024-09-03 | Stop reason: HOSPADM

## 2024-08-30 RX ORDER — METOPROLOL SUCCINATE 25 MG/1
50 TABLET, EXTENDED RELEASE ORAL DAILY
Status: DISCONTINUED | OUTPATIENT
Start: 2024-08-30 | End: 2024-09-03 | Stop reason: HOSPADM

## 2024-08-30 RX ORDER — ATORVASTATIN CALCIUM 40 MG/1
40 TABLET, FILM COATED ORAL NIGHTLY
Status: DISCONTINUED | OUTPATIENT
Start: 2024-08-30 | End: 2024-09-03 | Stop reason: HOSPADM

## 2024-08-30 RX ORDER — POTASSIUM CHLORIDE 7.45 MG/ML
10 INJECTION INTRAVENOUS PRN
Status: DISCONTINUED | OUTPATIENT
Start: 2024-08-30 | End: 2024-09-03 | Stop reason: HOSPADM

## 2024-08-30 RX ORDER — ONDANSETRON 2 MG/ML
4 INJECTION INTRAMUSCULAR; INTRAVENOUS EVERY 6 HOURS PRN
Status: DISCONTINUED | OUTPATIENT
Start: 2024-08-30 | End: 2024-09-03 | Stop reason: HOSPADM

## 2024-08-30 RX ORDER — ASPIRIN 81 MG/1
81 TABLET ORAL DAILY
Status: DISCONTINUED | OUTPATIENT
Start: 2024-08-30 | End: 2024-09-03 | Stop reason: HOSPADM

## 2024-08-30 RX ORDER — MAGNESIUM SULFATE IN WATER 40 MG/ML
2000 INJECTION, SOLUTION INTRAVENOUS PRN
Status: DISCONTINUED | OUTPATIENT
Start: 2024-08-30 | End: 2024-09-03 | Stop reason: HOSPADM

## 2024-08-30 RX ORDER — POTASSIUM CHLORIDE 1500 MG/1
40 TABLET, EXTENDED RELEASE ORAL PRN
Status: DISCONTINUED | OUTPATIENT
Start: 2024-08-30 | End: 2024-09-03 | Stop reason: HOSPADM

## 2024-08-30 RX ORDER — POLYETHYLENE GLYCOL 3350 17 G/17G
17 POWDER, FOR SOLUTION ORAL DAILY PRN
Status: DISCONTINUED | OUTPATIENT
Start: 2024-08-30 | End: 2024-09-01

## 2024-08-30 RX ORDER — FAMOTIDINE 20 MG/1
20 TABLET, FILM COATED ORAL DAILY
Status: DISCONTINUED | OUTPATIENT
Start: 2024-08-30 | End: 2024-09-03 | Stop reason: HOSPADM

## 2024-08-30 RX ORDER — TIZANIDINE 2 MG/1
2 TABLET ORAL EVERY 8 HOURS PRN
Status: DISCONTINUED | OUTPATIENT
Start: 2024-08-30 | End: 2024-09-03 | Stop reason: HOSPADM

## 2024-08-30 RX ORDER — SODIUM CHLORIDE 9 MG/ML
INJECTION, SOLUTION INTRAVENOUS PRN
Status: DISCONTINUED | OUTPATIENT
Start: 2024-08-30 | End: 2024-09-03 | Stop reason: HOSPADM

## 2024-08-30 RX ORDER — ONDANSETRON 4 MG/1
4 TABLET, ORALLY DISINTEGRATING ORAL EVERY 8 HOURS PRN
Status: DISCONTINUED | OUTPATIENT
Start: 2024-08-30 | End: 2024-09-03 | Stop reason: HOSPADM

## 2024-08-30 RX ADMIN — TIZANIDINE 2 MG: 2 TABLET ORAL at 22:22

## 2024-08-30 RX ADMIN — ATORVASTATIN CALCIUM 40 MG: 40 TABLET, FILM COATED ORAL at 21:34

## 2024-08-30 RX ADMIN — ENOXAPARIN SODIUM 30 MG: 100 INJECTION SUBCUTANEOUS at 21:34

## 2024-08-30 RX ADMIN — MONTELUKAST 10 MG: 10 TABLET, FILM COATED ORAL at 21:34

## 2024-08-30 RX ADMIN — SACUBITRIL AND VALSARTAN 1 TABLET: 24; 26 TABLET, FILM COATED ORAL at 21:34

## 2024-08-30 RX ADMIN — METFORMIN HYDROCHLORIDE 500 MG: 500 TABLET ORAL at 20:00

## 2024-08-30 RX ADMIN — SODIUM CHLORIDE, PRESERVATIVE FREE 10 ML: 5 INJECTION INTRAVENOUS at 21:34

## 2024-08-30 ASSESSMENT — ENCOUNTER SYMPTOMS
SHORTNESS OF BREATH: 0
ABDOMINAL PAIN: 0

## 2024-08-30 NOTE — PROGRESS NOTES
Skin: Skin is warm and dry. There is no rash or diaphoresis.   Psychiatric: He has a normal mood and affect. His speech is normal and behavior is normal.      MOST RECENT LABS ON RECORD:   Lab Results   Component Value Date    WBC 9.7 07/10/2024    HGB 11.0 (L) 07/10/2024    HCT 33.1 (L) 07/10/2024     07/10/2024    CHOL 92 07/09/2024    TRIG 105 07/09/2024    HDL 35 (L) 07/09/2024    ALT 14 07/08/2024    AST 14 07/08/2024     07/10/2024    K 4.4 07/10/2024     07/10/2024    CREATININE 1.3 (H) 07/10/2024    BUN 34 (H) 07/10/2024    CO2 24 07/10/2024    TSH 4.12 04/13/2024    INR 1.0 07/04/2024    LABA1C 7.5 (H) 07/05/2024     ASSESSMENT:     1. Chronic combined systolic (congestive) and diastolic (congestive) heart failure (HCC)    2. Idiopathic anemia    3. NSVT (nonsustained ventricular tachycardia) ([facility])    4. ASHD (arteriosclerotic heart disease)    5. ICD (implantable cardioverter-defibrillator) in place    6. NICM (nonischemic cardiomyopathy) ([facility])    7. 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 Anemia of Chronic Disease 12.8 on 8/6/2024 and 8.80 on 8/27/2024  Although he has some signs of fluid overload-I do not believe heart failure is the main source of his signs and symptoms and believe that further investigation for possible guillain barre Syndrome needs to be ruled out sooner rather than later to get to the root of the problem.   I have a call out to Dr Chapin to discuss further recommendations and he was in agreement with the plan as was he ER Dr. Kirill Bowles.     Chronic combined heart failure:/ Ischemic Cardiomyopathy: Although he has some signs of fluid overload-I do not believe this is the main source of his symptoms and believe that further investigation for possible Autoimmune Deficiency needs to be ruled out sooner  5/21/2024.    Essential Hypertension: Controlled  Beta Blocker: Continue Metoprolol succinate (Toprol XL) 50 mg daily.   ACE Inibitor/ARB: Continue sacubitril/valsartan (Entresto) 24/26 mg twice daily.   Diuretics: Continue bumetinide (Bumex) 1 mg daily with an additional 0.5 mg on Mon, Wed and Fri.     Hyperlipidemia: Mixed, LDL done on 3/12/2024 was 39 mg/ dL  Cholesterol Reduction Therapy: Continue Atorvastatin (Lipitor) 40 mg daily.      Stage 3 CKD:  Continue follow up with nephrology.    Finally, I recommended that he continue his other medications and follow up with you as previously scheduled.     FOLLOW UP:   I told Mr. Diallo to call my office if he had any problems, but otherwise I asked him to Return in about 4 weeks (around 9/27/2024). However, I would be happy to see him sooner should the need arise.     Sincerely,  Alan Rios MD, MS, F.A.C.C.  [facility] Cardiology Specialist    [address] [place] [address]  Phone: [phone], Fax: [phone]     I believe that the risk of significant morbidity and mortality related to the patient's current medical conditions are: high. At least 45 minutes were spent during prep work, discussion and exam of the patient, and follow up documentation and all of their questions were answered.    The documentation recorded by the scribe, accurately and completely reflects the services I personally performed and the decisions made by me. Alan Rios MD, MS, F.A.C.C. August 30, 2024

## 2024-08-30 NOTE — ED PROVIDER NOTES
Quit date: 1/1/1960    Tobacco comments:     quit 50 years ago   Vaping Use    Vaping status: Never Used   Substance Use Topics    Alcohol use: No    Drug use: No         PHYSICAL EXAM       Physical Exam  Constitutional:       General: He is not in acute distress.  HENT:      Head: Normocephalic and atraumatic.      Nose: Nose normal.      Mouth/Throat:      Mouth: Mucous membranes are moist.      Pharynx: Oropharynx is clear.   Eyes:      General: No scleral icterus.  Cardiovascular:      Rate and Rhythm: Normal rate and regular rhythm.   Pulmonary:      Effort: Pulmonary effort is normal.      Breath sounds: Normal breath sounds.   Abdominal:      General: There is no distension.      Palpations: Abdomen is soft.      Tenderness: There is no abdominal tenderness.   Musculoskeletal:      Comments: LLE swelling without calf TTP.     Chronic wound, left foot.     Skin:     General: Skin is warm and dry.      Coloration: Skin is not jaundiced or pale.   Neurological:      Mental Status: He is alert and oriented to person, place, and time.      GCS: GCS eye subscore is 4. GCS verbal subscore is 5. GCS motor subscore is 6.      Cranial Nerves: No dysarthria or facial asymmetry.      Sensory: Sensory deficit (Markedly diminished sensory function to the BUE and BLE, more prevalent in BLE.) present.      Motor: Weakness (No effort / movement to the BLE.  Markedly diminished strength in the BUE as well, though more notable on the right side.) present. No seizure activity.         DIAGNOSTIC RESULTS       LABS:  Labs Reviewed   CBC WITH AUTO DIFFERENTIAL   COMPREHENSIVE METABOLIC PANEL   SEDIMENTATION RATE   C-REACTIVE PROTEIN   HEMOGLOBIN A1C       All other labs were within normal range or not returned as of this dictation.    EMERGENCY DEPARTMENT COURSE and DIFFERENTIAL DIAGNOSIS/MDM:     84-year-old male presents to the ED today for evaluation of progressively worsening weakness over the course of the past few weeks.   History and course suggest possible GBS.  CVA appears less likely at this time.  In the absence of an acute change today I feel that inpatient management by neurology is most appropriate and that comprehensive imaging and additional evaluation at this facility will only serve to delay the diagnosis.  Accordingly, discussed with Dr. Michaels, neurology, at [facility], who has accepted the patient for further management at that facility.      FINAL IMPRESSION      1. Generalized weakness    2. 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  Efforts were made to edit the dictations but occasionally words are mis-transcribed.)    Jose Angel Roy MD (electronically signed)  Attending Emergency Physician            Jose Angel Roy MD  08/30/24 [id number]

## 2024-08-30 NOTE — H&P
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    Past Medical History:     Past Medical History:   Diagnosis Date    Acute renal failure superimposed on stage 3 chronic kidney disease (HCC) 03/01/2017    AICD (automatic cardioverter/defibrillator) present     Arthritis     CHF (congestive heart failure) ([facility])     Chronic combined systolic and diastolic CHF, NYHA class 3 ([facility]) 07/08/2024    Diabetes mellitus ([facility])     Hyperlipidemia     Hypertension     Kidney stone         Past Surgical History:     Past Surgical History:   Procedure Laterality Date    CARDIAC CATHETERIZATION Left 03/22/2024    /[facility]/ Radial Access    CARDIAC PROCEDURE N/A 3/22/2024    Left heart cath / coronary angiography performed by Alan Rios MD at [facility] CARDIAC CATH/IR LAB    CATARACT REMOVAL WITH IMPLANT Right 08/26/2013    CYSTOSCOPY INSERTION / REMOVAL STENT / STONE Right 11/01/2017    CYSTOSCOPY STENT INSERTION performed by Tod Patton MD at [facility] OR    CYSTOSCOPY INSERTION / REMOVAL STENT / STONE N/A 11/03/2017    CYSTOSCOPY STENT INSERTION performed by Tod Patton MD at [facility] OR    CYSTOURETHROSCOPY  11/03/2017    with stent placement on right side. Changed schulz catheter    FEMORAL BYPASS Left 04/27/2023    LEFT DISTAL SUPERFICIAL FEMORAL ARTERY TO POSTERIOR TIBIAL ARTERY BYPASS performed by Ruddy Trent MD at [facility] CVOR    FEMORAL-TIBIAL BYPASS GRAFT Left 04/27/2023    HERNIA REPAIR      umbilical 1997    INTRACAPSULAR CATARACT EXTRACTION Left 09/28/2020    EYE CATARACT EMULSIFICATION IOL IMPLANT performed by Jorge Luis East DO at [facility] OR    JOINT REPLACEMENT      right knee march 22, 2012    JOINT REPLACEMENT Right junu 2014 partial knee    JOINT REPLACEMENT Right 2014    complete removal    KNEE SURGERY Right     x 2    LITHOTRIPSY Right 11/01/2017    with stent placement - Dr. Patton     LITHOTRIPSY Left

## 2024-08-30 NOTE — ED NOTES
Received call from ED at [facility].  Patient now has a room.  Room 140.  Attempt to call report to [phone]

## 2024-08-30 NOTE — ED NOTES
[facility] called back with Dr Boo from [facility], connected call to Dr Roy

## 2024-08-31 ENCOUNTER — APPOINTMENT (OUTPATIENT)
Dept: MRI IMAGING | Age: 84
DRG: 551 | End: 2024-08-31
Attending: PSYCHIATRY & NEUROLOGY
Payer: MEDICARE

## 2024-08-31 ENCOUNTER — APPOINTMENT (OUTPATIENT)
Dept: CT IMAGING | Age: 84
DRG: 551 | End: 2024-08-31
Attending: PSYCHIATRY & NEUROLOGY
Payer: MEDICARE

## 2024-08-31 LAB
BASOPHILS # BLD: 0.09 K/UL (ref 0–0.2)
BASOPHILS NFR BLD: 1 % (ref 0–2)
EOSINOPHIL # BLD: 0.15 K/UL (ref 0–0.44)
EOSINOPHILS RELATIVE PERCENT: 1 % (ref 1–4)
ERYTHROCYTE [DISTWIDTH] IN BLOOD BY AUTOMATED COUNT: 15 % (ref 11.8–14.4)
EST. AVERAGE GLUCOSE BLD GHB EST-MCNC: 197 MG/DL
GLUCOSE BLD-MCNC: 142 MG/DL (ref 75–110)
HBA1C MFR BLD: 8.5 % (ref 4–6)
HCT VFR BLD AUTO: 30.2 % (ref 40.7–50.3)
HGB BLD-MCNC: 9.4 G/DL (ref 13–17)
IMM GRANULOCYTES # BLD AUTO: 0.44 K/UL (ref 0–0.3)
IMM GRANULOCYTES NFR BLD: 4 %
LYMPHOCYTES NFR BLD: 2.02 K/UL (ref 1.1–3.7)
LYMPHOCYTES RELATIVE PERCENT: 18 % (ref 24–43)
MCH RBC QN AUTO: 30.7 PG (ref 25.2–33.5)
MCHC RBC AUTO-ENTMCNC: 31.1 G/DL (ref 28.4–34.8)
MCV RBC AUTO: 98.7 FL (ref 82.6–102.9)
MONOCYTES NFR BLD: 1.12 K/UL (ref 0.1–1.2)
MONOCYTES NFR BLD: 10 % (ref 3–12)
NEUTROPHILS NFR BLD: 66 % (ref 36–65)
NEUTS SEG NFR BLD: 7.27 K/UL (ref 1.5–8.1)
NRBC BLD-RTO: 0 PER 100 WBC
PLATELET # BLD AUTO: 279 K/UL (ref 138–453)
PMV BLD AUTO: 9.2 FL (ref 8.1–13.5)
RBC # BLD AUTO: 3.06 M/UL (ref 4.21–5.77)
RBC # BLD: ABNORMAL 10*6/UL
WBC OTHER # BLD: 11.1 K/UL (ref 3.5–11.3)

## 2024-08-31 PROCEDURE — 72141 MRI NECK SPINE W/O DYE: CPT

## 2024-08-31 PROCEDURE — 97110 THERAPEUTIC EXERCISES: CPT

## 2024-08-31 PROCEDURE — 2060000000 HC ICU INTERMEDIATE R&B

## 2024-08-31 PROCEDURE — 6370000000 HC RX 637 (ALT 250 FOR IP)

## 2024-08-31 PROCEDURE — 82947 ASSAY GLUCOSE BLOOD QUANT: CPT

## 2024-08-31 PROCEDURE — 2580000003 HC RX 258

## 2024-08-31 PROCEDURE — 6360000002 HC RX W HCPCS

## 2024-08-31 PROCEDURE — 2700000000 HC OXYGEN THERAPY PER DAY

## 2024-08-31 PROCEDURE — 97162 PT EVAL MOD COMPLEX 30 MIN: CPT

## 2024-08-31 PROCEDURE — 99232 SBSQ HOSP IP/OBS MODERATE 35: CPT | Performed by: PSYCHIATRY & NEUROLOGY

## 2024-08-31 PROCEDURE — 72125 CT NECK SPINE W/O DYE: CPT

## 2024-08-31 PROCEDURE — 85025 COMPLETE CBC W/AUTO DIFF WBC: CPT

## 2024-08-31 PROCEDURE — 36415 COLL VENOUS BLD VENIPUNCTURE: CPT

## 2024-08-31 PROCEDURE — 94761 N-INVAS EAR/PLS OXIMETRY MLT: CPT

## 2024-08-31 RX ADMIN — ENOXAPARIN SODIUM 30 MG: 100 INJECTION SUBCUTANEOUS at 21:00

## 2024-08-31 RX ADMIN — SACUBITRIL AND VALSARTAN 1 TABLET: 24; 26 TABLET, FILM COATED ORAL at 21:00

## 2024-08-31 RX ADMIN — METFORMIN HYDROCHLORIDE 500 MG: 500 TABLET ORAL at 18:11

## 2024-08-31 RX ADMIN — ATORVASTATIN CALCIUM 40 MG: 40 TABLET, FILM COATED ORAL at 21:00

## 2024-08-31 RX ADMIN — SODIUM CHLORIDE, PRESERVATIVE FREE 5 ML: 5 INJECTION INTRAVENOUS at 09:40

## 2024-08-31 RX ADMIN — FAMOTIDINE 20 MG: 20 TABLET, FILM COATED ORAL at 09:38

## 2024-08-31 RX ADMIN — SACUBITRIL AND VALSARTAN 1 TABLET: 24; 26 TABLET, FILM COATED ORAL at 09:38

## 2024-08-31 RX ADMIN — METFORMIN HYDROCHLORIDE 500 MG: 500 TABLET ORAL at 09:38

## 2024-08-31 RX ADMIN — ASPIRIN 81 MG: 81 TABLET, COATED ORAL at 09:38

## 2024-08-31 RX ADMIN — SODIUM CHLORIDE, PRESERVATIVE FREE 10 ML: 5 INJECTION INTRAVENOUS at 21:01

## 2024-08-31 RX ADMIN — BUMETANIDE 2 MG: 1 TABLET ORAL at 09:40

## 2024-08-31 RX ADMIN — TAMSULOSIN HYDROCHLORIDE 0.4 MG: 0.4 CAPSULE ORAL at 09:38

## 2024-08-31 RX ADMIN — ENOXAPARIN SODIUM 30 MG: 100 INJECTION SUBCUTANEOUS at 09:39

## 2024-08-31 RX ADMIN — METOPROLOL SUCCINATE 50 MG: 25 TABLET, EXTENDED RELEASE ORAL at 09:39

## 2024-08-31 RX ADMIN — MONTELUKAST 10 MG: 10 TABLET, FILM COATED ORAL at 21:54

## 2024-08-31 NOTE — PLAN OF CARE
Problem: Chronic Conditions and Co-morbidities  Goal: Patient's chronic conditions and co-morbidity symptoms are monitored and maintained or improved  Outcome: Progressing     Problem: Discharge Planning  Goal: Discharge to home or other facility with appropriate resources  Outcome: Progressing     Problem: Safety - Adult  Goal: Free from fall injury  Outcome: Progressing     Problem: Skin/Tissue Integrity  Goal: Absence of new skin breakdown  Description: 1.  Monitor for areas of redness and/or skin breakdown  2.  Assess vascular access sites hourly  3.  Every 4-6 hours minimum:  Change oxygen saturation probe site  4.  Every 4-6 hours:  If on nasal continuous positive airway pressure, respiratory therapy assess nares and determine need for appliance change or resting period.  Outcome: Progressing     Problem: ABCDS Injury Assessment  Goal: Absence of physical injury  Outcome: Progressing     Problem: Physical Therapy - Adult  Goal: By Discharge: Performs mobility at highest level of function for planned discharge setting.  See evaluation for individualized goals.  8/31/2024 1029 by Pooja Avila, PT  Outcome: Progressing

## 2024-08-31 NOTE — PLAN OF CARE
Problem: Chronic Conditions and Co-morbidities  Goal: Patient's chronic conditions and co-morbidity symptoms are monitored and maintained or improved  Recent Flowsheet Documentation  Taken 8/30/2024 2000 by Anselmo Santillan, RN  Care Plan - Patient's Chronic Conditions and Co-Morbidity Symptoms are Monitored and Maintained or Improved: Monitor and assess patient's chronic conditions and comorbid symptoms for stability, deterioration, or improvement     Problem: Discharge Planning  Goal: Discharge to home or other facility with appropriate resources  Recent Flowsheet Documentation  Taken 8/30/2024 2000 by Anselmo Santillan, RN  Discharge to home or other facility with appropriate resources:   Identify barriers to discharge with patient and caregiver   Arrange for needed discharge resources and transportation as appropriate     Problem: ABCDS Injury Assessment  Goal: Absence of physical injury  Recent Flowsheet Documentation  Taken 8/30/2024 1925 by Anselmo Santillan, RN  Absence of Physical Injury: Implement safety measures based on patient assessment

## 2024-09-01 ENCOUNTER — APPOINTMENT (OUTPATIENT)
Dept: GENERAL RADIOLOGY | Age: 84
DRG: 551 | End: 2024-09-01
Attending: PSYCHIATRY & NEUROLOGY
Payer: MEDICARE

## 2024-09-01 LAB
BASOPHILS # BLD: 0.08 K/UL (ref 0–0.2)
BASOPHILS NFR BLD: 1 % (ref 0–2)
BNP SERPL-MCNC: 2019 PG/ML (ref 0–300)
EOSINOPHIL # BLD: 0.12 K/UL (ref 0–0.44)
EOSINOPHILS RELATIVE PERCENT: 1 % (ref 1–4)
ERYTHROCYTE [DISTWIDTH] IN BLOOD BY AUTOMATED COUNT: 14.6 % (ref 11.8–14.4)
GLUCOSE BLD-MCNC: 112 MG/DL (ref 75–110)
HCT VFR BLD AUTO: 32 % (ref 40.7–50.3)
HGB BLD-MCNC: 9.5 G/DL (ref 13–17)
IMM GRANULOCYTES # BLD AUTO: 0.29 K/UL (ref 0–0.3)
IMM GRANULOCYTES NFR BLD: 2 %
INR PPP: 1.1
LYMPHOCYTES NFR BLD: 1.97 K/UL (ref 1.1–3.7)
LYMPHOCYTES RELATIVE PERCENT: 16 % (ref 24–43)
MCH RBC QN AUTO: 30.3 PG (ref 25.2–33.5)
MCHC RBC AUTO-ENTMCNC: 29.7 G/DL (ref 28.4–34.8)
MCV RBC AUTO: 101.9 FL (ref 82.6–102.9)
MONOCYTES NFR BLD: 1.11 K/UL (ref 0.1–1.2)
MONOCYTES NFR BLD: 9 % (ref 3–12)
NEUTROPHILS NFR BLD: 71 % (ref 36–65)
NEUTS SEG NFR BLD: 8.43 K/UL (ref 1.5–8.1)
NRBC BLD-RTO: 0 PER 100 WBC
PARTIAL THROMBOPLASTIN TIME: 30.6 SEC (ref 23–36.5)
PLATELET # BLD AUTO: 296 K/UL (ref 138–453)
PMV BLD AUTO: 8.9 FL (ref 8.1–13.5)
PROTHROMBIN TIME: 14 SEC (ref 11.7–14.9)
RBC # BLD AUTO: 3.14 M/UL (ref 4.21–5.77)
RBC # BLD: ABNORMAL 10*6/UL
WBC OTHER # BLD: 12 K/UL (ref 3.5–11.3)

## 2024-09-01 PROCEDURE — 36415 COLL VENOUS BLD VENIPUNCTURE: CPT

## 2024-09-01 PROCEDURE — 6370000000 HC RX 637 (ALT 250 FOR IP)

## 2024-09-01 PROCEDURE — 71045 X-RAY EXAM CHEST 1 VIEW: CPT

## 2024-09-01 PROCEDURE — 6370000000 HC RX 637 (ALT 250 FOR IP): Performed by: PSYCHIATRY & NEUROLOGY

## 2024-09-01 PROCEDURE — 85730 THROMBOPLASTIN TIME PARTIAL: CPT

## 2024-09-01 PROCEDURE — 2060000000 HC ICU INTERMEDIATE R&B

## 2024-09-01 PROCEDURE — 99223 1ST HOSP IP/OBS HIGH 75: CPT | Performed by: INTERNAL MEDICINE

## 2024-09-01 PROCEDURE — 82947 ASSAY GLUCOSE BLOOD QUANT: CPT

## 2024-09-01 PROCEDURE — 2580000003 HC RX 258

## 2024-09-01 PROCEDURE — 99232 SBSQ HOSP IP/OBS MODERATE 35: CPT | Performed by: PSYCHIATRY & NEUROLOGY

## 2024-09-01 PROCEDURE — 85025 COMPLETE CBC W/AUTO DIFF WBC: CPT

## 2024-09-01 PROCEDURE — 85610 PROTHROMBIN TIME: CPT

## 2024-09-01 PROCEDURE — 83880 ASSAY OF NATRIURETIC PEPTIDE: CPT

## 2024-09-01 RX ORDER — LOPERAMIDE HCL 2 MG
2 CAPSULE ORAL 4 TIMES DAILY PRN
Status: DISCONTINUED | OUTPATIENT
Start: 2024-09-01 | End: 2024-09-03 | Stop reason: HOSPADM

## 2024-09-01 RX ORDER — MIDODRINE HYDROCHLORIDE 5 MG/1
5 TABLET ORAL
Status: DISCONTINUED | OUTPATIENT
Start: 2024-09-01 | End: 2024-09-03 | Stop reason: HOSPADM

## 2024-09-01 RX ADMIN — MIDODRINE HYDROCHLORIDE 5 MG: 5 TABLET ORAL at 17:13

## 2024-09-01 RX ADMIN — LOPERAMIDE HYDROCHLORIDE 2 MG: 2 CAPSULE ORAL at 11:37

## 2024-09-01 RX ADMIN — BUMETANIDE 2 MG: 1 TABLET ORAL at 09:09

## 2024-09-01 RX ADMIN — ATORVASTATIN CALCIUM 40 MG: 40 TABLET, FILM COATED ORAL at 20:38

## 2024-09-01 RX ADMIN — PRAMIPEXOLE DIHYDROCHLORIDE 0.5 MG: 0.25 TABLET ORAL at 08:07

## 2024-09-01 RX ADMIN — SODIUM CHLORIDE, PRESERVATIVE FREE 10 ML: 5 INJECTION INTRAVENOUS at 08:12

## 2024-09-01 RX ADMIN — MONTELUKAST 10 MG: 10 TABLET, FILM COATED ORAL at 20:37

## 2024-09-01 RX ADMIN — MIDODRINE HYDROCHLORIDE 5 MG: 5 TABLET ORAL at 09:09

## 2024-09-01 RX ADMIN — SACUBITRIL AND VALSARTAN 1 TABLET: 24; 26 TABLET, FILM COATED ORAL at 20:37

## 2024-09-01 RX ADMIN — TAMSULOSIN HYDROCHLORIDE 0.4 MG: 0.4 CAPSULE ORAL at 08:06

## 2024-09-01 RX ADMIN — MIDODRINE HYDROCHLORIDE 5 MG: 5 TABLET ORAL at 11:37

## 2024-09-01 RX ADMIN — LOPERAMIDE HYDROCHLORIDE 2 MG: 2 CAPSULE ORAL at 20:38

## 2024-09-01 RX ADMIN — SODIUM CHLORIDE, PRESERVATIVE FREE 10 ML: 5 INJECTION INTRAVENOUS at 20:38

## 2024-09-01 RX ADMIN — METFORMIN HYDROCHLORIDE 500 MG: 500 TABLET ORAL at 08:06

## 2024-09-01 RX ADMIN — FAMOTIDINE 20 MG: 20 TABLET, FILM COATED ORAL at 08:06

## 2024-09-01 RX ADMIN — SACUBITRIL AND VALSARTAN 1 TABLET: 24; 26 TABLET, FILM COATED ORAL at 09:09

## 2024-09-01 NOTE — CARE COORDINATION
Case Management Assessment  Initial Evaluation    Date/Time of Evaluation: 9/1/2024 4:41 PM  Assessment Completed by: MARGY HARRINGTON    If patient is discharged prior to next notation, then this note serves as note for discharge by case management.    Patient Name: Moise Diallo                   YOB: 1940  Diagnosis: Guillain Barré syndrome (HCC) [G61.0]                   Date / Time: 8/30/2024  3:26 PM    Patient Admission Status: Inpatient   Readmission Risk (Low < 19, Mod (19-27), High > 27): Readmission Risk Score: 26.7    Current PCP: Terrell Chapin MD  PCP verified by CM? (P) Yes    Chart Reviewed: Yes      History Provided by: (P) Patient  Patient Orientation: (P) Alert and Oriented, Person, Place, Situation, Self    Patient Cognition: (P) Alert    Hospitalization in the last 30 days (Readmission):  No    If yes, Readmission Assessment in CM Navigator will be completed.    Advance Directives:      Code Status: DNR-CCA   Patient's Primary Decision Maker is: (P) Named in Scanned ACP Document    Primary Decision Maker: Roseann Diallo - Spouse - [phone]    Discharge Planning:    Patient lives with: (P) Other (Comment) (pt from [facility] and plans to return) Type of Home: (P) Skilled Nursing Facility  Primary Care Giver: (P) Self  Patient Support Systems include: (P) Spouse/Significant Other, Children, Family Members   Current Financial resources: (P) Medicaid, Medicare  Current community resources: (P) None  Current services prior to admission: (P) Skilled Nursing Facility            Current DME:              Type of Home Care services:  (P) None    ADLS  Prior functional level: (P) Assistance with the following:, Bathing, Dressing, Toileting, Cooking, Housework, Shopping  Current functional level: (P) Assistance with the following:, Bathing, Dressing, Toileting, Cooking, Housework, Shopping, Mobility    PT AM-PAC: 6 /24  OT AM-PAC:   /24    Family can provide assistance at

## 2024-09-01 NOTE — PLAN OF CARE
Patient in no apparent distress at this time.  No falls or new injuries noted.  No complaints at this time.  Pill pad call light left within reach.

## 2024-09-02 PROBLEM — M47.12 SPONDYLOSIS OF CERVICAL SPINE WITH MYELOPATHY: Status: ACTIVE | Noted: 2024-09-02

## 2024-09-02 PROBLEM — G82.50 QUADRIPARESIS (HCC): Status: ACTIVE | Noted: 2024-09-02

## 2024-09-02 LAB
ANION GAP SERPL CALCULATED.3IONS-SCNC: 9 MMOL/L (ref 9–16)
BASOPHILS # BLD: 0.06 K/UL (ref 0–0.2)
BASOPHILS NFR BLD: 1 % (ref 0–2)
BUN SERPL-MCNC: 36 MG/DL (ref 8–23)
CALCIUM SERPL-MCNC: 8.8 MG/DL (ref 8.6–10.4)
CHLORIDE SERPL-SCNC: 101 MMOL/L (ref 98–107)
CO2 SERPL-SCNC: 22 MMOL/L (ref 20–31)
CREAT SERPL-MCNC: 1.7 MG/DL (ref 0.7–1.2)
EOSINOPHIL # BLD: 0.18 K/UL (ref 0–0.44)
EOSINOPHILS RELATIVE PERCENT: 2 % (ref 1–4)
ERYTHROCYTE [DISTWIDTH] IN BLOOD BY AUTOMATED COUNT: 14.6 % (ref 11.8–14.4)
GFR, ESTIMATED: 39 ML/MIN/1.73M2
GLUCOSE SERPL-MCNC: 104 MG/DL (ref 74–99)
HCT VFR BLD AUTO: 30.3 % (ref 40.7–50.3)
HGB BLD-MCNC: 9.6 G/DL (ref 13–17)
IMM GRANULOCYTES # BLD AUTO: 0.41 K/UL (ref 0–0.3)
IMM GRANULOCYTES NFR BLD: 4 %
LYMPHOCYTES NFR BLD: 2.42 K/UL (ref 1.1–3.7)
LYMPHOCYTES RELATIVE PERCENT: 21 % (ref 24–43)
MCH RBC QN AUTO: 31.1 PG (ref 25.2–33.5)
MCHC RBC AUTO-ENTMCNC: 31.7 G/DL (ref 28.4–34.8)
MCV RBC AUTO: 98.1 FL (ref 82.6–102.9)
MONOCYTES NFR BLD: 1.29 K/UL (ref 0.1–1.2)
MONOCYTES NFR BLD: 11 % (ref 3–12)
NEUTROPHILS NFR BLD: 61 % (ref 36–65)
NEUTS SEG NFR BLD: 7.4 K/UL (ref 1.5–8.1)
NRBC BLD-RTO: 0 PER 100 WBC
PLATELET # BLD AUTO: 302 K/UL (ref 138–453)
PMV BLD AUTO: 9.3 FL (ref 8.1–13.5)
POTASSIUM SERPL-SCNC: 4.7 MMOL/L (ref 3.7–5.3)
RBC # BLD AUTO: 3.09 M/UL (ref 4.21–5.77)
RBC # BLD: ABNORMAL 10*6/UL
SODIUM SERPL-SCNC: 132 MMOL/L (ref 136–145)
WBC OTHER # BLD: 11.8 K/UL (ref 3.5–11.3)

## 2024-09-02 PROCEDURE — 99233 SBSQ HOSP IP/OBS HIGH 50: CPT | Performed by: PSYCHIATRY & NEUROLOGY

## 2024-09-02 PROCEDURE — 99222 1ST HOSP IP/OBS MODERATE 55: CPT | Performed by: INTERNAL MEDICINE

## 2024-09-02 PROCEDURE — 94761 N-INVAS EAR/PLS OXIMETRY MLT: CPT

## 2024-09-02 PROCEDURE — 36415 COLL VENOUS BLD VENIPUNCTURE: CPT

## 2024-09-02 PROCEDURE — 99232 SBSQ HOSP IP/OBS MODERATE 35: CPT | Performed by: INTERNAL MEDICINE

## 2024-09-02 PROCEDURE — 6370000000 HC RX 637 (ALT 250 FOR IP)

## 2024-09-02 PROCEDURE — 2700000000 HC OXYGEN THERAPY PER DAY

## 2024-09-02 PROCEDURE — 2580000003 HC RX 258

## 2024-09-02 PROCEDURE — 80048 BASIC METABOLIC PNL TOTAL CA: CPT

## 2024-09-02 PROCEDURE — 85025 COMPLETE CBC W/AUTO DIFF WBC: CPT

## 2024-09-02 PROCEDURE — 2060000000 HC ICU INTERMEDIATE R&B

## 2024-09-02 RX ORDER — SODIUM CHLORIDE 9 MG/ML
INJECTION, SOLUTION INTRAVENOUS CONTINUOUS
Status: DISCONTINUED | OUTPATIENT
Start: 2024-09-03 | End: 2024-09-03 | Stop reason: HOSPADM

## 2024-09-02 RX ORDER — MIDODRINE HYDROCHLORIDE 5 MG/1
2.5 TABLET ORAL 3 TIMES DAILY PRN
Qty: 90 TABLET | Refills: 3 | Status: SHIPPED | OUTPATIENT
Start: 2024-09-02

## 2024-09-02 RX ADMIN — SACUBITRIL AND VALSARTAN 1 TABLET: 24; 26 TABLET, FILM COATED ORAL at 10:26

## 2024-09-02 RX ADMIN — TAMSULOSIN HYDROCHLORIDE 0.4 MG: 0.4 CAPSULE ORAL at 10:23

## 2024-09-02 RX ADMIN — MONTELUKAST 10 MG: 10 TABLET, FILM COATED ORAL at 21:49

## 2024-09-02 RX ADMIN — SACUBITRIL AND VALSARTAN 1 TABLET: 24; 26 TABLET, FILM COATED ORAL at 21:49

## 2024-09-02 RX ADMIN — SODIUM CHLORIDE, PRESERVATIVE FREE 10 ML: 5 INJECTION INTRAVENOUS at 10:24

## 2024-09-02 RX ADMIN — MIDODRINE HYDROCHLORIDE 5 MG: 5 TABLET ORAL at 13:43

## 2024-09-02 RX ADMIN — MIDODRINE HYDROCHLORIDE 5 MG: 5 TABLET ORAL at 10:23

## 2024-09-02 RX ADMIN — ATORVASTATIN CALCIUM 40 MG: 40 TABLET, FILM COATED ORAL at 21:49

## 2024-09-02 RX ADMIN — MIDODRINE HYDROCHLORIDE 5 MG: 5 TABLET ORAL at 18:28

## 2024-09-02 RX ADMIN — FAMOTIDINE 20 MG: 20 TABLET, FILM COATED ORAL at 10:23

## 2024-09-02 RX ADMIN — BUMETANIDE 2 MG: 1 TABLET ORAL at 10:26

## 2024-09-02 RX ADMIN — METOPROLOL SUCCINATE 50 MG: 25 TABLET, EXTENDED RELEASE ORAL at 10:23

## 2024-09-02 RX ADMIN — SODIUM CHLORIDE, PRESERVATIVE FREE 10 ML: 5 INJECTION INTRAVENOUS at 21:50

## 2024-09-02 ASSESSMENT — ENCOUNTER SYMPTOMS
ABDOMINAL PAIN: 0
BACK PAIN: 0
VOMITING: 0
COUGH: 0
NAUSEA: 0
VOICE CHANGE: 0
SHORTNESS OF BREATH: 0

## 2024-09-02 NOTE — DISCHARGE INSTR - COC
Continuity of Care Form    Patient Name: Moise Diallo   :  1940  MRN:  [id number]    Admit date:  2024  Discharge date:  ***    Code Status Order: DNR-CCA   Advance Directives:   Advance Care Flowsheet Documentation             Admitting Physician:  Jose Boo MD  PCP: Terrell Chapin MD    Discharging Nurse: ***  Discharging Hospital Unit/Room#: 0140/0140-01  Discharging Unit Phone Number: ***    Emergency Contact:   Extended Emergency Contact Information  Primary Emergency Contact: Roseann Diallo  Address: [address] [place], [place] [id number] [facility]  Home Phone: [phone]  Relation: Spouse  Hearing or visual needs: None  Other needs: None  Preferred language: English   needed? No  Secondary Emergency Contact: Sophia Diallo   [facility]  Home Phone: [phone]  Relation: Child  Hearing or visual needs: None  Other needs: None  Preferred language: English   needed? No    Past Surgical History:  Past Surgical History:   Procedure Laterality Date    CARDIAC CATHETERIZATION Left 2024    /[facility]/Rt Radial Access    CARDIAC PROCEDURE N/A 3/22/2024    Left heart cath / coronary angiography performed by Alan Rios MD at [facility] CARDIAC CATH/IR LAB    CATARACT REMOVAL WITH IMPLANT Right 2013    CYSTOSCOPY INSERTION / REMOVAL STENT / STONE Right 2017    CYSTOSCOPY STENT INSERTION performed by Tod Patton MD at [facility] OR    CYSTOSCOPY INSERTION / REMOVAL STENT / STONE N/A 2017    CYSTOSCOPY STENT INSERTION performed by Tod Patton MD at [facility] OR    CYSTOURETHROSCOPY  2017    with stent placement on right side. Changed schulz catheter    FEMORAL BYPASS Left 2023    LEFT DISTAL SUPERFICIAL FEMORAL ARTERY TO POSTERIOR TIBIAL ARTERY BYPASS performed by Ruddy Trent MD at [facility]    FEMORAL-TIBIAL BYPASS GRAFT Left 2023    HERNIA REPAIR      umbilical 1997

## 2024-09-02 NOTE — CARE COORDINATION
Transition Planning    Informed by nursing that pt would like to return to the [facility] ASAP. Plans to decline surgery. Call to [facility] and spoke with Kaur. Their admissions is closed today d/t it being a holiday. Misti to call CM back.     1200 Misti from [facility] can accept back today. To call her at [phone] if decision is made to discharge today. This is the same number to call report. Fax [phone].     1210 PS Dr KARINA Callahan, neuro resident, to update. They need to round on pt.     1600 Transportation request  faxed  to [facility].. Spoke to [place]. Will Transport tomorrow at 0900. Call to facility and spoke with Simin to update.     1615 AVS/RISHI faxed to [facility].

## 2024-09-02 NOTE — PLAN OF CARE
Problem: Safety - Adult  Goal: Free from fall injury  9/2/2024 1728 by Inez Coles, RN  Outcome: Progressing  9/2/2024 0536 by Antionette Guzmán, RN  Outcome: Progressing

## 2024-09-03 VITALS
HEART RATE: 84 BPM | OXYGEN SATURATION: 96 % | RESPIRATION RATE: 17 BRPM | BODY MASS INDEX: 33.16 KG/M2 | SYSTOLIC BLOOD PRESSURE: 102 MMHG | HEIGHT: 70 IN | TEMPERATURE: 98.7 F | DIASTOLIC BLOOD PRESSURE: 59 MMHG | WEIGHT: 231.6 LBS

## 2024-09-03 PROBLEM — Z51.5 ENCOUNTER FOR PALLIATIVE CARE: Status: ACTIVE | Noted: 2024-09-03

## 2024-09-03 PROBLEM — Z71.89 ACP (ADVANCE CARE PLANNING): Status: ACTIVE | Noted: 2024-09-03

## 2024-09-03 PROCEDURE — 6370000000 HC RX 637 (ALT 250 FOR IP)

## 2024-09-03 PROCEDURE — 99212 OFFICE O/P EST SF 10 MIN: CPT

## 2024-09-03 PROCEDURE — 6370000000 HC RX 637 (ALT 250 FOR IP): Performed by: PSYCHIATRY & NEUROLOGY

## 2024-09-03 PROCEDURE — 99238 HOSP IP/OBS DSCHRG MGMT 30/<: CPT | Performed by: PSYCHIATRY & NEUROLOGY

## 2024-09-03 PROCEDURE — 99223 1ST HOSP IP/OBS HIGH 75: CPT | Performed by: PHYSICAL MEDICINE & REHABILITATION

## 2024-09-03 PROCEDURE — 99222 1ST HOSP IP/OBS MODERATE 55: CPT | Performed by: INTERNAL MEDICINE

## 2024-09-03 RX ADMIN — BUMETANIDE 2 MG: 1 TABLET ORAL at 08:41

## 2024-09-03 RX ADMIN — FAMOTIDINE 20 MG: 20 TABLET, FILM COATED ORAL at 08:41

## 2024-09-03 RX ADMIN — METOPROLOL SUCCINATE 50 MG: 25 TABLET, EXTENDED RELEASE ORAL at 08:41

## 2024-09-03 RX ADMIN — LOPERAMIDE HYDROCHLORIDE 2 MG: 2 CAPSULE ORAL at 08:42

## 2024-09-03 RX ADMIN — SACUBITRIL AND VALSARTAN 1 TABLET: 24; 26 TABLET, FILM COATED ORAL at 08:40

## 2024-09-03 RX ADMIN — TAMSULOSIN HYDROCHLORIDE 0.4 MG: 0.4 CAPSULE ORAL at 08:42

## 2024-09-03 RX ADMIN — MIDODRINE HYDROCHLORIDE 5 MG: 5 TABLET ORAL at 08:42

## 2024-09-03 NOTE — ACP (ADVANCE CARE PLANNING)
Advance Care Planning      Palliative Medicine Provider (MD/NP)  Advance Care Planning (ACP) Conversation      Date of Conversation: 09/03/24  The patient and/or authorized decision maker consented to a voluntary Advance Care Planning conversation.   Individuals present for the conversation:   Patient, wife via phone    Legal Healthcare Agent(s):    Primary Decision Maker: Roseann Diallo - Spouse - [phone]    ACP documents available in EMR prior to discussion:  -Portable DNR    Primary Palliative Diagnosis(es):  Cervical myelopathy    Conversation Summary:  Not ready for \"the end\"  Agreeable to have hospice follow.  DNR-CCA confirmed.  Wife is HCPOA.    Resuscitation Status:    Code Status: DNR-CCA    Outcomes / Completed Documentation:  An explanation of advance directives and their importance was provided and the following forms completed:    -No new documents completed.    If new document completed, original was provided to patient and/or family member.    Copy was placed for scanning into the [facility] EMR.      I spent 10 minutes providing separately identifiable ACP services with the patient and/or surrogate decision maker in a voluntary, in-person conversation discussing the patient's wishes and goals as detailed in the above note.       KLAUS ROA, DO

## 2024-09-03 NOTE — CARE COORDINATION
Transitional Planning    Called [facility], spoke with Yolanda, patient's transportation was not in the system. Refaxed information and Yolanda will call around.  Update provided to Layne RN and patient.      Spoke with Yolanda at [facility],  at 12 pm. Update provided to Margy monroe [place], patient and Layne RN.      Called spouse Roseann update provided.  Updated RISHI faxed to [facility].     Discharge Report    [facility]  Clinical Case Management Department  Written by: Juli Mcgee RN    Patient Name: Moise MCDONALD Yoel  Attending Provider: Jose Boo MD  Admit Date: 2024  3:26 PM  MRN: [id number]  Account: [id number]                     : 1940  Discharge Date: 9-3-24      Disposition: [facility].     Juli Mcgee RN

## 2024-09-03 NOTE — DISCHARGE SUMMARY
Sliding Scale AC and HS- 150-200-3U; 201-250-6U; 251-300-9U; 301-350 12U; 315-400-15U; Call if Greater than 400      pramipexole (MIRAPEX) 0.5 MG tablet Take 1 tablet by mouth nightly as needed (for restless leg)  Qty: 90 tablet, Refills: 3      metFORMIN (GLUCOPHAGE) 500 MG tablet Take 1 tablet by mouth 2 times daily (with meals)      atorvastatin (LIPITOR) 40 MG tablet Take 1 tablet by mouth nightly  Qty: 30 tablet, Refills: 3      docusate sodium (COLACE, DULCOLAX) 100 MG CAPS Take 100 mg by mouth 2 times daily      magnesium oxide (MAG-OX) 400 (240 Mg) MG tablet Take 1 tablet by mouth 2 times daily      pantoprazole (PROTONIX) 40 MG tablet Take 1 tablet by mouth every morning (before breakfast)  Qty: 90 tablet, Refills: 0      Multiple Vitamin (MULTIVITAMIN ADULT PO) Take 1 tablet by mouth daily      CONTOUR TEST strip USE 1 STRIP TO CHECK GLUCOSE ONCE DAILY      metoprolol succinate (TOPROL XL) 50 MG extended release tablet Take 1 tablet by mouth daily  Qty: 90 tablet, Refills: 3      tamsulosin (FLOMAX) 0.4 MG capsule TAKE ONE CAPSULE BY MOUTH EVERY MORNING  Qty: 90 capsule, Refills: 3      montelukast (SINGULAIR) 10 MG tablet Take 1 tablet by mouth nightly  Qty: 30 tablet, Refills: 3      Microlet Lancets MISC USE 1 LANCET TO CHECK GLUCOSE ONCE DAILY AND AS NEEDED      sacubitril-valsartan (ENTRESTO) 24-26 MG per tablet Take 1 tablet by mouth 2 times daily  Qty: 180 tablet, Refills: 3    Associated Diagnoses: ASHD (arteriosclerotic heart disease); Abnormal stress test; Abnormal EKG; PVD (peripheral vascular disease) ([facility]); Primary hypertension; SOB (shortness of breath); Leg edema      aspirin 81 MG tablet Take 1 tablet by mouth daily      magnesium hydroxide (MILK OF MAGNESIA) 400 MG/5ML suspension Take by mouth daily as needed for Constipation             Activity: activity as tolerated    Restrictions: Driving No, Swimming No, Operating heavy machinery No, Compromising heights No    Diet: cardiac 
diet    Follow-up:    Alan Khan MD  [address]  [facility] [address]  [phone]    Call in 6 week(s)      [place] at [address] [address] [phone]        Cyn Ingram MD  [address] [facility] [id number] [phone]    Call in 6 week(s)  spinal cord injury follow up      Note that over 30 minutes was spent in preparing discharge papers, discussing discharge with patient, medication review, etc.      Bryan Mantilla DO  Neurology Resident PGY-4  Department of Neurology  [place], [place]  9/3/2024, 3:58 PM

## 2024-09-03 NOTE — PROGRESS NOTES
[facility] NEUROLOGY & NEUROSCIENCE  IN-PATIENT SERVICE    HISTORY & PHYSICAL EXAMINATION NOTE       Date:                            8/30/2024  Patient name:              Moise Diallo  Date of admission:      8/30/2024  3:26 PM  MRN:                           [id number]  Account:                      [id number]  YOB: 1940  PCP:                            Terrell Chapin MD  Room:                          [address] [place] [address]  Code Status:               Prior     Chief Complaint:      Quadriplegia      Interval      Seen and examined    Repeat CT Cervical: Canal stenosis- most severe at C2-C3.  Little change from prior study.    Possibly repeating CT myelogram if not possible to get MRI cervical      History of Present Illness:      The patient is a right handed, 84 y.o. male with past medical hx of CAD, CHF status post AICD, CKD, DM, Parkinson disease presented with a 10-month history of progressive quadriplegia.  Patient states that he began noticing weakness in his legs and gait approximately 10 months ago.  He was initially using a walker but now is completely nonambulatory.  About 2 months ago began noticing weakness in bilateral upper extremities.  Today he feels like he cannot move at all and presents to the emergency department at [facility].  There was initial concern of Guillain-Barré syndrome and patient was transferred to [facility] for further workup.  Has had constipation for several months now.  Is short of breath but does have concomitant severe CHF and COPD.  Denies any urinary retention at this time.     On arrival patient is generally weak.  Evidence of pitting edema in upper extremities.  Lower extremities and boots due to foot drop.  Patient does appear short of breath and hypophonic.  On exam patient has asymmetric weakness with 2/5 in the right upper and lower extremity and 3/5 in the left upper, 2/5 in the left lower.  Reflexes 2+ in bilateral 
           [facility] NEUROLOGY & NEUROSCIENCE  IN-PATIENT SERVICE    Daily progress note       Date:                            8/30/2024  Patient name:              Moise Diallo  Date of admission:      8/30/2024  3:26 PM  MRN:                           [id number]  Account:                      [id number]  YOB: 1940  PCP:                            Terrell Chapin MD  Room:                          [address] [place] [address]  Code Status:               Prior     Chief Complaint:      Quadriplegia      Interval      Patient was seen and evaluated this morning.  No change in neurological examination.  Patient has been evaluated by the neurosurgical team and they have had a lengthy discussion with the patient in terms of risks and benefits of surgery, patient would like to proceed with surgery.  Currently neurosurgery is recommending presurgical restratification and requesting pulmonology and cardiology's input.    Patient also continues to experience intractable watery stools, has been started on loperamide, FMS tube in place.    He also has a stage IV decubitus ulcer, we will ask wound care for their input and management.     History of Present Illness:      The patient is a right handed, 84 y.o. male with past medical hx of CAD, CHF status post AICD, CKD, DM, Parkinson disease presented with a 10-month history of progressive quadriplegia.  Patient states that he began noticing weakness in his legs and gait approximately 10 months ago.  He was initially using a walker but now is completely nonambulatory.  About 2 months ago began noticing weakness in bilateral upper extremities.  Today he feels like he cannot move at all and presents to the emergency department at [facility].  There was initial concern of Guillain-Barré syndrome and patient was transferred to [facility] for further workup.  Has had constipation for several months now.  Is short of breath but does have concomitant 
  [facility]    Neurosurgery Service  Resident Daily Progress Note   9/1/2024 10:44 AM     Subjective    No acute events overnight.  No new complaints.   The patient is appropriate for neurosurgical intervention, however he will need both cardiac and pulmonary clearance.     Objective    Vitals:    09/01/24 0310 09/01/24 0800 09/01/24 0805 09/01/24 0909   BP: 126/65 (!) 90/50 (!) 90/50 (!) 94/52   Pulse: 76 73 75    Resp: 16 13 12    Temp: 97.9 °F (36.6 °C) 98.4 °F (36.9 °C) 98.1 °F (36.7 °C)    TempSrc: Oral Oral Oral    SpO2: 97% 98% 96%    Weight:       Height:           Physical Exam:  Gen: Resting comfortably, no acute distress  CV: RRR  Resp: CTAB  GI: Abdomen soft, non-tender  Skin: Cap refill< 2 seconds, surgical incision   Neuro:    RUE 2/5 proximal and distal muscle groups   LUE 2/5 proximal and distal muscle groups   RLE 2/5 proximal and distal muscle groups  LLE 3/5  proximal and distal muscle groups    Bilateral upper extremity spasticity     Sensory: Decreased sensation to soft touch, pin prick LLE    Labs:  Lab Results   Component Value Date    WBC 11.1 08/31/2024    HGB 9.4 (L) 08/31/2024    HCT 30.2 (L) 08/31/2024     08/31/2024    CHOL 92 07/09/2024    TRIG 105 07/09/2024    HDL 35 (L) 07/09/2024    ALT 22 08/30/2024    AST 24 08/30/2024     (L) 08/30/2024    K 5.4 (H) 08/30/2024    CL 98 08/30/2024    CREATININE 2.0 (H) 08/30/2024    BUN 52 (H) 08/30/2024    CO2 25 08/30/2024    TSH 4.12 04/13/2024    INR 1.0 07/04/2024    LABA1C 8.5 (H) 08/30/2024       Radiology (last 24 hours):    MRI C spine:   1. Severe central spinal canal narrowing at C2-C3 and C3-C4 with associated areas of myelomalacia in the cervical spinal cord.  2. Moderate-severe central spinal canal narrowing at C4-C5 with mild  compression of the cervical spinal cord.  3. Moderate central spinal canal narrowing at C5-C6 with flattening of the  cervical spinal cord.  4. Multilevel foraminal 
  [facility]  Occupational Therapy Not Seen Note    DATE: 2024    NAME: Moise Diallo  MRN: [id number]   : 1940      Patient not seen this date for Occupational Therapy due to:    Surgery/Procedure: Plan for OR 9/3 posterior C2-C5 decompressive laminectomy for decompression of spinal cord  per chart pending clearance    Next Scheduled Treatment: Ck      Electronically signed by Tereza Brice OT on 2024 at 9:42 AM    
Comprehensive Nutrition Assessment    Type and Reason for Visit:  Initial, Wound, Positive Nutrition Screen (Positive nutrition screen for Wounds)    Nutrition Recommendations/Plan:   Continue current diet as tolerated  Provide diabetic ONS with lunch and wound healing with breakfast and dinner  Monitor PO intake, ONS intake, wounds, edema, wt, labs     Malnutrition Assessment:  Malnutrition Status:  No malnutrition (08/31/24 0915)    Context:  Chronic Illness     Findings of the 6 clinical characteristics of malnutrition:  Energy Intake:  No significant decrease in energy intake  Weight Loss:  No significant weight loss     Body Fat Loss:  No significant body fat loss     Muscle Mass Loss:  No significant muscle mass loss    Fluid Accumulation:  Severe Generalized, Extremities   Strength:  Not Performed    Nutrition Assessment:    Positive nutrition screen for wounds. Per documentation, pt with unstageable, stage 4 and skin tear wounds. Per past RD notes, pt was receiving glucerna (diabetic ONS) at lunch and juan carlos (wound healing ONS) with breakfast and dinner - writer added to orders. Pt adm from Dr. Rios's office for increased extremity weakness over the last 4-6 weeks. Presented to [place] ED for initial concern of Guillain-Barré syndrome and patient was transferred to [facility] for further workup. Na 134, K 54    Nutrition Related Findings:    Meds/labs reviewed; +1 non pitting generalized and +3 pitting BLE edema Wound Type: Multiple, Pressure Injury, Stage IV, Unstageable, Skin Tears       Current Nutrition Intake & Therapies:    Average Meal Intake: Unable to assess  Average Supplements Intake: Unable to assess  ADULT DIET; Regular  ADULT ORAL NUTRITION SUPPLEMENT; Breakfast, Dinner; Wound Healing Oral Supplement  ADULT ORAL NUTRITION SUPPLEMENT; Lunch; Diabetic Oral Supplement    Anthropometric Measures:  Height: 177.8 cm (5' 10\")  Ideal Body Weight (IBW): 166 lbs (75 kg)    Admission Body 
Congestive Heart Failure Education completed and charted. CHF booklet given. Patient was receptive to education.    Discussed the  importance of medication compliance.    Discussed the importance of a heart healthy diet. Discussed 2000 mg sodium-restricted daily diet.  Patient instructed to limit fluid intake to  1.5 to 2 liters per day.    Patient instructed to weigh self at the same time of each day each morning, reinforced teaching to monitor for 3-5 lb weight increase over 1-2 days notify physician if change noted.      Signs and symptoms of CHF discussed with patient, such as feeling more tired than normal, feeling short of breath, coughing that increases when lying down, sudden weight gain, swelling of the feet, legs or belly.  Patient verbalized understanding to notify physician office if these symptoms occur.  EF 35-40%   Pt resides in [place], [place]   
PULMONARY & CRITICAL CARE MEDICINE CONSULT NOTE     Patient:  Moise Diallo  MRN: [id number]  Admit date: 8/30/2024  Primary Care Physician: Terrell Chapin MD  CODE Status: DNR-CCA  LOS: 3    SUBJECTIVE     CHIEF COMPLAINT/REASON FOR CONSULT: Preoperative evaluation    HISTORY OF PRESENT ILLNESS:  The patient is a 84 y.o. male with a history of hypertension hyperlipidemia, diabetes, CHF, CKD admitted with bilateral neuromuscular weakness.  Patient has been diagnosed significant cervical myelopathy.  He is being planned for posterior cervical laminectomy.  Pulmonary service consulted for preoperative evaluation.    Patient denies any previous lung problems including asthma or COPD.  He denies any cough, sputum, wheezing, hemoptysis or pleuritic chest pain he is not on any bronchodilators.  He is obese but denies any history of sleep apnea.  He is on supplemental oxygen at 2 L/min, and is saturating 98%.  He was able to wean him off oxygen while in the room.    Pulmonology was consulted for surgical clearance, spirometry in 2017 showed decreased FEV1 and FVC, normal FEV1/FVC ratio suggesting restrictive ventilatory impairment.    Interval history:  Patient had no acute events overnight.  Maintaining saturations well on 2 L nasal cannula  Patient cleared for surgery from moderate risk of postoperative pulmonary complications.  Patient refusing surgical decompression  Patient to return to [place].      PAST MEDICAL HISTORY:        Diagnosis Date    Acute renal failure superimposed on stage 3 chronic kidney disease (HCC) 03/01/2017    AICD (automatic cardioverter/defibrillator) present     Arthritis     CHF (congestive heart failure) (HCC)     Chronic combined systolic and diastolic CHF, NYHA class 3 (HCC) 07/08/2024    Diabetes mellitus (HCC)     Hyperlipidemia     Hypertension     Kidney stone      PAST SURGICAL HISTORY:        Procedure Laterality Date    CARDIAC CATHETERIZATION Left 03/22/2024    
Patient has no new complaints this a.m.  Patient is not obtain cardiac clearance as of yet.  Patient was seen by pulmonary service yesterday and placed at a moderate risk for pulmonary complications regarding surgery.  Vitals:    09/02/24 0846   BP: 103/62   Pulse:    Resp:    Temp: 97.6 °F (36.4 °C)   SpO2:      Neurologic examination remains unchanged.  Currently the patient's surgery is postponed as we have not obtained cardiac clearance.  In addition at least 9-10 family members were present this morning at the patient's bedside.  I did review review the potential expectations and complications which could occur with surgical intervention if he does obtain cardiac clearance.  The patient and the family clearly understand that the primary goal of surgery is to decompress the spinal cord in hopes that his condition will not worsen and potentially improved.  Given that he has been nearly quadriplegic since April by report, I did clearly state that the chance of him gaining significant improvement from surgical invention was slim.  In addition they all clearly understood that the patient condition could worse with surgical intervention resulting in complete quadriplegia with the need and requirement of the ventilator in the future after surgery.  After reviewing all the above information, the patient indicated they would like to wait for cardiology evaluation.  Currently I do have the patient on the surgical schedule for tomorrow morning.  
Patient has no new complaints today.  Neurologic examination is unchanged.  Patient did undergo cardiology evaluation and was deemed a high risk for surgical intervention.  After considering the patient's current condition, the potential risk of surgical intervention as well as the cardiac status of high risk for surgical intervention, the patient and family wish for no aggressive measures and did not wish to consider surgical intervention at this point in time.  The patient and family have decided on hospice care.  
Spiritual Health Assessment/Progress Note  [facility]    Initial Encounter,  ,  ,      Name: Moise Diallo MRN: [id number]    Age: 84 y.o.     Sex: male   Language: English   Latter-day: Restorationism   Guillain Barré syndrome (HCC)     Date: 8/31/2024            Total Time Calculated: (P) 10 min              Spiritual Assessment began in STV 1C STEPDOWN        Referral/Consult From: Rounding   Encounter Overview/Reason: Initial Encounter  Service Provided For: Patient    Marlene, Belief, Meaning:   Patient is connected with a marleen tradition or spiritual practice  Family/Friends No family/friends present      Importance and Influence:  Patient has no beliefs influential to healthcare decision-making identified during this visit  Family/Friends no family/friends present    Community:  Patient is connected with a spiritual community and feels well-supported. Support system includes: Spouse/Partner and Children  Family/Friends Other: Family not present.    Assessment and Plan of Care:     Patient Interventions include: Other:  provided a supportive presence through active listening and words of affirmation.  Family/Friends Interventions include: Other: Family not present.    Patient Plan of Care: Spiritual Care available upon further referral  Family/Friends Plan of Care: Spiritual Care available upon further referral    Electronically signed by Chaplain Gauri on 8/31/2024 at 9:28 PM    
year?: Yes  ADL Assistance: Needs assistance  Homemaking Responsibilities: No  Ambulation Assistance: Non-ambulatory  Transfer Assistance: Needs assistance  Active : No  Additional Comments: Pt reports being dependent for all self care and eating at facility due to progressive quadraplegia.  Chao lift to w/c for meal per pt, otherwise in bed.  Has been in therapy, has not tolerated dangle in several weeks due to weakness per pt report.  Vision/Hearing  Vision  Vision: Within Functional Limits  Hearing  Hearing: Within functional limits    Cognition   Orientation  Overall Orientation Status: Within Normal Limits  Cognition  Overall Cognitive Status: WFL           PROM RLE (degrees)  RLE PROM: WFL  RLE General PROM: tightness in ankle  PROM LLE (degrees)  LLE PROM: WFL  LLE General PROM: tightness in ankle  PROM RUE (degrees)  RUE PROM: WFL  PROM LUE (degrees)  LUE PROM: WFL  Strength RLE  Strength RLE: Exception  Comment: spontaneous mvmt in legs noted  R Hip Flexion: 1/5  R Hip ABduction: 1/5  R Knee Flexion: 1/5  R Knee Extension: 1/5  R Ankle Dorsiflexion: 1/5  R Ankle Plantar flexion: 1/5  Strength LLE  Strength LLE: Exception  Comment: spontaneous mvmt in legs noted  L Hip Flexion: 1/5  L Knee Flexion: 1/5  L Knee Extension: 1/5  L Ankle Plantar Flexion: 1/5  Strength RUE  Strength RUE: Exception  R Shoulder Flexion: 1/5  R Elbow Flexion: 2-/5  R Elbow Extension: 2-/5  Strength LUE  Strength LUE: Exception  L Shoulder Flexion: 1/5  L Elbow Flexion: 2-/5  L Elbow Extension: 2-/5  Strength Other  Other: limited  in both hands, 1/5 grossly           Bed mobility  Rolling to Left: Dependent/Total;2 Person assistance  Rolling to Right: Dependent/Total;2 Person assistance  Bed Mobility Comments: several rolls completed for brief change with nursing, pt unable to  rails or use LE's to assist in anyway  Transfers  Sit to Stand: Unable to assess  Stand to Sit: Unable to assess  Comment: unsafe to assess at 
pantoprazole (PROTONIX) 40 MG tablet Take 1 tablet by mouth every morning (before breakfast) 90 tablet 0    Multiple Vitamin (MULTIVITAMIN ADULT PO) Take 1 tablet by mouth daily      CONTOUR TEST strip USE 1 STRIP TO CHECK GLUCOSE ONCE DAILY      metoprolol succinate (TOPROL XL) 50 MG extended release tablet Take 1 tablet by mouth daily 90 tablet 3    tamsulosin (FLOMAX) 0.4 MG capsule TAKE ONE CAPSULE BY MOUTH EVERY MORNING 90 capsule 3    montelukast (SINGULAIR) 10 MG tablet Take 1 tablet by mouth nightly 30 tablet 3    Microlet Lancets MISC USE 1 LANCET TO CHECK GLUCOSE ONCE DAILY AND AS NEEDED      sacubitril-valsartan (ENTRESTO) 24-26 MG per tablet Take 1 tablet by mouth 2 times daily 180 tablet 3    aspirin 81 MG tablet Take 1 tablet by mouth daily      magnesium hydroxide (MILK OF MAGNESIA) 400 MG/5ML suspension Take by mouth daily as needed for Constipation         Objective    BP (!) 102/59   Pulse 84   Temp 98.7 °F (37.1 °C) (Oral)   Resp 17   Ht 1.778 m (5' 10\")   Wt 105.1 kg (231 lb 9.6 oz)   SpO2 96%   BMI 33.23 kg/m²     LABS:  WBC:    Lab Results   Component Value Date/Time    WBC 11.8 09/02/2024 04:19 AM     H/H:    Lab Results   Component Value Date/Time    HGB 9.6 09/02/2024 04:19 AM    HCT 30.3 09/02/2024 04:19 AM     PTT:    Lab Results   Component Value Date/Time    APTT 30.6 09/01/2024 11:38 AM    APTT 31.0 10/03/2022 07:57 AM   [APTT}  PT/INR:    Lab Results   Component Value Date/Time    PROTIME 14.0 09/01/2024 11:38 AM    INR 1.1 09/01/2024 11:38 AM     HgBA1c:    Lab Results   Component Value Date/Time    LABA1C 8.5 08/30/2024 12:46 PM       Assessment   Cruz Risk Score: Cruz Scale Score: 12    Patient Active Problem List   Diagnosis Code    Senile nuclear sclerosis H25.10    Gross hematuria R31.0    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

## 2024-09-03 NOTE — PLAN OF CARE
Problem: Chronic Conditions and Co-morbidities  Goal: Patient's chronic conditions and co-morbidity symptoms are monitored and maintained or improved  Outcome: Progressing     Problem: Discharge Planning  Goal: Discharge to home or other facility with appropriate resources  Outcome: Progressing     Problem: Safety - Adult  Goal: Free from fall injury  9/3/2024 0543 by Antionette Guzmán RN  Outcome: Progressing

## 2024-09-03 NOTE — CONSULTS
Department of Neurosurgery                                                             Consult Note      Reason for Consult: Progressive quadriplegia  Requesting Physician:   Neurosurgeon:   [] Dr. Pitts   [] Dr. ORDONEZ  [] Dr. Jones  [x] Jameel    History Obtained From:  patient, electronic medical record    CHIEF COMPLAINT:         Quadriplegia    HISTORY OF PRESENT ILLNESS:       The patient is a right handed, 84 y.o. male with past medical hx of CAD, CHF status post AICD, CKD, DM, Parkinson disease presented with a 10-month history of progressive quadriplegia.  Patient states that he began noticing weakness in his legs and gait approximately 10 months ago.  He was initially using a walker but now is completely nonambulatory on July.  About 2 months ago began noticing weakness in bilateral upper extremities.  he feels like he cannot move at all and presents to the emergency department at [facility].  There was initial concern of Guillain-Barré syndrome and patient was transferred to [facility] for further workup.  Has had constipation for several months now.  Is short of breath but does have concomitant severe CHF and COPD.  Denies any urinary retention at this time. On arrival patient is generally weak.  Evidence of pitting edema in upper extremities.  Lower extremities and boots due to foot drop.  Patient does appear short of breath and hypophonic.  On exam patient has symmetric weakness with 2/5 in the right upper and lower extremity and 2/5 in the left upper, 2/5 in the left lower.  Reflexes 3+ in bilateral upper extremities at the BR, biceps and triceps negative Linda's.  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  Repeated CT 
[facility]   Consult Note         Today's Date: 9/2/2024  Patient Name: Moise Diallo  Date of admission: 8/30/2024  3:26 PM  Patient's age: 84 y.o., 1940  Admission Dx: Guillain Barré syndrome (HCC) [G61.0]    Reason for Consult:  Cardiac evaluation    Requesting Physician: Jose Boo MD    REASON FOR CONSULT:  Cardiac Clearance.     History Obtained From:  Patient, chart, staff, records    HISTORY OF PRESENT ILLNESS:      The patient is a 84 y.o. male with a history of hypertension hyperlipidemia, diabetes, CHF, CKD admitted with bilateral neuromuscular weakness.  Patient has been diagnosed significant cervical myelopathy.  He is being planned for posterior cervical laminectomy.  Cardiology service consulted for preoperative evaluation.    Outpatient Cardiology Note:   He is a 84 y.o. male with a history of severe coronary artery disease, reduced EF and an ICD. He has two brothers with heart disease he is unsure of details or ages when the problems started. One brother has a pacemaker and the other brother has had open heart surgery.  He believes they had heart attacks in the past. Also reports mother and father with CAD, he believes everyone was in their 70's prior to having any issues. He is former smoker quit over 60 years ago. He smoked 1/2 pack per day for 3 years. He was diagnosed with hypertension for only a few years and hyperlipidemia and diabetes. He denies having sleep apnea. He states until he was 72 years old he never seen a provider and never went to the doctor's office. He has urinary retention and has to self cath once daily.         Past Medical History:   has a past medical history of Acute renal failure superimposed on stage 3 chronic kidney disease (HCC), AICD (automatic cardioverter/defibrillator) present, Arthritis, CHF (congestive heart failure) (HCC), Chronic combined systolic and diastolic CHF, NYHA class 3 (HCC), Diabetes mellitus (HCC), Hyperlipidemia, 
1 tablet by mouth daily      magnesium hydroxide (MILK OF MAGNESIA) 400 MG/5ML suspension Take by mouth daily as needed for Constipation         Data         BP (!) 102/59   Pulse 84   Temp 98.7 °F (37.1 °C) (Oral)   Resp 17   Ht 1.778 m (5' 10\")   Wt 105.1 kg (231 lb 9.6 oz)   SpO2 96%   BMI 33.23 kg/m²     Wt Readings from Last 3 Encounters:   08/30/24 105.1 kg (231 lb 9.6 oz)   08/30/24 105.1 kg (231 lb 9.6 oz)   08/30/24 103.9 kg (229 lb)        Code Status: DNR-CCA     ADVANCED CARE PLANNING:  Patient has capacity for medical decisions: yes  Health Care Power of : yes - wife  Living Will: not asked    Personal, Social, and Family History  Marital Status:   Living situation: nursing home  Does patient understand diagnosis/treatment? yes  Does caregiver understand diagnosis/treatment? yes    Assessment        REVIEW OF SYSTEMS  CONSTITUTIONAL:  negative for fevers, chills, sweats, fatigue, weight loss  HEENT:  negative for vision, hearing changes, runny nose, throat pain  RESPIRATORY:  negative for shortness of breath, cough, congestion, wheezing  CARDIOVASCULAR:  negative for chest pain, palpitations  GASTROINTESTINAL:  negative for nausea, vomiting, diarrhea, constipation  MUSCULOSKELETAL:  negative joint pains, muscle aches, swelling of joints  NEUROLOGICAL: reports weakness of his extremities; negative for headaches, dizziness, lightheadedness, numbness, pain, tingling extremities  BEHAVIOR/PSYCH:  negative for depression, anxiety    PHYSICAL ASSESSMENT:  General Appearance: alert, well appearing, obese  gentleman resting supine in bed, in no acute distress  Mental status: oriented to person, place, and time  Head: normocephalic, atraumatic  Eye: no icterus, redness, pupils equal and reactive, extraocular eye movements intact, conjunctiva clear  Ear: normal external ear, no discharge, hearing intact  Nose: no drainage noted, simple cannula present  Mouth: mucous membranes 
light touch. DTRs absent. Absent sensation distal to T10.  MSK: No functional ROM. Muscle tone and bulk are normal bilaterally. Strength B  3/5. B shoulder flexion 1/5. 0/5 key muscles BLes.   EXT: No calf tenderness to palpation or edema BLEs.  SKIN: Warm dry and intact with good turgor.  PSYCH: Mood WNL. Affect WNL. Appropriately interactive.    Impression:    Quadriplegia secondary to cervical stenosis C2-5  Neurogenic bladder: indwelling Schulz  Neurogenic bowel: will benefit from SCI bowel program    Recommendations:    Diagnosis:  Quadriplegia secondary to cervical stenosis  Therapy: Has PT/OT needs  Medical Necessity: As above  Support: Good support from family  Rehab Recommendation: Agree with patient's plan to discharge to subacute rehab. Power chair with head controls is already ordered. He will benefit from spinal cord bowel program to regulate bowels. Has indwelling schulz for bladder management. Will benefit from outpatient PM&R follow up for spinal cord management.   DVT Prophylaxis: lovenox on hold    It was my pleasure to evaluate Moise Diallo today.  Please call with questions.    JOVANY MANUEL MD        This note is created with the assistance of a speech recognition program.  While intending to generate a document that actually reflects the content of the visit, the document can still have some errors including those of syntax and sound a like substitutions which may escape proof reading.  In such instances, actual meaning can be extrapolated by contextual diversion.     
with basilar atelectasis.         MRI CERVICAL SPINE WO CONTRAST   Final Result   1. Severe central spinal canal narrowing at C2-C3 and C3-C4 with associated   areas of myelomalacia in the cervical spinal cord.   2. Moderate-severe central spinal canal narrowing at C4-C5 with mild   compression of the cervical spinal cord.   3. Moderate central spinal canal narrowing at C5-C6 with flattening of the   cervical spinal cord.   4. Multilevel foraminal narrowing as described above.   5. Paraspinal edema, most pronounced on the right at C3-C4, likely related to   muscular injury/strain.   6. 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         CT CERVICAL SPINE WO CONTRAST   Final Result   1. No acute osseous abnormality of the cervical spine.   2. Multilevel spondylosis and facet arthropathy with multilevel neural   foraminal narrowing and canal stenosis as described above.  Canal stenosis   most severe at C2-C3.  Little change from prior study.   3. Atherosclerotic disease of the coronary aorta and carotids.   4. Esophageal wall thickening, symmetrical.              ECHOCARDIOGRAM:   Results for orders placed during the hospital encounter of 06/21/24    Echo (TTE) complete (PRN contrast/bubble/strain/3D)    Interpretation Summary    Left Ventricle: Moderately reduced left ventricular systolic function with a visually estimated EF of 35 - 40%. EF by 2D Simpsons Biplane is 40%. Left ventricle size is normal. Mildly increased wall thickness. Moderate hypokinesis of the following segments: mid inferoseptal, apical inferior and apical septal. Grade I diastolic dysfunction with normal LAP.    Aortic Valve: Mild to moderate regurgitation.    Tricuspid Valve: Mild to moderate regurgitation.    Compared to the previous study on 4/20/23, the patients ejection fraction has improved from  30% to 40% but is otherwise largely unchanged.       ASSESSMENT AND PLAN     PROBLEM LIST:    Patient Active Problem List   Diagnosis

## 2024-09-10 ENCOUNTER — TELEPHONE (OUTPATIENT)
Age: 84
End: 2024-09-10

## 2024-10-01 ENCOUNTER — OFFICE VISIT (OUTPATIENT)
Dept: UROLOGY | Age: 84
End: 2024-10-01

## 2024-10-01 VITALS — DIASTOLIC BLOOD PRESSURE: 62 MMHG | SYSTOLIC BLOOD PRESSURE: 112 MMHG

## 2024-10-01 DIAGNOSIS — R33.9 URINARY RETENTION: Primary | ICD-10-CM

## 2024-10-01 DIAGNOSIS — R53.81 PHYSICAL DECONDITIONING: ICD-10-CM

## 2024-10-01 DIAGNOSIS — N18.32 STAGE 3B CHRONIC KIDNEY DISEASE (HCC): ICD-10-CM

## 2024-10-01 DIAGNOSIS — N32.0 BNC (BLADDER NECK CONTRACTURE): ICD-10-CM

## 2024-10-01 NOTE — PROGRESS NOTES
History  1/2015 Greenlight PVP     PVR remained elevated post-op, but was improving: Feb 839 mL, March 600 mL, April 505 mL, July 452 mL, Oct 252 mL.     7/2015 Flomax stopped     10/2016 PVR back up, >850 mL. Does not feel full or uncomfortable. He did self cath previously. He does report intermittent urine stream about half the time, mild straining. Offered option of resuming Flomax or resuming self cath qhs. Prefered to start self cath.     1/2017 Gross hematuria and some difficulty with cathing. Cysto showed small caliber BNC.     1/2017 Greenlight PVP and TUIBNC     11/2017 Right HLL     1/2018 Left ESWL    4/2024 Hospital consult. Admitted for generalized weakness and difficulty ambulating. He had a Schulz catheter placed. At her last visit we did have him performing intermittent catheterization at bedtime. Patient was having difficulty performing this due to generalized weakness.     5/2024 schulz removed, resumed cathing QHS    8/2024 multiple hospitalizations since the beginning of the year.  6/21/2024 through 6/24/2024 for heart failure.  7/4/2024 through 7/6/2024 for urinary tract infection and decubitus ulcers.  We were not notified of this admission.  7/8/2024 through 7/10/2024 for shortness of breath and CHF.    Opted to maintain schulz instead of intermittent catheterizations    Today  Here today at the [facility]. Renal function reviewed: creatinine 1.7, GFR 39.  He did have the Schulz catheter changed today per nursing facility staff without difficulty.  He does have a wound VAC currently.  He does require a lift for transfer.  He has not had any recent urinary tract infections.    Plan  We will maintain Schulz catheter.  Staff will changes every 4 weeks per facility protocol    We will see him in 3 months during facility rounds or sooner if needed

## 2024-10-08 ENCOUNTER — TELEPHONE (OUTPATIENT)
Dept: UROLOGY | Age: 84
End: 2024-10-08

## 2024-10-08 NOTE — TELEPHONE ENCOUNTER
Received a fax from the nursing facility that since the patient was readmitted to the facility on 10/01/2024, resident has had catheter changed once due to blockage and also flushed once due to blockage.  They are asking if they can get an order to flush catheter on a routine basis.

## 2024-10-09 NOTE — TELEPHONE ENCOUNTER
Routine flushing of catheter introduces infection. Try changing to a silicone catheter. This has been shown to be better at preventing sediment. Try this first

## 2024-10-14 NOTE — TELEPHONE ENCOUNTER
Called and spoke with Simin and she was advised of the provider response.   She asked if writer would send back the previous note we received from the [place] since she could not find it and asked if we could put Patrica's response on the bottom of it.  She advised that she did not need a signature from the provider.

## 2024-10-15 ENCOUNTER — APPOINTMENT (OUTPATIENT)
Dept: GENERAL RADIOLOGY | Age: 84
End: 2024-10-15
Payer: MEDICARE

## 2024-10-15 ENCOUNTER — HOSPITAL ENCOUNTER (EMERGENCY)
Age: 84
Discharge: ANOTHER HOSPITAL | End: 2024-10-16
Attending: EMERGENCY MEDICINE
Payer: MEDICARE

## 2024-10-15 VITALS
TEMPERATURE: 97.6 F | BODY MASS INDEX: 31.71 KG/M2 | DIASTOLIC BLOOD PRESSURE: 64 MMHG | RESPIRATION RATE: 20 BRPM | SYSTOLIC BLOOD PRESSURE: 120 MMHG | HEART RATE: 91 BPM | WEIGHT: 221 LBS | OXYGEN SATURATION: 100 %

## 2024-10-15 DIAGNOSIS — N17.9 ACUTE RENAL FAILURE, UNSPECIFIED ACUTE RENAL FAILURE TYPE (HCC): Primary | ICD-10-CM

## 2024-10-15 DIAGNOSIS — T83.511A URINARY TRACT INFECTION ASSOCIATED WITH INDWELLING URETHRAL CATHETER, INITIAL ENCOUNTER (HCC): ICD-10-CM

## 2024-10-15 DIAGNOSIS — D50.0 BLOOD LOSS ANEMIA: ICD-10-CM

## 2024-10-15 DIAGNOSIS — N39.0 URINARY TRACT INFECTION ASSOCIATED WITH INDWELLING URETHRAL CATHETER, INITIAL ENCOUNTER (HCC): ICD-10-CM

## 2024-10-15 LAB
ANION GAP SERPL CALCULATED.3IONS-SCNC: 17 MMOL/L (ref 9–16)
BACTERIA URNS QL MICRO: ABNORMAL
BASOPHILS # BLD: 0.08 K/UL (ref 0–0.2)
BASOPHILS NFR BLD: 1 % (ref 0–2)
BILIRUB UR QL STRIP: NEGATIVE
BNP SERPL-MCNC: 7672 PG/ML (ref 0–450)
BUN SERPL-MCNC: 103 MG/DL (ref 8–23)
BUN/CREAT SERPL: 16 (ref 9–20)
CALCIUM SERPL-MCNC: 8.4 MG/DL (ref 8.6–10.4)
CHLORIDE SERPL-SCNC: 103 MMOL/L (ref 98–107)
CLARITY UR: CLEAR
CO2 SERPL-SCNC: 14 MMOL/L (ref 20–31)
COLOR UR: YELLOW
CREAT SERPL-MCNC: 6.6 MG/DL (ref 0.7–1.2)
EOSINOPHIL # BLD: 0.16 K/UL (ref 0–0.44)
EOSINOPHILS RELATIVE PERCENT: 2 % (ref 1–4)
EPI CELLS #/AREA URNS HPF: ABNORMAL /HPF (ref 0–5)
ERYTHROCYTE [DISTWIDTH] IN BLOOD BY AUTOMATED COUNT: 16.3 % (ref 11.8–14.4)
GFR, ESTIMATED: 8 ML/MIN/1.73M2
GLUCOSE SERPL-MCNC: 135 MG/DL (ref 74–99)
GLUCOSE UR STRIP-MCNC: NEGATIVE MG/DL
HCT VFR BLD AUTO: 23.8 % (ref 40.7–50.3)
HGB BLD-MCNC: 7.7 G/DL (ref 13–17)
HGB UR QL STRIP.AUTO: ABNORMAL
IMM GRANULOCYTES # BLD AUTO: 0.08 K/UL (ref 0–0.3)
IMM GRANULOCYTES NFR BLD: 1 %
KETONES UR STRIP-MCNC: NEGATIVE MG/DL
LEUKOCYTE ESTERASE UR QL STRIP: ABNORMAL
LYMPHOCYTES NFR BLD: 1.58 K/UL (ref 1.1–3.7)
LYMPHOCYTES RELATIVE PERCENT: 20 % (ref 24–43)
MCH RBC QN AUTO: 30.8 PG (ref 25.2–33.5)
MCHC RBC AUTO-ENTMCNC: 32.4 G/DL (ref 28.4–34.8)
MCV RBC AUTO: 95.2 FL (ref 82.6–102.9)
MONOCYTES NFR BLD: 1.03 K/UL (ref 0.1–1.2)
MONOCYTES NFR BLD: 13 % (ref 3–12)
MORPHOLOGY: ABNORMAL
NEUTROPHILS NFR BLD: 63 % (ref 36–65)
NEUTS SEG NFR BLD: 4.97 K/UL (ref 1.5–8.1)
NITRITE UR QL STRIP: NEGATIVE
NRBC BLD-RTO: 0 PER 100 WBC
PH UR STRIP: 6 [PH] (ref 5–9)
PLATELET # BLD AUTO: ABNORMAL K/UL (ref 138–453)
PLATELET, FLUORESCENCE: 96 K/UL (ref 138–453)
PLATELETS.RETICULATED NFR BLD AUTO: 5 % (ref 1.1–10.3)
POTASSIUM SERPL-SCNC: 4.7 MMOL/L (ref 3.7–5.3)
PROT UR STRIP-MCNC: ABNORMAL MG/DL
RBC # BLD AUTO: 2.5 M/UL (ref 4.21–5.77)
RBC #/AREA URNS HPF: ABNORMAL /HPF (ref 0–2)
SODIUM SERPL-SCNC: 134 MMOL/L (ref 136–145)
SP GR UR STRIP: 1.02 (ref 1.01–1.02)
TROPONIN I SERPL HS-MCNC: 258 NG/L (ref 0–22)
TROPONIN I SERPL HS-MCNC: 265 NG/L (ref 0–22)
UROBILINOGEN UR STRIP-ACNC: NORMAL EU/DL (ref 0–1)
WBC #/AREA URNS HPF: ABNORMAL /HPF (ref 0–5)
WBC OTHER # BLD: 7.9 K/UL (ref 3.5–11.3)
YEAST URNS QL MICRO: ABNORMAL

## 2024-10-15 PROCEDURE — 2580000003 HC RX 258: Performed by: EMERGENCY MEDICINE

## 2024-10-15 PROCEDURE — 51702 INSERT TEMP BLADDER CATH: CPT

## 2024-10-15 PROCEDURE — 96374 THER/PROPH/DIAG INJ IV PUSH: CPT

## 2024-10-15 PROCEDURE — 99285 EMERGENCY DEPT VISIT HI MDM: CPT

## 2024-10-15 PROCEDURE — 87086 URINE CULTURE/COLONY COUNT: CPT

## 2024-10-15 PROCEDURE — 6360000002 HC RX W HCPCS: Performed by: EMERGENCY MEDICINE

## 2024-10-15 PROCEDURE — 71045 X-RAY EXAM CHEST 1 VIEW: CPT

## 2024-10-15 PROCEDURE — 80048 BASIC METABOLIC PNL TOTAL CA: CPT

## 2024-10-15 PROCEDURE — 84484 ASSAY OF TROPONIN QUANT: CPT

## 2024-10-15 PROCEDURE — 81001 URINALYSIS AUTO W/SCOPE: CPT

## 2024-10-15 PROCEDURE — 36415 COLL VENOUS BLD VENIPUNCTURE: CPT

## 2024-10-15 PROCEDURE — 83880 ASSAY OF NATRIURETIC PEPTIDE: CPT

## 2024-10-15 PROCEDURE — 85025 COMPLETE CBC W/AUTO DIFF WBC: CPT

## 2024-10-15 PROCEDURE — 93005 ELECTROCARDIOGRAM TRACING: CPT | Performed by: EMERGENCY MEDICINE

## 2024-10-15 RX ORDER — INSULIN GLARGINE 100 [IU]/ML
20 INJECTION, SOLUTION SUBCUTANEOUS 2 TIMES DAILY
Status: ON HOLD | COMMUNITY
Start: 2024-09-30 | End: 2024-10-17 | Stop reason: HOSPADM

## 2024-10-15 RX ORDER — ONDANSETRON 4 MG/1
4 TABLET, FILM COATED ORAL EVERY 8 HOURS PRN
COMMUNITY

## 2024-10-15 RX ORDER — INSULIN ASPART 100 [IU]/ML
2 INJECTION, SOLUTION INTRAVENOUS; SUBCUTANEOUS
COMMUNITY
Start: 2024-09-30

## 2024-10-15 RX ADMIN — WATER 1000 MG: 1 INJECTION INTRAMUSCULAR; INTRAVENOUS; SUBCUTANEOUS at 19:14

## 2024-10-15 NOTE — ED NOTES
Called [facility] for transfer to [facility], they will page hospitalist

## 2024-10-15 NOTE — ED PROVIDER NOTES
[facility] ED  Emergency Department Encounter  Emergency Medicine      Pt Name:Moise Diallo  MRN: [id number]  Birthdate 1940  Date of evaluation: 10/15/24  PCP:  Terrell Chapin MD  4:58 PM EDT      CHIEF COMPLAINT       Chief Complaint   Patient presents with    Abnormal Lab     Patient sent in by nephrologist for abnormal creatinine (6.5) and BUN (99).  Patient sees Dr. Arreola at [facility]       HISTORY OF PRESENT ILLNESS  (Location/Symptom, Timing/Onset, Context/Setting, Quality, Duration, Modifying Factors, Severity.)      Moise Diallo is a 84 y.o. male who presents with being sent over from [place] due to elevated creatine.  Follows with dr. Sage, and had creatine which was 6.5 today.  Patient does have current fluid overload, and swelling  He has neck stenosis and can't move anything from neck down. Has chronic wound to left heel, and wound buttock which is scheduled to be debrided at [facility] surgery center tomorrow.   Colostomy placed last month due to need to divert stool to see if it would help sacral ulcer heal.    PAST MEDICAL / SURGICAL / SOCIAL / FAMILY HISTORY      has a past medical history of Acute renal failure superimposed on stage 3 chronic kidney disease (HCC), AICD (automatic cardioverter/defibrillator) present, Arthritis, CHF (congestive heart failure) ([facility]), Chronic combined systolic and diastolic CHF, NYHA class 3 (HCC), Diabetes mellitus (HCC), Hyperlipidemia, Hypertension, and Kidney stone.       has a past surgical history that includes hernia repair; Cataract removal with implant (Right, 08/26/2013); knee surgery (Right); joint replacement; joint replacement (Right, junu 2014 partial knee); joint replacement (Right, 2014); TURP (01/2017); TURP (01/2015); Lithotripsy (Right, 11/01/2017); pr cysto w/ureteroscopy w/lithotripsy (Right, 11/01/2017); CYSTOSCOPY INSERTION / REMOVAL STENT / STONE (Right, 11/01/2017); cystourethroscopy (11/03/2017); CYSTOSCOPY INSERTION /

## 2024-10-16 ENCOUNTER — APPOINTMENT (OUTPATIENT)
Dept: INTERVENTIONAL RADIOLOGY/VASCULAR | Age: 84
DRG: 682 | End: 2024-10-16
Attending: INTERNAL MEDICINE
Payer: MEDICARE

## 2024-10-16 ENCOUNTER — HOSPITAL ENCOUNTER (INPATIENT)
Age: 84
LOS: 1 days | Discharge: HOSPICE | DRG: 682 | End: 2024-10-17
Payer: MEDICARE

## 2024-10-16 ENCOUNTER — APPOINTMENT (OUTPATIENT)
Dept: ULTRASOUND IMAGING | Age: 84
DRG: 682 | End: 2024-10-16
Payer: MEDICARE

## 2024-10-16 PROBLEM — D63.1 ANEMIA OF CHRONIC RENAL FAILURE, STAGE 5 (HCC): Status: ACTIVE | Noted: 2024-10-16

## 2024-10-16 PROBLEM — E87.5 HYPERKALEMIA: Status: ACTIVE | Noted: 2024-10-16

## 2024-10-16 PROBLEM — E87.70 HYPERVOLEMIA ASSOCIATED WITH RENAL INSUFFICIENCY: Status: ACTIVE | Noted: 2024-10-16

## 2024-10-16 PROBLEM — N18.5 ANEMIA OF CHRONIC RENAL FAILURE, STAGE 5 (HCC): Status: ACTIVE | Noted: 2024-10-16

## 2024-10-16 PROBLEM — N17.9 ACUTE KIDNEY INJURY SUPERIMPOSED ON CHRONIC KIDNEY DISEASE (HCC): Status: ACTIVE | Noted: 2024-10-16

## 2024-10-16 PROBLEM — N28.9 HYPERVOLEMIA ASSOCIATED WITH RENAL INSUFFICIENCY: Status: ACTIVE | Noted: 2024-10-16

## 2024-10-16 PROBLEM — N18.9 ACUTE KIDNEY INJURY SUPERIMPOSED ON CHRONIC KIDNEY DISEASE (HCC): Status: ACTIVE | Noted: 2024-10-16

## 2024-10-16 LAB
ACANTHOCYTES: ABNORMAL
ANION GAP SERPL CALC-SCNC: 15 MEQ/L (ref 8–16)
ANION GAP SERPL CALC-SCNC: 19 MEQ/L (ref 8–16)
ANION GAP SERPL CALC-SCNC: 19 MEQ/L (ref 8–16)
AUTO DIFF PNL BLD: ABNORMAL
BACTERIA URNS QL MICRO: ABNORMAL /HPF
BASOPHILS ABSOLUTE: 0.1 THOU/MM3 (ref 0–0.1)
BASOPHILS ABSOLUTE: 0.1 THOU/MM3 (ref 0–0.1)
BASOPHILS NFR BLD AUTO: 0.8 %
BASOPHILS NFR BLD AUTO: 0.9 %
BILIRUB UR QL STRIP.AUTO: NEGATIVE
BUN SERPL-MCNC: 100 MG/DL (ref 7–22)
BUN SERPL-MCNC: 57 MG/DL (ref 7–22)
BUN SERPL-MCNC: 99 MG/DL (ref 7–22)
CA-I BLD ISE-SCNC: 1.08 MMOL/L (ref 1.12–1.32)
CA-I BLD ISE-SCNC: 1.11 MMOL/L (ref 1.12–1.32)
CALCIUM SERPL-MCNC: 7.9 MG/DL (ref 8.5–10.5)
CALCIUM SERPL-MCNC: 8 MG/DL (ref 8.5–10.5)
CALCIUM SERPL-MCNC: 8.2 MG/DL (ref 8.5–10.5)
CASTS #/AREA URNS LPF: ABNORMAL /LPF
CASTS 2: ABNORMAL /LPF
CHARACTER UR: ABNORMAL
CHLORIDE SERPL-SCNC: 100 MEQ/L (ref 98–111)
CHLORIDE SERPL-SCNC: 101 MEQ/L (ref 98–111)
CHLORIDE SERPL-SCNC: 102 MEQ/L (ref 98–111)
CO2 SERPL-SCNC: 13 MEQ/L (ref 23–33)
CO2 SERPL-SCNC: 13 MEQ/L (ref 23–33)
CO2 SERPL-SCNC: 19 MEQ/L (ref 23–33)
COLOR, UA: YELLOW
CREAT SERPL-MCNC: 4.4 MG/DL (ref 0.4–1.2)
CREAT SERPL-MCNC: 7.2 MG/DL (ref 0.4–1.2)
CREAT SERPL-MCNC: 7.2 MG/DL (ref 0.4–1.2)
CRYSTALS URNS MICRO: ABNORMAL
DEPRECATED RDW RBC AUTO: 54.9 FL (ref 35–45)
DEPRECATED RDW RBC AUTO: 56 FL (ref 35–45)
EKG Q-T INTERVAL: 414 MS
EKG QRS DURATION: 126 MS
EKG QTC CALCULATION (BAZETT): 528 MS
EKG R AXIS: -63 DEGREES
EKG T AXIS: 51 DEGREES
EKG VENTRICULAR RATE: 98 BPM
EOSINOPHIL NFR BLD AUTO: 2.4 %
EOSINOPHIL NFR BLD AUTO: 2.4 %
EOSINOPHILS ABSOLUTE: 0.2 THOU/MM3 (ref 0–0.4)
EOSINOPHILS ABSOLUTE: 0.2 THOU/MM3 (ref 0–0.4)
EPITHELIAL CELLS, UA: ABNORMAL /HPF
ERYTHROCYTE [DISTWIDTH] IN BLOOD BY AUTOMATED COUNT: 16.5 % (ref 11.5–14.5)
ERYTHROCYTE [DISTWIDTH] IN BLOOD BY AUTOMATED COUNT: 16.6 % (ref 11.5–14.5)
GFR SERPL CREATININE-BSD FRML MDRD: 13 ML/MIN/1.73M2
GFR SERPL CREATININE-BSD FRML MDRD: 7 ML/MIN/1.73M2
GFR SERPL CREATININE-BSD FRML MDRD: 7 ML/MIN/1.73M2
GLUCOSE BLD STRIP.AUTO-MCNC: 102 MG/DL (ref 70–108)
GLUCOSE BLD STRIP.AUTO-MCNC: 77 MG/DL (ref 70–108)
GLUCOSE BLD STRIP.AUTO-MCNC: 82 MG/DL (ref 70–108)
GLUCOSE SERPL-MCNC: 67 MG/DL (ref 70–108)
GLUCOSE SERPL-MCNC: 71 MG/DL (ref 70–108)
GLUCOSE SERPL-MCNC: 85 MG/DL (ref 70–108)
GLUCOSE UR QL STRIP.AUTO: NEGATIVE MG/DL
HBV SURFACE AB SER QL IA: NEGATIVE
HBV SURFACE AG SERPL QL IA: NEGATIVE
HCT VFR BLD AUTO: 23.2 % (ref 42–52)
HCT VFR BLD AUTO: 24.2 % (ref 42–52)
HGB BLD-MCNC: 7.3 GM/DL (ref 14–18)
HGB BLD-MCNC: 7.7 GM/DL (ref 14–18)
HGB UR QL STRIP.AUTO: ABNORMAL
IMM GRANULOCYTES # BLD AUTO: 0.07 THOU/MM3 (ref 0–0.07)
IMM GRANULOCYTES # BLD AUTO: 0.08 THOU/MM3 (ref 0–0.07)
IMM GRANULOCYTES NFR BLD AUTO: 0.9 %
IMM GRANULOCYTES NFR BLD AUTO: 1 %
KETONES UR QL STRIP.AUTO: NEGATIVE
LACTATE SERPL-SCNC: 1.2 MMOL/L (ref 0.5–2)
LYMPHOCYTES ABSOLUTE: 1.7 THOU/MM3 (ref 1–4.8)
LYMPHOCYTES ABSOLUTE: 1.9 THOU/MM3 (ref 1–4.8)
LYMPHOCYTES NFR BLD AUTO: 22.5 %
LYMPHOCYTES NFR BLD AUTO: 23 %
MCH RBC QN AUTO: 30.2 PG (ref 26–33)
MCH RBC QN AUTO: 30.4 PG (ref 26–33)
MCHC RBC AUTO-ENTMCNC: 31.5 GM/DL (ref 32.2–35.5)
MCHC RBC AUTO-ENTMCNC: 31.8 GM/DL (ref 32.2–35.5)
MCV RBC AUTO: 95.7 FL (ref 80–94)
MCV RBC AUTO: 95.9 FL (ref 80–94)
MISCELLANEOUS 2: ABNORMAL
MONOCYTES ABSOLUTE: 1.3 THOU/MM3 (ref 0.4–1.3)
MONOCYTES ABSOLUTE: 1.3 THOU/MM3 (ref 0.4–1.3)
MONOCYTES NFR BLD AUTO: 16 %
MONOCYTES NFR BLD AUTO: 17 %
NEUTROPHILS ABSOLUTE: 4.2 THOU/MM3 (ref 1.8–7.7)
NEUTROPHILS ABSOLUTE: 4.6 THOU/MM3 (ref 1.8–7.7)
NEUTROPHILS NFR BLD AUTO: 56.4 %
NEUTROPHILS NFR BLD AUTO: 56.7 %
NITRITE UR QL STRIP: NEGATIVE
NRBC BLD AUTO-RTO: 0 /100 WBC
NRBC BLD AUTO-RTO: 0 /100 WBC
PATHOLOGIST REVIEW: ABNORMAL
PH UR STRIP.AUTO: 6 [PH] (ref 5–9)
PHOSPHATE SERPL-MCNC: 5.7 MG/DL (ref 2.4–4.7)
PHOSPHATE SERPL-MCNC: 5.9 MG/DL (ref 2.4–4.7)
PLATELET # BLD AUTO: 84 THOU/MM3 (ref 130–400)
PLATELET # BLD AUTO: 86 THOU/MM3 (ref 130–400)
PLATELET BLD QL SMEAR: ABNORMAL
PMV BLD AUTO: 11.8 FL (ref 9.4–12.4)
PMV BLD AUTO: 11.9 FL (ref 9.4–12.4)
POTASSIUM SERPL-SCNC: 3.8 MEQ/L (ref 3.5–5.2)
POTASSIUM SERPL-SCNC: 4.5 MEQ/L (ref 3.5–5.2)
POTASSIUM SERPL-SCNC: 4.8 MEQ/L (ref 3.5–5.2)
PROT UR STRIP.AUTO-MCNC: 100 MG/DL
RBC # BLD AUTO: 2.42 MILL/MM3 (ref 4.7–6.1)
RBC # BLD AUTO: 2.53 MILL/MM3 (ref 4.7–6.1)
RBC URINE: > 100 /HPF
RENAL EPI CELLS #/AREA URNS HPF: ABNORMAL /[HPF]
SCAN OF BLOOD SMEAR: NORMAL
SCHISTOCYTES BLD QL SMEAR: ABNORMAL
SODIUM SERPL-SCNC: 133 MEQ/L (ref 135–145)
SODIUM SERPL-SCNC: 134 MEQ/L (ref 135–145)
SODIUM SERPL-SCNC: 134 MEQ/L (ref 135–145)
SP GR UR REFRACT.AUTO: 1.02 (ref 1–1.03)
TROPONIN, HIGH SENSITIVITY: 283 NG/L (ref 0–12)
TROPONIN, HIGH SENSITIVITY: 295 NG/L (ref 0–12)
UROBILINOGEN, URINE: 0.2 EU/DL (ref 0–1)
WBC # BLD AUTO: 7.5 THOU/MM3 (ref 4.8–10.8)
WBC # BLD AUTO: 8.1 THOU/MM3 (ref 4.8–10.8)
WBC #/AREA URNS HPF: > 200 /HPF
WBC #/AREA URNS HPF: ABNORMAL /[HPF]
YEAST LIKE FUNGI URNS QL MICRO: ABNORMAL

## 2024-10-16 PROCEDURE — 36556 INSERT NON-TUNNEL CV CATH: CPT

## 2024-10-16 PROCEDURE — 76937 US GUIDE VASCULAR ACCESS: CPT

## 2024-10-16 PROCEDURE — 83605 ASSAY OF LACTIC ACID: CPT

## 2024-10-16 PROCEDURE — 82330 ASSAY OF CALCIUM: CPT

## 2024-10-16 PROCEDURE — 84100 ASSAY OF PHOSPHORUS: CPT

## 2024-10-16 PROCEDURE — 6370000000 HC RX 637 (ALT 250 FOR IP): Performed by: PHYSICIAN ASSISTANT

## 2024-10-16 PROCEDURE — 99223 1ST HOSP IP/OBS HIGH 75: CPT | Performed by: INTERNAL MEDICINE

## 2024-10-16 PROCEDURE — 85025 COMPLETE CBC W/AUTO DIFF WBC: CPT

## 2024-10-16 PROCEDURE — 5A1D70Z PERFORMANCE OF URINARY FILTRATION, INTERMITTENT, LESS THAN 6 HOURS PER DAY: ICD-10-PCS

## 2024-10-16 PROCEDURE — 86706 HEP B SURFACE ANTIBODY: CPT

## 2024-10-16 PROCEDURE — 1200000003 HC TELEMETRY R&B

## 2024-10-16 PROCEDURE — 2709999900 IR FLUORO GUIDED CVA DEVICE PLMT/REPLACE/REMOVAL

## 2024-10-16 PROCEDURE — 99223 1ST HOSP IP/OBS HIGH 75: CPT | Performed by: PHYSICIAN ASSISTANT

## 2024-10-16 PROCEDURE — 87086 URINE CULTURE/COLONY COUNT: CPT

## 2024-10-16 PROCEDURE — 90935 HEMODIALYSIS ONE EVALUATION: CPT

## 2024-10-16 PROCEDURE — 87340 HEPATITIS B SURFACE AG IA: CPT

## 2024-10-16 PROCEDURE — 2580000003 HC RX 258: Performed by: PHYSICIAN ASSISTANT

## 2024-10-16 PROCEDURE — 81001 URINALYSIS AUTO W/SCOPE: CPT

## 2024-10-16 PROCEDURE — 93010 ELECTROCARDIOGRAM REPORT: CPT | Performed by: FAMILY MEDICINE

## 2024-10-16 PROCEDURE — 84484 ASSAY OF TROPONIN QUANT: CPT

## 2024-10-16 PROCEDURE — 82948 REAGENT STRIP/BLOOD GLUCOSE: CPT

## 2024-10-16 PROCEDURE — 80048 BASIC METABOLIC PNL TOTAL CA: CPT

## 2024-10-16 PROCEDURE — P9047 ALBUMIN (HUMAN), 25%, 50ML: HCPCS | Performed by: INTERNAL MEDICINE

## 2024-10-16 PROCEDURE — 2500000003 HC RX 250 WO HCPCS: Performed by: RADIOLOGY

## 2024-10-16 PROCEDURE — 6360000002 HC RX W HCPCS: Performed by: INTERNAL MEDICINE

## 2024-10-16 PROCEDURE — 77001 FLUOROGUIDE FOR VEIN DEVICE: CPT

## 2024-10-16 PROCEDURE — 02H633Z INSERTION OF INFUSION DEVICE INTO RIGHT ATRIUM, PERCUTANEOUS APPROACH: ICD-10-PCS | Performed by: RADIOLOGY

## 2024-10-16 PROCEDURE — 36415 COLL VENOUS BLD VENIPUNCTURE: CPT

## 2024-10-16 PROCEDURE — 51702 INSERT TEMP BLADDER CATH: CPT

## 2024-10-16 RX ORDER — ATORVASTATIN CALCIUM 40 MG/1
40 TABLET, FILM COATED ORAL NIGHTLY
Status: DISCONTINUED | OUTPATIENT
Start: 2024-10-16 | End: 2024-10-17 | Stop reason: HOSPADM

## 2024-10-16 RX ORDER — SODIUM CHLORIDE 0.9 % (FLUSH) 0.9 %
10 SYRINGE (ML) INJECTION EVERY 12 HOURS SCHEDULED
Status: DISCONTINUED | OUTPATIENT
Start: 2024-10-16 | End: 2024-10-17 | Stop reason: HOSPADM

## 2024-10-16 RX ORDER — SODIUM CHLORIDE 0.9 % (FLUSH) 0.9 %
10 SYRINGE (ML) INJECTION PRN
Status: DISCONTINUED | OUTPATIENT
Start: 2024-10-16 | End: 2024-10-17 | Stop reason: HOSPADM

## 2024-10-16 RX ORDER — DEXTROSE MONOHYDRATE 100 MG/ML
INJECTION, SOLUTION INTRAVENOUS CONTINUOUS PRN
Status: DISCONTINUED | OUTPATIENT
Start: 2024-10-16 | End: 2024-10-17 | Stop reason: HOSPADM

## 2024-10-16 RX ORDER — ONDANSETRON 2 MG/ML
4 INJECTION INTRAMUSCULAR; INTRAVENOUS EVERY 6 HOURS PRN
Status: DISCONTINUED | OUTPATIENT
Start: 2024-10-16 | End: 2024-10-17 | Stop reason: HOSPADM

## 2024-10-16 RX ORDER — LIDOCAINE HYDROCHLORIDE 20 MG/ML
INJECTION, SOLUTION INFILTRATION; PERINEURAL PRN
Status: DISCONTINUED | OUTPATIENT
Start: 2024-10-16 | End: 2024-10-17 | Stop reason: HOSPADM

## 2024-10-16 RX ORDER — INSULIN LISPRO 100 [IU]/ML
0-4 INJECTION, SOLUTION INTRAVENOUS; SUBCUTANEOUS
Status: DISCONTINUED | OUTPATIENT
Start: 2024-10-16 | End: 2024-10-17 | Stop reason: HOSPADM

## 2024-10-16 RX ORDER — ASPIRIN 81 MG/1
81 TABLET, CHEWABLE ORAL DAILY
Status: DISCONTINUED | OUTPATIENT
Start: 2024-10-16 | End: 2024-10-17 | Stop reason: HOSPADM

## 2024-10-16 RX ORDER — INSULIN GLARGINE 100 [IU]/ML
20 INJECTION, SOLUTION SUBCUTANEOUS 2 TIMES DAILY
Status: DISCONTINUED | OUTPATIENT
Start: 2024-10-16 | End: 2024-10-17 | Stop reason: HOSPADM

## 2024-10-16 RX ORDER — GLUCAGON 1 MG/ML
1 KIT INJECTION PRN
Status: DISCONTINUED | OUTPATIENT
Start: 2024-10-16 | End: 2024-10-17 | Stop reason: HOSPADM

## 2024-10-16 RX ORDER — POLYETHYLENE GLYCOL 3350 17 G/17G
17 POWDER, FOR SOLUTION ORAL DAILY PRN
Status: DISCONTINUED | OUTPATIENT
Start: 2024-10-16 | End: 2024-10-17 | Stop reason: HOSPADM

## 2024-10-16 RX ORDER — SENNOSIDES A AND B 8.6 MG/1
1 TABLET, FILM COATED ORAL DAILY PRN
Status: DISCONTINUED | OUTPATIENT
Start: 2024-10-16 | End: 2024-10-17 | Stop reason: HOSPADM

## 2024-10-16 RX ORDER — BUMETANIDE 1 MG/1
2 TABLET ORAL EVERY 8 HOURS
Status: DISCONTINUED | OUTPATIENT
Start: 2024-10-16 | End: 2024-10-17 | Stop reason: HOSPADM

## 2024-10-16 RX ORDER — MIDODRINE HYDROCHLORIDE 5 MG/1
2.5 TABLET ORAL 3 TIMES DAILY PRN
Status: DISCONTINUED | OUTPATIENT
Start: 2024-10-16 | End: 2024-10-17 | Stop reason: HOSPADM

## 2024-10-16 RX ORDER — ACETAMINOPHEN 650 MG/1
650 SUPPOSITORY RECTAL EVERY 6 HOURS PRN
Status: DISCONTINUED | OUTPATIENT
Start: 2024-10-16 | End: 2024-10-17 | Stop reason: HOSPADM

## 2024-10-16 RX ORDER — ACETAMINOPHEN 325 MG/1
650 TABLET ORAL EVERY 6 HOURS PRN
Status: DISCONTINUED | OUTPATIENT
Start: 2024-10-16 | End: 2024-10-17 | Stop reason: HOSPADM

## 2024-10-16 RX ORDER — TAMSULOSIN HYDROCHLORIDE 0.4 MG/1
0.4 CAPSULE ORAL EVERY MORNING
Status: DISCONTINUED | OUTPATIENT
Start: 2024-10-16 | End: 2024-10-17 | Stop reason: HOSPADM

## 2024-10-16 RX ORDER — SODIUM CHLORIDE 9 MG/ML
INJECTION, SOLUTION INTRAVENOUS PRN
Status: DISCONTINUED | OUTPATIENT
Start: 2024-10-16 | End: 2024-10-17 | Stop reason: HOSPADM

## 2024-10-16 RX ORDER — MONTELUKAST SODIUM 10 MG/1
10 TABLET ORAL NIGHTLY
Status: DISCONTINUED | OUTPATIENT
Start: 2024-10-16 | End: 2024-10-17 | Stop reason: HOSPADM

## 2024-10-16 RX ORDER — PANTOPRAZOLE SODIUM 40 MG/1
40 TABLET, DELAYED RELEASE ORAL
Status: DISCONTINUED | OUTPATIENT
Start: 2024-10-16 | End: 2024-10-17 | Stop reason: HOSPADM

## 2024-10-16 RX ORDER — METOLAZONE 5 MG/1
5 TABLET ORAL ONCE
Status: COMPLETED | OUTPATIENT
Start: 2024-10-16 | End: 2024-10-16

## 2024-10-16 RX ORDER — ALBUMIN (HUMAN) 12.5 G/50ML
25 SOLUTION INTRAVENOUS ONCE
Status: COMPLETED | OUTPATIENT
Start: 2024-10-16 | End: 2024-10-16

## 2024-10-16 RX ORDER — ONDANSETRON 4 MG/1
4 TABLET, ORALLY DISINTEGRATING ORAL EVERY 8 HOURS PRN
Status: DISCONTINUED | OUTPATIENT
Start: 2024-10-16 | End: 2024-10-17 | Stop reason: HOSPADM

## 2024-10-16 RX ORDER — METOPROLOL SUCCINATE 50 MG/1
50 TABLET, EXTENDED RELEASE ORAL DAILY
Status: DISCONTINUED | OUTPATIENT
Start: 2024-10-16 | End: 2024-10-17 | Stop reason: HOSPADM

## 2024-10-16 RX ORDER — LANOLIN ALCOHOL/MO/W.PET/CERES
6 CREAM (GRAM) TOPICAL NIGHTLY PRN
Status: DISCONTINUED | OUTPATIENT
Start: 2024-10-16 | End: 2024-10-17 | Stop reason: HOSPADM

## 2024-10-16 RX ADMIN — ALBUMIN (HUMAN) 25 G: 0.25 INJECTION, SOLUTION INTRAVENOUS at 11:15

## 2024-10-16 RX ADMIN — SODIUM CHLORIDE, PRESERVATIVE FREE 10 ML: 5 INJECTION INTRAVENOUS at 14:33

## 2024-10-16 RX ADMIN — TAMSULOSIN HYDROCHLORIDE 0.4 MG: 0.4 CAPSULE ORAL at 14:32

## 2024-10-16 RX ADMIN — PANTOPRAZOLE SODIUM 40 MG: 40 TABLET, DELAYED RELEASE ORAL at 06:41

## 2024-10-16 RX ADMIN — BUMETANIDE 2 MG: 1 TABLET ORAL at 20:24

## 2024-10-16 RX ADMIN — APIXABAN 2.5 MG: 2.5 TABLET, FILM COATED ORAL at 20:24

## 2024-10-16 RX ADMIN — ALBUMIN (HUMAN) 25 G: 0.25 INJECTION, SOLUTION INTRAVENOUS at 11:00

## 2024-10-16 RX ADMIN — SODIUM CHLORIDE, PRESERVATIVE FREE 10 ML: 5 INJECTION INTRAVENOUS at 20:24

## 2024-10-16 RX ADMIN — LIDOCAINE HYDROCHLORIDE 6 ML: 20 INJECTION, SOLUTION INFILTRATION; PERINEURAL at 10:02

## 2024-10-16 RX ADMIN — BUMETANIDE 2 MG: 1 TABLET ORAL at 14:32

## 2024-10-16 RX ADMIN — METOLAZONE 5 MG: 5 TABLET ORAL at 14:32

## 2024-10-16 RX ADMIN — APIXABAN 2.5 MG: 2.5 TABLET, FILM COATED ORAL at 14:32

## 2024-10-16 RX ADMIN — MONTELUKAST 10 MG: 10 TABLET, FILM COATED ORAL at 20:24

## 2024-10-16 RX ADMIN — METOPROLOL SUCCINATE 50 MG: 50 TABLET, EXTENDED RELEASE ORAL at 14:32

## 2024-10-16 RX ADMIN — ATORVASTATIN CALCIUM 40 MG: 40 TABLET, FILM COATED ORAL at 20:24

## 2024-10-16 RX ADMIN — ASPIRIN 81 MG 81 MG: 81 TABLET ORAL at 14:32

## 2024-10-16 NOTE — FLOWSHEET NOTE
10/16/24 1030 10/16/24 1350   Vital Signs   BP (!) 116/58 114/62   Temp 97 °F (36.1 °C) 97.5 °F (36.4 °C)   Pulse 96 93   Respirations 14 15   SpO2 97 % 97 %   Weight - Scale 102.9 kg (226 lb 13.7 oz) 101.9 kg (224 lb 10.4 oz)   Weight Method Bed scale Bed scale   Percent Weight Change 0.78 -0.97   Post-Hemodialysis Assessment   Post-Treatment Procedures  --  Blood returned;Catheter Capped, clamped with Saline x2 ports   Machine Disinfection Process  --  Acid/Vinegar Clean;Heat Disinfect;Exterior Machine Disinfection   Blood Volume Processed (Liters)  --  41.4 L   Dialyzer Clearance  --  Lightly streaked   Duration of Treatment (minutes)  --  180 minutes   Heparin Amount Administered During Treatment (mL)  --  0 mL   Hemodialysis Intake (ml)  --  500 ml   Hemodialysis Output (ml)  --  1583 ml   NET Removed (ml)  --  1083   Tolerated Treatment  --  Good     3 hour treatment completed. hypotension noted. albumin 25 gm x 2 administered. uf goal reduced. Patient tolerated remaining treatment well. cath lines flushed with 0.9 nsc, clamped and tegos in place./ report called to primary nurse.

## 2024-10-16 NOTE — PROGRESS NOTES
[facility] Nurse  Progress Note       Moise Diallo  AGE: 84 y.o.   GENDER: male  : 1940  UNIT: 6K-26/026-A  TODAY'S DATE:  10/16/2024  ADMISSION DATE: 10/16/2024  1:36 AM    Subjective   Reason for [facility] Evaluation and Assessment: heel, sacrum      Moise Diallo is a 84 y.o. male referred by:   [x] Physician/ Resident/ PA/ APRN-CNP  [x] Nursing  [] Other:     Wound Identification:  Wound Type:pressure  Wound Location: sacrum, left heel  Established colostomy    Objective     Cruz Risk Score: Cruz Scale Score: 10      Assessment     Encounter: Present to pt room. Pt in bed. Assisted to turn to left side. Wound photo, assessment and treatment recommendations below. Pt follows with [facility] and provider. Had a planned excisional debridement of sacral wound for today. Would recommend wet to dry dressings for now and have procedure rescheduled with provider when discharged. Pt has diverting colostomy. Pouching system changed. Bed in low, call light in reach.      Wound 10/16/24 Heel Left (Active)   Wound Image   10/16/24 1414   Wound Etiology Pressure Stage 4 10/16/24 1414   Dressing Status New dressing applied 10/16/24 1414   Wound Cleansed Cleansed with saline 10/16/24 1414   Dressing/Treatment Hydrofiber;Foam 10/16/24 1414   Offloading for Diabetic Foot Ulcers Offloading ordered;Back offloading shoe 10/16/24 1414   Dressing Change Due 10/17/24 10/16/24 1414   Wound Length (cm) 5.5 cm 10/16/24 1414   Wound Width (cm) 4.2 cm 10/16/24 1414   Wound Depth (cm) 1 cm 10/16/24 1414   Wound Surface Area (cm^2) 23.1 cm^2 10/16/24 1414   Wound Volume (cm^3) 23.1 cm^3 10/16/24 1414   Wound Assessment Pink/red;Slough 10/16/24 1414   Drainage Amount Moderate (25-50%) 10/16/24 1414   Drainage Description Serosanguinous 10/16/24 1414   Odor None 10/16/24 1414   Camelia-wound Assessment Intact 10/16/24 1414   Margins Attached edges 10/16/24 1414   Wound Thickness Description not for  Pressure Injury Full thickness 10/16/24 1414   Number of days: 0               Wound 10/16/24 Perineum (Active)   Wound Image   10/16/24 1414   Wound Etiology Pressure Stage 4 10/16/24 1414   Dressing Status New dressing applied 10/16/24 1414   Wound Cleansed Cleansed with saline 10/16/24 1414   Dressing/Treatment Moist to dry;ABD;Tape/Soft cloth adhesive tape 10/16/24 1414   Dressing Change Due 10/16/24 10/16/24 1414   Wound Length (cm) 12 cm 10/16/24 1414   Wound Width (cm) 9 cm 10/16/24 1414   Wound Depth (cm) 2.4 cm 10/16/24 1414   Wound Surface Area (cm^2) 108 cm^2 10/16/24 1414   Wound Volume (cm^3) 259.2 cm^3 10/16/24 1414   Undermining Starts ___ O'Clock 9 10/16/24 1414   Undermining Ends___ O'Clock 3 10/16/24 1414   Undermining Maxium Distance (cm) 1.5 10/16/24 1414   Wound Assessment Slough;Pink/red 10/16/24 1414   Drainage Amount Large (50-75% saturated) 10/16/24 1414   Drainage Description Serosanguinous;Brown 10/16/24 1414   Odor None 10/16/24 1414   Camelia-wound Assessment Non-blanchable erythema 10/16/24 1414   Margins Attached edges;Unattached edges 10/16/24 1414   Wound Thickness Description not for Pressure Injury Full thickness 10/16/24 1414   Number of days: 0     Plan     Treatment Recommendations:     Sacral wound: Cleanse wound with normal saline or wound cleanser and gauze. Pat dry with clean gauze. Apply saline moist kerlix to wound bed. Cover with ABD pad. Secure with foam tape. Change each shift and as needed.    Left heel wound: Cleanse wound with normal saline or wound cleanser and gauze. Pat dry with clean gauze. Apply opticell ag to wound bed. Cover with sacral foam. Change dressing daily and as needed. Offloading boots to bilateral heels at all times.     Pressure Injury Prevention:   [x] Support Surface: hercules  [] Turned with wedges   [x] Turned with pillows  [x] Offloading boots   [x] No depends  [x] Chux pad  [] External urinary device  [x] Other: schulz catheter    Discharge

## 2024-10-16 NOTE — CONSULTS
[facility]    [address] [id number]  [phone]     Hospital Consult  10/16/2024 9:54 AM    Pt Name:    Moise Diallo  MRN:     [id number]   [id number]  YOB: 1940  Admit Date:    10/16/2024  1:36 AM  Primary Care Physician:  Terrell Chapin MD    CSN Number:   [id number]    Reason for Consult:  WOODROW, CKD  Requesting provider:  Hospitalist    History:   The patient is a 84 y.o. elderly white male with history of hypertension, CKD.  His baseline creatinine appears to be around 1.5-2.0.  Patient follows with nephrologist, Dr. Dietz, at [facility] CKD clinic.  Patient has history of myelopathy and quadriplegia.  Previously he was recommended decompressive laminectomy but apparently patient had declined this.  He does have a history of hypertension, cardiomyopathy with EF of 35 to 40%.  He has an AICD in place.  Patient also has a sacral wound and follows with wound clinic at [facility].  He was supposed to be evaluated today for debridement of his wound with wound VAC placement.  Apparently his creatinine had been rising recently and was followed by his nephrologist.  Patient also has a colostomy in the left lower quadrant.  He has a Fernandes catheter in place.  Patient follows with urology at [facility] and undergoes frequent Fernandes catheter exchanges.  Yesterday he was sent to the emergency room by his nephrologist for elevated creatinine.  His serum creatinine apparently was 6.5 and BUN was close to 100.  He was noted to be extremely fluid overloaded.  He was also noted to have low hemoglobin.  Patient was not able to be admitted at [facility] due to bed shortage and therefore was transferred to [facility].  Patient was admitted by hospitalist services.  Nephrology was consulted this morning.  Had lengthy discussion with the patient.  Discussed his state of renal function.  Discussed that his renal function is very poor.  Patient is  (Medical): No     Lack of Transportation (Non-Medical): No   Physical Activity: Not on file   Stress: Not on file   Social Connections: Not on file   Intimate Partner Violence: Not on file   Housing Stability: Low Risk  (10/16/2024)    Housing Stability Vital Sign     Unable to Pay for Housing in the Last Year: No     Number of Times Moved in the Last Year: 1     Homeless in the Last Year: No       Home Meds:  Prior to Admission medications    Medication Sig Start Date End Date Taking? Authorizing Provider   apixaban (ELIQUIS) 5 MG TABS tablet Take 1 tablet by mouth 2 times daily 9/30/24  Yes ProviderElver MD   insulin aspart (NOVOLOG) 100 UNIT/ML injection vial Inject 2 Units into the skin 3 times daily (before meals) Sliding scale   110-149-2 Units  150-199-4 Units  200-249-6 Units  250-299-8 Units  300-349-10 Units 9/30/24  Yes ProviderElver MD   LANTUS SOLOSTAR 100 UNIT/ML injection pen Inject 20 Units into the skin 2 times daily 9/30/24  Yes ProviderElver MD   ondansetron (ZOFRAN) 4 MG tablet Take 1 tablet by mouth every 8 hours as needed for Nausea or Vomiting   Yes ProviderElver MD   midodrine (PROAMATINE) 5 MG tablet Take 0.5 tablets by mouth 3 times daily as needed (for SBP <100) 9/2/24  Yes Bryan Mantilla S,    tiZANidine (ZANAFLEX) 2 MG tablet Take 1 tablet by mouth every 8 hours as needed   Yes ProviderElver MD   bumetanide (BUMEX) 2 MG tablet Take 1 tablet by mouth daily 7/11/24  Yes Nora Gotti APRN - CNP   pramipexole (MIRAPEX) 0.5 MG tablet Take 1 tablet by mouth nightly as needed (for restless leg) 7/6/24  Yes Bruce Allen MD   atorvastatin (LIPITOR) 40 MG tablet Take 1 tablet by mouth nightly 6/24/24  Yes Nora Gotti APRN - CNP   docusate sodium (COLACE, DULCOLAX) 100 MG CAPS Take 100 mg by mouth 2 times daily 6/24/24  Yes Nora Gotti APRN - CNP   magnesium oxide (MAG-OX) 400 (240 Mg) MG tablet Take 1 tablet by  ultrasound of the kidneys  Check urine sodium, urine chloride  Severe metabolic acidosis.  Await family's decision.  IV bicarbonate +/- dialysis (family agreeable).   Severe volume overload.  Patient almost in anasarca like state  Severe azotemia with subtle uremia  CHFrEF 35 to 40%  AICD status  Sacral decubitus  Diverting colostomy  Myelopathy/quadriplegia  Anemia and thrombocytopenia    D/W patient and primary RN    Thank you for the consult. Please feel free to call me if you have any questions.     Addendum  Spoke with patient's family members on the phone including wife and children.  Discussed renal condition, plans of care, dialysis versus nondialysis support/therapy were all discussed.  Family agreeable to dialysis on limited trial basis.  Indications, R/, B/a were all discussed in detail.  Family is in agreement to proceed forward with dialysis.    Jimmie Bruce MD  [facility]    This report has been created using voice recognition software. It may contain minor errors which are inherent in voice recognition technology.

## 2024-10-16 NOTE — PROGRESS NOTES
Patient planning for discharge tomorrow 10/17/24 to [facility]. Transport set up with [facility] at 11:45 am.    Barrier to discharge: temporary dialysis port, to be pulled by IV team in morning of 10/17/24     Wife Juliette lezama.

## 2024-10-16 NOTE — H&P
Formulation and discussion of sedation / procedure plans, risks, benefits, side effects and alternatives with patient and/or responsible adult completed.    History and Physical reviewed and unchanged.    Electronically signed by Tod Miles MD on 10/16/24 at 9:25 AM EDT

## 2024-10-16 NOTE — PROGRESS NOTES
0945 Pt in specials radiology for temporary dialysis catheter insertion. Discussed procedure with pt and pt verbalizes understanding.   0952 Moved to table and attached to monitor.  1000 Dr Miles to speak to pt.  1005 14 fr x 20 cm Hemo-cath dialysis catheter inserted in right neck. Pt tolerated well  1006 Catheter sutured to right neck.  1008 Tegaderm with CHG applied to site. Site without redness, swelling or hematoma.  1013 Pt positioned on bed for comfort. Transferred to IP dialysis per bed. Report called to RN.

## 2024-10-16 NOTE — PLAN OF CARE
Plan of care.  Patient is 84 years old male, earlier morning admitted to the hospital.  The patient is seen in dialysis unit.  Patient is awake, alert oriented X.3.  Patient complains of fatigue but denies chest pain heart palpitations productive cough diarrhea constipation.  Additionally patient complains of bilateral lower and upper extremity swelling.  Will continue current management.  Nephrology following

## 2024-10-16 NOTE — PROGRESS NOTES
This RN talked with patient, family, and [facility] representative Bridgette further wishes of patient's care. Patient no longer would like to do dialysis and would like to transition to hospice care. Bridgette from the [facility] told this RN that she could set up hospice care at the [facility]. This RN updated hospitalist and nephrologist on updated intentions of care.   Bridgette can be reached at: [phone]

## 2024-10-16 NOTE — H&P
Hospitalist History & Physical         Patient: Moise Diallo 84 y.o. male      : 1940  Date of Admission: 10/16/2024  Date of Service: Pt seen/examined on 10/16/24 and Admitted to Inpatient with expected LOS greater than two midnights due to medical therapy.         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   Continue bumex   Edema likely to primary issue above   Daily weights, strict I and O's     Anemia of CKD  Hgb 7.7   Patient consented to transfusion if blood needed  Transfuse if hgb < 7    Elevated troponins   Troponins likely from primary issue   No chest pains endorsed from patient     Type II diabetes  Lantus 20 units qhs   Low sliding scale insulin   Hypoglycemia treatment     Chronic sacral wound  Was supposed to undergo I&D today at different facility  Wound care consult to address and see if any temporizing measures  Consider general surgery consult     HLD   Statin     DNR   Confirmed with patient regarding DNR-CCA wishes/status       Data reviewed (unless otherwise discussed in assessment/plan)  EKG:  I have reviewed the EKG with the following interpretation: wide complex rhythm with RBBB     Labs: Reviewed, see chart and plan above.       =======================================================================    SUBJECTIVE    Chief Complaint:  acute renal failure     History Of Present Illness:  Moise Diallo is a 84 y.o. male who presents to [facility] with acute renal failure.  Patient was initially to be transferred to [facility] for debridement of his sacral wound but no beds were available and he was  3/22/2024    Left heart cath / coronary angiography performed by Alan Rios MD at [facility] CARDIAC CATH/IR LAB    CATARACT REMOVAL WITH IMPLANT Right 08/26/2013    CYSTOSCOPY INSERTION / REMOVAL STENT / STONE Right 11/01/2017    CYSTOSCOPY STENT INSERTION performed by Tod Patton MD at [facility] OR    CYSTOSCOPY INSERTION / REMOVAL STENT / STONE N/A 11/03/2017    CYSTOSCOPY STENT INSERTION performed by Tod Patton MD at [facility] OR    CYSTOURETHROSCOPY  11/03/2017    with stent placement on right side. Changed schulz catheter    FEMORAL BYPASS Left 04/27/2023    LEFT DISTAL SUPERFICIAL FEMORAL ARTERY TO POSTERIOR TIBIAL ARTERY BYPASS performed by Ruddy Trent MD at [facility] CVOR    FEMORAL-TIBIAL BYPASS GRAFT Left 04/27/2023    HERNIA REPAIR      umbilical 1997    INTRACAPSULAR CATARACT EXTRACTION Left 09/28/2020    EYE CATARACT EMULSIFICATION IOL IMPLANT performed by Jorge Luis East DO at [facility] OR    JOINT REPLACEMENT      right knee march 22, 2012    JOINT REPLACEMENT Right junu 2014 partial knee    JOINT REPLACEMENT Right 2014    complete removal    KNEE SURGERY Right     x 2    LITHOTRIPSY Right 11/01/2017    with stent placement - Dr. Patton     LITHOTRIPSY Left 01/16/2018    TN CYSTO W/URETEROSCOPY W/LITHOTRIPSY Right 11/01/2017    CYSTOSCOPY URETEROSCOPY LASER-WITH HLL performed by Tod Patton MD at [facility] OR    TN LITHOTRIPSY XTRCORP SHOCK WAVE Left 01/16/2018    ESWL EXTRACORPEAL SHOCK WAVE LITHOTRIPSY performed by Tod Patton MD at [facility] OR    TURP  01/2017    [facility] PVP and TUIBNC    TURP  01/2015    [facility] PVP    VASCULAR SURGERY Left 04/13/2023    Left Lower Angio    VASCULAR SURGERY Right 04/13/2023    LOWER EXTERMITY ARTERIOGRAM, performed by Ruddy Trent MD at [facility] CVOR         Problem Relation Age of Onset    Heart Failure Brother      Social History     Socioeconomic History    Marital status:    Tobacco Use    Smoking status: Former     Current packs/day: 0.00

## 2024-10-16 NOTE — CARE COORDINATION
10/16/24, 8:43 AM EDT  Discharge Planning Evaluation  Social work consult received, patient from [facility].    Patient/Family preference is to return to the [facility].    The patient's current payor source at the facility is medicare.   Medicare skilled days available: yes  Medicare does the patient have a three midnight qualifying stay? Admitted 10/16/24  Insurance precert:  no  Spoke with Nathalie at the facility.  Patient bed hold: unofficial- pt has used ALL of his Medicaid bed hold days  Anticipated transport plan: ambulance  Patient's Healthcare Decision Maker: Named in Scanned ACP Document    SW spoke with Nathalie at [facility], pt has used all of his medicaid bed hold days.  Nathalie stated that they \"should have a bed for pt to return to\".  JENNYFER requested that she contact this  if this bed status should change.  Phone number provided.      Readmission Risk Low 0-14, Mod 15-19), High > 20: Readmission Risk Score: 26.4    Current PCP: Terrell Chapin MD  PCP verified by CM? Yes    Patient Orientation: Alert and Oriented    Patient Cognition: Alert  History Provided by: Patient, Other (see comment), Medical Record (ECF)    Advance Directives:      Code Status: DNR-CCA   Patient's Primary Decision Maker is: Named in Scanned ACP Document    Primary Decision Maker: Roseann Diallo - Spouse - [phone]     Discharge Planning:    Patient lives with: Other (Comment) (ECF) Type of Home: Long-Term Care  Primary Care Giver: Other (Comment) ([facility] STAFF)  Patient Support Systems include: Spouse/Significant Other   Current Financial resources: Medicaid, Medicare  Current community resources:  ([facility])  Current services prior to admission: Extended Care Facility            Current DME:              Type of Home Care services:  None    ADLS  Prior functional level: Assistance with the following:, Bathing, Dressing, Toileting, Cooking, Housework, Shopping  Current functional level: Assistance with the

## 2024-10-16 NOTE — PLAN OF CARE
Problem: Chronic Conditions and Co-morbidities  Goal: Patient's chronic conditions and co-morbidity symptoms are monitored and maintained or improved  Outcome: Progressing  Flowsheets (Taken 10/16/2024 0353)  Care Plan - Patient's Chronic Conditions and Co-Morbidity Symptoms are Monitored and Maintained or Improved: Monitor and assess patient's chronic conditions and comorbid symptoms for stability, deterioration, or improvement     Problem: Discharge Planning  Goal: Discharge to home or other facility with appropriate resources  Outcome: Progressing  Flowsheets (Taken 10/16/2024 0353)  Discharge to home or other facility with appropriate resources: Identify barriers to discharge with patient and caregiver     Problem: Safety - Adult  Goal: Free from fall injury  Outcome: Progressing  Flowsheets (Taken 10/16/2024 0353)  Free From Fall Injury: Instruct family/caregiver on patient safety     Problem: ABCDS Injury Assessment  Goal: Absence of physical injury  Outcome: Progressing  Flowsheets (Taken 10/16/2024 0353)  Absence of Physical Injury: Implement safety measures based on patient assessment     Problem: Skin/Tissue Integrity  Goal: Absence of new skin breakdown  Description: 1.  Monitor for areas of redness and/or skin breakdown  2.  Assess vascular access sites hourly  3.  Every 4-6 hours minimum:  Change oxygen saturation probe site  4.  Every 4-6 hours:  If on nasal continuous positive airway pressure, respiratory therapy assess nares and determine need for appliance change or resting period.  Outcome: Progressing  Note: No new skin breakdown noted.  Encouraged patient to reposition in bed.

## 2024-10-16 NOTE — ED NOTES
Dr. Bean spoke with patient who states he is okay with going to [facility] rather than [facility].

## 2024-10-16 NOTE — OP NOTE
Department of Radiology  Post Procedure Progress Note      Pre-Procedure Diagnosis:  Renal Failure    Procedure Performed: Dialysis Catheter insertion     Anesthesia: local     Findings: successful    Immediate Complications:  None    Estimated Blood Loss: minimal    SEE DICTATED PROCEDURE NOTE FOR COMPLETE DETAILS.      Tod Miles MD   10/16/2024 10:08 AM

## 2024-10-16 NOTE — PROGRESS NOTES
Noted that facility is helping with setting up hospice care. Patient is out of service area of 50 miles. Will remain available should patient condition change in status.

## 2024-10-16 NOTE — CONSENT
Informed Consent for Blood Component Transfusion Note    I have discussed with the patient the rationale for blood component transfusion; its benefits in treating or preventing fatigue, organ damage, or death; and its risk which includes mild transfusion reactions, rare risk of blood borne infection, or more serious but rare reactions. I have discussed the alternatives to transfusion, including the risk and consequences of not receiving transfusion. The patient had an opportunity to ask questions and had agreed to proceed with transfusion of blood components.    Electronically signed by TAN Alejo on 10/16/24 at 5:04 AM EDT

## 2024-10-16 NOTE — SIGNIFICANT EVENT
CODE STATUS discussion.  Patient and family requests hospice evaluation.  They chose by themselves local at [facility] for further hospice care.  Inpatient hospice care consult placed.     Travelling to [place] and will be visiting [facility] too. Wants travel advice  She is going for 2 weeks in [place]  She has not been getting periods with ocp . She can feel it coming but does not get any bleeding at all  Her anxiety and migraine HA are good with diet    WELL WOMAN EXAM    CHIEF COMPLAINT:  Complete physical     HISTORY OF PRESENT ILLNESS:  Patient is here today for complete physical          Last Pap :  History of abnormal Pap smear:none  Birth control method:ocp  Menstrual problems:none  Genitourinary issues:none  Additional issues to discuss:none    Past Medical History:   Diagnosis Date   • Anxiety    • Panic attacks        History reviewed. No pertinent surgical history.    Current Outpatient Medications   Medication Sig Dispense Refill   • JUNEL 1/20 1-20 MG-MCG per tablet TAKE 1 TABLET BY MOUTH  DAILY 63 tablet 1     No current facility-administered medications for this visit.        ALLERGIES:   Allergen Reactions   • Vicodin [Hydrocodone-Acetaminophen] NAUSEA       Family History   Problem Relation Age of Onset   • High blood pressure Mother    • Heart disease Mother    • Diabetes Father    • Diabetes Paternal Grandmother    • Diabetes Paternal Grandfather        Social History     Socioeconomic History   • Marital status: Single     Spouse name: Not on file   • Number of children: Not on file   • Years of education: Not on file   • Highest education level: Not on file   Occupational History   • Not on file   Social Needs   • Financial resource strain: Not on file   • Food insecurity:     Worry: Not on file     Inability: Not on file   • Transportation needs:     Medical: Not on file     Non-medical: Not on file   Tobacco Use   • Smoking status: Current Some Day Smoker   • Smokeless tobacco: Never Used   • Tobacco comment: occ. Vapor   Substance and Sexual Activity   • Alcohol use: Yes     Frequency: Never     Drinks per session: 1 or 2     Binge frequency: Never     Comment: occasional   •  Drug use: No   • Sexual activity: Yes     Partners: Male   Lifestyle   • Physical activity:     Days per week: Not on file     Minutes per session: Not on file   • Stress: Not on file   Relationships   • Social connections:     Talks on phone: Not on file     Gets together: Not on file     Attends Ep[employer] service: Not on file     Active member of club or organization: Not on file     Attends meetings of clubs or organizations: Not on file     Relationship status: Not on file   • Intimate partner violence:     Fear of current or ex partner: Not on file     Emotionally abused: Not on file     Physically abused: Not on file     Forced sexual activity: Not on file   Other Topics Concern   •  Service Not Asked   • Blood Transfusions Not Asked   • Caffeine Concern Not Asked   • Occupational Exposure Not Asked   • Hobby Hazards Not Asked   • Sleep Concern Not Asked   • Stress Concern Not Asked   • Weight Concern Not Asked   • Special Diet Not Asked   • Back Care Not Asked   • Exercise Not Asked   • Bike Helmet Not Asked   • Seat Belt Yes   • Self-Exams No   Social History Narrative    Single    Lives with 3 children    No pets    Exercise none    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  No fever or chills.  Normal nutritional habits.  ENT:  Ears:  No hearing loss, or recurrent infections.  Nose:  No epistaxis, rhinorrhea, nasal congestion.  Mouth/throat:  No sores in mouth, no recurrent sore throat, hoarseness, dental problems.    RESPIRATORY:  No history of asthma, copd (chronic obstructive pulmonary disease).  Nonsmoker.  No dyspnea.  No chronic cough.  CARDIOVASCULAR:  No history of cardiac disease.  No chest pain.  No shortness of breath at rest or with exertion.  No pain in calves with ambulation.  No palpitations.  No dizziness upon standing.  GASTROINTESTINAL:  No nausea or vomiting.  No diarrhea or constipation.   No bleeding per rectum.  No history of liver disease or pancreatic disease.  No   GERD (gastroesophageal reflux disease), dysphagia or odynophagia.  GENITOURINARY:  No difficulty voiding or recurrent urinary tract infections. No history of renal disease or stones. Regular menses without significant dysmenorrheal symptoms.         Pap smears normal and up-to-date.  SKIN:  No rashes or history of significant skin disease.  No history of skin cancer or pre-cancerous lesions.  HEMATOLOGIC / LYMPHATIC:  No history of anemia or significant blood dyscrasias.  No recurrent enlarged lymph nodes.  No history of hematologic or lymphatic malignancies.    ENDOCRINE:  No history of thyroid disease.  No reported hair loss, cold intolerance, significant weight changes, fatigue.  No history of diabetes.  No polydypsia or polyuria.  No history of cholesterol or triglyceride problems.    MUSCULOSKELETAL:  No joint pain or muscle weakness.  No history of significant arthritis.  No complaints of pain involving neck or back.  NEUROLOGIC:   No history of seizures or other neurological conditions.  No weakness, numbness or tingling in extremities.  No recurrent headaches.  No dizziness.  No difficulty with gait.  No history of CVA (cerebrovascular accident).  PSYCHIATRIC;  No hypersomnolence, agitation, depressed mood, anxiety symptoms or panic symptoms.  No history of treatment for psychiatric illness.  Denies auditory or visual hallucinations.        PHYSICAL EXAM:    GENERAL:  Well-developed, well-hydrated, pleasant female in no acute distress.    Blood pressure 101/69, pulse 60, temperature 98.4 °F (36.9 °C), temperature source Oral, height 5' 1\" (1.549 m), weight 58.7 kg, SpO2 98 %.  HEENT/NECK:  HEAD:  Normocephalic.  EYES: PERRLA, EOMI (Pupils equal, round, reactive to light and accommodation, extraocular movements intact), sclerae and conjuctivae clear, lids normal bilaterally.  EARS:  Normal pinnae, external auditory canals  and tympanic membranes.  Hearing is grossly normal.  NOSE:  Normal turbinates and no significant deviation of septum.  OROPHARYNX/THROAT:  No erythema, exudates or postnasal drip.  No suspicious intraoral lesions.  Normal tonsils, palate elevates normally, uvula in midline.  NECK:  No significant anterior cervical or supraclavicular lymphadenopathy.  Normal sized thyroid to palpation with no identified masses.  No carotid bruits or JVD (jugular venous distention).     HEART:  Regular rate and rhythm.  Normal S1 and S2.    LUNGS:  Clear to auscultation bilaterally with good respiratory effort.  ABDOMEN:  Soft, nontender, positive bowel sounds, no masses or organomegaly.  No hernias appreciated.  EXTREMITIES:  No clubbing, cyanosis, edema.  No nail abnormalities.    MUSCULOSKELETAL:  Examination of the neck, shoulders, elbows, wrists, hands, thoracolumbar spine; hips, knees, ankles and feet reveal no crepitance, deformity or swelling.  Range of motion is normal.  All joints are stable and with normal muscle tone and strength.  Gait is normal.  NEUROLOGICAL:  Cranial nerves II-XII are intact by testing.  Muscle mass is symmetric and normal, and strength is 5/5 and equal bilaterally.  DTRs (Deep tendon reflexes) and gross sensory testing to light touch are normal and symmetric in the upper and lower extremities.    SKIN:  Warm and dry without suspicious lesions or rashes.  BREASTS:  Symmetric and without dimples or nipple retraction bilaterally.  Palpation reveals no suspicious or dominant masses.  No axillary lymphadenopathy is appreciated bilaterally.    PSYCHIATRIC:  Alert and oriented.  Judgment is intact.  Mood and affect are appropriate for situation.    ASSESSMENT:  Well woman exam     Body mass index is 24.43 kg/m².    BMI (BODY MASS INDEX) ASSESSMENT/PLAN:  Patient BMI is within normal range.          DEPRESSION ASSESSMENT/PLAN:  Depression screening is negative no further plan needed.    PLAN:  Pap  Refills:  none  Discussed self-care including diet and exercise as well as calcium supplementation.- advised   Discussed vaccinations including hepatitis B, gardasil, tetanus and influenza.declined pneumo 23   Return for next physical in 1 year (Pap to be performed in 1 years (assuming current Pap is normal).  Constiption; miralax started  Travel advice; typhoid vaccine given, acetazolamide given for altitude sickness prevention

## 2024-10-16 NOTE — CARE COORDINATION
Case Management Assessment Initial Evaluation    Date/Time of Evaluation: 10/16/2024 8:03 AM  Assessment Completed by: Angela Flynn RN    If patient is discharged prior to next notation, then this note serves as note for discharge by case management.    Patient Name: Moise Diallo                   YOB: 1940  Diagnosis: Acute kidney injury superimposed on chronic kidney disease (HCC) [N17.9, N18.9]                   Date / Time: 10/16/2024  1:36 AM  Location: [address] [place] [address]     Patient Admission Status: Inpatient   Readmission Risk Low 0-14, Mod 15-19), High > 20: Readmission Risk Score: 26.5    Current PCP: Terrell Chapin MD  Health Care Decision Makers:   Primary Decision Maker: Roseann Diallo - Spouse - [phone]    Additional Case Management Notes: To ED r/t elevated creatinine (6.6). Hospitalist following. Nephrology to see. Wound ostomy to eval. Telemetry. Fluid restriction. Eliquis. Asa. PO bumex. DM management. Creat 7.2. Anion gap 19.0. Ical 1.08. Troponin 283. Hgb 7.7.     Procedures: none     Imaging:   10/15 CXR: Negative     Patient Goals/Plan/Treatment Preferences: From [facility]. CM assessment deferred to JENNYFER. SW consult placed.

## 2024-10-16 NOTE — DISCHARGE INSTR - COC
Continuity of Care Form    Patient Name: Moise Diallo   :  1940  MRN:  [id number]    Admit date:  10/16/2024  Discharge date:  10/17/24    Code Status Order: DNR-CC   Advance Directives:   Advance Care Flowsheet Documentation             Admitting Physician:  Royer Steward DO  PCP: Terrell Chapin MD    Discharging Nurse: Iris HEATH  Discharging Hospital Unit/Room#: 6K-26/026-A  Discharging Unit Phone Number: [phone]    Emergency Contact:   Extended Emergency Contact Information  Primary Emergency Contact: Roseann Diallo  Address: [address] [place] [address]  Home Phone: [phone]  Relation: Spouse  Hearing or visual needs: None  Other needs: None  Preferred language: English   needed? No  Secondary Emergency Contact: Sophia Diallo   [facility]  Home Phone: [phone]  Relation: Child  Hearing or visual needs: None  Other needs: None  Preferred language: English   needed? No    Past Surgical History:  Past Surgical History:   Procedure Laterality Date    CARDIAC CATHETERIZATION Left 2024    /[facility]/Rt Radial Access    CARDIAC PROCEDURE N/A 3/22/2024    Left heart cath / coronary angiography performed by Alan Rios MD at [facility] CARDIAC CATH/IR LAB    CATARACT REMOVAL WITH IMPLANT Right 2013    CYSTOSCOPY INSERTION / REMOVAL STENT / STONE Right 2017    CYSTOSCOPY STENT INSERTION performed by Tod Patton MD at [facility] OR    CYSTOSCOPY INSERTION / REMOVAL STENT / STONE N/A 2017    CYSTOSCOPY STENT INSERTION performed by Tod Patton MD at [facility] OR    CYSTOURETHROSCOPY  2017    with stent placement on right side. Changed schulz catheter    FEMORAL BYPASS Left 2023    LEFT DISTAL SUPERFICIAL FEMORAL ARTERY TO POSTERIOR TIBIAL ARTERY BYPASS performed by Ruddy Trent MD at [facility] CVOR    FEMORAL-TIBIAL BYPASS GRAFT Left 2023    HERNIA REPAIR    Sepsis associated hypotension ([facility]) A41.9, I95.9    Incomplete bladder emptying R33.9    Hydronephrosis of right kidney N13.30    Bacteremia R78.81    Kidney stones N20.0    Abnormal cardiovascular stress test R94.39    Stage 3b chronic kidney disease ([facility]) N18.32    Athscl native art of left leg w ulcer of heel and midfoot ([facility]) I70.244    Abnormal stress test R94.39    Laceration of right elbow S51.011A    Hypotension I95.9    Urinary retention R33.9    Angina, class III ([facility]) I20.9    Gastritis and duodenitis K29.90    ASHD (arteriosclerotic heart disease) I25.10    Acute on chronic combined systolic and diastolic CHF (congestive heart failure) ([facility]) I50.43    Pressure injury of buttock, stage 2 ([facility]) L89.302    Decubitus ulcer of heel, left, unstageable ([facility]) L89.620    Anasarca R60.1    Congestive heart failure ([facility]) I50.9    Sepsis due to urinary tract infection ([facility]) A41.9, N39.0    Urinary tract infection associated with indwelling urethral catheter ([facility]) T83.511A, N39.0    Shortness of breath R06.02    Chronic combined systolic and diastolic CHF, NYHA class 3 ([facility]) I50.42    Parkinson's disease G20.A1    Guillain Barré syndrome ([facility]) G61.0    Quadriparesis ([facility]) G82.50    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.12    Encounter for palliative care Z51.5    ACP (advance care planning) Z71.89    Acute kidney injury superimposed on chronic kidney disease ([facility]) N17.9, N18.9    Anemia of chronic renal failure, stage 5 ([facility]) N18.5, D63.1    Hyperkalemia E87.5    Hypervolemia associated with renal insufficiency E87.70, N28.9       Isolation/Infection:   Isolation            Contact          Patient Infection Status       Infection Onset Added Last Indicated Last Indicated By Review Planned Expiration Resolved Resolved By    MRSA 04/01/24 04/04/24 07/04/24 Culture, Wound        Foot 7/2024                       Nurse Assessment:  Last Vital Signs: /70   Pulse (!) 101   Temp 97.9 °F (36.6 °C) (Oral)    Wound Thickness Description not for Pressure Injury Full thickness 10/16/24 1414   Number of days: 1       Wound 10/16/24 Toe (Comment  which one) Right Dry, healing wound. (Active)   Dressing Status Clean;Dry;Intact 10/16/24 2000   Wound Cleansed Not Cleansed 10/16/24 2000   Wound Assessment Dry 10/16/24 2000   Drainage Amount None (dry) 10/16/24 2000   Odor None 10/16/24 2000   Number of days: 1       Wound 10/16/24 Brachial Right;Anterior weeping. (Active)   Dressing Status Clean;Dry;Intact 10/16/24 2000   Wound Cleansed Not Cleansed 10/16/24 2000   Dressing/Treatment Moist to dry 10/16/24 2000   Drainage Amount Moderate (25-50%) 10/16/24 2000   Drainage Description Yellow 10/16/24 2000   Odor None 10/16/24 2000   Number of days: 1       Wound 10/16/24 Perineum (Active)   Wound Image   10/16/24 1414   Wound Etiology Pressure Stage 4 10/16/24 1414   Dressing Status Clean;Dry;Intact 10/16/24 2000   Wound Cleansed Irrigated with saline 10/16/24 2000   Dressing/Treatment Moist to dry 10/16/24 2000   Dressing Change Due 10/16/24 10/16/24 1414   Wound Length (cm) 12 cm 10/16/24 1414   Wound Width (cm) 9 cm 10/16/24 1414   Wound Depth (cm) 2.4 cm 10/16/24 1414   Wound Surface Area (cm^2) 108 cm^2 10/16/24 1414   Wound Volume (cm^3) 259.2 cm^3 10/16/24 1414   Undermining Starts ___ O'Clock 9 10/16/24 1414   Undermining Ends___ O'Clock 3 10/16/24 1414   Undermining Maxium Distance (cm) 1.5 10/16/24 1414   Wound Assessment Exposed structure muscle;Exposed structure bone;Bleeding 10/16/24 2000   Drainage Amount Moderate (25-50%) 10/16/24 2000   Drainage Description Thick;Serosanguinous 10/16/24 2000   Odor None 10/16/24 2000   Camelia-wound Assessment Non-blanchable erythema 10/16/24 1414   Margins Attached edges;Unattached edges 10/16/24 1414   Wound Thickness Description not for Pressure Injury Full thickness 10/16/24 1414   Number of days: 1       Incision 04/27/23 Hip Anterior;Left;Medial;Upper;Lower;Inner (Active)

## 2024-10-17 VITALS
RESPIRATION RATE: 18 BRPM | HEART RATE: 85 BPM | TEMPERATURE: 97.8 F | HEIGHT: 70 IN | SYSTOLIC BLOOD PRESSURE: 103 MMHG | BODY MASS INDEX: 32.16 KG/M2 | WEIGHT: 224.65 LBS | OXYGEN SATURATION: 94 % | DIASTOLIC BLOOD PRESSURE: 61 MMHG

## 2024-10-17 LAB
GLUCOSE BLD STRIP.AUTO-MCNC: 120 MG/DL (ref 70–108)
GLUCOSE BLD STRIP.AUTO-MCNC: 86 MG/DL (ref 70–108)
MICROORGANISM SPEC CULT: ABNORMAL
SPECIMEN DESCRIPTION: ABNORMAL

## 2024-10-17 PROCEDURE — 99239 HOSP IP/OBS DSCHRG MGMT >30: CPT

## 2024-10-17 PROCEDURE — 2580000003 HC RX 258: Performed by: PHYSICIAN ASSISTANT

## 2024-10-17 PROCEDURE — 99232 SBSQ HOSP IP/OBS MODERATE 35: CPT | Performed by: INTERNAL MEDICINE

## 2024-10-17 PROCEDURE — 6370000000 HC RX 637 (ALT 250 FOR IP): Performed by: PHYSICIAN ASSISTANT

## 2024-10-17 PROCEDURE — 82948 REAGENT STRIP/BLOOD GLUCOSE: CPT

## 2024-10-17 RX ADMIN — MIDODRINE HYDROCHLORIDE 2.5 MG: 5 TABLET ORAL at 11:08

## 2024-10-17 RX ADMIN — BUMETANIDE 2 MG: 1 TABLET ORAL at 08:40

## 2024-10-17 RX ADMIN — APIXABAN 2.5 MG: 2.5 TABLET, FILM COATED ORAL at 08:40

## 2024-10-17 RX ADMIN — ASPIRIN 81 MG 81 MG: 81 TABLET ORAL at 08:40

## 2024-10-17 RX ADMIN — TIZANIDINE 2 MG: 4 TABLET ORAL at 08:40

## 2024-10-17 RX ADMIN — METOPROLOL SUCCINATE 50 MG: 50 TABLET, EXTENDED RELEASE ORAL at 08:40

## 2024-10-17 RX ADMIN — SODIUM CHLORIDE, PRESERVATIVE FREE 10 ML: 5 INJECTION INTRAVENOUS at 08:40

## 2024-10-17 RX ADMIN — TAMSULOSIN HYDROCHLORIDE 0.4 MG: 0.4 CAPSULE ORAL at 08:40

## 2024-10-17 RX ADMIN — PANTOPRAZOLE SODIUM 40 MG: 40 TABLET, DELAYED RELEASE ORAL at 08:40

## 2024-10-17 NOTE — PROGRESS NOTES
Orders received per Jimmie Bruce MD to remove temporary dialysis catheter. Procedure explained to patient. Patient placed in supine position for procedure and for 30 min post removal. Dressing removed. Site cleansed and sutures removed. Tessio removed and pressure held on site for 5 minutes. Clean dressing applied to site with pressure dressing. Patient informed to call nurse if bleeding occurs, s/s of infection discussed. All questions answered at time of removal. Primary RN notified.

## 2024-10-17 NOTE — PROGRESS NOTES
[facility]   Nephrology progress note  10/17/2024, 9:15 AM      Pt Name:    Moise Diallo  MRN:     [id number]     YOB: 1940  Admit Date:    10/16/2024  1:36 AM    Chief Complaint: Nephrology following for WOODROW/CKD    Subjective:  Patient was seen and examined this morning  Patient had dialysis treatment yesterday  Later in the evening family and patient decided to change goals of care to comfort care and now patient has decided to go on hospice  Patient is very clear about his wishes.  He does not want any further dialysis treatment even if temporary.  He wants his dialysis catheter removed.    Objective:  24HR INTAKE/OUTPUT:    Intake/Output Summary (Last 24 hours) at 10/17/2024 0915  Last data filed at 10/16/2024 2015  Gross per 24 hour   Intake 700 ml   Output 2183 ml   Net -1483 ml         I/O last 3 completed shifts:  In: 700 [P.O.:200]  Out: 2208 [Urine:625]  No intake/output data recorded.   Admission weight: 102.1 kg (225 lb 1.4 oz)  Wt Readings from Last 3 Encounters:   10/16/24 101.9 kg (224 lb 10.4 oz)   10/15/24 100.2 kg (221 lb)   10/08/24 109.6 kg (241 lb 11.2 oz)        Vitals :   Vitals:    10/16/24 2015 10/16/24 2315 10/17/24 0453 10/17/24 0830   BP: (!) 106/56 112/72 104/67 103/70   Pulse: 98 98 90 (!) 101   Resp: 17 18 18 18   Temp: 97.8 °F (36.6 °C) 97.6 °F (36.4 °C) 97.8 °F (36.6 °C) 97.9 °F (36.6 °C)   TempSrc: Oral Oral Oral Oral   SpO2: 98% 96% 98% 95%   Weight:       Height:           Physical examination  General Appearance: alert and cooperative with exam, appears comfortable, no distress  Mouth/Throat: Oral mucosa moist  Neck: No JVD  Lungs:  no use of accessory muscles  Extremities: + LE edema    Medications:  Infusion:    sodium chloride      dextrose       Meds:    sodium chloride flush  10 mL IntraVENous 2 times per day    insulin lispro  0-4 Units SubCUTAneous 4x Daily AC & HS    apixaban  2.5 mg Oral BID    aspirin  81 mg Oral Daily     Associates    This report has been created using voice recognition software. It may contain minor errors which are inherent in voice recognition technology

## 2024-10-17 NOTE — DISCHARGE SUMMARY
Hospital Medicine Discharge Summary      Patient Identification:   Moise Diallo   : 1940  MRN: [id number]   Account: [id number]      Patient's PCP: Terrell Chapin MD    Admit Date: 10/16/2024     Discharge Date: 10/17/2024      Admitting Physician: Royer Steward DO     Discharge Physician: Royer Steward DO     Discharge Diagnoses:    WOODROW on CKD stage IV  Deconditioning  CHF  Moderate to severe anemia of chronic disease  Type 2 diabetes mellitus  Quadriplegia    The patient was seen and examined on day of discharge and this discharge summary is in conjunction with any daily progress note from day of discharge.    Hospital Course:   Moise Diallo is a 84 y.o. male admitted to [facility] on 10/16/2024 for abnormal labs including acute kidney failure he, increasing creatinine levels..        Initial HPI. Moise Diallo is a 84 y.o. male who presents to [facility] with acute renal failure.  Patient was initially to be transferred to [facility] for debridement of his sacral wound but no beds were available and he was transferred to [facility] for escalation of care given his acutely worsening renal functioning. Patient has expressed he does not want \"heroic measures\" wishing to maintain his DNR code status and is okay with \"reasonable\" care interventions. He denies chest pain, shortness of breath  He states his feet are chronically numb and lack sensation up to his lower legs.     Patient is seen by nephrology team to and appreciate recommendations.  Patient received 1 session of hemodialysis.  On 2024.  Patient requested meeting regarding CODE STATUS changes and goals of care discussion.  Patient stated that he is tired of his chronic disease and does not want any more dialysis sessions or any invasive workup or treatment options.  Patient stated that he is willing to go to nursing home with a hospice program.  Family meeting is held.  Discussed with

## 2024-10-17 NOTE — CARE COORDINATION
10/17/24, 10:24 AM EDT    Patient goals/plan/ treatment preferences discussed by  and .  Patient goals/plan/ treatment preferences reviewed with patient/ family.  Patient/ family verbalize understanding of discharge plan and are in agreement with goal/plan/treatment preferences.  Understanding was demonstrated using the teach back method.  AVS provided by RN at time of discharge, which includes all necessary medical information pertaining to the patients current course of illness, treatment, post-discharge goals of care, and treatment preferences.     Services At/After Discharge: Long Term Care, Aide services, Hospice, In ambulance, and Nursing service    Pt is being discharged today back to [facility].      LACP for  at 1 pm.  JENNYFER updated pt and left message with spouse.    JENNYFER notified Bridgette with ECF and Linda with Hospice.  JENNYFER faxed requested clinicals to hospice along with AVS.    JENNYFER faxed AVS to ECF.  IVANA Simmons is aware.

## 2024-10-17 NOTE — PROGRESS NOTES
AVS, last dose MAR, and RISHI printed and in blue envelope. This RN called report to Kaur at [facility]. Patient updated on plan. Transportation should be here at 1330 to transport patient back to [facility].

## 2024-10-18 PROBLEM — N18.4 ACUTE KIDNEY INJURY SUPERIMPOSED ON STAGE 4 CHRONIC KIDNEY DISEASE (HCC): Status: ACTIVE | Noted: 2024-10-18

## 2024-10-18 PROBLEM — R53.81 PHYSICAL DECONDITIONING: Status: ACTIVE | Noted: 2024-10-18

## 2024-10-18 PROBLEM — N17.9 ACUTE KIDNEY INJURY SUPERIMPOSED ON STAGE 4 CHRONIC KIDNEY DISEASE (HCC): Status: ACTIVE | Noted: 2024-10-18

## 2024-10-18 PROBLEM — Z99.2: Status: ACTIVE | Noted: 2024-10-18

## 2024-10-18 LAB
BACTERIA UR CULT: ABNORMAL
ORGANISM: ABNORMAL

## 2024-10-21 ENCOUNTER — TELEPHONE (OUTPATIENT)
Dept: CARDIOLOGY | Age: 84
End: 2024-10-21

## 2024-10-21 NOTE — TELEPHONE ENCOUNTER
Pt currently on hospice and cancelled all his future apts. Also [employer] did deactivate his device. They were wondering if it would be ok with you to also discontinue his fluid restriction at this time.     Please advise.   Thanks!

## 2024-10-23 NOTE — PROGRESS NOTES
Physician Progress Note      PATIENT:               LINDA SANTOS  [facility] #:                  [id number]  :                       1940  ADMIT DATE:       10/16/2024 1:36 AM  DISCH DATE:        10/17/2024 2:45 PM  RESPONDING  PROVIDER #:        Royer Steward DO          QUERY TEXT:    Patient admitted with WOODROW on CKD.  10/16 Nephrology note states, \"WOODROW on CKD   3B...Patient's serum creatinine was 1.7 in 2024.  Now up to   7.2...Patient noted to have severe WOODROW associated with severe azotemia,   metabolic acidemia and fluid overload.\"  10/17 Nephrology note states, \"WOODROW on   CKD 3B...Had dialysis treatment yesterday for metabolic acidosis and fluid   overload...He does not want any further dialysis treatment even if temporary.\"    Discharge summary states, \"WOODROW on CKD stage IV.\"  BUN/CREAT/GFR: (10/16)   99/7.2/7...100/7.2/7...57/.  If possible, please     The medical record reflects the following:  Risk Factors: age 84, chronic HFmrEF, DM 2, severe volume overload  Clinical Indicators: 10/16 Nephrology note states, \"WOODROW on CKD 3B...Patient's   serum creatinine was 1.7 in 2024.  Now up to 7.2...Patient noted to   have severe WOODROW associated with severe azotemia, metabolic acidemia and fluid   overload.\"  10/17 Nephrology note states, \"WOODROW on CKD 3B...Had dialysis   treatment yesterday for metabolic acidosis and fluid overload...He does not   want any further dialysis treatment even if temporary.\"  Discharge summary   states, \"WOODROW on CKD stage IV.\"  BUN/CREAT/GFR: (10/16)   99/7.2/7...100/7.2/7...57/.  Treatment: Nephrology consult, dialysis catheter insertion, dialysis, labs,   strict I&Os, daily weights  Options provided:  -- WOODROW on CKD 3b confirmed  -- WOODROW on CKD 4 confirmed  -- WOODROW on CKD 5  -- WOODROW on ESRD  -- Other - I will add my own diagnosis  -- Disagree - Not applicable / Not valid  -- Disagree - Clinically unable to determine / Unknown  -- Refer to Clinical

## (undated) DEVICE — MARKER,SKIN,WI/RULER AND LABELS: Brand: MEDLINE

## (undated) DEVICE — Device

## (undated) DEVICE — SUTURE PROL SZ 5-0 L36IN NONABSORBABLE BLU L13MM C-1 3/8 8720H

## (undated) DEVICE — GLOVE ORANGE PI 7 1/2   MSG9075

## (undated) DEVICE — Z INACTIVE USE 2660664 SOLUTION IRRIG 3000ML 0.9% SOD CHL USP UROMATIC PLAS CONT

## (undated) DEVICE — GLOVE SURG SZ 65 CRM LTX FREE POLYISOPRENE POLYMER BEAD ANTI

## (undated) DEVICE — SYRINGE MED 20ML STD CLR PLAS LUERLOCK TIP N CTRL DISP

## (undated) DEVICE — SOLUTION IV IRRIG WATER 1000ML POUR BRL 2F7114

## (undated) DEVICE — CATHETER DIAG L150CM ID0.0175IN SHTH OD5FR 0.014IN STD S

## (undated) DEVICE — SUTURE VCRL + SZ 2-0 L27IN ABSRB CLR CT-1 1/2 CIR TAPERCUT VCP259H

## (undated) DEVICE — APPLICATOR MEDICATED 26 CC SOLUTION HI LT ORNG CHLORAPREP

## (undated) DEVICE — SUTURE VCRL + SZ 0 L27IN ABSRB UD CT-1 L36MM 1/2 CIR TAPR VCP260H

## (undated) DEVICE — BLADE,CARBON-STEEL,11,STRL,DISPOSABLE,TB: Brand: MEDLINE

## (undated) DEVICE — BENTSON WIRE GUIDE 20CM DISTAL FLEXIBILITY WITH SOFTENED TIP: Brand: BENTSON

## (undated) DEVICE — SUTURE VICRYL SZ 2-0 L18IN ABSRB VLT L26MM SH 1/2 CIR J775D

## (undated) DEVICE — ADAPTER URO SCP UROLOK LL

## (undated) DEVICE — GOWN,SIRUS,NONRNF,SETINSLV,XL,20/CS: Brand: MEDLINE

## (undated) DEVICE — GLOVE SURG SZ 6 CRM LTX FREE POLYISOPRENE POLYMER BEAD ANTI

## (undated) DEVICE — CLIP LIG M BLU TI HRT SHP WIRE HORZ 6 CLIPS PER PK

## (undated) DEVICE — Z DISCONTINUED USE 2220295 SUTURE VICRYL SZ 0 L18IN ABSRB UD L36MM CT-1 1/2 CIR J840D

## (undated) DEVICE — PENCIL ES L3M BTTN SWCH HOLSTER W/ BLDE ELECTRD EDGE

## (undated) DEVICE — STRAP ARMBRD W1.5XL32IN FOAM STR YET SFT W/ HK AND LOOP

## (undated) DEVICE — DRAIN SURG W10XL20CM SIL SMOOTH FLAT 3/4 PERF DBL WRP

## (undated) DEVICE — SUTURE PROL SZ 7-0 L30IN NONABSORBABLE BLU L9.3MM BV-1 1/2 8703H

## (undated) DEVICE — GLOVE SURG SZ 7 L12IN FNGR THK79MIL GRN LTX FREE

## (undated) DEVICE — GLOVE SURG SZ 7.5 L11.73IN FNGR THK9.8MIL STRW LTX POLYMER

## (undated) DEVICE — COVER,LIGHT HANDLE,FLX,2/PK: Brand: MEDLINE INDUSTRIES, INC.

## (undated) DEVICE — DRAPE,REIN 53X77,STERILE: Brand: MEDLINE

## (undated) DEVICE — GARMENT,MEDLINE,DVT,INT,CALF,MED, GEN2: Brand: MEDLINE

## (undated) DEVICE — CATHETER ANGIO 6FR L110CM ID0.056IN VENT PIG CRV ROBUST

## (undated) DEVICE — GOWN,AURORA,NONREINFORCED,LARGE: Brand: MEDLINE

## (undated) DEVICE — GLOVE SURG SZ 65 L12IN FNGR THK79MIL GRN LTX FREE

## (undated) DEVICE — SUTURE PROL SZ 6-0 L24IN NONABSORBABLE BLU L9.3MM BV-1 3/8 8805H

## (undated) DEVICE — Z DISCONTINUED BY MEDLINE USE 2711682 TRAY SKIN PREP DRY W/ PREM GLV

## (undated) DEVICE — ELECTRODE ES L3IN S STL BLDE INSUL DISP VALLEYLAB EDGE

## (undated) DEVICE — STRAP,POSITIONING,KNEE/BODY,FOAM,4X60": Brand: MEDLINE

## (undated) DEVICE — TUBING SUCT 12FR MAL ALUM SHFT FN CAP VENT UNIV CONN W/ OBT

## (undated) DEVICE — SPONGE LAP W18XL18IN WHT COT 4 PLY FLD STRUNG RADPQ DISP ST 2 PER PACK

## (undated) DEVICE — SOLUTION SURG PREP ANTIMICROBIAL 4 OZ SKIN WND EXIDINE

## (undated) DEVICE — DECANTER BAG 9": Brand: MEDLINE INDUSTRIES, INC.

## (undated) DEVICE — SOLUTION IV IRRIG POUR BRL 0.9% SODIUM CHL 2F7124

## (undated) DEVICE — TOWEL,OR,DSP,ST,BLUE,DLX,XR,4/PK,20PK/CS: Brand: MEDLINE

## (undated) DEVICE — DRESSING TRNSPAR FLM 2.4X2.8IN SURESITE 123

## (undated) DEVICE — Device: Brand: ALLEGRO 1X SILICONE I/A HANDPIECE (6)

## (undated) DEVICE — APPLICATOR MEDICATED 10.5 CC SOLUTION HI LT ORNG CHLORAPREP

## (undated) DEVICE — PAD GEN USE BORDERED ADH 14IN 2IN AND 12IN 4IN GZ UNIV ST

## (undated) DEVICE — INTENDED FOR TISSUE SEPARATION, AND OTHER PROCEDURES THAT REQUIRE A SHARP SURGICAL BLADE TO PUNCTURE OR CUT.: Brand: BARD-PARKER ® CARBON RIB-BACK BLADES

## (undated) DEVICE — SUTURE NONABSORBABLE MONOFILAMENT 6-0 C-1 1X30 IN PROLENE 8706H

## (undated) DEVICE — AGENT HEMOSTATIC SURGIFLOW MATRIX KIT W/THROMBIN

## (undated) DEVICE — BETADINE 5% EYE SOL

## (undated) DEVICE — DRAPE,THYROID,SOFT,STERILE: Brand: MEDLINE

## (undated) DEVICE — SUTURE PERMAHAND SZ 4-0 L18IN NONABSORBABLE BLK SILK BRAID A183H

## (undated) DEVICE — STVZ LUMBAR SPINE PACK: Brand: MEDLINE INDUSTRIES, INC.

## (undated) DEVICE — ROSEN CURVED WIRE GUIDE: Brand: ROSEN

## (undated) DEVICE — GOWN,AURORA,NON-REINFORCED,2XL: Brand: MEDLINE

## (undated) DEVICE — MERCY TIFFIN CATH LAB PACK: Brand: MEDLINE INDUSTRIES, INC.

## (undated) DEVICE — CATHETER ANGIO 4FR L65CM 0.035IN VIS OMNI FLUSH-0 SFT TIP

## (undated) DEVICE — GLOVE SURG SZ 7 CRM LTX FREE POLYISOPRENE POLYMER BEAD ANTI

## (undated) DEVICE — RADIFOCUS OPTITORQUE ANGIOGRAPHIC CATHETER: Brand: OPTITORQUE

## (undated) DEVICE — GUIDEWIRE VASC STR 3 CM 0.035 INX150 CM STD NIT ZIPWIRE

## (undated) DEVICE — SUTURE NONABSORBABLE MONOFILAMENT 7-0 BV-1 1X24 IN PROLENE 8702H

## (undated) DEVICE — SUTURE ETHLN SZ 4-0 L18IN NONABSORBABLE BLK L19MM PS-2 3/8 1667H

## (undated) DEVICE — KNIFE OPHTH DIA22MM 45DEG SLT W HNDL SHRP ANG PNT DEL DBL

## (undated) DEVICE — GLOVE SURG SZ 75 CRM LTX FREE POLYISOPRENE POLYMER BEAD ANTI

## (undated) DEVICE — GLOVE SURG SZ 75 L12IN FNGR THK79MIL GRN LTX FREE

## (undated) DEVICE — TR BAND RADIAL ARTERY COMPRESSION DEVICE: Brand: TR BAND

## (undated) DEVICE — RESERVOIR,SUCTION,100CC,SILICONE: Brand: MEDLINE

## (undated) DEVICE — AIRLIFE™ NASAL OXYGEN CANNULA CURVED, FLARED TIP, WITH 7 FEET (2.1 M) CRUSH RESISTANT TUBING, OVER-THE-EAR STYLE: Brand: AIRLIFE™

## (undated) DEVICE — SPONGE GZ W4XL4IN CELOS POSTOP AVANT

## (undated) DEVICE — GAUZE,SPONGE,FLUFF,6"X6.75",STRL,5/TRAY: Brand: MEDLINE

## (undated) DEVICE — 1LYRTR 16FR10ML100%SILTMPS SNP: Brand: MEDLINE INDUSTRIES, INC.

## (undated) DEVICE — 1.5MM HYDRO LEMAITRE VALVULOTOME (98 CM): Brand: HYDRO LEMAITRE VALVULOTOME

## (undated) DEVICE — NAVICROSS SUPPORT CATHETER: Brand: NAVICROSS

## (undated) DEVICE — GLIDESHEATH NITINOL HYDROPHILIC COATED INTRODUCER SHEATH: Brand: GLIDESHEATH

## (undated) DEVICE — 3M™ IOBAN™ 2 ANTIMICROBIAL INCISE DRAPE 6651EZ: Brand: IOBAN™ 2

## (undated) DEVICE — ELECTRODE PT RET AD L9FT HI MOIST COND ADH HYDRGEL CORDED

## (undated) DEVICE — PINNACLE INTRODUCER SHEATH: Brand: PINNACLE

## (undated) DEVICE — RADIFOCUS GLIDEWIRE ADVANTAGE GUIDEWIRE: Brand: GLIDEWIRE ADVANTAGE

## (undated) DEVICE — SINGLE ACTION PUMPING SYSTEM

## (undated) DEVICE — SUTURE VCRL + SZ 2-0 L27IN ABSRB WHT SH 1/2 CIR TAPERCUT VCP417H

## (undated) DEVICE — CATHETER F BLLN 5CC 18FR 2 W HYDRGEL COAT LESS TRAUM LUB

## (undated) DEVICE — CONTAINER,SPECIMEN,4OZ,OR STRL: Brand: MEDLINE

## (undated) DEVICE — GUIDEWIRE VASC ANGLED 0.035 INX150 CM STD STEER SPLASHWIRE

## (undated) DEVICE — DRAPE, RADIAL, STERILE: Brand: MEDLINE

## (undated) DEVICE — SVMMC VASC MAJ PK

## (undated) DEVICE — SOFT SHIELD® COLLAGEN SHIELD, 12 HOURS (CE): Brand: SOFT SHIELD® COLLAGEN SHIELDS

## (undated) DEVICE — TAPE UMB 72X7/32 IN

## (undated) DEVICE — IMPLANTABLE DEVICE
Type: IMPLANTABLE DEVICE | Status: NON-FUNCTIONAL
Removed: 2023-04-27

## (undated) DEVICE — 3.0MM PRECISION NEURO (MATCH HEAD)